# Patient Record
Sex: FEMALE | Race: WHITE | NOT HISPANIC OR LATINO | Employment: FULL TIME
[De-identification: names, ages, dates, MRNs, and addresses within clinical notes are randomized per-mention and may not be internally consistent; named-entity substitution may affect disease eponyms.]

---

## 2017-06-17 ENCOUNTER — HEALTH MAINTENANCE LETTER (OUTPATIENT)
Age: 47
End: 2017-06-17

## 2018-05-15 NOTE — TELEPHONE ENCOUNTER
FUTURE VISIT INFORMATION      FUTURE VISIT INFORMATION:    Date: 5/18/18    Time: 0840    Location: ORTHO  REFERRAL INFORMATION:    Referring provider:  N/A    Referring providers clinic:  N/A    Reason for visit/diagnosis  L FOOT BUNION       RECORDS STATUS      PATIENT TO  IMAGES AND RECORDS PER APPOINTMENT NOTES

## 2018-05-18 ENCOUNTER — PRE VISIT (OUTPATIENT)
Dept: ORTHOPEDICS | Facility: CLINIC | Age: 48
End: 2018-05-18

## 2018-05-18 ENCOUNTER — OFFICE VISIT (OUTPATIENT)
Dept: ORTHOPEDICS | Facility: CLINIC | Age: 48
End: 2018-05-18
Payer: COMMERCIAL

## 2018-05-18 VITALS — WEIGHT: 198.2 LBS | HEIGHT: 64 IN | BODY MASS INDEX: 33.84 KG/M2

## 2018-05-18 DIAGNOSIS — M20.12 HALLUX VALGUS OF LEFT FOOT: Primary | ICD-10-CM

## 2018-05-18 RX ORDER — FLUVOXAMINE MALEATE 50 MG
50 TABLET ORAL 2 TIMES DAILY
COMMUNITY
End: 2019-01-24

## 2018-05-18 ASSESSMENT — ENCOUNTER SYMPTOMS
WEIGHT GAIN: 0
HALLUCINATIONS: 0
NIGHT SWEATS: 0
ALTERED TEMPERATURE REGULATION: 0
CHILLS: 0
WEIGHT LOSS: 0
INCREASED ENERGY: 0
POLYPHAGIA: 0
FATIGUE: 1
FEVER: 0
DECREASED APPETITE: 0
POLYDIPSIA: 0

## 2018-05-18 NOTE — NURSING NOTE
"Chief Complaint   Patient presents with     Foot Problems     patient is here for second opinion on painful left great toe bunion. Patient was previously seen at [facility].        Pain Assessment  Patient Currently in Pain: Yes  0-10 Pain Scale: 3  Primary Pain Location: Foot (great toe)  Pain Orientation: Left  Pain Descriptors: Aching, Sharp  Alleviating Factors: Rest  Aggravating Factors: Walking               Ht 1.626 m (5' 4\")  Wt 89.9 kg (198 lb 3.2 oz)  BMI 34.02 kg/m2    No Known Allergies    Current Outpatient Prescriptions   Medication Sig Dispense Refill     albuterol (2.5 MG/3ML) 0.083% nebulizer solution Take 1 vial (2.5 mg) by nebulization every 6 hours as needed for shortness of breath / dyspnea or wheezing 30 vial 0     albuterol (PROAIR HFA, PROVENTIL HFA, VENTOLIN HFA) 108 (90 BASE) MCG/ACT inhaler Inhale 2 puffs into the lungs every 4 hours as needed for shortness of breath / dyspnea or wheezing 1 Inhaler 6     CALCIUM 600 + D 600-200 MG-UNIT OR TABS one daily       chlorzoxazone (PARAFON FORTE) 500 MG tablet TAKE 1/2-1&1/2 TABLETS BY MOUTH 3-4 TIMES DAILY AS NEEDED TO RELAX MUSCLES  Needs follow up for refills. Needs to establish care 56 tablet 0     Cholecalciferol (VITAMIN D) 2000 UNITS tablet Take 2,000 Units by mouth daily. 100 tablet 3     fluvoxaMINE (LUVOX) 50 MG tablet Take 50 mg by mouth 2 times daily       LANsoprazole (PREVACID) 30 MG capsule Take 1 capsule (30 mg) by mouth daily 90 capsule 0     lidocaine (LIDODERM) 5 % patch Place 1 patch onto the skin every 24 hours. Remove after 12 hours. 30 patch 0     naproxen (NAPROSYN) 500 MG tablet Take 1 tablet (500 mg) by mouth 2 times daily as needed for moderate pain 30 tablet 1     TYLENOL EXTRA STRENGTH 500 MG OR TABS 1 TABLET EVERY 4 HOURS AS NEEDED       BUDESONIDE, INHALATION, 90 MCG/ACT AEPB Inhale 2 puffs into the lungs 2 times daily (Patient not taking: Reported on 5/18/2018) 3 each prn     buPROPion (WELLBUTRIN XL) " 150 MG 24 hr tablet Take 1 tablet (150 mg) by mouth every morning 30 tablet 0     citalopram (CELEXA) 40 MG tablet Take 1 tablet (40 mg) by mouth every morning (Patient not taking: Reported on 5/18/2018) 90 tablet 1     HYDROcodone-acetaminophen (NORCO) 5-325 MG per tablet Take 1 tablet by mouth every 6 hours as needed for moderate to severe pain (Patient not taking: Reported on 5/18/2018) 42 tablet 0     multivitamin, therapeutic with minerals (MULTI-VITAMIN) TABS Take 1 tablet by mouth daily         Fani Sanford  5/18/2018

## 2018-05-18 NOTE — LETTER
5/18/2018       RE: Manju Berg  [address] [facility] [address]     Dear Colleague,    Thank you for referring your patient, Manju Berg, to the [facility] at [facility]. Please see a copy of my visit note below.    Date of Service: 5/18/2018    Chief Complaint:   Chief Complaint   Patient presents with     Foot Problems     patient is here for second opinion on painful left great toe bunion. Patient was previously seen at [facility].       HPI: Manju is a 47 year old female who presents today for further evaluation of painful left bunion. Patient had bilateral bunionectomies in 2000. Since then, the left bunion has slowly returned and it is now painful almost daily. It is affecting her daily life, as she is only able to find one pair of shoes that is even somewhat comfortable. Walking is otherwise painful. She has also started to get blisters on the dorsal left second digit because it is riding up higher now and rubbing against top of her shoes. She has tried padding, different footwear, steroid injections, pain medications and just recently, custom orthotics. She saw a podiatrist about 2 months ago and is here for a second opinion to see what her treatment options are. She is open to corrective surgery. She brought recent x-rays with her today.    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/2011,Proximal left tibia     Dyspepsia and other specified disorders of function of stomach     No Comments Provided     Headache     No Comments Provided     Herpes simplex     No Comments Provided     Lateral epicondylitis of elbow     No Comments Provided     Other depressive disorder     No Comments Provided     Other unspecified back disorder     No  "Comments Provided     Pain in joint, upper arm     No Comments Provided       PSxH:   Past Surgical History:   Procedure Laterality Date     BUNIONECTOMY      No Comments Provided     OTHER SURGICAL HISTORY      65656.0,[place] EXCIS BARTHOLIN GLAND/CYST     OTHER SURGICAL HISTORY      3/16/12,112784,OTHER,Left,lower leg     RELEASE CARPAL TUNNEL      No Comments Provided     Allergies: Review of patient's allergies indicates no known allergies.    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     FH:   Family History   Problem Relation Age of Onset     Hypertension Father      Hypertension     Hyperlipidemia Father      Hyperlipidemia     Other - See Comments Mother      Psychiatric illness     Family History Negative Sister      Good Health     Family History Negative Son      Good Health     Colon Cancer Father      Cancer-colon     CANCER Father      Cancer,bladder   Objective:  Ht 5' 4\"  Wt 198 lb 3.2 oz  BMI 34.02 kg/m2    PT and DP pulses are 2/4 bilaterally. CRT is 3 seconds. Positive pedal hair.   Gross sensation is intact bilaterally.   Equinus is mild bilaterally. No pain with active or passive ROM of the ankle, MTJ, 1st ray, or halluces bilaterally,. Hypermobility of the left 1st ray in all directions. HAV deformity noted to the left foot with some irritation erythema noted to the medial aspect of the joint. Pain noted with 1st MTPJ active and passive ROM.   Nails normal bilaterally. No open lesions " are noted.     No x-rays indicated during today's visit  Outside films from [place] [facility] were reviewed today, independent visualization of images was performed, and results were discussed with the patient  Impression: Noted to have medial prominence excision with some tibial sesamoid peaking.     Assessment: Left bunion causing pain  Hammertoe left second digit  Hx of BL bunionectomies 2000    Plan:  - Pt seen and evaluated. Diagnosis and treatment options discussed. X-rays reviewed.  - She does have hypermobility of the first ray and a previous bunionectomy on the left.  I have referred her to see Dr. Esqueda for possible Lapidus or other procedure consultation.   - Dispensed Budin splint for L second digit  - Continue with custom orthotics and comfortable footwear in the meantime.   - Refer to Dr. Esqueda for discussion of surgical options     Again, thank you for allowing me to participate in the care of your patient.      Sincerely,    Rajan Millan DPM

## 2018-05-18 NOTE — MR AVS SNAPSHOT
After Visit Summary   5/18/2018    Manju Berg    MRN: [id number]           Patient Information     Date Of Birth          1970        Visit Information        Provider Department      5/18/2018 8:40 AM Rajan Millan DPM [facility]        Today's Diagnoses     Hallux valgus of left foot    -  1       Follow-ups after your visit        Additional Services     ORTHOPEDICS ADULT REFERRAL       Your provider has referred you to: [facility]: Orthopaedic Clinic [facility] [phone]   [email or web address]      Dr. Esqueda - consult for possible Lapidus    Please be aware that coverage of these services is subject to the terms and limitations of your health insurance plan.  Call member services at your health plan with any benefit or coverage questions.      Please bring the following to your appointment:    >>   Any x-rays, CTs or MRIs which have been performed.  Contact the facility where they were done to arrange for  prior to your scheduled appointment.    >>   List of current medications   >>   This referral request   >>   Any documents/labs given to you for this referral                  Your next 10 appointments already scheduled     Jul 10, 2018  2:50 PM CDT   (Arrive by 2:35 PM)   NEW FOOT/ANKLE with Sudeep Esqueda MD   [facility] ([facility])    [address] [place] [id number] [phone]              Who to contact     Please call your clinic at [phone] to:    Ask questions about your health    Make or cancel appointments    Discuss your medicines    Learn about your test results    Speak to your doctor            Additional Information About Your Visit        MyChart Information     [employer] gives you secure access to your electronic health record. If you see a primary care provider, you can also send messages to your care team and make  "appointments. If you have questions, please call your primary care clinic.  If you do not have a primary care provider, please call [phone] and they will assist you.      [employer] is an electronic gateway that provides easy, online access to your medical records. With [employer], you can request a clinic appointment, read your test results, renew a prescription or communicate with your care team.     To access your existing account, please contact your [facility] or call [phone] for assistance.        Care EveryWhere ID     This is your Care EveryWhere ID. This could be used by other organizations to access your [facility] medical records  [id number]        Your Vitals Were     Height BMI (Body Mass Index)                1.626 m (5' 4\") 34.02 kg/m2           Blood Pressure from Last 3 Encounters:   07/19/15 120/83   02/04/15 108/60   01/09/15 120/72    Weight from Last 3 Encounters:   05/18/18 89.9 kg (198 lb 3.2 oz)   07/19/15 82.5 kg (181 lb 12.8 oz)   02/04/15 83.5 kg (184 lb)              We Performed the Following     ORTHOPEDICS ADULT REFERRAL        Primary Care Provider Office Phone # Fax #    Pierce Aguilar [phone]       [address] [place] [address] [place] [address]        Equal Access to Services     MATT LIND AH: Hadii aad ku hadasho Soomaali, waaxda luqadaha, qaybta kaalmada adegurjityada, mignon miller. So [facility] [phone].    ATENCIÓN: Si habla español, tiene a bassett disposición servicios gratuitos de asistencia lingüística. Llame al [phone].    We comply with applicable federal civil rights laws and [place] laws. We do not discriminate on the basis of race, color, national origin, age, disability, sex, sexual orientation, or gender identity.            Thank you!     Thank you for choosing [facility]  for your care. Our goal is always to provide you with excellent care. " Hearing back from our patients is one way we can continue to improve our services. Please take a few minutes to complete the written survey that you may receive in the mail after your visit with us. Thank you!             Your Updated Medication List - Protect others around you: Learn how to safely use, store and throw away your medicines at www.disposemymeds.org.          This list is accurate as of 5/18/18 10:31 AM.  Always use your most recent med list.                   Brand Name Dispense Instructions for use Diagnosis    * albuterol 108 (90 Base) MCG/ACT Inhaler    PROAIR HFA/PROVENTIL HFA/VENTOLIN HFA    1 Inhaler    Inhale 2 puffs into the lungs every 4 hours as needed for shortness of breath / dyspnea or wheezing    Mild intermittent asthma, with acute exacerbation       * albuterol (2.5 MG/3ML) 0.083% neb solution     30 vial    Take 1 vial (2.5 mg) by nebulization every 6 hours as needed for shortness of breath / dyspnea or wheezing    Cough       BUDESONIDE (INHALATION) 90 MCG/ACT Aepb     3 each    Inhale 2 puffs into the lungs 2 times daily    Mild intermittent asthma       buPROPion 150 MG 24 hr tablet    WELLBUTRIN XL    30 tablet    Take 1 tablet (150 mg) by mouth every morning    Major depressive disorder, single episode, unspecified       CALCIUM 600 + D 600-200 MG-UNIT Tabs      one daily        chlorzoxazone 500 MG tablet    PARAFON FORTE    56 tablet    TAKE 1/2-1&1/2 TABLETS BY MOUTH 3-4 TIMES DAILY AS NEEDED TO RELAX MUSCLES Needs follow up for refills. Needs to establish care    Cervicalgia       citalopram 40 MG tablet    celeXA    90 tablet    Take 1 tablet (40 mg) by mouth every morning    Major depressive disorder, single episode, unspecified       fluvoxaMINE 50 MG tablet    LUVOX     Take 50 mg by mouth 2 times daily        HYDROcodone-acetaminophen 5-325 MG per tablet    NORCO    42 tablet    Take 1 tablet by mouth every 6 hours as needed for moderate to severe pain    Cervicalgia        LANsoprazole 30 MG CR capsule    PREVACID    90 capsule    Take 1 capsule (30 mg) by mouth daily    Dyspepsia and other specified disorders of function of stomach       lidocaine 5 % Patch    LIDODERM    30 patch    Place 1 patch onto the skin every 24 hours. Remove after 12 hours.    Lumbago       Multi-vitamin Tabs tablet      Take 1 tablet by mouth daily        naproxen 500 MG tablet    NAPROSYN    30 tablet    Take 1 tablet (500 mg) by mouth 2 times daily as needed for moderate pain    Hand joint pain, right       TYLENOL 500 MG tablet   Generic drug:  acetaminophen      1 TABLET EVERY 4 HOURS AS NEEDED        vitamin D 2000 units tablet     100 tablet    Take 2,000 Units by mouth daily.    Unspecified vitamin D deficiency       * Notice:  This list has 2 medication(s) that are the same as other medications prescribed for you. Read the directions carefully, and ask your doctor or other care provider to review them with you.

## 2018-05-18 NOTE — PROGRESS NOTES
Date of Service: 5/18/2018    Chief Complaint:   Chief Complaint   Patient presents with     Foot Problems     patient is here for second opinion on painful left great toe bunion. Patient was previously seen at [facility].       HPI: Manju is a 47 year old female who presents today for further evaluation of painful left bunion. Patient had bilateral bunionectomies in 2000. Since then, the left bunion has slowly returned and it is now painful almost daily. It is affecting her daily life, as she is only able to find one pair of shoes that is even somewhat comfortable. Walking is otherwise painful. She has also started to get blisters on the dorsal left second digit because it is riding up higher now and rubbing against top of her shoes. She has tried padding, different footwear, steroid injections, pain medications and just recently, custom orthotics. She saw a podiatrist about 2 months ago and is here for a second opinion to see what her treatment options are. She is open to corrective surgery. She brought recent x-rays with her today.    Review of Systems: Answers for HPI/ROS submitted by the patient on 5/18/2018   General Symptoms: Yes  Skin Symptoms: No  HENT Symptoms: No  EYE SYMPTOMS: No  HEART SYMPTOMS: No  LUNG SYMPTOMS: No  INTESTINAL SYMPTOMS: No  URINARY SYMPTOMS: No  GYNECOLOGIC SYMPTOMS: No  BREAST SYMPTOMS: No  SKELETAL SYMPTOMS: No  BLOOD SYMPTOMS: No  NERVOUS SYSTEM SYMPTOMS: No  MENTAL HEALTH SYMPTOMS: No  Fever: No  Loss of appetite: No  Weight loss: No  Weight gain: No  Fatigue: Yes  Night sweats: No  Chills: No  Increased stress: Yes  Excessive hunger: No  Excessive thirst: No  Feeling hot or cold when others believe the temperature is normal: No  Loss of height: No  Post-operative complications: No  Surgical site pain: No  Hallucinations: No  Change in or Loss of Energy: No  Hyperactivity: No  Confusion: No      PMH:   Past Medical History:   Diagnosis Date     Anxiety state     No  "Comments Provided     Asthma     No Comments Provided     Asthma     No Comments Provided     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     5/27/2011,Proximal left tibia     Dyspepsia and other specified disorders of function of stomach     No Comments Provided     Headache     No Comments Provided     Herpes simplex     No Comments Provided     Lateral epicondylitis of elbow     No Comments Provided     Other depressive disorder     No Comments Provided     Other unspecified back disorder     No Comments Provided     Pain in joint, upper arm     No Comments Provided       PSxH:   Past Surgical History:   Procedure Laterality Date     BUNIONECTOMY      No Comments Provided     OTHER SURGICAL HISTORY      18389.0,[place] EXCIS BARTHOLIN GLAND/CYST     OTHER SURGICAL HISTORY      3/16/12,795210,OTHER,Left,lower leg     RELEASE CARPAL TUNNEL      No Comments Provided       Allergies: Review of patient's allergies indicates no known allergies.    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       FH:   Family History   Problem Relation Age of Onset     Hypertension Father      Hypertension     Hyperlipidemia Father      Hyperlipidemia     Other - See Comments Mother      Psychiatric illness     Family History Negative Sister      Good Health     Family History Negative Son      Good Health     Colon Cancer Father      Cancer-colon     CANCER Father      Cancer,bladder       Objective:  Ht 5' 4\"  Wt 198 lb 3.2 oz "  BMI 34.02 kg/m2    PT and DP pulses are 2/4 bilaterally. CRT is 3 seconds. Positive pedal hair.   Gross sensation is intact bilaterally.   Equinus is mild bilaterally. No pain with active or passive ROM of the ankle, MTJ, 1st ray, or halluces bilaterally,. Hypermobility of the left 1st ray in all directions. HAV deformity noted to the left foot with some irritation erythema noted to the medial aspect of the joint. Pain noted with 1st MTPJ active and passive ROM.   Nails normal bilaterally. No open lesions are noted.     No x-rays indicated during today's visit  Outside films from [facility] were reviewed today, independent visualization of images was performed, and results were discussed with the patient  Impression: Noted to have medial prominence excision with some tibial sesamoid peaking.       Assessment: Left bunion causing pain  Hammertoe left second digit  Hx of BL bunionectomies 2000    Plan:  - Pt seen and evaluated. Diagnosis and treatment options discussed. X-rays reviewed.  - She does have hypermobility of the first ray and a previous bunionectomy on the left.  I have referred her to see Dr. Esqueda for possible Lapidus or other procedure consultation.   - Dispensed Budin splint for L second digit  - Continue with custom orthotics and comfortable footwear in the meantime.   - Refer to Dr. Esqueda for discussion of surgical options

## 2018-06-28 NOTE — TELEPHONE ENCOUNTER
FUTURE VISIT INFORMATION      FUTURE VISIT INFORMATION:    Date: 7/10/18    Time: 1450    Location: ORTHO  REFERRAL INFORMATION:    Referring provider:  DR HEREDIA    Referring providers clinic:  ORTHO/PODIATRY    Reason for visit/diagnosis  HALLUX VALGUS      RECORDS STATUS      RECORDS IN CHART FROM DR GLOVER APPOINTMENT

## 2018-07-02 DIAGNOSIS — M20.12 HALLUX VALGUS, ACQUIRED, LEFT: Primary | ICD-10-CM

## 2018-07-10 ENCOUNTER — RADIANT APPOINTMENT (OUTPATIENT)
Dept: GENERAL RADIOLOGY | Facility: CLINIC | Age: 48
End: 2018-07-10
Attending: ORTHOPAEDIC SURGERY
Payer: COMMERCIAL

## 2018-07-10 ENCOUNTER — OFFICE VISIT (OUTPATIENT)
Dept: ORTHOPEDICS | Facility: CLINIC | Age: 48
End: 2018-07-10
Payer: COMMERCIAL

## 2018-07-10 ENCOUNTER — PRE VISIT (OUTPATIENT)
Dept: ORTHOPEDICS | Facility: CLINIC | Age: 48
End: 2018-07-10

## 2018-07-10 VITALS — BODY MASS INDEX: 32.44 KG/M2 | WEIGHT: 190 LBS | HEIGHT: 64 IN

## 2018-07-10 DIAGNOSIS — M20.12 HALLUX VALGUS, ACQUIRED, LEFT: ICD-10-CM

## 2018-07-10 DIAGNOSIS — M25.572 PAIN IN JOINT, ANKLE AND FOOT, LEFT: Primary | ICD-10-CM

## 2018-07-10 ASSESSMENT — ENCOUNTER SYMPTOMS
SKIN CHANGES: 0
POOR WOUND HEALING: 1
NAIL CHANGES: 0

## 2018-07-10 NOTE — NURSING NOTE
Reason For Visit:   Chief Complaint   Patient presents with     Musculoskeletal Problem     Left foot bunion           Pain Assessment  Patient Currently in Pain: No

## 2018-07-10 NOTE — LETTER
7/10/2018       RE: Manju Berg  [address] [facility] [address]     Dear Colleague,    Thank you for referring your patient, Manju Berg, [facility]. Please see a copy of my visit note below.    Chief complaint left foot bunion deformity    Mrs. Jalloh is a 48-year-old female who presents today for evaluation of her left foot.  Patient reports to have had a bunion corrective surgery in the past and in spite of her best efforts she reports to to have now a recurrence of the deformity.  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  Reports otherwise to be a very active person who enjoys walking for recreational purposes.    Patient has tried some shoe modifications but he still presents with pain and discomfort.  More recently she had that went a hike with her family where she developed a fair amount of ulceration across the dorsal aspect of the second toe which is felt to be secondary to the great toe pushing the second toe towards the shoe.    Past medical history significant for anxiety and depression among others.    Patient has no known drug allergies.    Current medications were reviewed on today's visit.    On today's exam she presents with full range of motion of the ankle and hind foot joints. Presents with a hypermobile first ray as well. There is a correctable deformity of the bunion. There are also signs of skin pressure along the dorsomedial aspect of the 1st MTPJ. Remaining of the foot exam is unremarkable.    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     Assesement: Left bunion deformity.    Plan, it was discussed with the patient the different treatment options. Those included, observation vs. a Lapidus  procedure vs. a 1st MTPJ fusion. Pros and cons of each option were discussed with the patient.     After a lengthy discussion patient opted to proceed with a 1st MTPJ fusion of the left foot. Complications form that intervention include but not limited to, pain, stiffness, nonunion, nerve damage.    Patient would like to do some thinking and she will contact us when she chooses to proceed with a 1st MTPJ fusion of the left foot.    CT: 20 mins, TT: 30 mins.    Again, thank you for allowing me to participate in the care of your patient.      Sincerely,    Sudeep Esqueda MD

## 2018-07-10 NOTE — NURSING NOTE
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 No       Patient demonstrates understanding of the following:  Reason for the appointment, diagnosis and treatment plan: Yes  Knowledge of proper use of medications and conditions for which they are ordered (with special attention to potential side effects or drug interactions): Yes  Which situations necessitate calling provider and whom to contact: Yes       Teaching Concerns Addressed:   Comments: negative significant medical history      Proper use and care of  (medical equip, care aids, etc.): Yes  Nutritional needs and diet plan: Yes  Pain management techniques: Yes  Wound Care: Yes  How and/when to access community resources: NA     Instructional Materials Used/Given: pre-op packet, antiseptic soap, TRIA packet, post-operative foot and ankle surgery instructions      Time spent with patient: 15 minutes.

## 2018-07-10 NOTE — PROGRESS NOTES
Chief complaint left foot bunion deformity    Mrs. Jalloh is a 48-year-old female who presents today for evaluation of her left foot.  Patient reports to have had a bunion corrective surgery in the past and in spite of her best efforts she reports to to have now a recurrence of the deformity.  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  Reports otherwise to be a very active person who enjoys walking for recreational purposes.    Patient has tried some shoe modifications but he still presents with pain and discomfort.  More recently she had that went a hike with her family where she developed a fair amount of ulceration across the dorsal aspect of the second toe which is felt to be secondary to the great toe pushing the second toe towards the shoe.    Past medical history significant for anxiety and depression among others.    Patient has no known drug allergies.    Current medications were reviewed on today's visit.    On today's exam she presents with full range of motion of the ankle and hind foot joints. Presents with a hypermobile first ray as well. There is a correctable deformity of the bunion. There are also signs of skin pressure along the dorsomedial aspect of the 1st MTPJ. Remaining of the foot exam is unremarkable.    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     Assesement: Left bunion deformity.    Plan, it was discussed with the patient the different treatment options. Those included, observation vs. a Lapidus procedure vs. a 1st MTPJ fusion. Pros and cons of each option were discussed with the patient.     After a lengthy discussion patient opted to proceed with a 1st MTPJ fusion of the left foot. Complications form that intervention include but not limited to, pain, stiffness, nonunion, nerve  damage.    Patient would like to do some thinking and she will contact us when she chooses to proceed with a 1st MTPJ fusion of the left foot.    CT: 20 mins, TT: 30 mins.

## 2018-07-10 NOTE — MR AVS SNAPSHOT
"              After Visit Summary   7/10/2018    Manju Berg    MRN: [id number]           Patient Information     Date Of Birth          1970        Visit Information        Provider Department      7/10/2018 2:50 PM [facility]        Today's Diagnoses     Pain in joint, ankle and foot, left    -  1       Follow-ups after your visit        Who to contact     Please call your clinic at [phone] to:    Ask questions about your health    Make or cancel appointments    Discuss your medicines    Learn about your test results    Speak to your doctor            Additional Information About Your Visit        MyChart Information     [employer] gives you secure access to your electronic health record. If you see a primary care provider, you can also send messages to your care team and make appointments. If you have questions, please call your primary care clinic.  If you do not have a primary care provider, please call [phone] and they will assist you.      [employer] is an electronic gateway that provides easy, online access to your medical records. With [employer], you can request a clinic appointment, read your test results, renew a prescription or communicate with your care team.     To access your existing account, please contact your [facility] or call [phone] for assistance.        Care EveryWhere ID     This is your Care EveryWhere ID. This could be used by other organizations to access your [place] medical records  [id number]        Your Vitals Were     Height BMI (Body Mass Index)                1.626 m (5' 4\") 32.61 kg/m2           Blood Pressure from Last 3 Encounters:   07/19/15 120/83   02/04/15 108/60   01/09/15 120/72    Weight from Last 3 Encounters:   07/10/18 86.2 kg (190 lb)   05/18/18 89.9 kg (198 lb 3.2 oz)   07/19/15 82.5 kg (181 lb 12.8 oz)              Today, you had the following     No orders found for " display         Today's Medication Changes          These changes are accurate as of 7/10/18  5:52 PM.  If you have any questions, ask your nurse or doctor.               Stop taking these medicines if you haven't already. Please contact your care team if you have questions.     HYDROcodone-acetaminophen 5-325 MG per tablet   Commonly known as:  NORCO   Stopped by:  Sudeep Esqueda MD                    Primary Care Provider Office Phone # Fax #    Pierce Aguilar [phone]       [address] [place] [address] [place] [address]        Equal Access to Services     [facility]: Hadii aad ku hadasho Soomaali, waaxda luqadaha, qaybta kaalmada adeegyada, mignon zamudio . So [facility] [phone].    ATENCIÓN: Si habla español, tiene a bassett disposición servicios gratuitos de asistencia lingüística. [facility] [phone].    We comply with applicable federal civil rights laws and [place] laws. We do not discriminate on the basis of race, color, national origin, age, disability, sex, sexual orientation, or gender identity.            Thank you!     Thank you for choosing [facility]  for your care. Our goal is always to provide you with excellent care. Hearing back from our patients is one way we can continue to improve our services. Please take a few minutes to complete the written survey that you may receive in the mail after your visit with us. Thank you!             Your Updated Medication List - Protect others around you: Learn how to safely use, store and throw away your medicines at www.disposemymeds.org.          This list is accurate as of 7/10/18  5:52 PM.  Always use your most recent med list.                   Brand Name Dispense Instructions for use Diagnosis    * albuterol 108 (90 Base) MCG/ACT Inhaler    PROAIR HFA/PROVENTIL HFA/VENTOLIN HFA    1 Inhaler    Inhale 2 puffs into the lungs every 4 hours as needed for shortness of  breath / dyspnea or wheezing    Mild intermittent asthma, with acute exacerbation       * albuterol (2.5 MG/3ML) 0.083% neb solution     30 vial    Take 1 vial (2.5 mg) by nebulization every 6 hours as needed for shortness of breath / dyspnea or wheezing    Cough       BUDESONIDE (INHALATION) 90 MCG/ACT Aepb     3 each    Inhale 2 puffs into the lungs 2 times daily    Mild intermittent asthma       buPROPion 150 MG 24 hr tablet    WELLBUTRIN XL    30 tablet    Take 1 tablet (150 mg) by mouth every morning    Major depressive disorder, single episode, unspecified       CALCIUM 600 + D 600-200 MG-UNIT Tabs      one daily        chlorzoxazone 500 MG tablet    PARAFON FORTE    56 tablet    TAKE 1/2-1&1/2 TABLETS BY MOUTH 3-4 TIMES DAILY AS NEEDED TO RELAX MUSCLES Needs follow up for refills. Needs to establish care    Cervicalgia       citalopram 40 MG tablet    celeXA    90 tablet    Take 1 tablet (40 mg) by mouth every morning    Major depressive disorder, single episode, unspecified       fluvoxaMINE 50 MG tablet    LUVOX     Take 50 mg by mouth 2 times daily        LANsoprazole 30 MG CR capsule    PREVACID    90 capsule    Take 1 capsule (30 mg) by mouth daily    Dyspepsia and other specified disorders of function of stomach       lidocaine 5 % Patch    LIDODERM    30 patch    Place 1 patch onto the skin every 24 hours. Remove after 12 hours.    Lumbago       Multi-vitamin Tabs tablet      Take 1 tablet by mouth daily        naproxen 500 MG tablet    NAPROSYN    30 tablet    Take 1 tablet (500 mg) by mouth 2 times daily as needed for moderate pain    Hand joint pain, right       TYLENOL 500 MG tablet   Generic drug:  acetaminophen      1 TABLET EVERY 4 HOURS AS NEEDED        vitamin D 2000 units tablet     100 tablet    Take 2,000 Units by mouth daily.    Unspecified vitamin D deficiency       * Notice:  This list has 2 medication(s) that are the same as other medications prescribed for you. Read the directions  carefully, and ask your doctor or other care provider to review them with you.

## 2018-07-13 ENCOUNTER — TELEPHONE (OUTPATIENT)
Dept: ORTHOPEDICS | Facility: CLINIC | Age: 48
End: 2018-07-13

## 2018-07-13 NOTE — TELEPHONE ENCOUNTER
Phoned patient to confirm surgery with Dr. Esqueda for 8/31/18 at [facility]. Patient was told she will receive a call from [facility] the week of her surgery to confirm her arrival time. Patient was also provided dates and times of her post ops and was reminded to get her pre-op H&P within 30 days of surgery. Patient agreed.

## 2018-07-30 ENCOUNTER — TELEPHONE (OUTPATIENT)
Dept: ORTHOPEDICS | Facility: CLINIC | Age: 48
End: 2018-07-30

## 2018-07-30 NOTE — TELEPHONE ENCOUNTER
Phoned patient to let her know that her surgery with Dr. Esqueda scheduled for 8/31/18 at [facility] will need to be rescheduled due to a change in his schedule. I left her my direct number to call back as soon as she is able. [phone].

## 2018-07-30 NOTE — TELEPHONE ENCOUNTER
Received call back from patient to reschedule her surgery with Dr. Esqueda from 8/31/18 to 8/30/18 at [facility]. Patient was told she will receive a call from [facility] the week of surgery to confirm her arrival time. Patient agreed.

## 2018-08-09 ENCOUNTER — TELEPHONE (OUTPATIENT)
Dept: ORTHOPEDICS | Facility: CLINIC | Age: 48
End: 2018-08-09

## 2018-08-09 DIAGNOSIS — Z09 SURGICAL FOLLOW-UP CARE: Primary | ICD-10-CM

## 2018-08-09 NOTE — TELEPHONE ENCOUNTER
[facility] Call Center    Phone Message    May a detailed message be left on voicemail: yes    Reason for Call: Other: Pt is requesting knee walker for post surgery     Action Taken: Message routed to:  [facility] Orthopedics

## 2018-08-13 NOTE — TELEPHONE ENCOUNTER
Order placed for roll-a-bout knee walker as requested and mailed to patients home. Patient has been notified.

## 2018-08-21 ENCOUNTER — DOCUMENTATION ONLY (OUTPATIENT)
Dept: ORTHOPEDICS | Facility: CLINIC | Age: 48
End: 2018-08-21

## 2018-08-24 ENCOUNTER — TELEPHONE (OUTPATIENT)
Dept: ORTHOPEDICS | Facility: CLINIC | Age: 48
End: 2018-08-24

## 2018-08-24 NOTE — TELEPHONE ENCOUNTER
[facility] Call Center    Phone Message    May a detailed message be left on voicemail: yes    Reason for Call: Other: Ferny from [employer] calling. Has some questions regarding paperwork that was faxed to them. Please call them ASAP.     Action Taken: Message routed to:  [facility] Orthopedics

## 2018-08-24 NOTE — TELEPHONE ENCOUNTER
Call returned to Ferny from [facility], additional information provided as requested such as who her referring provider was and how long she has had this condition.

## 2018-08-30 ENCOUNTER — TRANSFERRED RECORDS (OUTPATIENT)
Dept: HEALTH INFORMATION MANAGEMENT | Facility: CLINIC | Age: 48
End: 2018-08-30

## 2018-09-13 ENCOUNTER — OFFICE VISIT (OUTPATIENT)
Dept: ORTHOPEDICS | Facility: CLINIC | Age: 48
End: 2018-09-13
Payer: COMMERCIAL

## 2018-09-13 DIAGNOSIS — Z09 SURGICAL FOLLOW-UP CARE: Primary | ICD-10-CM

## 2018-09-13 NOTE — MR AVS SNAPSHOT
After Visit Summary   9/13/2018    Manju Berg    MRN: [id number]           Patient Information     Date Of Birth          1970        Visit Information        Provider Department      9/13/2018 1:00 PM Nurse, Johan FERNANDEZ [facility]        Today's Diagnoses     Surgical follow-up care    -  1       Follow-ups after your visit        Your next 10 appointments already scheduled     Oct 16, 2018  8:00 AM CDT   (Arrive by 7:45 AM)   RETURN FOOT/ANKLE with Sudeep Esqueda MD   [facility] ([facility])    [address] [place] [id number] [phone]              Who to contact     Please call your clinic at [phone] to:    Ask questions about your health    Make or cancel appointments    Discuss your medicines    Learn about your test results    Speak to your doctor            Additional Information About Your Visit        MyChart Information     [employer] gives you secure access to your electronic health record. If you see a primary care provider, you can also send messages to your care team and make appointments. If you have questions, please call your primary care clinic.  If you do not have a primary care provider, please call [phone] and they will assist you.      [facility] is an electronic gateway that provides easy, online access to your medical records. With [facility], you can request a clinic appointment, read your test results, renew a prescription or communicate with your care team.     To access your existing account, please contact your [facility] or call [phone] for assistance.        Care EveryWhere ID     This is your Care EveryWhere ID. This could be used by other organizations to access your [place] medical records  [id number]         Blood Pressure from Last 3 Encounters:   07/19/15 120/83   02/04/15 108/60   01/09/15 120/72    Weight from Last 3  Encounters:   07/10/18 86.2 kg (190 lb)   05/18/18 89.9 kg (198 lb 3.2 oz)   07/19/15 82.5 kg (181 lb 12.8 oz)              Today, you had the following     No orders found for display       Primary Care Provider Office Phone # Fax #    Pierce Aguilar [phone]       [address] [place] [address] [place] [address]        Equal Access to Services     [facility] : Hadii aad ku hadasho Soomaali, waaxda luqadaha, qaybta kaalmada adeegyada, waxay idiin hayaan adeeg kharash la'lynette ah. So [facility] [phone].    ATENCIÓN: Si habla español, tiene a bassett disposición servicios gratuitos de asistencia lingüística. [facility] [phone].    We comply with applicable federal civil rights laws and [place] laws. We do not discriminate on the basis of race, color, national origin, age, disability, sex, sexual orientation, or gender identity.            Thank you!     Thank you for choosing [facility]  for your care. Our goal is always to provide you with excellent care. Hearing back from our patients is one way we can continue to improve our services. Please take a few minutes to complete the written survey that you may receive in the mail after your visit with us. Thank you!             Your Updated Medication List - Protect others around you: Learn how to safely use, store and throw away your medicines at www.disposemymeds.org.          This list is accurate as of 9/13/18  1:39 PM.  Always use your most recent med list.                   Brand Name Dispense Instructions for use Diagnosis    * albuterol 108 (90 Base) MCG/ACT inhaler    PROAIR HFA/PROVENTIL HFA/VENTOLIN HFA    1 Inhaler    Inhale 2 puffs into the lungs every 4 hours as needed for shortness of breath / dyspnea or wheezing    Mild intermittent asthma, with acute exacerbation       * albuterol (2.5 MG/3ML) 0.083% neb solution     30 vial    Take 1 vial (2.5 mg) by nebulization every 6 hours as needed for shortness  of breath / dyspnea or wheezing    Cough       BUDESONIDE (INHALATION) 90 MCG/ACT Aepb     3 each    Inhale 2 puffs into the lungs 2 times daily    Mild intermittent asthma       buPROPion 150 MG 24 hr tablet    WELLBUTRIN XL    30 tablet    Take 1 tablet (150 mg) by mouth every morning    Major depressive disorder, single episode, unspecified       CALCIUM 600 + D 600-200 MG-UNIT Tabs      one daily        chlorzoxazone 500 MG tablet    PARAFON FORTE    56 tablet    TAKE 1/2-1&1/2 TABLETS BY MOUTH 3-4 TIMES DAILY AS NEEDED TO RELAX MUSCLES Needs follow up for refills. Needs to establish care    Cervicalgia       citalopram 40 MG tablet    celeXA    90 tablet    Take 1 tablet (40 mg) by mouth every morning    Major depressive disorder, single episode, unspecified       fluvoxaMINE 50 MG tablet    LUVOX     Take 50 mg by mouth 2 times daily        LANsoprazole 30 MG CR capsule    PREVACID    90 capsule    Take 1 capsule (30 mg) by mouth daily    Dyspepsia and other specified disorders of function of stomach       lidocaine 5 % Patch    LIDODERM    30 patch    Place 1 patch onto the skin every 24 hours. Remove after 12 hours.    Lumbago       Multi-vitamin Tabs tablet      Take 1 tablet by mouth daily        naproxen 500 MG tablet    NAPROSYN    30 tablet    Take 1 tablet (500 mg) by mouth 2 times daily as needed for moderate pain    Hand joint pain, right       order for DME     1 Units    Roll-A-Bout Walker. Patient can use for 4 months    Surgical follow-up care       TYLENOL 500 MG tablet   Generic drug:  acetaminophen      1 TABLET EVERY 4 HOURS AS NEEDED        vitamin D 2000 units tablet     100 tablet    Take 2,000 Units by mouth daily.    Unspecified vitamin D deficiency       * Notice:  This list has 2 medication(s) that are the same as other medications prescribed for you. Read the directions carefully, and ask your doctor or other care provider to review them with you.

## 2018-09-13 NOTE — PROGRESS NOTES
Reason for visit:    Manju Berg came in to the clinic for a two week post op check.    Her surgery was done 8/30/18 by Dr Esqueda.  She had Left 1st metatarsophalangeal joint arthrodesis     Assessment:    Manju came into the clinic in her post op cam boot WBAT    The Surgical wounds were exposed and found to be well-healed and without evidence of infection; so the sutures were removed.    She is taking Tylenol for pain, and states that overall she is feeling good with minimal pain.     Plan:     She was placed in steri-strips and an ace wrap.  She was told to remain WBAT     She has an appointment to see Dr. Esqueda at that time Dr. Esqueda will determine further restrictions.    She has our phone number and will call with questions or problems.

## 2018-09-15 ENCOUNTER — HOSPITAL ENCOUNTER (EMERGENCY)
Facility: CLINIC | Age: 48
Discharge: HOME | End: 2018-09-15
Attending: EMERGENCY MEDICINE | Admitting: EMERGENCY MEDICINE
Payer: COMMERCIAL

## 2018-09-15 ENCOUNTER — APPOINTMENT (OUTPATIENT)
Dept: ULTRASOUND IMAGING | Facility: CLINIC | Age: 48
End: 2018-09-15
Attending: EMERGENCY MEDICINE
Payer: COMMERCIAL

## 2018-09-15 ENCOUNTER — APPOINTMENT (OUTPATIENT)
Dept: GENERAL RADIOLOGY | Facility: CLINIC | Age: 48
End: 2018-09-15
Attending: EMERGENCY MEDICINE
Payer: COMMERCIAL

## 2018-09-15 VITALS
OXYGEN SATURATION: 98 % | WEIGHT: 185 LBS | BODY MASS INDEX: 31.76 KG/M2 | RESPIRATION RATE: 14 BRPM | HEART RATE: 69 BPM | DIASTOLIC BLOOD PRESSURE: 73 MMHG | SYSTOLIC BLOOD PRESSURE: 111 MMHG | TEMPERATURE: 97.7 F

## 2018-09-15 DIAGNOSIS — G89.18 POSTOPERATIVE PAIN: ICD-10-CM

## 2018-09-15 LAB
ANION GAP SERPL CALCULATED.3IONS-SCNC: 2 MMOL/L (ref 3–14)
BASOPHILS # BLD AUTO: 0 10E9/L (ref 0–0.2)
BASOPHILS NFR BLD AUTO: 0.5 %
BUN SERPL-MCNC: 13 MG/DL (ref 7–30)
CALCIUM SERPL-MCNC: 8.7 MG/DL (ref 8.5–10.1)
CHLORIDE SERPL-SCNC: 104 MMOL/L (ref 94–109)
CO2 SERPL-SCNC: 32 MMOL/L (ref 20–32)
CREAT SERPL-MCNC: 0.8 MG/DL (ref 0.52–1.04)
CRP SERPL-MCNC: <2.9 MG/L (ref 0–8)
DIFFERENTIAL METHOD BLD: NORMAL
EOSINOPHIL # BLD AUTO: 0.3 10E9/L (ref 0–0.7)
EOSINOPHIL NFR BLD AUTO: 5.7 %
ERYTHROCYTE [DISTWIDTH] IN BLOOD BY AUTOMATED COUNT: 11.8 % (ref 10–15)
ERYTHROCYTE [SEDIMENTATION RATE] IN BLOOD BY WESTERGREN METHOD: 5 MM/H (ref 0–20)
GFR SERPL CREATININE-BSD FRML MDRD: 77 ML/MIN/1.7M2
GLUCOSE SERPL-MCNC: 76 MG/DL (ref 70–99)
HCT VFR BLD AUTO: 42 % (ref 35–47)
HGB BLD-MCNC: 14.3 G/DL (ref 11.7–15.7)
IMM GRANULOCYTES # BLD: 0 10E9/L (ref 0–0.4)
IMM GRANULOCYTES NFR BLD: 0.2 %
LYMPHOCYTES # BLD AUTO: 2.4 10E9/L (ref 0.8–5.3)
LYMPHOCYTES NFR BLD AUTO: 43.4 %
MCH RBC QN AUTO: 28.9 PG (ref 26.5–33)
MCHC RBC AUTO-ENTMCNC: 34 G/DL (ref 31.5–36.5)
MCV RBC AUTO: 85 FL (ref 78–100)
MONOCYTES # BLD AUTO: 0.3 10E9/L (ref 0–1.3)
MONOCYTES NFR BLD AUTO: 5 %
NEUTROPHILS # BLD AUTO: 2.5 10E9/L (ref 1.6–8.3)
NEUTROPHILS NFR BLD AUTO: 45.2 %
NRBC # BLD AUTO: 0 10*3/UL
NRBC BLD AUTO-RTO: 0 /100
PLATELET # BLD AUTO: 226 10E9/L (ref 150–450)
POTASSIUM SERPL-SCNC: 3.8 MMOL/L (ref 3.4–5.3)
RBC # BLD AUTO: 4.95 10E12/L (ref 3.8–5.2)
SODIUM SERPL-SCNC: 138 MMOL/L (ref 133–144)
WBC # BLD AUTO: 5.6 10E9/L (ref 4–11)

## 2018-09-15 PROCEDURE — 86140 C-REACTIVE PROTEIN: CPT | Performed by: EMERGENCY MEDICINE

## 2018-09-15 PROCEDURE — 85652 RBC SED RATE AUTOMATED: CPT | Performed by: EMERGENCY MEDICINE

## 2018-09-15 PROCEDURE — 80048 BASIC METABOLIC PNL TOTAL CA: CPT | Performed by: EMERGENCY MEDICINE

## 2018-09-15 PROCEDURE — 73630 X-RAY EXAM OF FOOT: CPT | Mod: LT

## 2018-09-15 PROCEDURE — 99285 EMERGENCY DEPT VISIT HI MDM: CPT | Mod: 25

## 2018-09-15 PROCEDURE — 85025 COMPLETE CBC W/AUTO DIFF WBC: CPT | Performed by: EMERGENCY MEDICINE

## 2018-09-15 PROCEDURE — 93971 EXTREMITY STUDY: CPT | Mod: LT

## 2018-09-15 RX ORDER — GABAPENTIN 300 MG/1
CAPSULE ORAL
Qty: 70 CAPSULE | Refills: 0 | Status: SHIPPED | OUTPATIENT
Start: 2018-09-15 | End: 2019-01-22

## 2018-09-15 RX ORDER — OXYCODONE HYDROCHLORIDE 5 MG/1
5 TABLET ORAL EVERY 6 HOURS PRN
Qty: 12 TABLET | Refills: 0 | Status: SHIPPED | OUTPATIENT
Start: 2018-09-15 | End: 2019-01-22

## 2018-09-15 ASSESSMENT — ENCOUNTER SYMPTOMS
SHORTNESS OF BREATH: 0
WOUND: 0
ABDOMINAL PAIN: 0
FEVER: 0
ARTHRALGIAS: 1
VOMITING: 0
NAUSEA: 0
NUMBNESS: 1
CHILLS: 0

## 2018-09-15 NOTE — ED AVS SNAPSHOT
Emergency Department    [address] [facility] [address]    Phone:  [phone]    Fax:  [phone]                                       Manju Berg   MRN: [id number]    Department:   Emergency Department   Date of Visit:  9/15/2018           After Visit Summary Signature Page     I have received my discharge instructions, and my questions have been answered. I have discussed any challenges I see with this plan with the nurse or doctor.    ..........................................................................................................................................  Patient/Patient Representative Signature      ..........................................................................................................................................  Patient Representative Print Name and Relationship to Patient    ..................................................               ................................................  Date                                   Time    ..........................................................................................................................................  Reviewed by Signature/Title    ...................................................              ..............................................  Date                                               Time          22EPIC Rev 08/18

## 2018-09-15 NOTE — ED PROVIDER NOTES
History     Chief Complaint:  Foot Pain    HPI   Manju Berg is a 48 year old female s/p left first metatarsophalangeal joint fusion on 8/30/18 with Dr. Dheeraj BERRY who presents with left foot pain. The patient reports she was doing well following the surgery and had her sutures removed on 9/13/18. However, overnight on 9/14/18, the patient notes she began experiencing sudden onset of a burning and tingling sensation starting on the top of her left foot, radiating into her big toe. Throughout the day yesterday, she states she rested and kept the boot and ace wrap off, which seemed to help it feel better. Last night throughout the night, the patient states the pain and tingling became unbearable again, and she was unable to get comfortable even with pain medications, so she came to the ED for further evaluation. On presentation to the ED, the patient continues to endorse the tingling and pounding sensation in her left foot and big toe. The patient denies any redness, drainage, fevers, increased swelling to the foot, chest pain, shortness of breath, nausea, vomiting, night sweats, recent falls or trauma to the foot, or other acute symptoms. Of note, the patient has not spoken with her orthopedic surgeon since the pain started.    Allergies:  No known drug allergies    Medications:    Albuterol neb solution  Albuterol inhaler  Budesonide inhaler  Bupropion  Chlorzoxazone  Celexa  Fluvoxamine  Prevacid  Tylenol  Naproxen    Past Medical History:    Anxiety  Asthma  Benign neoplasm of bone/articular cartilage  Headaches  Depression  Chronic constipation    Past Surgical History:    Bunionectomy  Bartholin cyst removal  Release carpal tunnel  Orthopedic foot surgery, left    Family History:    Hypertension  Hyperlipidemia  Psychiatric illness  Colon cancer  Bladder cancer    Social History:  Smoking status: No  Alcohol use: Yes, socially  PCP: Pierce Aguilar  Marital Status:  [2]     Review of  Systems   Constitutional: Negative for chills and fever.   Respiratory: Negative for shortness of breath.    Cardiovascular: Negative for chest pain and leg swelling.   Gastrointestinal: Negative for abdominal pain, nausea and vomiting.   Musculoskeletal: Positive for arthralgias.   Skin: Negative for wound.   Neurological: Positive for numbness.   All other systems reviewed and are negative.    Physical Exam   Patient Vitals for the past 24 hrs:   BP Temp Temp src Pulse Resp SpO2 Weight   09/15/18 0932 (!) 128/92 97.7  F (36.5  C) Oral 82 14 95 % 83.9 kg (185 lb)     Physical Exam  Constitutional: Well developed, nontox appearance  Head: Atraumatic.   Mouth/Throat: Oropharynx is clear and moist.   Neck:  no stridor  Eyes: no scleral icterus  Cardiovascular: RRR, 2+ bilat radial pulses. Left lower extremity: Brisk cap refill.  Pulmonary/Chest: nml resp effort  Ext: Warm, well perfused, no edema. Left lower extremity warm and well-perfused, no edema, resolving ecchymosis to dorsal aspect of left foot. Incision CDI.    Neurological: A&O, symmetric facies, moves ext x4. Subjectively decreased sensation to left great toe.  Skin: Skin is warm and dry.   Psychiatric: Behavior is normal. Thought content normal.   Nursing note and vitals reviewed.    Emergency Department Course   Imaging:  Radiographic findings were communicated with the patient who voiced understanding of the findings.    US Lower Extremity Venous Duplex Left:  No evidence of deep venous thrombosis.  As read by Radiology.    Foot XR, G/E 3 views, left:  Two surgical screws are noted across the left first MTP  joint. No evidence for acute fracture or dislocation in the left foot.  As read by Radiology.    Laboratory:  CBC: WNL (WBC 5.6, HGB 1.3, )  BMP: Anion GAP 2 (L), o/w WNL (Creatinine 0.80)  CRP inflammation: <2.9  ESR: 5    Emergency Department Course:  Past medical records, nursing notes, and vitals reviewed.  0939: I performed an exam of the  patient and obtained history, as documented above.  IV inserted and blood drawn.  The patient was sent for a left lower extremity venous ultrasound and left foot x-ray while in the emergency department, findings above.    1120: I spoke to Dr. Joseph on-call for orthopedic surgery through [facility], where the patient had her surgery.    1153: I rechecked the patient. Explained findings to the patient.    I rechecked the patient. Findings and plan explained to the patient. Patient discharged home with instructions regarding supportive care, medications, and reasons to return. The importance of close follow-up was reviewed.     Impression & Plan    Medical Decision Making:  Manju Berg is a 48 year old female who presents to the ED for evaluation of post-operative left foot pain. Differential diagnosis for this patient includes, but is not limited to fracture, DVT, post op infection, neuropathy, and others. The patient was sent for duplex ultrasound of the left leg, which did not demonstrate any evidence of DVT. She was also sent for a left foot x-ray, which does not show evidence for fracture or other acute etiology of her newly onset pain. This is likely post-operative pain vs assoc nerve pain. Doubt post op infection, fracture.  She declined pain medications here. The patient should follow-up with her orthopedic surgeon Dr. Esqueda at least via telephone by Monday. In the meantime, she can take Tylenol and Ibuprofen for pain management. I have also provided her scripts for Gabapentin and Oxycodone she can use as needed until she follows up. Recommendations given regarding follow up with primary care doctor and return to the emergency department as needed for new or worsening symptoms. The patient was understanding and agreeable to plan. Patient discharged in stable condition.     Diagnosis:    ICD-10-CM   1. Postoperative pain G89.18     Disposition: Discharged to home    Discharge Medications:  New Prescriptions     GABAPENTIN (NEURONTIN) 300 MG CAPSULE    Take 300 mg daily for one week, then 300 mg twice daily for one week, then 300 mg TID.    OXYCODONE IR (ROXICODONE) 5 MG TABLET    Take 1 tablet (5 mg) by mouth every 6 hours as needed for pain     Edith Lara  9/15/2018    EMERGENCY DEPARTMENT    I, Edith Lara, maria eugenia serving as a scribe at 9:39 AM on 9/15/2018 to document services personally performed by Carmelo Garcia MD based on my observations and the provider's statements to me.      Carmelo Garcia MD  09/15/18 2001

## 2018-09-15 NOTE — DISCHARGE INSTRUCTIONS
Take the below medications as prescribed.    New Prescriptions    GABAPENTIN (NEURONTIN) 300 MG CAPSULE    Take 300 mg daily for one week, then 300 mg twice daily for one week, then 300 mg TID.    OXYCODONE IR (ROXICODONE) 5 MG TABLET    Take 1 tablet (5 mg) by mouth every 6 hours as needed for pain     1. -Take acetaminophen 500 to 1000 mg by mouth every 4 to 6 hours as needed for pain or fever.  Do not take more than 4000 mg in 24 hours.  Do not take within 6 hours of another acetaminophen containing medication such as norco (vicodin) or percocet.  - Take ibuprofen 600 to 800 mg by mouth every 6 to 8 hours as needed for pain or fever  2. Use ice as needed for swelling.  3. Elevate extremity to help with swelling.  4. Follow-up with Dr. Esqueda by phone on Monday.  5. You may return to the ED as needed for new or worsening symptoms such as fever greater than 100.4 F, reinjury, severe and uncontrollable pain, focal weakness, severe numbness and tingling, any other concerning symptoms.

## 2018-09-15 NOTE — ED AVS SNAPSHOT
Emergency Department    [address] [facility] [address]    Phone:  [phone]    Fax:  [phone]                                       Manju Berg   MRN: [id number]    Department:   Emergency Department   Date of Visit:  9/15/2018           Patient Information     Date Of Birth          1970        Your diagnoses for this visit were:     Postoperative pain        You were seen by Carmelo Garcia MD.      Follow-up Information     Follow up with  Emergency Department.    Specialty:  EMERGENCY MEDICINE    Why:  As needed    Contact information:    [address] [facility] [address] [phone]        Follow up with Sudeep Esqueda MD In 2 days.    Specialty:  Orthopedics    Contact information:    [facility]  [address] [place] [address] [phone]          Discharge Instructions       Take the below medications as prescribed.    New Prescriptions    GABAPENTIN (NEURONTIN) 300 MG CAPSULE    Take 300 mg daily for one week, then 300 mg twice daily for one week, then 300 mg TID.    OXYCODONE IR (ROXICODONE) 5 MG TABLET    Take 1 tablet (5 mg) by mouth every 6 hours as needed for pain     1. -Take acetaminophen 500 to 1000 mg by mouth every 4 to 6 hours as needed for pain or fever.  Do not take more than 4000 mg in 24 hours.  Do not take within 6 hours of another acetaminophen containing medication such as norco (vicodin) or percocet.  - Take ibuprofen 600 to 800 mg by mouth every 6 to 8 hours as needed for pain or fever  2. Use ice as needed for swelling.  3. Elevate extremity to help with swelling.  4. Follow-up with Dr. Esqueda by phone on Monday.  5. You may return to the ED as needed for new or worsening symptoms such as fever greater than 100.4 F, reinjury, severe and uncontrollable pain, focal weakness, severe numbness and tingling, any other concerning symptoms.      Your next 10 appointments already scheduled     Oct 16,  2018  8:00 AM CDT   (Arrive by 7:45 AM)   RETURN FOOT/ANKLE with Sudeep Esqueda MD   [facility] ([facility])    [address]  [address] [facility] [address] [phone]              24 Hour Appointment Hotline       To make an appointment at any [facility], call [phone] ([phone]). If you don't have a family doctor or clinic, we will help you find one. [facility] are conveniently located to serve the needs of you and your family.             Review of your medicines      START taking        Dose / Directions Last dose taken    gabapentin 300 MG capsule   Commonly known as:  NEURONTIN   Quantity:  70 capsule        Take 300 mg daily for one week, then 300 mg twice daily for one week, then 300 mg TID.   Refills:  0        oxyCODONE IR 5 MG tablet   Commonly known as:  ROXICODONE   Dose:  5 mg   Quantity:  12 tablet        Take 1 tablet (5 mg) by mouth every 6 hours as needed for pain   Refills:  0          Our records show that you are taking the medicines listed below. If these are incorrect, please call your family doctor or clinic.        Dose / Directions Last dose taken    * albuterol 108 (90 Base) MCG/ACT inhaler   Commonly known as:  PROAIR HFA/PROVENTIL HFA/VENTOLIN HFA   Dose:  2 puff   Quantity:  1 Inhaler        Inhale 2 puffs into the lungs every 4 hours as needed for shortness of breath / dyspnea or wheezing   Refills:  6        * albuterol (2.5 MG/3ML) 0.083% neb solution   Dose:  1 vial   Quantity:  30 vial        Take 1 vial (2.5 mg) by nebulization every 6 hours as needed for shortness of breath / dyspnea or wheezing   Refills:  0        BUDESONIDE (INHALATION) 90 MCG/ACT Aepb   Dose:  2 puff   Quantity:  3 each        Inhale 2 puffs into the lungs 2 times daily   Refills:  prn        buPROPion 150 MG 24 hr tablet   Commonly known as:  WELLBUTRIN XL   Dose:  150 mg   Quantity:  30 tablet        Take 1 tablet (150  mg) by mouth every morning   Refills:  0        CALCIUM 600 + D 600-200 MG-UNIT Tabs        one daily   Refills:  0        chlorzoxazone 500 MG tablet   Commonly known as:  PARAFON FORTE   Quantity:  56 tablet        TAKE 1/2-1&1/2 TABLETS BY MOUTH 3-4 TIMES DAILY AS NEEDED TO RELAX MUSCLES Needs follow up for refills. Needs to establish care   Refills:  0        citalopram 40 MG tablet   Commonly known as:  celeXA   Dose:  40 mg   Quantity:  90 tablet        Take 1 tablet (40 mg) by mouth every morning   Refills:  1        fluvoxaMINE 50 MG tablet   Commonly known as:  LUVOX   Dose:  50 mg        Take 50 mg by mouth 2 times daily   Refills:  0        LANsoprazole 30 MG CR capsule   Commonly known as:  PREVACID   Dose:  30 mg   Quantity:  90 capsule        Take 1 capsule (30 mg) by mouth daily   Refills:  0        lidocaine 5 % Patch   Commonly known as:  LIDODERM   Dose:  1 patch   Quantity:  30 patch        Place 1 patch onto the skin every 24 hours. Remove after 12 hours.   Refills:  0        Multi-vitamin Tabs tablet   Dose:  1 tablet        Take 1 tablet by mouth daily   Refills:  0        naproxen 500 MG tablet   Commonly known as:  NAPROSYN   Dose:  500 mg   Quantity:  30 tablet        Take 1 tablet (500 mg) by mouth 2 times daily as needed for moderate pain   Refills:  1        order for DME   Quantity:  1 Units        Roll-A-Bout Walker. Patient can use for 4 months   Refills:  0        TYLENOL 500 MG tablet   Generic drug:  acetaminophen        1 TABLET EVERY 4 HOURS AS NEEDED   Refills:  0        vitamin D 2000 units tablet   Dose:  2000 Units   Quantity:  100 tablet        Take 2,000 Units by mouth daily.   Refills:  3        * Notice:  This list has 2 medication(s) that are the same as other medications prescribed for you. Read the directions carefully, and ask your doctor or other care provider to review them with you.            Information about OPIOIDS     PRESCRIPTION OPIOIDS: WHAT YOU NEED TO KNOW    We gave you an opioid (narcotic) pain medicine. It is important to manage your pain, but opioids are not always the best choice. You should first try all the other options your care team gave you. Take this medicine for as short a time (and as few doses) as possible.    Some activities can increase your pain, such as bandage changes or therapy sessions. It may help to take your pain medicine 30 to 60 minutes before these activities. Reduce your stress by getting enough sleep, working on hobbies you enjoy and practicing relaxation or meditation. Talk to your care team about ways to manage your pain beyond prescription opioids.    These medicines have risks:    DO NOT drive when on new or higher doses of pain medicine. These medicines can affect your alertness and reaction times, and you could be arrested for driving under the influence (DUI). If you need to use opioids long-term, talk to your care team about driving.    DO NOT operate heavy machinery    DO NOT do any other dangerous activities while taking these medicines.    DO NOT drink any alcohol while taking these medicines.     If the opioid prescribed includes acetaminophen, DO NOT take with any other medicines that contain acetaminophen. Read all labels carefully. Look for the word  acetaminophen  or  Tylenol.  Ask your pharmacist if you have questions or are unsure.    You can get addicted to pain medicines, especially if you have a history of addiction (chemical, alcohol or substance dependence). Talk to your care team about ways to reduce this risk.    All opioids tend to cause constipation. Drink plenty of water and eat foods that have a lot of fiber, such as fruits, vegetables, prune juice, apple juice and high-fiber cereal. Take a laxative (Miralax, milk of magnesia, Colace, Senna) if you don t move your bowels at least every other day. Other side effects include upset stomach, sleepiness, dizziness, throwing up, tolerance (needing more of the  medicine to have the same effect), physical dependence and slowed breathing.    Store your pills in a secure place, locked if possible. We will not replace any lost or stolen medicine. If you don t finish your medicine, please throw away (dispose) as directed by your pharmacist. The [place] [employer]ution [employer] has more information about safe disposal: https://www.pca.[facility].[email or web address]/living-green/managing-unwanted-medications        Prescriptions were sent or printed at these locations (2 Prescriptions)                   Other Prescriptions                Printed at Department/Unit printer (2 of 2)         gabapentin (NEURONTIN) 300 MG capsule               oxyCODONE IR (ROXICODONE) 5 MG tablet                Procedures and tests performed during your visit     Basic metabolic panel    CBC with platelets differential    CRP inflammation    Erythrocyte sedimentation rate auto    Foot XR, G/E 3 views, left    US Lower Extremity Venous Duplex Left      Orders Needing Specimen Collection     None      Pending Results     No orders found from 9/13/2018 to 9/16/2018.            Pending Culture Results     No orders found from 9/13/2018 to 9/16/2018.            Pending Results Instructions     If you had any lab results that were not finalized at the time of your Discharge, you can call the ED Lab Result RN at [phone]. You will be contacted by this team for any positive Lab results or changes in treatment. The nurses are available 7 days a week from 10A to 6:30P.  You can leave a message 24 hours per day and they will return your call.        Test Results From Your Hospital Stay        9/15/2018 10:15 AM      Component Results     Component Value Ref Range & Units Status    WBC 5.6 4.0 - 11.0 10e9/L Final    RBC Count 4.95 3.8 - 5.2 10e12/L Final    Hemoglobin 14.3 11.7 - 15.7 g/dL Final    Hematocrit 42.0 35.0 - 47.0 % Final    MCV 85 78 - 100 fl Final    MCH 28.9 26.5 - 33.0 pg Final    MCHC 34.0 31.5 - 36.5 g/dL  Final    RDW 11.8 10.0 - 15.0 % Final    Platelet Count 226 150 - 450 10e9/L Final    Diff Method Automated Method  Final    % Neutrophils 45.2 % Final    % Lymphocytes 43.4 % Final    % Monocytes 5.0 % Final    % Eosinophils 5.7 % Final    % Basophils 0.5 % Final    % Immature Granulocytes 0.2 % Final    Nucleated RBCs 0 0 /100 Final    Absolute Neutrophil 2.5 1.6 - 8.3 10e9/L Final    Absolute Lymphocytes 2.4 0.8 - 5.3 10e9/L Final    Absolute Monocytes 0.3 0.0 - 1.3 10e9/L Final    Absolute Eosinophils 0.3 0.0 - 0.7 10e9/L Final    Absolute Basophils 0.0 0.0 - 0.2 10e9/L Final    Abs Immature Granulocytes 0.0 0 - 0.4 10e9/L Final    Absolute Nucleated RBC 0.0  Final         9/15/2018 10:32 AM      Component Results     Component Value Ref Range & Units Status    Sodium 138 133 - 144 mmol/L Final    Potassium 3.8 3.4 - 5.3 mmol/L Final    Chloride 104 94 - 109 mmol/L Final    Carbon Dioxide 32 20 - 32 mmol/L Final    Anion Gap 2 (L) 3 - 14 mmol/L Final    Glucose 76 70 - 99 mg/dL Final    Urea Nitrogen 13 7 - 30 mg/dL Final    Creatinine 0.80 0.52 - 1.04 mg/dL Final    GFR Estimate 77 >60 mL/min/1.7m2 Final    Non  GFR Calc    GFR Estimate If Black >90 >60 mL/min/1.7m2 Final    African American GFR Calc    Calcium 8.7 8.5 - 10.1 mg/dL Final         9/15/2018 10:32 AM      Component Results     Component Value Ref Range & Units Status    CRP Inflammation <2.9 0.0 - 8.0 mg/L Final         9/15/2018 10:37 AM      Component Results     Component Value Ref Range & Units Status    Sed Rate 5 0 - 20 mm/h Final         9/15/2018 11:32 AM      Narrative     LEFT FOOT THREE VIEWS  9/15/2018 10:15 AM     HISTORY: Left foot pain. History of first MTP fusion.    COMPARISON: None.        Impression     IMPRESSION: Two surgical screws are noted across the left first MTP  joint. No evidence for acute fracture or dislocation in the left foot.    OTIS CABRERA MD         9/15/2018 10:49 AM      Narrative      VENOUS ULTRASOUND LEFT LEG  9/15/2018 10:07 AM     HISTORY: New pain and throbbing to foot status post foot surgery.    COMPARISON: None.    FINDINGS:  Examination of the deep veins with graded compression and  color flow Doppler with spectral wave form analysis shows no evidence  of thrombus in the common femoral vein, femoral vein, popliteal vein  or calf veins.  There is no venous insufficiency.        Impression     IMPRESSION: No evidence of deep venous thrombosis.    KENNEDY JEAN MD                Clinical Quality Measure: Blood Pressure Screening     Your blood pressure was checked while you were in the emergency department today. The last reading we obtained was  BP: (!) 128/92 . Please read the guidelines below about what these numbers mean and what you should do about them.  If your systolic blood pressure (the top number) is less than 120 and your diastolic blood pressure (the bottom number) is less than 80, then your blood pressure is normal. There is nothing more that you need to do about it.  If your systolic blood pressure (the top number) is 120-139 or your diastolic blood pressure (the bottom number) is 80-89, your blood pressure may be higher than it should be. You should have your blood pressure rechecked within a year by a primary care provider.  If your systolic blood pressure (the top number) is 140 or greater or your diastolic blood pressure (the bottom number) is 90 or greater, you may have high blood pressure. High blood pressure is treatable, but if left untreated over time it can put you at risk for heart attack, stroke, or kidney failure. You should have your blood pressure rechecked by a primary care provider within the next 4 weeks.  If your provider in the emergency department today gave you specific instructions to follow-up with your doctor or provider even sooner than that, you should follow that instruction and not wait for up to 4 weeks for your follow-up visit.        Thank you  for choosing [facility]       Thank you for choosing [facility] for your care. Our goal is always to provide you with excellent care. Hearing back from our patients is one way we can continue to improve our services. Please take a few minutes to complete the written survey that you may receive in the mail after you visit with us. Thank you!        [employer] Information     [employer] gives you secure access to your electronic health record. If you see a primary care provider, you can also send messages to your care team and make appointments. If you have questions, please call your primary care clinic.  If you do not have a primary care provider, please call [phone] and they will assist you.        Care EveryWhere ID     This is your Care EveryWhere ID. This could be used by other organizations to access your [facility] medical records  [id number]        Equal Access to Services     MATT LIND : Fredy Hughes, tra cardoso, mignon gonzalez. So [facility] [phone].    ATENCIÓN: Si habla español, tiene a bassett disposición servicios gratuitos de asistencia lingüística. Llame al [phone].    We comply with applicable federal civil rights laws and [place] laws. We do not discriminate on the basis of race, color, national origin, age, disability, sex, sexual orientation, or gender identity.            After Visit Summary       This is your record. Keep this with you and show to your community pharmacist(s) and doctor(s) at your next visit.

## 2018-09-19 ENCOUNTER — TELEPHONE (OUTPATIENT)
Dept: ORTHOPEDICS | Facility: CLINIC | Age: 48
End: 2018-09-19

## 2018-09-19 NOTE — TELEPHONE ENCOUNTER
Message left to call the clinic concerning change in  current medication dose versus changing medication.  Dr Esqueda is concerned 100 mg capsules of Gabapentin may not control pain.  An alternative would be Lyrica 75 mg BID.

## 2018-09-19 NOTE — TELEPHONE ENCOUNTER
Patient states she adjusted the time she takes Gabapentin and drowsiness is not a problem now.  She will continue with this dosage and schedule.

## 2018-10-15 DIAGNOSIS — M79.672 LEFT FOOT PAIN: Primary | ICD-10-CM

## 2018-10-16 ENCOUNTER — RADIANT APPOINTMENT (OUTPATIENT)
Dept: GENERAL RADIOLOGY | Facility: CLINIC | Age: 48
End: 2018-10-16
Attending: ORTHOPAEDIC SURGERY
Payer: COMMERCIAL

## 2018-10-16 ENCOUNTER — OFFICE VISIT (OUTPATIENT)
Dept: ORTHOPEDICS | Facility: CLINIC | Age: 48
End: 2018-10-16
Payer: COMMERCIAL

## 2018-10-16 VITALS — WEIGHT: 188.7 LBS | HEIGHT: 64 IN | BODY MASS INDEX: 32.21 KG/M2

## 2018-10-16 DIAGNOSIS — M79.672 LEFT FOOT PAIN: ICD-10-CM

## 2018-10-16 DIAGNOSIS — M25.572 PAIN IN JOINT, ANKLE AND FOOT, LEFT: Primary | ICD-10-CM

## 2018-10-16 NOTE — LETTER
10/16/2018       RE: Manju Berg  [address] [facility] [address]     Dear Colleague,    Thank you for referring your patient, Manju Berg, to the [facility]. Please see a copy of my visit note below.    CHIEF COMPLAINT:  Status post left first MTP joint arthrodesis performed on 08/31/2018.      HISTORY OF PRESENT ILLNESS:  Ms. Berg presents today for further followup.  Reports to be compliant.  Denies to have any problems or complaints.      PHYSICAL EXAMINATION:  On today's visit, she presents with an excellent alignment of the left first MTP joint.  Presents with no gross motion across the joint and there is no pain when being stressed.  Surgical incision is well healed.  CMS is intact.      RADIOGRAPHIC EVALUATION:  Three views of the left foot were obtained today which were significant for showing what seems to be a partial to full consolidation across the arthrodesis site.  Hardware is intact and in place.  Alignment is excellent.      ASSESSMENT:  Status post left first MTP joint arthrodesis.      PLAN:  I discussed with the patient that she is making excellent progress.  We can be a little more liberal with the use of the CAM Walker and she can proceed with the use of a Dansko type of shoe as long as it is for short distances.  The patient was also cleared to sleep without the boot and to walk long distances but still probably with the CAM Walker.      All questions were answered.  The patient was pleased with the discussion.  The patient will follow up in a month from today, and at that time, 3 views of the left foot will be obtained.  Based on those findings, further recommendations will be given to the patient.       Sudeep Esqueda MD

## 2018-10-16 NOTE — NURSING NOTE
"Reason For Visit:   Chief Complaint   Patient presents with     Surgical Followup     6 week follow up left 1st MTPJ fusion DOS: 8/31/18       Ht 1.613 m (5' 3.5\")  Wt 85.6 kg (188 lb 11.2 oz)  BMI 32.9 kg/m2    Pain Assessment  Patient Currently in Pain: Yes  0-10 Pain Scale: 2  Primary Pain Location: Foot  Pain Orientation: Left  Pain Descriptors: Aching, Sore  Alleviating Factors: NSAIDS  Aggravating Factors: Walking    Siddhartha Bernabe ATC  "

## 2018-10-16 NOTE — MR AVS SNAPSHOT
"              After Visit Summary   10/16/2018    Manju Berg    MRN: [id number]           Patient Information     Date Of Birth          1970        Visit Information        Provider Department      10/16/2018 8:00 AM Sudeep Esqueda MD [facility]        Today's Diagnoses     Pain in joint, ankle and foot, left    -  1       Follow-ups after your visit        Your next 10 appointments already scheduled     Nov 20, 2018  1:40 PM CST   (Arrive by 1:25 PM)   RETURN FOOT/ANKLE with Sudeep Esqueda MD   [facility] ([facility])    [address] [place] [id number] [phone]              Who to contact     Please call your clinic at [phone] to:    Ask questions about your health    Make or cancel appointments    Discuss your medicines    Learn about your test results    Speak to your doctor            Additional Information About Your Visit        MyChart Information     [employer] gives you secure access to your electronic health record. If you see a primary care provider, you can also send messages to your care team and make appointments. If you have questions, please call your primary care clinic.  If you do not have a primary care provider, please call [phone] and they will assist you.      [employer] is an electronic gateway that provides easy, online access to your medical records. With [employer], you can request a clinic appointment, read your test results, renew a prescription or communicate with your care team.     To access your existing account, please contact your [facility] or call [phone] for assistance.        Care EveryWhere ID     This is your Care EveryWhere ID. This could be used by other organizations to access your [place] medical records  [id number]        Your Vitals Were     Height BMI (Body Mass Index)                1.613 m (5' 3.5\") 32.9 kg/m2   "         Blood Pressure from Last 3 Encounters:   09/15/18 111/73   07/19/15 120/83   02/04/15 108/60    Weight from Last 3 Encounters:   10/16/18 85.6 kg (188 lb 11.2 oz)   09/15/18 83.9 kg (185 lb)   07/10/18 86.2 kg (190 lb)              Today, you had the following     No orders found for display       Primary Care Provider Office Phone # Fax #    Pierce Aguilar [phone]       [address]   [facility] [address]        Equal Access to Services     [facility]: Hadii aad ku hadasho Soomaali, waaxda luqadaha, qaybta kaalmada adeegyada, mignon bartlett hayaan neyda zamudio . So [facility] [phone].    ATENCIÓN: Si habla español, tiene a bassett disposición servicios gratuitos de asistencia lingüística. [facility] [phone].    We comply with applicable federal civil rights laws and [place] laws. We do not discriminate on the basis of race, color, national origin, age, disability, sex, sexual orientation, or gender identity.            Thank you!     Thank you for choosing [facility]  for your care. Our goal is always to provide you with excellent care. Hearing back from our patients is one way we can continue to improve our services. Please take a few minutes to complete the written survey that you may receive in the mail after your visit with us. Thank you!             Your Updated Medication List - Protect others around you: Learn how to safely use, store and throw away your medicines at www.disposemymeds.org.          This list is accurate as of 10/16/18 11:59 PM.  Always use your most recent med list.                   Brand Name Dispense Instructions for use Diagnosis    * albuterol 108 (90 Base) MCG/ACT inhaler    PROAIR HFA/PROVENTIL HFA/VENTOLIN HFA    1 Inhaler    Inhale 2 puffs into the lungs every 4 hours as needed for shortness of breath / dyspnea or wheezing    Mild intermittent asthma, with acute exacerbation       * albuterol (2.5  MG/3ML) 0.083% neb solution     30 vial    Take 1 vial (2.5 mg) by nebulization every 6 hours as needed for shortness of breath / dyspnea or wheezing    Cough       BUDESONIDE (INHALATION) 90 MCG/ACT Aepb     3 each    Inhale 2 puffs into the lungs 2 times daily    Mild intermittent asthma       buPROPion 150 MG 24 hr tablet    WELLBUTRIN XL    30 tablet    Take 1 tablet (150 mg) by mouth every morning    Major depressive disorder, single episode, unspecified       CALCIUM 600 + D 600-200 MG-UNIT Tabs      one daily        chlorzoxazone 500 MG tablet    PARAFON FORTE    56 tablet    TAKE 1/2-1&1/2 TABLETS BY MOUTH 3-4 TIMES DAILY AS NEEDED TO RELAX MUSCLES Needs follow up for refills. Needs to establish care    Cervicalgia       citalopram 40 MG tablet    celeXA    90 tablet    Take 1 tablet (40 mg) by mouth every morning    Major depressive disorder, single episode, unspecified       fluvoxaMINE 50 MG tablet    LUVOX     Take 50 mg by mouth 2 times daily        gabapentin 300 MG capsule    NEURONTIN    70 capsule    Take 300 mg daily for one week, then 300 mg twice daily for one week, then 300 mg TID.        LANsoprazole 30 MG CR capsule    PREVACID    90 capsule    Take 1 capsule (30 mg) by mouth daily    Dyspepsia and other specified disorders of function of stomach       lidocaine 5 % Patch    LIDODERM    30 patch    Place 1 patch onto the skin every 24 hours. Remove after 12 hours.    Lumbago       Multi-vitamin Tabs tablet      Take 1 tablet by mouth daily        naproxen 500 MG tablet    NAPROSYN    30 tablet    Take 1 tablet (500 mg) by mouth 2 times daily as needed for moderate pain    Hand joint pain, right       order for DME     1 Units    Roll-A-Bout Walker. Patient can use for 4 months    Surgical follow-up care       oxyCODONE IR 5 MG tablet    ROXICODONE    12 tablet    Take 1 tablet (5 mg) by mouth every 6 hours as needed for pain        TYLENOL 500 MG tablet   Generic drug:  acetaminophen      1  TABLET EVERY 4 HOURS AS NEEDED        vitamin D 2000 units tablet     100 tablet    Take 2,000 Units by mouth daily.    Unspecified vitamin D deficiency       * Notice:  This list has 2 medication(s) that are the same as other medications prescribed for you. Read the directions carefully, and ask your doctor or other care provider to review them with you.

## 2018-10-16 NOTE — PROGRESS NOTES
CHIEF COMPLAINT:  Status post left first MTP joint arthrodesis performed on 08/31/2018.      HISTORY OF PRESENT ILLNESS:  Ms. Berg presents today for further followup.  Reports to be compliant.  Denies to have any problems or complaints.      PHYSICAL EXAMINATION:  On today's visit, she presents with an excellent alignment of the left first MTP joint.  Presents with no gross motion across the joint and there is no pain when being stressed.  Surgical incision is well healed.  CMS is intact.      RADIOGRAPHIC EVALUATION:  Three views of the left foot were obtained today which were significant for showing what seems to be a partial to full consolidation across the arthrodesis site.  Hardware is intact and in place.  Alignment is excellent.      ASSESSMENT:  Status post left first MTP joint arthrodesis.      PLAN:  I discussed with the patient that she is making excellent progress.  We can be a little more liberal with the use of the CAM Walker and she can proceed with the use of a Dansko type of shoe as long as it is for short distances.  The patient was also cleared to sleep without the boot and to walk long distances but still probably with the CAM Walker.      All questions were answered.  The patient was pleased with the discussion.  The patient will follow up in a month from today, and at that time, 3 views of the left foot will be obtained.  Based on those findings, further recommendations will be given to the patient.

## 2018-11-19 DIAGNOSIS — M79.672 LEFT FOOT PAIN: Primary | ICD-10-CM

## 2018-11-20 ENCOUNTER — OFFICE VISIT (OUTPATIENT)
Dept: ORTHOPEDICS | Facility: CLINIC | Age: 48
End: 2018-11-20
Payer: COMMERCIAL

## 2018-11-20 ENCOUNTER — RADIANT APPOINTMENT (OUTPATIENT)
Dept: GENERAL RADIOLOGY | Facility: CLINIC | Age: 48
End: 2018-11-20
Attending: ORTHOPAEDIC SURGERY
Payer: COMMERCIAL

## 2018-11-20 VITALS — WEIGHT: 188 LBS | BODY MASS INDEX: 32.1 KG/M2 | HEIGHT: 64 IN

## 2018-11-20 DIAGNOSIS — M25.572 PAIN IN JOINT, ANKLE AND FOOT, LEFT: Primary | ICD-10-CM

## 2018-11-20 DIAGNOSIS — M79.672 LEFT FOOT PAIN: ICD-10-CM

## 2018-11-20 NOTE — LETTER
11/20/2018       RE: Manju Berg  [address] [facility] [address]     Dear Colleague,    Thank you for referring your patient, Manju Berg, to the [facility] at [facility]. Please see a copy of my visit note below.    CHIEF COMPLAINT:  Status post left first MTP joint arthrodesis performed on 08/31/2018.      HISTORY OF PRESENT ILLNESS:  Mrs. Berg presents today for further followup.  Reports to be doing well.  Reports to be compliant.      PHYSICAL EXAMINATION:  On today's visit, she presents with no gross motion across the first MTP joint.  Presents with excellent alignment.  CMS intact.  There is a well-healed surgical incision.      RADIOGRAPHIC EVALUATION:  Three views of the left foot were obtained today which were significant for showing full consolidation of the arthrodesis site.  Hardware is intact and in place.  Alignment is excellent.      ASSESSMENT:  Status post left first MTP joint arthrodesis.      PLAN:  I discussed with the patient, she can have activity as tolerated.  A prescription for physical therapy and gait training was given to the patient to be utilized at her own discretion.      The patient will follow up on a p.r.n. basis or in 3 months from now if she is not happy with the function of her left foot.      All questions were answered.         Again, thank you for allowing me to participate in the care of your patient.      Sincerely,    Sudeep Esqueda MD

## 2018-11-20 NOTE — PROGRESS NOTES
CHIEF COMPLAINT:  Status post left first MTP joint arthrodesis performed on 08/31/2018.      HISTORY OF PRESENT ILLNESS:  Mrs. Berg presents today for further followup.  Reports to be doing well.  Reports to be compliant.      PHYSICAL EXAMINATION:  On today's visit, she presents with no gross motion across the first MTP joint.  Presents with excellent alignment.  CMS intact.  There is a well-healed surgical incision.      RADIOGRAPHIC EVALUATION:  Three views of the left foot were obtained today which were significant for showing full consolidation of the arthrodesis site.  Hardware is intact and in place.  Alignment is excellent.      ASSESSMENT:  Status post left first MTP joint arthrodesis.      PLAN:  I discussed with the patient, she can have activity as tolerated.  A prescription for physical therapy and gait training was given to the patient to be utilized at her own discretion.      The patient will follow up on a p.r.n. basis or in 3 months from now if she is not happy with the function of her left foot.      All questions were answered.

## 2018-11-20 NOTE — MR AVS SNAPSHOT
"              After Visit Summary   11/20/2018    Manju Berg    MRN: [id number]           Patient Information     Date Of Birth          1970        Visit Information        Provider Department      11/20/2018 1:40 PM Sudeep Esqueda MD [facility]        Today's Diagnoses     Pain in joint, ankle and foot, left    -  1       Follow-ups after your visit        Additional Services     PHYSICAL THERAPY REFERRAL (External-Prints)       Physical Therapy Referral                  Who to contact     Please call your clinic at [phone] to:    Ask questions about your health    Make or cancel appointments    Discuss your medicines    Learn about your test results    Speak to your doctor            Additional Information About Your Visit        MyChart Information     [employer] gives you secure access to your electronic health record. If you see a primary care provider, you can also send messages to your care team and make appointments. If you have questions, please call your primary care clinic.  If you do not have a primary care provider, please call [phone] and they will assist you.      [employer] is an electronic gateway that provides easy, online access to your medical records. With [employer], you can request a clinic appointment, read your test results, renew a prescription or communicate with your care team.     To access your existing account, please contact your [facility] or call [phone] for assistance.        Care EveryWhere ID     This is your Care EveryWhere ID. This could be used by other organizations to access your [place] medical records  [id number]        Your Vitals Were     Height BMI (Body Mass Index)                1.613 m (5' 3.5\") 32.78 kg/m2           Blood Pressure from Last 3 Encounters:   09/15/18 111/73   07/19/15 120/83   02/04/15 108/60    Weight from Last 3 Encounters:   11/20/18 85.3 kg (188 lb)   10/16/18 85.6 kg " (188 lb 11.2 oz)   09/15/18 83.9 kg (185 lb)               Primary Care Provider Office Phone # Fax #    Pierce Aguilar [phone]       [address] [place] [address]        Equal Access to Services     [facility] : Hadii aad ku hadasho Soomaali, waaxda luqadaha, qaybta kaalmada adeegyada, waxay idiin hayaan adeeg jf lamorenitan ah. So [facility] [phone].    ATENCIÓN: Si habla español, tiene a bassett disposición servicios gratuitos de asistencia lingüística. [facility] [phone].    We comply with applicable federal civil rights laws and [place] laws. We do not discriminate on the basis of race, color, national origin, age, disability, sex, sexual orientation, or gender identity.            Thank you!     Thank you for choosing [facility]  for your care. Our goal is always to provide you with excellent care. Hearing back from our patients is one way we can continue to improve our services. Please take a few minutes to complete the written survey that you may receive in the mail after your visit with us. Thank you!             Your Updated Medication List - Protect others around you: Learn how to safely use, store and throw away your medicines at www.disposemymeds.org.          This list is accurate as of 11/20/18 11:59 PM.  Always use your most recent med list.                   Brand Name Dispense Instructions for use Diagnosis    * albuterol 108 (90 Base) MCG/ACT inhaler    PROAIR HFA/PROVENTIL HFA/VENTOLIN HFA    1 Inhaler    Inhale 2 puffs into the lungs every 4 hours as needed for shortness of breath / dyspnea or wheezing    Mild intermittent asthma, with acute exacerbation       * albuterol (2.5 MG/3ML) 0.083% neb solution    PROVENTIL    30 vial    Take 1 vial (2.5 mg) by nebulization every 6 hours as needed for shortness of breath / dyspnea or wheezing    Cough       budesonide 90 MCG/ACT inhaler    PULMICORT FLEXHALER    3 each    Inhale  2 puffs into the lungs 2 times daily    Mild intermittent asthma       buPROPion 150 MG 24 hr tablet    WELLBUTRIN XL    30 tablet    Take 1 tablet (150 mg) by mouth every morning    Major depressive disorder, single episode, unspecified       CALCIUM 600 + D 600-200 MG-UNIT Tabs      one daily        chlorzoxazone 500 MG tablet    PARAFON FORTE    56 tablet    TAKE 1/2-1&1/2 TABLETS BY MOUTH 3-4 TIMES DAILY AS NEEDED TO RELAX MUSCLES Needs follow up for refills. Needs to establish care    Cervicalgia       citalopram 40 MG tablet    celeXA    90 tablet    Take 1 tablet (40 mg) by mouth every morning    Major depressive disorder, single episode, unspecified       fluvoxaMINE 50 MG tablet    LUVOX     Take 50 mg by mouth 2 times daily        gabapentin 300 MG capsule    NEURONTIN    70 capsule    Take 300 mg daily for one week, then 300 mg twice daily for one week, then 300 mg TID.        LANsoprazole 30 MG DR capsule    PREVACID    90 capsule    Take 1 capsule (30 mg) by mouth daily    Dyspepsia and other specified disorders of function of stomach       lidocaine 5 % Patch    LIDODERM    30 patch    Place 1 patch onto the skin every 24 hours. Remove after 12 hours.    Lumbago       Multi-vitamin tablet      Take 1 tablet by mouth daily        naproxen 500 MG tablet    NAPROSYN    30 tablet    Take 1 tablet (500 mg) by mouth 2 times daily as needed for moderate pain    Hand joint pain, right       order for DME     1 Units    Roll-A-Bout Walker. Patient can use for 4 months    Surgical follow-up care       oxyCODONE 5 MG tablet    ROXICODONE    12 tablet    Take 1 tablet (5 mg) by mouth every 6 hours as needed for pain        TYLENOL 500 MG tablet   Generic drug:  acetaminophen      1 TABLET EVERY 4 HOURS AS NEEDED        vitamin D3 2000 units tablet    CHOLECALCIFEROL    100 tablet    Take 2,000 Units by mouth daily.    Unspecified vitamin D deficiency       * Notice:  This list has 2 medication(s) that are the same  as other medications prescribed for you. Read the directions carefully, and ask your doctor or other care provider to review them with you.

## 2018-11-20 NOTE — NURSING NOTE
"Reason For Visit:   Chief Complaint   Patient presents with     Surgical Followup     12 week POP left 1st MTPJ fusion DOS: 8/31/18       Ht 1.613 m (5' 3.5\")  Wt 85.3 kg (188 lb)  BMI 32.78 kg/m2    Pain Assessment  Patient Currently in Pain: Denies (soreness)    Siddhartha Bernabe ATC  "

## 2018-12-10 ENCOUNTER — TELEPHONE (OUTPATIENT)
Dept: ORTHOPEDICS | Facility: CLINIC | Age: 48
End: 2018-12-10

## 2018-12-10 DIAGNOSIS — Z53.9 ERRONEOUS ENCOUNTER--DISREGARD: Primary | ICD-10-CM

## 2018-12-10 DIAGNOSIS — S92.919A TOE FRACTURE: Primary | ICD-10-CM

## 2018-12-10 NOTE — TELEPHONE ENCOUNTER
Scheduled apt with  on 12/12/18 at 1:45pm and had a script sent to [facility] in [place] for knee scooter.

## 2018-12-10 NOTE — TELEPHONE ENCOUNTER
Appt made for fracture.  She is also asking for knee scooter prior to her appointment. RN will order for her

## 2018-12-11 NOTE — TELEPHONE ENCOUNTER
FUTURE VISIT INFORMATION      FUTURE VISIT INFORMATION:    Date: 12/12/18    Time: 1345    Location: ORTHO  REFERRAL INFORMATION:    Referring provider:  N/A    Referring providers clinic:  N/A    Reason for visit/diagnosis  R FOOT 5TH TOE FX     RECORDS REQUESTED FROM:       Clinic name Comments Records Status Imaging Status                                         RECORDS RECEIVED FROM: [facility]   DATE RECEIVED: 12/11/18   NOTES STATUS DETAILS   OFFICE NOTE from referring provider N/A    OFFICE NOTE from other specialist N/A    DISCHARGE SUMMARY from hospital N/A    DISCHARGE REPORT from the ER Care Everywhere 12/7/18   OPERATIVE REPORT N/A    MEDICATION LIST Care Everywhere    IMPLANT RECORD/STICKER N/A    LABS     CBC/DIFF N/A    CULTURES N/A    INJECTIONS DONE IN RADIOLOGY N/A    MRI N/A    CT SCAN N/A    XRAYS (IMAGES & REPORTS) Received 12/7/18   TUMOR     PATHOLOGY  Slides & report N/A

## 2018-12-12 ENCOUNTER — PRE VISIT (OUTPATIENT)
Dept: ORTHOPEDICS | Facility: CLINIC | Age: 48
End: 2018-12-12

## 2018-12-12 ENCOUNTER — ANCILLARY PROCEDURE (OUTPATIENT)
Dept: GENERAL RADIOLOGY | Facility: CLINIC | Age: 48
End: 2018-12-12
Payer: COMMERCIAL

## 2018-12-12 ENCOUNTER — OFFICE VISIT (OUTPATIENT)
Dept: ORTHOPEDICS | Facility: CLINIC | Age: 48
End: 2018-12-12
Payer: COMMERCIAL

## 2018-12-12 DIAGNOSIS — S92.351A DISPLACED FRACTURE OF FIFTH METATARSAL BONE, RIGHT FOOT, INITIAL ENCOUNTER FOR CLOSED FRACTURE: Primary | ICD-10-CM

## 2018-12-12 DIAGNOSIS — M25.511 ACUTE PAIN OF RIGHT SHOULDER: ICD-10-CM

## 2018-12-12 NOTE — PROGRESS NOTES
Referring provider: Sudeep Esqueda MD    CC: R foot pain    HPI: The patient is a 48-year-old female presented for evaluation of right foot pain after losing her footing on a step on December 7.  She had immediate pain.  She presented to the emergency department where she was diagnosed with a right fifth metatarsal fracture and placed into a CAM boot.  She has been weightbearing as tolerated in this since the time of injury.  She denies other areas of pain, head trauma, numbness or tingling, motor dysfunction or weakness distally.  She reports that her pain has improved since the date of injury.  She has been taking ibuprofen and Tylenol predominantly for pain control.    The patient also notes right shoulder pain after a fall yesterday when she was trying to get inside the home.  She points the anterior aspect of her shoulder.  It is aggravated by forward flexion.    Past medical history:  1.  Depression  2.  Asthma  3.  Anxiety    Past surgical history:  1.  Carpal tunnel release  2.  Bilateral bunionectomy  3.  Cyst excision    Family history: Patient denies family history problems of bleeding, clotting or anesthesia.    Social history: The patient lives in [place].  She is a nurse care coordinator.  She denies smoking.    Review of systems: Intake form is reviewed and the answers included at the end of this dictation.    Physical Exam:   General: No acute distress, pleasant, cooperative with exam.  HEENT: Normocephalic atraumatic.  Cardiac: Extremities warm well perfused.  Respiratory: Nonlabored breathing on room air.  Neuro: Moves all extremities spontaneously.   Psych: Appropriate affect.  Skin: No rashes or wounds noted on exposed skin.  MSK: Focused examination of the right lower extremity reveals DP and PT pulses 2+.  Sensation intact light touch transverse peroneal, deep peroneal, saphenous, sural and tibial nerve just patients.  Patient fires EHL and FHL.  She is able to wiggle her lesser toes.  She  has resolving ecchymosis and swelling consistent with her known injury.  She is nontender palpation of the other bony prominences of the foot avoiding the fifth metatarsal.  She has full ankle range of motion without pain.  Admission of her right upper extremity reveals tenderness to palpation of the anterior shoulder but no significant soft tissue swelling or effusion.  There is no significant deformity.    Imaging:    An x-ray of the right foot obtained on December 7, 2018 is reviewed and reveals evidence of an oblique fifth metatarsal fracture.  There is no significant displacement or angulation.    An x-ray of the right shoulder was obtained today revealing no acute bony abnormality.    Assessment and plan: The patient is a 48-year-old female presenting for evaluation of a right fifth metatarsal fracture sustained on December 7, 2018.  We reviewed the imaging with the patient in clinic today.  We will pursue nonoperative management.  She can continue with the CAM boot and weightbearing as tolerated.  We will have her follow-up in 1 month with repeat x-rays.  In regards to her right shoulder, we reviewed the imaging with her today.  If she is continuing to have pain, then we will refer her to our nonoperative clinic for further evaluation.  All questions were answered.    The patient was seen and discussed with Dr. Collazo was in agreement the above assessment plan.    Blanca Vasquez MD  Orthopedic Surgery Resident, PGY-4

## 2018-12-12 NOTE — LETTER
12/12/2018       RE: Manju Berg  [address] [facility] [address]     Dear Colleague,    Thank you for referring your patient, Manju Berg, to the [facility]. Please see a copy of my visit note below.    I was present with the resident during the history and exam.  I discussed the case with the resident and agree with the findings as documented in the assessment and plan.    Referring provider: Sudeep Esqueda MD    CC: R foot pain    HPI: The patient is a 48-year-old female presented for evaluation of right foot pain after losing her footing on a step on December 7.  She had immediate pain.  She presented to the emergency department where she was diagnosed with a right fifth metatarsal fracture and placed into a CAM boot.  She has been weightbearing as tolerated in this since the time of injury.  She denies other areas of pain, head trauma, numbness or tingling, motor dysfunction or weakness distally.  She reports that her pain has improved since the date of injury.  She has been taking ibuprofen and Tylenol predominantly for pain control.    The patient also notes right shoulder pain after a fall yesterday when she was trying to get inside the home.  She points the anterior aspect of her shoulder.  It is aggravated by forward flexion.    Past medical history:  1.  Depression  2.  Asthma  3.  Anxiety    Past surgical history:  1.  Carpal tunnel release  2.  Bilateral bunionectomy  3.  Cyst excision    Family history: Patient denies family history problems of bleeding, clotting or anesthesia.    Social history: The patient lives in [place].  She is a nurse care coordinator.  She denies smoking.    Review of systems: Intake form is reviewed and the answers included at the end of this dictation.    Physical Exam:   General: No acute distress, pleasant, cooperative with exam.  HEENT: Normocephalic atraumatic.  Cardiac:  Extremities warm well perfused.  Respiratory: Nonlabored breathing on room air.  Neuro: Moves all extremities spontaneously.   Psych: Appropriate affect.  Skin: No rashes or wounds noted on exposed skin.  MSK: Focused examination of the right lower extremity reveals DP and PT pulses 2+.  Sensation intact light touch transverse peroneal, deep peroneal, saphenous, sural and tibial nerve just patients.  Patient fires EHL and FHL.  She is able to wiggle her lesser toes.  She has resolving ecchymosis and swelling consistent with her known injury.  She is nontender palpation of the other bony prominences of the foot avoiding the fifth metatarsal.  She has full ankle range of motion without pain.  Admission of her right upper extremity reveals tenderness to palpation of the anterior shoulder but no significant soft tissue swelling or effusion.  There is no significant deformity.    Imaging:    An x-ray of the right foot obtained on December 7, 2018 is reviewed and reveals evidence of an oblique fifth metatarsal fracture.  There is no significant displacement or angulation.    An x-ray of the right shoulder was obtained today revealing no acute bony abnormality.    Assessment and plan: The patient is a 48-year-old female presenting for evaluation of a right fifth metatarsal fracture sustained on December 7, 2018.  We reviewed the imaging with the patient in clinic today.  We will pursue nonoperative management.  She can continue with the CAM boot and weightbearing as tolerated.  We will have her follow-up in 1 month with repeat x-rays.  In regards to her right shoulder, we reviewed the imaging with her today.  If she is continuing to have pain, then we will refer her to our nonoperative clinic for further evaluation.  All questions were answered.    The patient was seen and discussed with Dr. Collazo was in agreement the above assessment plan.    Blanca Vasquez MD  Orthopedic Surgery Resident, PGY-4        Again, thank you  for allowing me to participate in the care of your patient.      Sincerely,    Ramesh Collazo MD

## 2018-12-17 ENCOUNTER — OFFICE VISIT (OUTPATIENT)
Dept: ORTHOPEDICS | Facility: CLINIC | Age: 48
End: 2018-12-17
Payer: COMMERCIAL

## 2018-12-17 ENCOUNTER — ANCILLARY PROCEDURE (OUTPATIENT)
Dept: GENERAL RADIOLOGY | Facility: CLINIC | Age: 48
End: 2018-12-17
Attending: FAMILY MEDICINE
Payer: COMMERCIAL

## 2018-12-17 VITALS — RESPIRATION RATE: 16 BRPM | HEIGHT: 64 IN | BODY MASS INDEX: 32.1 KG/M2 | WEIGHT: 188 LBS

## 2018-12-17 DIAGNOSIS — S92.354D CLOSED NONDISPLACED FRACTURE OF FIFTH METATARSAL BONE OF RIGHT FOOT WITH ROUTINE HEALING, SUBSEQUENT ENCOUNTER: Primary | ICD-10-CM

## 2018-12-17 ASSESSMENT — MIFFLIN-ST. JEOR: SCORE: 1469.82

## 2018-12-17 NOTE — LETTER
12/17/2018       RE: Manju Berg  [address] [facility] [address]     Dear Colleague,    Thank you for referring your patient, Manju Berg, to the [facility] at [facility]. Please see a copy of my visit note below.          SPORTS & ORTHOPEDIC WALK-IN VISIT 12/17/2018    Primary Care Physician: Dr. Aguilar    Lost footing, went to ED. Xrays show fifth metacarpal fracture. Seen by Zay 12/12/18 and decided on non-op treatment. Friday was walking around house with boot on, had a sudden sharp pain like she broke it again. Pain went away but has gotten progressively worse again. Believes swelling got worse as well.     Reason for visit:     What part of your body is injured / painful?  right foot    What caused the injury /pain? Fall    How long ago did your injury occur or pain begin? several weeks ago    What are your most bothersome symptoms? Pain and Swelling    How would you characterize your symptom?  sharp    What makes your symptoms better? Other: boot     What makes your symptoms worse? Standing and Walking    Have you been previously seen for this problem? Yes, ED, Zay    Medical History:    Any recent changes to your medical history? No    Any new medication prescribed since last visit? No    Have you had surgery on this body part before? 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/2011,Proximal left tibia     Dyspepsia and other specified disorders of function of stomach     No Comments Provided     Headache     No Comments Provided     Herpes simplex     No Comments Provided     Lateral epicondylitis of elbow     No Comments Provided     Other  depressive disorder     No Comments Provided     Other unspecified back disorder     No Comments Provided     Pain in joint, upper arm     No Comments Provided       Active problem list:  Patient Active Problem List   Diagnosis     Herpes simplex virus (HSV) infection     Dyspepsia and other specified disorders of function of stomach     Encounter for other general counseling or advice on contraception     Other unspecified back disorder     Mild intermittent asthma     Major depressive disorder, single episode     Family history of colon cancer     CARDIOVASCULAR SCREENING; LDL GOAL LESS THAN 160     Lateral epicondylitis     Pain in joint, upper arm     sclerosing leiomyoma, L gastrocnemius, s/p excision     Chronic constipation     Vitamin D deficiency     Anxiety state       FH:  Family History   Problem Relation Age of Onset     Hypertension Father         Hypertension     Hyperlipidemia Father         Hyperlipidemia     Colon Cancer Father         Cancer-colon     Cancer Father         Cancer,bladder     Other - See Comments Mother         Psychiatric illness     Family History Negative Sister         Good Health     Family History Negative Son         Good Health       SH:  Social History     Socioeconomic History     Marital status:      Spouse name: Not on file     Number of children: Not on file     Years of education: Not on file     Highest education level: Not on file   Social Needs     Financial resource strain: Not on file     Food insecurity - worry: Not on file     Food insecurity - inability: Not on file     Transportation needs - medical: Not on file     Transportation needs - non-medical: Not on file   Occupational History     Occupation: RN     Employer: OTHER   Tobacco Use     Smoking status: Never Smoker     Smokeless tobacco: Never Used   Substance and Sexual Activity     Alcohol use: Yes     Comment: Alcoholic Drinks/day: Drinks socially only     Drug use: Unknown     Types: Other      Comment: Drug use: No     Sexual activity: Not on file   Other Topics Concern     Not on file   Social History Narrative    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     MEDS:  See EMR, reviewed  ALL:  See EMR, reviewed    Objective: She has residual swelling that is present from her initial injury involving the lateral side of the forefoot.  No bruising.  There is no swelling at the ankle.  She is nontender at the medial malleolus or lateral malleolus.  She is nontender at the navicular or the area of Lisfranc.  She has tenderness over the fifth metatarsal distally and also along the fourth metatarsal.  Normal range of motion of the great toe.  Skin is intact.  Sensation is normal.  Appropriate in conversation and affect.    Repeat AP and lateral of the foot is unchanged from her initial fracture findings.  She has a distal fifth metatarsal fracture that does not show displacement on the lateral view and seems unchanged from her initial x-ray on the AP view.    Assessment right fifth metatarsal fracture plan she has an upcoming visit with her orthopedist planned for later this month.  She has a Cam walker boot that she was told to assist with weightbearing.  She also has a scooter if weightbearing is too uncomfortable.  She will continue with ice and Tylenol.  She had no further questions.    This note was created with the use of Dragon software and unintentional spelling or errors may have occurred.    Again, thank you for allowing me to participate in the care of your patient.      Sincerely,    Moe Bell MD

## 2018-12-17 NOTE — PROGRESS NOTES
SPORTS & ORTHOPEDIC WALK-IN VISIT 12/17/2018    Primary Care Physician: Dr. Aguilar    Lost footing, went to ED. Xrays show fifth metacarpal fracture. Seen by Zay 12/12/18 and decided on non-op treatment. Friday was walking around house with boot on, had a sudden sharp pain like she broke it again. Pain went away but has gotten progressively worse again. Believes swelling got worse as well.     Reason for visit:     What part of your body is injured / painful?  right foot    What caused the injury /pain? Fall    How long ago did your injury occur or pain begin? several weeks ago    What are your most bothersome symptoms? Pain and Swelling    How would you characterize your symptom?  sharp    What makes your symptoms better? Other: boot     What makes your symptoms worse? Standing and Walking    Have you been previously seen for this problem? Yes, ED, Zay    Medical History:    Any recent changes to your medical history? No    Any new medication prescribed since last visit? No    Have you had surgery on this body part before? No    Social History:    Occupation: [employer]    Handedness: Right    Exercise:     Review of Systems:    Do you have fever, chills, weight loss? No    Do you have any vision problems? No    Do you have any chest pain or edema? No    Do you have any shortness of breath or wheezing?  No    Do you have stomach problems? No    Do you have any numbness or focal weakness? No    Do you have diabetes? No    Do you have problems with bleeding or clotting? No    Do you have an rashes or other skin lesions? 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/2011,Proximal left tibia     Dyspepsia and other specified disorders of function of stomach     No Comments Provided     Headache     No Comments Provided      Herpes simplex     No Comments Provided     Lateral epicondylitis of elbow     No Comments Provided     Other depressive disorder     No Comments Provided     Other unspecified back disorder     No Comments Provided     Pain in joint, upper arm     No Comments Provided       Active problem list:  Patient Active Problem List   Diagnosis     Herpes simplex virus (HSV) infection     Dyspepsia and other specified disorders of function of stomach     Encounter for other general counseling or advice on contraception     Other unspecified back disorder     Mild intermittent asthma     Major depressive disorder, single episode     Family history of colon cancer     CARDIOVASCULAR SCREENING; LDL GOAL LESS THAN 160     Lateral epicondylitis     Pain in joint, upper arm     sclerosing leiomyoma, L gastrocnemius, s/p excision     Chronic constipation     Vitamin D deficiency     Anxiety state       FH:  Family History   Problem Relation Age of Onset     Hypertension Father         Hypertension     Hyperlipidemia Father         Hyperlipidemia     Colon Cancer Father         Cancer-colon     Cancer Father         Cancer,bladder     Other - See Comments Mother         Psychiatric illness     Family History Negative Sister         Good Health     Family History Negative Son         Good Health       SH:  Social History     Socioeconomic History     Marital status:      Spouse name: Not on file     Number of children: Not on file     Years of education: Not on file     Highest education level: Not on file   Social Needs     Financial resource strain: Not on file     Food insecurity - worry: Not on file     Food insecurity - inability: Not on file     Transportation needs - medical: Not on file     Transportation needs - non-medical: Not on file   Occupational History     Occupation: RN     Employer: OTHER   Tobacco Use     Smoking status: Never Smoker     Smokeless tobacco: Never Used   Substance and Sexual Activity      Alcohol use: Yes     Comment: Alcoholic Drinks/day: Drinks socially only     Drug use: Unknown     Types: Other     Comment: Drug use: No     Sexual activity: Not on file   Other Topics Concern     Not on file   Social History Narrative    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       MEDS:  See EMR, reviewed  ALL:  See EMR, reviewed    REVIEW OF SYSTEMS:  CONSTITUTIONAL:NEGATIVE for fever, chills, change in weight  INTEGUMENTARY/SKIN: NEGATIVE for worrisome rashes, moles or lesions  EYES: NEGATIVE for vision changes or irritation  ENT/MOUTH: NEGATIVE for ear, mouth and throat problems  RESP:NEGATIVE for significant cough or SOB  BREAST: NEGATIVE for masses, tenderness or discharge  CV: NEGATIVE for chest pain, palpitations or peripheral edema  GI: NEGATIVE for nausea, abdominal pain, heartburn, or change in bowel habits  :NEGATIVE for frequency, dysuria, or hematuria  :NEGATIVE for frequency, dysuria, or hematuria  NEURO: NEGATIVE for weakness, dizziness or paresthesias  ENDOCRINE: NEGATIVE for temperature intolerance, skin/hair changes  HEME/ALLERGY/IMMUNE: NEGATIVE for bleeding problems  PSYCHIATRIC: NEGATIVE for changes in mood or affect    Objective: She has residual swelling that is present from her initial injury involving the lateral side of the forefoot.  No bruising.  There is no swelling at the ankle.  She is nontender at the medial malleolus or lateral malleolus.  She is nontender at the navicular or the area of Lisfranc.  She has tenderness over the fifth metatarsal distally and also along the fourth metatarsal.  Normal range of motion of the great toe.  Skin is intact.  Sensation is normal.  Appropriate in conversation and affect.    Repeat AP and lateral of the foot is unchanged from her initial fracture findings.  She has a distal fifth metatarsal fracture that does not show displacement on the lateral view and seems unchanged from her initial x-ray on the AP view.    Assessment right fifth  metatarsal fracture plan she has an upcoming visit with her orthopedist planned for later this month.  She has a Cam walker boot that she was told to assist with weightbearing.  She also has a scooter if weightbearing is too uncomfortable.  She will continue with ice and Tylenol.  She had no further questions.        This note was created with the use of Dragon software and unintentional spelling or errors may have occurred.

## 2019-01-07 NOTE — TELEPHONE ENCOUNTER
Patient called me back. No external records available - this is her first consultation for tremors.

## 2019-01-07 NOTE — TELEPHONE ENCOUNTER
FUTURE VISIT INFORMATION      FUTURE VISIT INFORMATION:    Date: 1/24/19    Time: 11.10a    Location: [facility]  REFERRAL INFORMATION:    Referring provider:  Dr Pierce Aguilar     Referring providers clinic:  [facility]    Reason for visit/diagnosis  Tremors    RECORDS REQUESTED FROM:       Clinic name Comments Records Status Imaging Status   [facility] Dr. Aguilar [facility]                                      1/7/19: External referral from Dr. Aguilar at [facility]. Could not find any records for tremors through [facility]. Left patient a voice mail to call me back.

## 2019-01-14 DIAGNOSIS — M79.671 RIGHT FOOT PAIN: Primary | ICD-10-CM

## 2019-01-14 NOTE — TELEPHONE ENCOUNTER
RECORDS RECEIVED FROM: internal   DATE RECEIVED: 1/14/19   NOTES STATUS DETAILS   OFFICE NOTE from referring provider Internal 12/17   OFFICE NOTE from other specialist Internal    DISCHARGE SUMMARY from hospital N/A    DISCHARGE REPORT from the ER N/A    OPERATIVE REPORT N/A    MEDICATION LIST Internal    IMPLANT RECORD/STICKER N/A    LABS     CBC/DIFF N/A    CULTURES N/A    INJECTIONS DONE IN RADIOLOGY N/A    MRI N/A    CT SCAN N/A    XRAYS (IMAGES & REPORTS) Internal 12/17   TUMOR     PATHOLOGY  Slides & report N/A

## 2019-01-15 ENCOUNTER — PRE VISIT (OUTPATIENT)
Dept: ORTHOPEDICS | Facility: CLINIC | Age: 49
End: 2019-01-15

## 2019-01-17 ENCOUNTER — ANCILLARY PROCEDURE (OUTPATIENT)
Dept: GENERAL RADIOLOGY | Facility: CLINIC | Age: 49
End: 2019-01-17
Payer: COMMERCIAL

## 2019-01-17 ENCOUNTER — OFFICE VISIT (OUTPATIENT)
Dept: ORTHOPEDICS | Facility: CLINIC | Age: 49
End: 2019-01-17
Payer: COMMERCIAL

## 2019-01-17 DIAGNOSIS — M79.671 RIGHT FOOT PAIN: ICD-10-CM

## 2019-01-17 DIAGNOSIS — S92.351D CLOSED DISPLACED FRACTURE OF FIFTH METATARSAL BONE OF RIGHT FOOT WITH ROUTINE HEALING, SUBSEQUENT ENCOUNTER: Primary | ICD-10-CM

## 2019-01-17 NOTE — PROGRESS NOTES
January 17, 2019: Manju Berg is a 48 year old female who is seen in f/u up for right 5th metacarpal fracture that occurred on 12/7/18. She reports that her pain has been improving.  She has been wearing the CAM boot.        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/2011,Proximal left tibia     Dyspepsia and other specified disorders of function of stomach     No Comments Provided     Headache     No Comments Provided     Herpes simplex     No Comments Provided     Lateral epicondylitis of elbow     No Comments Provided     Other depressive disorder     No Comments Provided     Other unspecified back disorder     No Comments Provided     Pain in joint, upper arm     No Comments Provided       Active problem list:  Patient Active Problem List   Diagnosis     Herpes simplex virus (HSV) infection     Dyspepsia and other specified disorders of function of stomach     Encounter for other general counseling or advice on contraception     Other unspecified back disorder     Mild intermittent asthma     Major depressive disorder, single episode     Family history of colon cancer     CARDIOVASCULAR SCREENING; LDL GOAL LESS THAN 160     Lateral epicondylitis     Pain in joint, upper arm     sclerosing leiomyoma, L gastrocnemius, s/p excision     Chronic constipation     Vitamin D deficiency     Anxiety state       FH:  Family History   Problem Relation Age of Onset     Hypertension Father         Hypertension     Hyperlipidemia Father         Hyperlipidemia     Colon Cancer Father         Cancer-colon     Cancer Father         Cancer,bladder     Other - See Comments Mother         Psychiatric illness     Family History Negative Sister         Good Health     Family History Negative Son         Good Health       SH:  Social History      Socioeconomic History     Marital status:      Spouse name: Not on file     Number of children: Not on file     Years of education: Not on file     Highest education level: Not on file   Social Needs     Financial resource strain: Not on file     Food insecurity - worry: Not on file     Food insecurity - inability: Not on file     Transportation needs - medical: Not on file     Transportation needs - non-medical: Not on file   Occupational History     Occupation: RN     Employer: OTHER   Tobacco Use     Smoking status: Never Smoker     Smokeless tobacco: Never Used   Substance and Sexual Activity     Alcohol use: Yes     Comment: Alcoholic Drinks/day: Drinks socially only     Drug use: Unknown     Types: Other     Comment: Drug use: No     Sexual activity: Not on file   Other Topics Concern     Not on file   Social History Narrative    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       MEDS:  See EMR, reviewed  ALL:  See EMR, reviewed    REVIEW OF SYSTEMS:  CONSTITUTIONAL:NEGATIVE for fever, chills, change in weight  INTEGUMENTARY/SKIN: NEGATIVE for worrisome rashes, moles or lesions  EYES: NEGATIVE for vision changes or irritation  ENT/MOUTH: NEGATIVE for ear, mouth and throat problems  RESP:NEGATIVE for significant cough or SOB  BREAST: NEGATIVE for masses, tenderness or discharge  CV: NEGATIVE for chest pain, palpitations or peripheral edema  GI: NEGATIVE for nausea, abdominal pain, heartburn, or change in bowel habits  :NEGATIVE for frequency, dysuria, or hematuria  :NEGATIVE for frequency, dysuria, or hematuria  NEURO: NEGATIVE for weakness, dizziness or paresthesias  ENDOCRINE: NEGATIVE for temperature intolerance, skin/hair changes  HEME/ALLERGY/IMMUNE: NEGATIVE for bleeding problems  PSYCHIATRIC: NEGATIVE for changes in mood or affect    Objective: She has only minimal tenderness today over the distal fifth metatarsal on the right which is an improvement.  The overlying skin is intact.  She  has some tenderness along the shaft of the fourth metatarsal but no findings on x-ray of abnormalities of the fourth metatarsal.  Sensation is intact in the distal digit.  Capillary refill is normal.  The overlying skin is intact.  Appropriate in conversation and affect.    Repeat x-ray today compared to 4 weeks ago shows increasing displacement distally of the comminuted fracture with increasing displacement on the lateral view as well.  There appears to be some signs of early bone formation about the more proximal aspect of the fracture.    Assessment: Right-sided fifth metatarsal fracture distally with comminution and displacement.    Plan: Went over x-rays together and compared them to x-rays from 4 weeks ago.  I think clinically she is showing signs of improvement but I am concerned about the displacement that has been present.  I suspect her problem is still nonoperative.  She saw Dr. Collazo for this fracture initially 1 month ago and nonoperative care was encouraged at that time.  I would like her to see him in consultation review the x-rays and make sure that we can continue with nonoperative care.  He has an appointment available within the next week.  Her current Cam walker boot has worn out and the Velcro no longer keeps the boot in place.  She would like replacement boot which is provided.      This note was created with the use of Dragon software and unintentional spelling or errors may have occurred.

## 2019-01-17 NOTE — LETTER
1/17/2019      RE: Manju Berg  [address] [facility] [id number]       January 17, 2019: Manju Berg is a 48 year old female who is seen in f/u up for right 5th metacarpal fracture that occurred on 12/7/18. She reports that her pain has been improving.  She has been wearing the CAM boot.        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/2011,Proximal left tibia     Dyspepsia and other specified disorders of function of stomach     No Comments Provided     Headache     No Comments Provided     Herpes simplex     No Comments Provided     Lateral epicondylitis of elbow     No Comments Provided     Other depressive disorder     No Comments Provided     Other unspecified back disorder     No Comments Provided     Pain in joint, upper arm     No Comments Provided       Active problem list:  Patient Active Problem List   Diagnosis     Herpes simplex virus (HSV) infection     Dyspepsia and other specified disorders of function of stomach     Encounter for other general counseling or advice on contraception     Other unspecified back disorder     Mild intermittent asthma     Major depressive disorder, single episode     Family history of colon cancer     CARDIOVASCULAR SCREENING; LDL GOAL LESS THAN 160     Lateral epicondylitis     Pain in joint, upper arm     sclerosing leiomyoma, L gastrocnemius, s/p excision     Chronic constipation     Vitamin D deficiency     Anxiety state       FH:  Family History   Problem Relation Age of Onset     Hypertension Father         Hypertension     Hyperlipidemia Father         Hyperlipidemia     Colon Cancer Father         Cancer-colon     Cancer Father         Cancer,bladder     Other - See Comments Mother         Psychiatric illness     Family History Negative Sister         Good  Health     Family History Negative Son         Good Health       SH:  Social History     Socioeconomic History     Marital status:      Spouse name: Not on file     Number of children: Not on file     Years of education: Not on file     Highest education level: Not on file   Social Needs     Financial resource strain: Not on file     Food insecurity - worry: Not on file     Food insecurity - inability: Not on file     Transportation needs - medical: Not on file     Transportation needs - non-medical: Not on file   Occupational History     Occupation: RN     Employer: OTHER   Tobacco Use     Smoking status: Never Smoker     Smokeless tobacco: Never Used   Substance and Sexual Activity     Alcohol use: Yes     Comment: Alcoholic Drinks/day: Drinks socially only     Drug use: Unknown     Types: Other     Comment: Drug use: No     Sexual activity: Not on file   Other Topics Concern     Not on file   Social History Narrative    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       MEDS:  See EMR, reviewed  ALL:  See EMR, reviewed    REVIEW OF SYSTEMS:  CONSTITUTIONAL:NEGATIVE for fever, chills, change in weight  INTEGUMENTARY/SKIN: NEGATIVE for worrisome rashes, moles or lesions  EYES: NEGATIVE for vision changes or irritation  ENT/MOUTH: NEGATIVE for ear, mouth and throat problems  RESP:NEGATIVE for significant cough or SOB  BREAST: NEGATIVE for masses, tenderness or discharge  CV: NEGATIVE for chest pain, palpitations or peripheral edema  GI: NEGATIVE for nausea, abdominal pain, heartburn, or change in bowel habits  :NEGATIVE for frequency, dysuria, or hematuria  :NEGATIVE for frequency, dysuria, or hematuria  NEURO: NEGATIVE for weakness, dizziness or paresthesias  ENDOCRINE: NEGATIVE for temperature intolerance, skin/hair changes  HEME/ALLERGY/IMMUNE: NEGATIVE for bleeding problems  PSYCHIATRIC: NEGATIVE for changes in mood or affect    Objective: She has only minimal tenderness today over the distal fifth  metatarsal on the right which is an improvement.  The overlying skin is intact.  She has some tenderness along the shaft of the fourth metatarsal but no findings on x-ray of abnormalities of the fourth metatarsal.  Sensation is intact in the distal digit.  Capillary refill is normal.  The overlying skin is intact.  Appropriate in conversation and affect.    Repeat x-ray today compared to 4 weeks ago shows increasing displacement distally of the comminuted fracture with increasing displacement on the lateral view as well.  There appears to be some signs of early bone formation about the more proximal aspect of the fracture.    Assessment: Right-sided fifth metatarsal fracture distally with comminution and displacement.    Plan: Went over x-rays together and compared them to x-rays from 4 weeks ago.  I think clinically she is showing signs of improvement but I am concerned about the displacement that has been present.  I suspect her problem is still nonoperative.  She saw Dr. Collazo for this fracture initially 1 month ago and nonoperative care was encouraged at that time.  I would like her to see him in consultation review the x-rays and make sure that we can continue with nonoperative care.  He has an appointment available within the next week.  Her current Cam walker boot has worn out and the Velcro no longer keeps the boot in place.  She would like replacement boot which is provided.      This note was created with the use of Dragon software and unintentional spelling or errors may have occurred.      Moe Bell MD

## 2019-01-18 ENCOUNTER — TELEPHONE (OUTPATIENT)
Dept: ORTHOPEDICS | Facility: CLINIC | Age: 49
End: 2019-01-18

## 2019-01-18 NOTE — TELEPHONE ENCOUNTER
Called pt for appt time change due to provider schedule. Pt was offered a later time in the day which was accepted.    Cj MARTIN ATC

## 2019-01-22 DIAGNOSIS — M79.675 TOE PAIN, LEFT: Primary | ICD-10-CM

## 2019-01-22 PROBLEM — M67.472 DIGITAL MUCINOUS CYST OF TOE OF LEFT FOOT: Status: ACTIVE | Noted: 2018-08-03

## 2019-01-22 RX ORDER — GABAPENTIN 300 MG/1
300 CAPSULE ORAL 3 TIMES DAILY
Qty: 70 CAPSULE | Refills: 0 | COMMUNITY
Start: 2019-01-22 | End: 2019-01-24

## 2019-01-22 RX ORDER — FLUVOXAMINE MALEATE 100 MG
TABLET ORAL
Refills: 1 | COMMUNITY
Start: 2018-12-26 | End: 2022-02-10

## 2019-01-22 RX ORDER — BACLOFEN 10 MG/1
10-20 TABLET ORAL
COMMUNITY
Start: 2019-01-08 | End: 2019-01-24

## 2019-01-22 RX ORDER — ACETAMINOPHEN 500 MG
TABLET ORAL
COMMUNITY
Start: 2015-03-19 | End: 2019-01-24

## 2019-01-22 RX ORDER — HYDROXYZINE PAMOATE 25 MG/1
CAPSULE ORAL
Refills: 1 | COMMUNITY
Start: 2018-08-30 | End: 2019-01-24

## 2019-01-22 RX ORDER — TINIDAZOLE 500 MG/1
TABLET ORAL
Refills: 0 | COMMUNITY
Start: 2018-08-03 | End: 2019-01-24

## 2019-01-22 NOTE — PROGRESS NOTES
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   Take 1-2 Tablets by mouth every 4 hours as needed. 25 Tablet   0 08/30/2018   Active     Active Problems      Problem Noted Date   Asthma 06/16/2012     Encounters  - from Last 3 Months    Date Type Specialty Care Team Description   01/04/2019 kristen peterson         Surgical History      Surgery Date Laterality Comments   ORTHOPEDIC SURGERY           Medical History      Medical History Date Comments   Gastroesophageal reflux disease       Depression ([facility])       Asthma ([facility])         Family History      Medical History Relation Name Comments   Hypertension Birth Father         Relation Name Status Comments   Birth Father             Answers for HPI/ROS submitted by the patient on 1/24/2019   General Symptoms: No  Skin Symptoms: No  HENT Symptoms: No  EYE SYMPTOMS: No  HEART SYMPTOMS: No  LUNG SYMPTOMS: No  INTESTINAL SYMPTOMS: No  URINARY SYMPTOMS: No  GYNECOLOGIC SYMPTOMS: No  BREAST SYMPTOMS: No  SKELETAL SYMPTOMS: Yes  BLOOD SYMPTOMS: No  NERVOUS SYSTEM SYMPTOMS: Yes  MENTAL HEALTH SYMPTOMS: No  Back  pain: No  Muscle aches: Yes  Neck pain: No  Swollen joints: No  Joint pain: No  Bone pain: Yes  Muscle cramps: No  Muscle weakness: No  Joint stiffness: No  Bone fracture: Yes  Trouble with coordination: No  Dizziness or trouble with balance: Yes  Fainting or black-out spells: No  Memory loss: No  Headache: Yes  Seizures: No  Speech problems: No  Tingling: No  Tremor: Yes  Weakness: No  Difficulty walking: No  Paralysis: No  Numbness: No

## 2019-01-23 ENCOUNTER — OFFICE VISIT (OUTPATIENT)
Dept: ORTHOPEDICS | Facility: CLINIC | Age: 49
End: 2019-01-23
Payer: COMMERCIAL

## 2019-01-23 VITALS — WEIGHT: 188 LBS | BODY MASS INDEX: 32.1 KG/M2 | HEIGHT: 64 IN

## 2019-01-23 DIAGNOSIS — S92.351D DISPLACED FRACTURE OF FIFTH METATARSAL BONE, RIGHT FOOT, SUBSEQUENT ENCOUNTER FOR FRACTURE WITH ROUTINE HEALING: Primary | ICD-10-CM

## 2019-01-23 ASSESSMENT — MIFFLIN-ST. JEOR: SCORE: 1469.82

## 2019-01-23 NOTE — NURSING NOTE
"Reason For Visit:   Chief Complaint   Patient presents with     Right Foot - RECHECK     DOI 12/10/18       Ht 1.613 m (5' 3.5\")   Wt 85.3 kg (188 lb)   BMI 32.78 kg/m      Pain Assessment  Patient Currently in Pain: Yes  0-10 Pain Scale: 3  Primary Pain Location: Foot  Pain Descriptors: Burning  Aggravating Factors: Walking    Carlos Dickinson ATC  "

## 2019-01-23 NOTE — PROGRESS NOTES
This 48-year-old woman is more than 6 weeks out from fracture of the distal portion of the right fifth metatarsal.  She still has anterolateral pain on the foot which radiates proximally.  She has been wearing a cam boot and has pain while wearing it at times.  She is recovering from surgery on her left foot as well.  I reviewed her patient survey information in the EMR.    Examination she is alert oriented has normal mood and 3.  She is mildly tender over the anterolateral aspect of her foot right where the fracture site is.    X-rays show that the fracture has displaced some but also there is healing bone on the medial side but a large gap centrally and laterally.    We discussed treatment of this.  I expect that there will be more bone that will form here and this may even fill in.  This can take several months in some cases.  Surgery could also be done but this would involve re-breaking the healed portions and surgery does not have a established track record in relieving pain and accelerating return to function in this part of this bone.  Given the large gap however it something that we will consider if she fails to form any more new bone but has persistent pain in this area.  Certainly if she has increasing pain she will return to see us immediately.  I have answered all of her questions.

## 2019-01-23 NOTE — LETTER
1/23/2019       RE: Manju Berg  [address] [facility] [address]     Dear Colleague,    Thank you for referring your patient, Manju Berg, to [facility]. Please see a copy of my visit note below.    This 48-year-old woman is more than 6 weeks out from fracture of the distal portion of the right fifth metatarsal.  She still has anterolateral pain on the foot which radiates proximally.  She has been wearing a cam boot and has pain while wearing it at times.  She is recovering from surgery on her left foot as well.  I reviewed her patient survey information in the EMR.    Examination she is alert oriented has normal mood and 3.  She is mildly tender over the anterolateral aspect of her foot right where the fracture site is.    X-rays show that the fracture has displaced some but also there is healing bone on the medial side but a large gap centrally and laterally.    We discussed treatment of this.  I expect that there will be more bone that will form here and this may even fill in.  This can take several months in some cases.  Surgery could also be done but this would involve re-breaking the healed portions and surgery does not have a established track record in relieving pain and accelerating return to function in this part of this bone.  Given the large gap however it something that we will consider if she fails to form any more new bone but has persistent pain in this area.  Certainly if she has increasing pain she will return to see us immediately.  I have answered all of her questions.    Again, thank you for allowing me to participate in the care of your patient.      Sincerely,    Ramesh Collazo MD

## 2019-01-24 ENCOUNTER — PRE VISIT (OUTPATIENT)
Dept: NEUROLOGY | Facility: CLINIC | Age: 49
End: 2019-01-24

## 2019-01-24 ENCOUNTER — OFFICE VISIT (OUTPATIENT)
Dept: NEUROLOGY | Facility: CLINIC | Age: 49
End: 2019-01-24
Payer: COMMERCIAL

## 2019-01-24 VITALS
DIASTOLIC BLOOD PRESSURE: 75 MMHG | OXYGEN SATURATION: 96 % | SYSTOLIC BLOOD PRESSURE: 128 MMHG | WEIGHT: 197 LBS | BODY MASS INDEX: 34.35 KG/M2 | HEART RATE: 91 BPM

## 2019-01-24 DIAGNOSIS — M54.41 CHRONIC BILATERAL LOW BACK PAIN WITH RIGHT-SIDED SCIATICA: ICD-10-CM

## 2019-01-24 DIAGNOSIS — M54.2 CERVICALGIA: ICD-10-CM

## 2019-01-24 DIAGNOSIS — G25.3 MYOCLONUS: ICD-10-CM

## 2019-01-24 DIAGNOSIS — G89.29 CHRONIC BILATERAL LOW BACK PAIN WITH RIGHT-SIDED SCIATICA: ICD-10-CM

## 2019-01-24 DIAGNOSIS — R25.1 TREMOR: Primary | ICD-10-CM

## 2019-01-24 DIAGNOSIS — F32.5 MAJOR DEPRESSION IN COMPLETE REMISSION (H): ICD-10-CM

## 2019-01-24 DIAGNOSIS — F41.9 ANXIETY: ICD-10-CM

## 2019-01-24 RX ORDER — BUPROPION HYDROCHLORIDE 300 MG/1
300 TABLET ORAL EVERY MORNING
COMMUNITY
Start: 2019-01-24 | End: 2022-06-30

## 2019-01-24 RX ORDER — PROPRANOLOL HYDROCHLORIDE 40 MG/1
40 TABLET ORAL PRN
Qty: 270 TABLET | Refills: 3 | COMMUNITY
Start: 2019-01-24 | End: 2022-09-25

## 2019-01-24 RX ORDER — ACETAMINOPHEN 500 MG
1000 TABLET ORAL EVERY 4 HOURS PRN
Status: ON HOLD | COMMUNITY
Start: 2019-01-24 | End: 2022-09-29

## 2019-01-24 RX ORDER — CHLORZOXAZONE 500 MG/1
TABLET ORAL
Qty: 56 TABLET | Refills: 0 | COMMUNITY
Start: 2019-01-24 | End: 2022-12-20

## 2019-01-24 RX ORDER — LIDOCAINE 50 MG/G
1 PATCH TOPICAL DAILY PRN
Qty: 30 PATCH | Refills: 0 | COMMUNITY
Start: 2019-01-24 | End: 2024-01-04

## 2019-01-24 RX ORDER — GABAPENTIN 300 MG/1
300 CAPSULE ORAL 2 TIMES DAILY
Qty: 70 CAPSULE | Refills: 0 | COMMUNITY
Start: 2019-01-24 | End: 2022-01-26

## 2019-01-24 RX ORDER — BACLOFEN 10 MG/1
TABLET ORAL
COMMUNITY
Start: 2019-01-24 | End: 2022-01-26

## 2019-01-24 RX ORDER — DOCUSATE SODIUM 100 MG/1
100 CAPSULE, LIQUID FILLED ORAL AT BEDTIME
Qty: 10 CAPSULE | Refills: 0 | COMMUNITY
Start: 2019-01-24 | End: 2022-09-25

## 2019-01-24 ASSESSMENT — ENCOUNTER SYMPTOMS
TREMORS: 1
LOSS OF CONSCIOUSNESS: 0
MUSCLE WEAKNESS: 0
ARTHRALGIAS: 0
SPEECH CHANGE: 0
NECK PAIN: 0
MEMORY LOSS: 0
BACK PAIN: 0
STIFFNESS: 0
DISTURBANCES IN COORDINATION: 0
JOINT SWELLING: 0
DIZZINESS: 1
MUSCLE CRAMPS: 0
TINGLING: 0
SEIZURES: 0
PARALYSIS: 0
HEADACHES: 1
NUMBNESS: 0
WEAKNESS: 0
MYALGIAS: 1

## 2019-01-24 NOTE — Clinical Note
2019       RE: Manju Berg  [address] [address]     Dear Colleague,    Thank you for referring your patient, Manju Berg, to the [facility]UR[facility] at [facility]. Please see a copy of my visit note below.    Summary and Recommendations:         Se Morales MD  _____________________________________________________________________  PATIENT: Manju Berg  48 year old female   : 1970  TAYLA: 2019    Consult requested by ***        Outside records reviewed and revealed ***      History obtained from patient      History of Present Illness  *** yo         Medications                                                                                                                                                                                                                  14 Review of systems  are negative except for   Patient Active Problem List   Diagnosis     Herpes     GERD (gastroesophageal reflux disease)     Other specified counseling     Other unspecified back disorder     Mild intermittent asthma     Depression, major, single episode     Family history of colon cancer     CARDIOVASCULAR SCREENING; LDL GOAL LESS THAN 160     Lateral epicondylitis     Pain in joint, upper arm     Benign neoplasm of soft tissues     Constipation     Vitamin D deficiency     Anxiety state     Asthma      delivery delivered     Digital mucinous cyst of toe of left foot     Edema     Leg pain, bilateral     Pain of finger of right hand     Prediabetes     Shoulder (girdle) dystocia during labor and deliver, delivered     Toe pain, left     Varicose veins of lower extremity     Backache      No Known Allergies  Past Surgical History:   Procedure Laterality Date     BUNIONECTOMY      No Comments Provided     OTHER SURGICAL HISTORY      82901.0,DE EXCIS BARTHOLIN GLAND/CYST     OTHER SURGICAL HISTORY       3/16/12,925877,OTHER,Left,lower leg     RELEASE CARPAL TUNNEL      No Comments Provided     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/2011,Proximal left tibia     Dyspepsia and other specified disorders of function of stomach     No Comments Provided     Headache     No Comments Provided     Herpes simplex     No Comments Provided     Lateral epicondylitis of elbow     No Comments Provided     Other depressive disorder     No Comments Provided     Other unspecified back disorder     No Comments Provided     Pain in joint, upper arm     No Comments Provided     Social History     Socioeconomic History     Marital status:      Spouse name: Not on file     Number of children: Not on file     Years of education: Not on file     Highest education level: Not on file   Social Needs     Financial resource strain: Not on file     Food insecurity - worry: Not on file     Food insecurity - inability: Not on file     Transportation needs - medical: Not on file     Transportation needs - non-medical: Not on file   Occupational History     Occupation: RN     Employer: OTHER   Tobacco Use     Smoking status: Never Smoker     Smokeless tobacco: Never Used   Substance and Sexual Activity     Alcohol use: Yes     Comment: Alcoholic Drinks/day: Drinks socially only     Drug use: Unknown     Types: Other     Comment: Drug use: No     Sexual activity: Not on file   Other Topics Concern     Not on file   Social History Narrative    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        . lives in [place]. [facility] spouse     Family History   Problem Relation Age of Onset     Hypertension Father         Hypertension     Hyperlipidemia Father         Hyperlipidemia     Colon Cancer Father         Cancer-colon     Cancer Father          Cancer,bladder     Other - See Comments Mother         Psychiatric illness     Family History Negative Sister         Good Health     Family History Negative Son         Good Health     Current Outpatient Medications   Medication Sig Dispense Refill     albuterol (2.5 MG/3ML) 0.083% nebulizer solution Take 1 vial (2.5 mg) by nebulization every 6 hours as needed for shortness of breath / dyspnea or wheezing 30 vial 0     albuterol (PROAIR HFA, PROVENTIL HFA, VENTOLIN HFA) 108 (90 BASE) MCG/ACT inhaler Inhale 2 puffs into the lungs every 4 hours as needed for shortness of breath / dyspnea or wheezing 1 Inhaler 6     BUDESONIDE, INHALATION, 90 MCG/ACT AEPB Inhale 2 puffs into the lungs 2 times daily (Patient not taking: Reported on 5/18/2018) 3 each prn     buPROPion (WELLBUTRIN XL) 150 MG 24 hr tablet Take 1 tablet (150 mg) by mouth every morning 30 tablet 0     CALCIUM 600 + D 600-200 MG-UNIT OR TABS one daily       chlorzoxazone (PARAFON FORTE) 500 MG tablet TAKE 1/2-1&1/2 TABLETS BY MOUTH 3-4 TIMES DAILY AS NEEDED TO RELAX MUSCLES  Needs follow up for refills. Needs to establish care 56 tablet 0     Cholecalciferol (VITAMIN D) 2000 UNITS tablet Take 2,000 Units by mouth daily. 100 tablet 3     citalopram (CELEXA) 40 MG tablet Take 1 tablet (40 mg) by mouth every morning (Patient not taking: Reported on 5/18/2018) 90 tablet 1     fluvoxaMINE (LUVOX) 50 MG tablet Take 50 mg by mouth 2 times daily       gabapentin (NEURONTIN) 300 MG capsule Take 300 mg daily for one week, then 300 mg twice daily for one week, then 300 mg TID. (Patient taking differently: 300 mg 2 times daily Take 300 mg daily for one week, then 300 mg twice daily for one week, then 300 mg TID.) 70 capsule 0     LANsoprazole (PREVACID) 30 MG capsule Take 1 capsule (30 mg) by mouth daily 90 capsule 0     lidocaine (LIDODERM) 5 % patch Place 1 patch onto the skin every 24 hours. Remove after 12 hours. 30 patch 0     multivitamin, therapeutic with  minerals (MULTI-VITAMIN) TABS Take 1 tablet by mouth daily       naproxen (NAPROSYN) 500 MG tablet Take 1 tablet (500 mg) by mouth 2 times daily as needed for moderate pain 30 tablet 1     order for DME [employer] [facility]. Patient can use for 3 months (Patient not taking: Reported on 1/17/2019) 1 Units 0     order for DME [employer] [facility]. Patient can use for 4 months (Patient not taking: Reported on 1/17/2019) 1 Units 0     oxyCODONE IR (ROXICODONE) 5 MG tablet Take 1 tablet (5 mg) by mouth every 6 hours as needed for pain (Patient not taking: Reported on 1/17/2019) 12 tablet 0     TYLENOL EXTRA STRENGTH 500 MG OR TABS 1 TABLET EVERY 4 HOURS AS NEEDED       Examination  B/P: Data Unavailable, T: Data Unavailable, P: Data Unavailable, R: Data Unavailable 0 lbs 0 oz  There were no vitals taken for this visit., There is no height or weight on file to calculate BMI.    Vitals signs were added and reviewed if not above. Please refer to the chart from this visit.    General examination: well developed, nourished and normal affect  Carotid: No bruits. Chest CTA, Heart regular without gallops or murmurs. Abdomen soft nontender, no masses, bowel sounds intact. Periphery: normal pulses without edema. No skin lesions. MENTAL STATUS:  Alert, oriented x3.  Speech fluent with normal naming, repetition, comprehension.  Good right-left orientation, Can remember ***/3 objects.   CRANIAL NERVES:  Disks flat. Pupils are equal, round, reactive to light.  Normal vascularity and fields. Extraocular movements full.  Facial sensation and movement normal.  Hearing intact. Palate moves symmetrically.  Tongue midline.  Sternocleidomastoid and trapezius strength intact.  Neck strength was normal.  NEUROLOGIC:  Tone: ***. Motor in upper and lower extremities. 5/5.  Reflexes 2/4.  Toe signs downgoing.  Good finger-nose-finger, fine finger movement, heel-shin maneuver, sensation to light touch, position sense and vibration and temperature was  normal. Gait normal except for ***. Romberg and postural stability *** Tremor ***            Again, thank you for allowing me to participate in the care of your patient.      Sincerely,    Se Morales MD

## 2019-01-24 NOTE — PATIENT INSTRUCTIONS
Tremor   She does not have hyperthyroidism based on her 9/2018 TSH that was normal.   This could be a combination of medication induced tremor and possibly essential tremor. (no family history of tremor)  There are no parkinsonian features.   She does not have clear dystonic features.     She has a history of intermittent jerks - presumably myoclonus  This could be related to sleep deprivation and/or medications    PLAN  Consider a reduction in one of her antidepressants, eg fluvoxamine reduce from 100 to 50mg twice daily or wellbutrin dose reduction to 225mg from 300mg.     Wait on sleep study to look for sleep apnea    Discussed sleeping issues -  has sleep apnea but not using cpap. She could have mild sleep apnea that could be aggravating her sleep deficit thereby predisposing to myoclonus - jerking.    Not keen to prescribe tremor medications due to a variety of reasons    1. Beta blockers may aggravate asthma  - she does have a 40mg dose that she uses as needed for anxiety. She could conceivably start with 10 or 20mg dose to lessen lung and heart issues.   2. Primidone - sedating  3. topiramate - may help with weight loss but has been associated with renal stones and memory changes    Return as needed.

## 2019-01-24 NOTE — LETTER
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 MG tablet    Indications: Situational Anxiety Take 40 mg by mouth. Indications: Anxiety Related to Current Life Problems   0     Active   HYDROcodone-acetaminophen (NORCO) 5-325 MG tablet   Take 1-2 Tablets by mouth every 4 hours as needed. 25 Tablet   0 08/30/2018   Active     Active Problems      Problem Noted Date   Asthma 06/16/2012     Encounters  - from Last 3 Months    Date Type Specialty Care Team Description   01/04/2019 raineuwdhruv peterson         Surgical History      Surgery Date Laterality Comments   ORTHOPEDIC SURGERY           Medical History      Medical History Date Comments   Gastroesophageal reflux disease       Depression ([facility])       Asthma ([facility])         Family History      Medical History Relation Name Comments   Hypertension Birth Father         Relation Name Status Comments   Birth Father           Sincerely,    Se Morales MD

## 2019-02-06 DIAGNOSIS — M79.671 RIGHT FOOT PAIN: Primary | ICD-10-CM

## 2019-02-12 ENCOUNTER — ANCILLARY PROCEDURE (OUTPATIENT)
Dept: GENERAL RADIOLOGY | Facility: CLINIC | Age: 49
End: 2019-02-12
Attending: ORTHOPAEDIC SURGERY
Payer: COMMERCIAL

## 2019-02-12 ENCOUNTER — OFFICE VISIT (OUTPATIENT)
Dept: ORTHOPEDICS | Facility: CLINIC | Age: 49
End: 2019-02-12
Payer: COMMERCIAL

## 2019-02-12 DIAGNOSIS — M25.572 PAIN IN JOINT, ANKLE AND FOOT, LEFT: Primary | ICD-10-CM

## 2019-02-12 DIAGNOSIS — M79.672 LEFT FOOT PAIN: ICD-10-CM

## 2019-02-12 DIAGNOSIS — M79.671 RIGHT FOOT PAIN: ICD-10-CM

## 2019-02-12 NOTE — LETTER
2/12/2019       RE: Manju Berg  [address] [facility] [address]     Dear Colleague,    Thank you for referring your patient, Manju Berg, to [facility]. Please see a copy of my visit note below.    CHIEF COMPLAINT:   1.  Status post left first MTP joint arthrodesis performed on 08/31/2018.   2.  Status post right fifth metatarsal fracture, currently under the care of Dr. Collazo.      HISTORY OF PRESENT ILLNESS:  Mrs. Berg presents today for evaluation of the left foot.  Reports to continue having problems and difficulties with regards to the first MTP joint.  The patient believes that she is having some interference with the hardware, and she has an interest in undergoing hardware removal.      PHYSICAL EXAMINATION:  On today's exam, she presents with excellent alignment of the left great toe.  Presents with no gross motion across the first MTP joint.  There is some swelling.  There are no signs of infection, inflammation.  Presents with a well-healed surgical incision.  CMS is grossly intact.      RADIOGRAPHIC EVALUATION:  Three views of the foot were obtained today which were significant for showing no obvious radiolucency across the first MTP joint as well as no failure of the hardware, either by loosening or fragmentation.      ASSESSMENT:  Status post left first MTP joint arthrodesis, possible painful retained hardware, possible pseudarthro sis.      PLAN:  Discussed with patient that at this point I would like to proceed with a CT scan of the left foot.  This will confirm if, in fact, she presents with a complete union or a pseudoarthrosis.  If she presents with a pseudoarthrosis, she will return to the clinic to discuss treatment options.  If she presents with a full union, we can tell her to proceed with hardware removal from the left foot at the best of her convenience.      I discussed with her the  most likely postoperative course and complications from undergoing such intervention.      All questions were answered.  The patient was pleased with the discussion.      TT 15 minutes, CT 10 minutes.     Again, thank you for allowing me to participate in the care of your patient.      Sincerely,    Sudeep Esqueda MD

## 2019-02-12 NOTE — NURSING NOTE
Reason For Visit:   Chief Complaint   Patient presents with     RECHECK     continued pain left foot 1st MTPJ fusion DOS 8/31/18        There were no vitals taken for this visit.    Pain Assessment  Patient Currently in Pain: Yes  0-10 Pain Scale: 2(with walking pain get to an 8/10)  Primary Pain Location: Foot  Pain Descriptors: Aching, Burning  Alleviating Factors: Rest  Aggravating Factors: Walking, Standing    Em Mckeon ATC

## 2019-02-12 NOTE — PROGRESS NOTES
CHIEF COMPLAINT:   1.  Status post left first MTP joint arthrodesis performed on 08/31/2018.   2.  Status post right fifth metatarsal fracture, currently under the care of Dr. Collazo.      HISTORY OF PRESENT ILLNESS:  Mrs. Berg presents today for evaluation of the left foot.  Reports to continue having problems and difficulties with regards to the first MTP joint.  The patient believes that she is having some interference with the hardware, and she has an interest in undergoing hardware removal.      PHYSICAL EXAMINATION:  On today's exam, she presents with excellent alignment of the left great toe.  Presents with no gross motion across the first MTP joint.  There is some swelling.  There are no signs of infection, inflammation.  Presents with a well-healed surgical incision.  CMS is grossly intact.      RADIOGRAPHIC EVALUATION:  Three views of the foot were obtained today which were significant for showing no obvious radiolucency across the first MTP joint as well as no failure of the hardware, either by loosening or fragmentation.      ASSESSMENT:  Status post left first MTP joint arthrodesis, possible painful retained hardware, possible pseudarthro sis.      PLAN:  Discussed with patient that at this point I would like to proceed with a CT scan of the left foot.  This will confirm if, in fact, she presents with a complete union or a pseudoarthrosis.  If she presents with a pseudoarthrosis, she will return to the clinic to discuss treatment options.  If she presents with a full union, we can tell her to proceed with hardware removal from the left foot at the best of her convenience.      I discussed with her the most likely postoperative course and complications from undergoing such intervention.      All questions were answered.  The patient was pleased with the discussion.      TT 15 minutes, CT 10 minutes.

## 2019-02-19 ENCOUNTER — ANCILLARY PROCEDURE (OUTPATIENT)
Dept: CT IMAGING | Facility: CLINIC | Age: 49
End: 2019-02-19
Attending: ORTHOPAEDIC SURGERY
Payer: COMMERCIAL

## 2019-02-19 DIAGNOSIS — M25.572 PAIN IN JOINT, ANKLE AND FOOT, LEFT: ICD-10-CM

## 2019-02-21 ENCOUNTER — TELEPHONE (OUTPATIENT)
Dept: ORTHOPEDICS | Facility: CLINIC | Age: 49
End: 2019-02-21

## 2019-02-21 NOTE — TELEPHONE ENCOUNTER
RN called and spoke with Manju.  Dr. Esqueda has reviewed your imaging of the CT Left foot.  You are healed and you may have the screws out, quick recovery of 10 days with a boot and done.  She will call us when she is ready to do this.          review CT left foot MRN [id number] H ZULY    Sudeep Esqueda [email or web address]  Tue 2/19, 5:28 PM  Syeda Lyons;Connie Garibay [email or web address]>  Syeda,    KENA is healed, she could have he screws removed and a quick recovery (10 days with a boot and done).    Thanks a lot!!    Sudeep Esqueda MD  [facility] Medical Director  Chief of Foot and Ankle Surgery Service  Department of Orthopaedic Surgery  [facility]  Ph [phone]

## 2019-10-01 ENCOUNTER — HEALTH MAINTENANCE LETTER (OUTPATIENT)
Age: 49
End: 2019-10-01

## 2020-02-04 DIAGNOSIS — R11.0 NAUSEA: Primary | ICD-10-CM

## 2020-02-04 RX ORDER — ONDANSETRON 4 MG/1
4 TABLET, FILM COATED ORAL EVERY 8 HOURS PRN
Qty: 20 TABLET | Refills: 0 | Status: SHIPPED | OUTPATIENT
Start: 2020-02-04 | End: 2022-01-26

## 2021-01-15 ENCOUNTER — HEALTH MAINTENANCE LETTER (OUTPATIENT)
Age: 51
End: 2021-01-15

## 2021-09-04 ENCOUNTER — HEALTH MAINTENANCE LETTER (OUTPATIENT)
Age: 51
End: 2021-09-04

## 2022-01-11 DIAGNOSIS — J45.909 ASTHMA: Primary | ICD-10-CM

## 2022-01-12 ENCOUNTER — ANCILLARY PROCEDURE (OUTPATIENT)
Dept: GENERAL RADIOLOGY | Facility: CLINIC | Age: 52
End: 2022-01-12
Attending: INTERNAL MEDICINE
Payer: COMMERCIAL

## 2022-01-12 DIAGNOSIS — J45.909 ASTHMA: ICD-10-CM

## 2022-01-12 PROCEDURE — 94726 PLETHYSMOGRAPHY LUNG VOLUMES: CPT | Performed by: INTERNAL MEDICINE

## 2022-01-12 PROCEDURE — 94375 RESPIRATORY FLOW VOLUME LOOP: CPT | Performed by: INTERNAL MEDICINE

## 2022-01-12 PROCEDURE — 94729 DIFFUSING CAPACITY: CPT | Performed by: INTERNAL MEDICINE

## 2022-01-12 PROCEDURE — 71046 X-RAY EXAM CHEST 2 VIEWS: CPT | Performed by: RADIOLOGY

## 2022-01-16 LAB
DLCOUNC-%PRED-PRE: 123 %
DLCOUNC-PRE: 25.76 ML/MIN/MMHG
DLCOUNC-PRED: 20.78 ML/MIN/MMHG
ERV-%PRED-PRE: 107 %
ERV-PRE: 0.59 L
ERV-PRED: 0.55 L
EXPTIME-PRE: 6.41 SEC
FEF2575-%PRED-PRE: 139 %
FEF2575-PRE: 3.82 L/SEC
FEF2575-PRED: 2.75 L/SEC
FEFMAX-%PRED-PRE: 149 %
FEFMAX-PRE: 10 L/SEC
FEFMAX-PRED: 6.69 L/SEC
FEV1-%PRED-PRE: 128 %
FEV1-PRE: 3.54 L
FEV1FEV6-PRE: 85 %
FEV1FEV6-PRED: 82 %
FEV1FVC-PRE: 85 %
FEV1FVC-PRED: 81 %
FEV1SVC-PRE: 84 %
FEV1SVC-PRED: 80 %
FIFMAX-PRE: 8.9 L/SEC
FRCPLETH-%PRED-PRE: 77 %
FRCPLETH-PRE: 2.05 L
FRCPLETH-PRED: 2.66 L
FVC-%PRED-PRE: 121 %
FVC-PRE: 4.16 L
FVC-PRED: 3.42 L
IC-%PRED-PRE: 124 %
IC-PRE: 3.6 L
IC-PRED: 2.89 L
RVPLETH-%PRED-PRE: 86 %
RVPLETH-PRE: 1.47 L
RVPLETH-PRED: 1.7 L
TLCPLETH-%PRED-PRE: 116 %
TLCPLETH-PRE: 5.66 L
TLCPLETH-PRED: 4.86 L
VA-%PRED-PRE: 105 %
VA-PRE: 5.05 L
VC-%PRED-PRE: 121 %
VC-PRE: 4.19 L
VC-PRED: 3.44 L

## 2022-01-26 ENCOUNTER — OFFICE VISIT (OUTPATIENT)
Dept: PULMONOLOGY | Facility: CLINIC | Age: 52
End: 2022-01-26
Payer: COMMERCIAL

## 2022-01-26 VITALS
TEMPERATURE: 98.8 F | DIASTOLIC BLOOD PRESSURE: 84 MMHG | RESPIRATION RATE: 16 BRPM | OXYGEN SATURATION: 98 % | SYSTOLIC BLOOD PRESSURE: 123 MMHG | HEART RATE: 98 BPM | WEIGHT: 203 LBS | BODY MASS INDEX: 35.4 KG/M2

## 2022-01-26 DIAGNOSIS — J45.40 MODERATE PERSISTENT ASTHMA, UNSPECIFIED WHETHER COMPLICATED: Primary | ICD-10-CM

## 2022-01-26 DIAGNOSIS — R05.9 COUGH: ICD-10-CM

## 2022-01-26 PROCEDURE — 99205 OFFICE O/P NEW HI 60 MIN: CPT | Performed by: INTERNAL MEDICINE

## 2022-01-26 RX ORDER — BUDESONIDE AND FORMOTEROL FUMARATE DIHYDRATE 160; 4.5 UG/1; UG/1
2 AEROSOL RESPIRATORY (INHALATION) 2 TIMES DAILY
Qty: 10.2 G | Refills: 11 | Status: SHIPPED | OUTPATIENT
Start: 2022-01-26 | End: 2022-03-23

## 2022-01-26 RX ORDER — CETIRIZINE HYDROCHLORIDE 10 MG/1
10 TABLET ORAL
COMMUNITY
Start: 2021-05-17 | End: 2022-07-25

## 2022-01-26 RX ORDER — VALACYCLOVIR HYDROCHLORIDE 1 G/1
TABLET, FILM COATED ORAL
COMMUNITY
Start: 2021-04-26 | End: 2022-07-01

## 2022-01-26 RX ORDER — MONTELUKAST SODIUM 10 MG/1
1 TABLET ORAL AT BEDTIME
COMMUNITY
Start: 2021-01-25 | End: 2022-03-23

## 2022-01-26 RX ORDER — ALBUTEROL SULFATE 0.83 MG/ML
2.5 SOLUTION RESPIRATORY (INHALATION) EVERY 6 HOURS PRN
Qty: 90 ML | Refills: 3 | Status: SHIPPED | OUTPATIENT
Start: 2022-01-26 | End: 2022-12-08

## 2022-01-26 RX ORDER — VITAMIN E 268 MG
CAPSULE ORAL
COMMUNITY
End: 2022-03-23

## 2022-01-26 RX ORDER — BUDESONIDE AND FORMOTEROL FUMARATE DIHYDRATE 160; 4.5 UG/1; UG/1
2 AEROSOL RESPIRATORY (INHALATION)
COMMUNITY
End: 2022-01-26

## 2022-01-26 RX ORDER — ALBUTEROL SULFATE 90 UG/1
2 AEROSOL, METERED RESPIRATORY (INHALATION) EVERY 4 HOURS PRN
Qty: 8.5 G | Refills: 11 | Status: SHIPPED | OUTPATIENT
Start: 2022-01-26 | End: 2022-12-08

## 2022-01-26 RX ORDER — SENNOSIDES 8.6 MG
TABLET ORAL
COMMUNITY
Start: 2021-02-23 | End: 2022-09-25

## 2022-01-26 ASSESSMENT — ASTHMA QUESTIONNAIRES
ACT_TOTALSCORE: 11
QUESTION_3 LAST FOUR WEEKS HOW OFTEN DID YOUR ASTHMA SYMPTOMS (WHEEZING, COUGHING, SHORTNESS OF BREATH, CHEST TIGHTNESS OR PAIN) WAKE YOU UP AT NIGHT OR EARLIER THAN USUAL IN THE MORNING: FOUR OR MORE NIGHTS A WEEK
QUESTION_5 LAST FOUR WEEKS HOW WOULD YOU RATE YOUR ASTHMA CONTROL: POORLY CONTROLLED
QUESTION_1 LAST FOUR WEEKS HOW MUCH OF THE TIME DID YOUR ASTHMA KEEP YOU FROM GETTING AS MUCH DONE AT WORK, SCHOOL OR AT HOME: SOME OF THE TIME
QUESTION_2 LAST FOUR WEEKS HOW OFTEN HAVE YOU HAD SHORTNESS OF BREATH: THREE TO SIX TIMES A WEEK
QUESTION_4 LAST FOUR WEEKS HOW OFTEN HAVE YOU USED YOUR RESCUE INHALER OR NEBULIZER MEDICATION (SUCH AS ALBUTEROL): ONE OR TWO TIMES PER DAY
ACT_TOTALSCORE: 11

## 2022-01-26 ASSESSMENT — PAIN SCALES - GENERAL: PAINLEVEL: NO PAIN (0)

## 2022-01-26 NOTE — PROGRESS NOTES
Pulmonary Clinic New Patient Consult  Reason for Consult: Asthma/Cough  History of Present Illness  I had the pleasure of seeing Manju Berg, who is a pleasant 51-year-old female who presents to clinic for evaluation of a chronic cough. She has been followed by Dr. Pierce Romo with [facility].  To briefly review, Manju was diagnosed with asthma as a teenager which was mostly associated with exercise (exercise-induced) and only requiring albuterol inhaler as needed.  More recently, her asthma has been poorly controlled with frequent asthma flares as well as requirements for antibiotics as well as steroids.  She is currently on a regimen of Symbicort 80 mcg 2 puffs twice daily, Singulair in addition to albuterol as needed.  She had a recent flare 3 to 4 weeks ago and is slowly recuperating.  During her flares, she required a daily use of her albuterol inhaler and she had a lot of nighttime symptoms.  Her symptoms include episodic shortness of breath associated with chest tightness as well as frequent wheezing.  She also has a cough that is usually mostly dry.  Her spirometric evaluation has historically been normal for the most part.  She does have a lot of seasonal allergies with associated allergic rhinitis and postnasal drip.  She uses Flonase sparingly usually mostly as needed.  She takes over-the-counter cetirizine daily.  Significant history of peptic ulcer disease and is on lansoprazole 30 mg, she denies any reflux symptoms currently.  Her weight has been somewhat stable, she denies any symptoms suggestive of sleep apnea her triggers includes chemicals, fumes, dust as well as pollens.   She denies any chest pain, no orthopnea, no PND, no leg swellings, no palpitations etc. she denies any dysphagia no dysphonia.   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 Never smoker, no family history of chronic lung disease or lung cancer.  She  lives in a house and her filters are changed very regularly.  She has hypoallergenic coverings on her mattresses and pillows.  There are no old carpets in the house.. And she tries to control the amount of dust.   Has pets including a cat and 2 dogs and she is not allergic to them      Review of Systems:  10 of 14 systems reviewed and are negative unless otherwise stated in HPI.    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/2011,Proximal left tibia     Dyspepsia and other specified disorders of function of stomach     No Comments Provided     Headache     No Comments Provided     Herpes simplex     No Comments Provided     Lateral epicondylitis of elbow     No Comments Provided     Other depressive disorder     No Comments Provided     Other unspecified back disorder     No Comments Provided     Pain in joint, upper arm     No Comments Provided       Past Surgical History:   Procedure Laterality Date     ------------OTHER------------- Right 12/07/2018    5th metatarsal fracture     BUNIONECTOMY Bilateral 1999 1999     FOOT SURGERY Left 30 august 2018    fusion of the large toe 30 august 2018     OTHER SURGICAL HISTORY      11534.0,DC EXCIS BARTHOLIN GLAND/CYST     OTHER SURGICAL HISTORY Left 03/16/2012     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     RELEASE CARPAL TUNNEL Bilateral     No Comments Provided       Family History   Problem Relation Age of Onset     Hypertension Father         Hypertension     Hyperlipidemia Father         Hyperlipidemia     Colon Cancer Father         Cancer-colon     Cancer Father         Cancer,bladder     Other - See Comments Mother         Psychiatric illness     Depression Mother      Osteoporosis Mother      Family History Negative Sister         Good Health     Family History Negative Son         Good Health        Social History     Socioeconomic History     Marital status:      Spouse name: Not on file     Number of children: Not on file     Years of education: Not on file     Highest education level: Not on file   Occupational History     Occupation: RN     Employer: OTHER   Tobacco Use     Smoking status: Never Smoker     Smokeless tobacco: Never Used   Substance and Sexual Activity     Alcohol use: Yes     Comment: Alcoholic Drinks/day: Drinks socially only     Drug use: Unknown     Types: Other     Comment: Drug use: No     Sexual activity: Not on file   Other Topics Concern     Not on file   Social History Narrative    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        . lives in [place]. [facility] [facility] spouse     Social Determinants of Health     Financial Resource Strain: Not on file   Food Insecurity: Not on file   Transportation Needs: Not on file   Physical Activity: Not on file   Stress: Not on file   Social Connections: Not on file   Intimate Partner Violence: Not on file   Housing Stability: Not on file         No Known Allergies      Current Outpatient Medications:      acetaminophen (TYLENOL) 500 MG tablet, Take 2 tablets (1,000 mg) by mouth every 4 hours as needed for mild pain, Disp: , Rfl:      albuterol (PROAIR HFA/PROVENTIL HFA/VENTOLIN HFA) 108 (90 Base) MCG/ACT inhaler, Inhale 2 puffs into the lungs every 4 hours as needed for shortness of breath / dyspnea or wheezing, Disp: 8.5 g, Rfl: 11     albuterol (PROVENTIL) (2.5 MG/3ML) 0.083% neb solution, Take 1 vial (2.5 mg) by nebulization every 6 hours as needed for shortness of breath / dyspnea or wheezing, Disp: 90 mL, Rfl: 3     budesonide-formoterol (SYMBICORT) 160-4.5 MCG/ACT Inhaler, Inhale 2 puffs into the lungs 2 times daily, Disp: 10.2 g, Rfl: 11     buPROPion (WELLBUTRIN XL) 300 MG 24 hr tablet, Take 1 tablet (300 mg) by mouth every morning, Disp: , Rfl:      CALCIUM 600 + D 600-200 MG-UNIT OR TABS, one daily, Disp: ,  Rfl:      cetirizine (ZYRTEC) 10 MG tablet, Take 10 mg by mouth, Disp: , Rfl:      chlorzoxazone (PARAFON FORTE) 500 MG tablet, TAKE 1/2-1&1/2 TABLETS BY MOUTH 3-4 TIMES DAILY AS NEEDED TO RELAX MUSCLES Needs follow up for refills. Needs to establish care, Disp: 56 tablet, Rfl: 0     Cholecalciferol (VITAMIN D) 2000 UNITS tablet, Take 2,000 Units by mouth daily., Disp: 100 tablet, Rfl: 3     docusate sodium (COLACE) 100 MG capsule, Take 1 capsule (100 mg) by mouth At Bedtime, Disp: 10 capsule, Rfl: 0     fluvoxaMINE (LUVOX) 100 MG tablet, TAKE 1 TABLET BY MOUTH TWICE A DAY, Disp: , Rfl: 1     LANsoprazole (PREVACID) 30 MG capsule, Take 1 capsule (30 mg) by mouth daily, Disp: 90 capsule, Rfl: 0     lidocaine (LIDODERM) 5 % patch, Place 1 patch onto the skin daily as needed for moderate pain Remove after 12 hours., Disp: 30 patch, Rfl: 0     montelukast (SINGULAIR) 10 MG tablet, Take 1 tablet by mouth At Bedtime, Disp: , Rfl:      naproxen (NAPROSYN) 500 MG tablet, Take 1 tablet (500 mg) by mouth 2 times daily as needed for moderate pain, Disp: 30 tablet, Rfl: 1     propranolol (INDERAL) 40 MG tablet, Take 1 tablet (40 mg) by mouth as needed, Disp: 270 tablet, Rfl: 3     sennosides (SENOKOT) 8.6 MG tablet, TAKE 1 TABLET BY MOUTH TWICE DAILY AS NEEDED, Disp: , Rfl:      valACYclovir (VALTREX) 1000 mg tablet, take 2 tablets twice x 1 day at first signs of outbreak., Disp: , Rfl:      vitamin E (TOCOPHEROL) 400 units (180 mg) capsule, , Disp: , Rfl:      baclofen (LIORESAL) 10 MG tablet, 1-2 X 10MG TABS BY MOUTH 2-3 TIMES PER DAY as needed, Disp: , Rfl:      gabapentin (NEURONTIN) 300 MG capsule, Take 1 capsule (300 mg) by mouth 2 times daily, Disp: 70 capsule, Rfl: 0     ondansetron (ZOFRAN) 4 MG tablet, Take 1 tablet (4 mg) by mouth every 8 hours as needed for nausea, Disp: 20 tablet, Rfl: 0      Physical Exam:  /84   Pulse 98   Temp 98.8  F (37.1  C)   Resp 16   Wt 92.1 kg (203 lb)   SpO2 98%   BMI 35.40  kg/m    GENERAL: Well developed, well nourished, alert, and in no apparent distress.  HEENT: Normocephalic, atraumatic. PERRL, EOMI. Oral mucosa is moist. No perioral cyanosis.  NECK: supple, no masses, no thyromegaly.  RESP:  Normal respiratory effort.  CTAB.  No rales, wheezes, rhonchi.  No cyanosis or clubbing.  CV: Normal S1, S2, regular rhythm, normal rate. No murmur.  No LE edema.   ABDOMEN:  Soft, non-tender, non-distended.   SKIN: warm and dry. No rash.  NEURO: AAOx3.  Normal gait.  Fluent speech.  PSYCH: mentation appears normal.   Results:  PFTs: Normal spirometric study, normal lung volumes, no evidence of airflow obstruction, normal flow volume loops as well as normal diffusion capacity  Most Recent [facility] Pulmonary Function Testing    FVC-Pred   Date Value Ref Range Status   01/12/2022 3.42 L      FVC-Pre   Date Value Ref Range Status   01/12/2022 4.16 L      FVC-%Pred-Pre   Date Value Ref Range Status   01/12/2022 121 %      FEV1-Pre   Date Value Ref Range Status   01/12/2022 3.54 L      FEV1-%Pred-Pre   Date Value Ref Range Status   01/12/2022 128 %      FEV1FVC-Pred   Date Value Ref Range Status   01/12/2022 81 %      FEV1FVC-Pre   Date Value Ref Range Status   01/12/2022 85 %      No results found for: 20029  FEFMax-Pred   Date Value Ref Range Status   01/12/2022 6.69 L/sec      FEFMax-Pre   Date Value Ref Range Status   01/12/2022 10.00 L/sec      FEFMax-%Pred-Pre   Date Value Ref Range Status   01/12/2022 149 %      ExpTime-Pre   Date Value Ref Range Status   01/12/2022 6.41 sec      FIFMax-Pre   Date Value Ref Range Status   01/12/2022 8.90 L/sec      FEV1FEV6-Pred   Date Value Ref Range Status   01/12/2022 82 %      FEV1FEV6-Pre   Date Value Ref Range Status   01/12/2022 85 %      No results found for: 20055  Imaging (personally reviewed in clinic today): CXR 01/12/2022  Minimal perihilar bronchial wall thickening.      Assessment and Plan:    Moderate persistent asthma   ACT score today is 11  indicative of poor control.  She is currently on a regimen of Symbicort 80 mcg 2 puffs twice daily in addition to Singulair and albuterol as needed.  She is just coming off an asthma flare and her control is slowly improving.  It is reasonable to escalate her therapy to a higher dose Symbicort and prescriptions for Symbicort 160 mcg 2 puffs twice daily were given today with refills.  She will continue with Singulair, I did caution her on the black box warning as it pertains to her history of depression and to watch closely for worsening mood changes.  She would also continue with every day over-the-counter sertraline.  Given her concomitant allergic rhinitis, I recommended that she uses her Flonase in a scheduled daily fashion instead of as needed to derive the full benefit and better control of allergic rhinitis and postnasal drip.  Long conversation about trigger avoidance and we formulated an asthma action plan in clinic today.  Brief discussion about biologics.  If her symptoms are not better controlled on maximal inhaler therapy such as the addition of a LAMA inhaler to her regimen, I will evaluate her for possible biologics by checking an IgE level as well as peripheral eosinophil levels.    We also discussed how GERD as well as obesity can complicate effective control of asthma.  Questions and concerns were answered to the patient's satisfaction.  she was provided with my contact information should new questions or concerns arise in the interim.  She should return to clinic in 6 months.  I spent a total of 60 minutes face to face with Manju Berg during today's office visit. Over 50% of this time was spent counseling the patient and/or coordinating care regarding their pulmonary disease.    Up to date on Pneumovax (2010)  Liane Perez MD  Pulmonary, Critical Care and Sleep Medicine  [facility]  Pager: [phone]        The above note was dictated using voice recognition software  and may include typographical errors. Please contact the author for any clarifications.

## 2022-01-26 NOTE — PROGRESS NOTES
Manju Berg's goals for this visit include: Consult  She requests these members of her care team be copied on today's visit information: PCP    PCP: Pierce Aguilar    Referring Provider:  No referring provider defined for this encounter.    /84   Pulse 98   Temp 98.8  F (37.1  C)   Resp 16   Wt 92.1 kg (203 lb)   SpO2 98%   BMI 35.40 kg/m      Do you need any medication refills at today's visit? JONATHAN Weinstein LPN  Pulmonary Medicine:  [facility]  Phone: [phone] Fax: [phone]

## 2022-02-10 DIAGNOSIS — F32.A DEPRESSION, UNSPECIFIED DEPRESSION TYPE: Primary | ICD-10-CM

## 2022-02-10 RX ORDER — FLUVOXAMINE MALEATE 50 MG
50 TABLET ORAL 2 TIMES DAILY
Qty: 60 TABLET | Refills: 0 | Status: SHIPPED | OUTPATIENT
Start: 2022-02-10 | End: 2022-03-23

## 2022-02-19 ENCOUNTER — HEALTH MAINTENANCE LETTER (OUTPATIENT)
Age: 52
End: 2022-02-19

## 2022-02-27 ENCOUNTER — MYC MEDICAL ADVICE (OUTPATIENT)
Dept: PULMONOLOGY | Facility: CLINIC | Age: 52
End: 2022-02-27
Payer: COMMERCIAL

## 2022-02-27 DIAGNOSIS — J18.9 COMMUNITY ACQUIRED PNEUMONIA: Primary | ICD-10-CM

## 2022-03-01 RX ORDER — AZITHROMYCIN 250 MG/1
TABLET, FILM COATED ORAL
Qty: 6 TABLET | Refills: 0 | Status: SHIPPED | OUTPATIENT
Start: 2022-03-01 | End: 2022-03-06

## 2022-03-01 RX ORDER — PREDNISONE 20 MG/1
40 TABLET ORAL DAILY
Qty: 10 TABLET | Refills: 0 | Status: SHIPPED | OUTPATIENT
Start: 2022-03-01 | End: 2022-03-06

## 2022-03-21 ASSESSMENT — ENCOUNTER SYMPTOMS
COUGH: 1
CHILLS: 0
PARESTHESIAS: 0
ABDOMINAL PAIN: 1
ARTHRALGIAS: 0
CONSTIPATION: 1
DYSURIA: 0
DIARRHEA: 0
JOINT SWELLING: 0
SORE THROAT: 0
FREQUENCY: 0
NAUSEA: 1
BREAST MASS: 0
DIZZINESS: 0
MYALGIAS: 0
HEARTBURN: 0
SHORTNESS OF BREATH: 0
HEADACHES: 1
HEMATURIA: 0
PALPITATIONS: 0
FEVER: 0
WEAKNESS: 0
EYE PAIN: 0
NERVOUS/ANXIOUS: 1
HEMATOCHEZIA: 0

## 2022-03-23 ENCOUNTER — OFFICE VISIT (OUTPATIENT)
Dept: FAMILY MEDICINE | Facility: CLINIC | Age: 52
End: 2022-03-23
Payer: COMMERCIAL

## 2022-03-23 VITALS
TEMPERATURE: 97.6 F | WEIGHT: 205.9 LBS | OXYGEN SATURATION: 98 % | DIASTOLIC BLOOD PRESSURE: 84 MMHG | RESPIRATION RATE: 20 BRPM | HEIGHT: 63 IN | BODY MASS INDEX: 36.48 KG/M2 | SYSTOLIC BLOOD PRESSURE: 121 MMHG | HEART RATE: 80 BPM

## 2022-03-23 DIAGNOSIS — G89.29 CHRONIC LOW BACK PAIN, UNSPECIFIED BACK PAIN LATERALITY, UNSPECIFIED WHETHER SCIATICA PRESENT: ICD-10-CM

## 2022-03-23 DIAGNOSIS — Z12.4 CERVICAL CANCER SCREENING: ICD-10-CM

## 2022-03-23 DIAGNOSIS — Z12.11 SCREEN FOR COLON CANCER: ICD-10-CM

## 2022-03-23 DIAGNOSIS — F32.A DEPRESSION, UNSPECIFIED DEPRESSION TYPE: ICD-10-CM

## 2022-03-23 DIAGNOSIS — Z13.220 SCREENING CHOLESTEROL LEVEL: ICD-10-CM

## 2022-03-23 DIAGNOSIS — E66.01 MORBID OBESITY (H): ICD-10-CM

## 2022-03-23 DIAGNOSIS — L40.9 PSORIASIS OF SCALP: ICD-10-CM

## 2022-03-23 DIAGNOSIS — M54.50 CHRONIC LOW BACK PAIN, UNSPECIFIED BACK PAIN LATERALITY, UNSPECIFIED WHETHER SCIATICA PRESENT: ICD-10-CM

## 2022-03-23 DIAGNOSIS — K21.9 GASTROESOPHAGEAL REFLUX DISEASE, UNSPECIFIED WHETHER ESOPHAGITIS PRESENT: ICD-10-CM

## 2022-03-23 DIAGNOSIS — Z11.4 SCREENING FOR HIV (HUMAN IMMUNODEFICIENCY VIRUS): ICD-10-CM

## 2022-03-23 DIAGNOSIS — Z12.31 VISIT FOR SCREENING MAMMOGRAM: ICD-10-CM

## 2022-03-23 DIAGNOSIS — R73.01 ELEVATED FASTING GLUCOSE: ICD-10-CM

## 2022-03-23 DIAGNOSIS — Z00.00 ROUTINE GENERAL MEDICAL EXAMINATION AT A HEALTH CARE FACILITY: ICD-10-CM

## 2022-03-23 DIAGNOSIS — Z13.1 SCREENING FOR DIABETES MELLITUS: ICD-10-CM

## 2022-03-23 DIAGNOSIS — R51.9 HEADACHE, CHRONIC DAILY: ICD-10-CM

## 2022-03-23 DIAGNOSIS — E55.9 VITAMIN D DEFICIENCY: ICD-10-CM

## 2022-03-23 DIAGNOSIS — Z11.59 NEED FOR HEPATITIS C SCREENING TEST: ICD-10-CM

## 2022-03-23 DIAGNOSIS — K59.00 CONSTIPATION, UNSPECIFIED CONSTIPATION TYPE: Primary | ICD-10-CM

## 2022-03-23 DIAGNOSIS — R10.84 ABDOMINAL PAIN, GENERALIZED: ICD-10-CM

## 2022-03-23 PROBLEM — Z97.5 IUD (INTRAUTERINE DEVICE) IN PLACE: Status: ACTIVE | Noted: 2019-07-24

## 2022-03-23 PROBLEM — M67.472 DIGITAL MUCINOUS CYST OF TOE OF LEFT FOOT: Status: RESOLVED | Noted: 2018-08-03 | Resolved: 2022-03-23

## 2022-03-23 PROCEDURE — 99213 OFFICE O/P EST LOW 20 MIN: CPT | Mod: 25 | Performed by: FAMILY MEDICINE

## 2022-03-23 PROCEDURE — G0145 SCR C/V CYTO,THINLAYER,RESCR: HCPCS | Performed by: FAMILY MEDICINE

## 2022-03-23 PROCEDURE — 87624 HPV HI-RISK TYP POOLED RSLT: CPT | Performed by: FAMILY MEDICINE

## 2022-03-23 PROCEDURE — 99386 PREV VISIT NEW AGE 40-64: CPT | Performed by: FAMILY MEDICINE

## 2022-03-23 RX ORDER — FLUVOXAMINE MALEATE 50 MG
50 TABLET ORAL 2 TIMES DAILY
Qty: 180 TABLET | Refills: 1 | Status: SHIPPED | OUTPATIENT
Start: 2022-03-23 | End: 2022-08-25

## 2022-03-23 RX ORDER — BUDESONIDE AND FORMOTEROL FUMARATE DIHYDRATE 80; 4.5 UG/1; UG/1
2 AEROSOL RESPIRATORY (INHALATION) 2 TIMES DAILY
COMMUNITY
End: 2022-07-25

## 2022-03-23 RX ORDER — BETAMETHASONE DIPROPIONATE 0.05 %
GEL (GRAM) TOPICAL DAILY
Qty: 50 G | Refills: 3 | Status: SHIPPED | OUTPATIENT
Start: 2022-03-23 | End: 2023-05-10

## 2022-03-23 RX ORDER — BETAMETHASONE DIPROPIONATE 0.05 %
GEL (GRAM) TOPICAL
COMMUNITY
Start: 2022-01-15 | End: 2022-03-23

## 2022-03-23 ASSESSMENT — ENCOUNTER SYMPTOMS
SHORTNESS OF BREATH: 0
PARESTHESIAS: 0
NAUSEA: 1
SORE THROAT: 0
COUGH: 1
CONSTIPATION: 1
PALPITATIONS: 0
ABDOMINAL PAIN: 1
JOINT SWELLING: 0
FEVER: 0
NERVOUS/ANXIOUS: 1
HEARTBURN: 0
DYSURIA: 0
EYE PAIN: 0
DIARRHEA: 0
ARTHRALGIAS: 0
HEADACHES: 1
DIZZINESS: 0
HEMATOCHEZIA: 0
FREQUENCY: 0
HEMATURIA: 0
BREAST MASS: 0
WEAKNESS: 0
CHILLS: 0
MYALGIAS: 0

## 2022-03-23 ASSESSMENT — PAIN SCALES - GENERAL: PAINLEVEL: MILD PAIN (2)

## 2022-03-23 NOTE — PATIENT INSTRUCTIONS
Please call  [facility] in [place] at [phone] to schedule mammogram.   Schedule with GI and dermatology            Preventive Health Recommendations  Female Ages 40 to 49    Yearly exam:     See your health care provider every year in order to  1. Review health changes.   2. Discuss preventive care.    3. Review your medicines if your doctor prescribed any.      Get a Pap test every three years (unless you have an abnormal result and your provider advises testing more often).      If you get Pap tests with HPV test, you only need to test every 5 years, unless you have an abnormal result. You do not need a Pap test if your uterus was removed (hysterectomy) and you have not had cancer.      You should be tested each year for STDs (sexually transmitted diseases), if you're at risk.     Ask your doctor if you should have a mammogram.      Have a colonoscopy (test for colon cancer) if someone in your family has had colon cancer or polyps before age 50.       Have a cholesterol test every 5 years.       Have a diabetes test (fasting glucose) after age 45. If you are at risk for diabetes, you should have this test every 3 years.    Shots: Get a flu shot each year. Get a tetanus shot every 10 years.     Nutrition:     Eat at least 5 servings of fruits and vegetables each day.    Eat whole-grain bread, whole-wheat pasta and brown rice instead of white grains and rice.    Get adequate Calcium and Vitamin D.      Lifestyle    Exercise at least 150 minutes a week (an average of 30 minutes a day, 5 days a week). This will help you control your weight and prevent disease.    Limit alcohol to one drink per day.    No smoking.     Wear sunscreen to prevent skin cancer.    See your dentist every six months for an exam and cleaning.
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
## 2022-03-25 LAB
BKR LAB AP GYN ADEQUACY: NORMAL
BKR LAB AP GYN INTERPRETATION: NORMAL
BKR LAB AP HPV REFLEX: NORMAL
BKR LAB AP LMP: NORMAL
BKR LAB AP PREVIOUS ABNORMAL: NORMAL
PATH REPORT.COMMENTS IMP SPEC: NORMAL
PATH REPORT.COMMENTS IMP SPEC: NORMAL
PATH REPORT.RELEVANT HX SPEC: NORMAL

## 2022-03-28 LAB
HUMAN PAPILLOMA VIRUS 16 DNA: NEGATIVE
HUMAN PAPILLOMA VIRUS 18 DNA: NEGATIVE
HUMAN PAPILLOMA VIRUS FINAL DIAGNOSIS: NORMAL
HUMAN PAPILLOMA VIRUS OTHER HR: NEGATIVE

## 2022-03-29 ENCOUNTER — ANCILLARY PROCEDURE (OUTPATIENT)
Dept: MAMMOGRAPHY | Facility: CLINIC | Age: 52
End: 2022-03-29
Attending: FAMILY MEDICINE
Payer: COMMERCIAL

## 2022-03-29 DIAGNOSIS — Z12.31 VISIT FOR SCREENING MAMMOGRAM: ICD-10-CM

## 2022-03-29 PROCEDURE — 77063 BREAST TOMOSYNTHESIS BI: CPT

## 2022-03-29 PROCEDURE — 77067 SCR MAMMO BI INCL CAD: CPT

## 2022-04-14 ENCOUNTER — LAB (OUTPATIENT)
Dept: LAB | Facility: CLINIC | Age: 52
End: 2022-04-14
Payer: COMMERCIAL

## 2022-04-14 DIAGNOSIS — Z11.59 NEED FOR HEPATITIS C SCREENING TEST: ICD-10-CM

## 2022-04-14 DIAGNOSIS — R73.01 ELEVATED FASTING GLUCOSE: ICD-10-CM

## 2022-04-14 DIAGNOSIS — Z13.1 SCREENING FOR DIABETES MELLITUS: ICD-10-CM

## 2022-04-14 DIAGNOSIS — Z13.220 SCREENING CHOLESTEROL LEVEL: ICD-10-CM

## 2022-04-14 DIAGNOSIS — Z11.4 SCREENING FOR HIV (HUMAN IMMUNODEFICIENCY VIRUS): ICD-10-CM

## 2022-04-14 LAB
CHOLEST SERPL-MCNC: 220 MG/DL
FASTING STATUS PATIENT QL REPORTED: ABNORMAL
FASTING STATUS PATIENT QL REPORTED: ABNORMAL
GLUCOSE BLD-MCNC: 107 MG/DL (ref 70–99)
HBA1C MFR BLD: 5.2 % (ref 0–5.6)
HCV AB SERPL QL IA: NONREACTIVE
HDLC SERPL-MCNC: 70 MG/DL
HIV 1+2 AB+HIV1 P24 AG SERPL QL IA: NONREACTIVE
LDLC SERPL CALC-MCNC: 129 MG/DL
NONHDLC SERPL-MCNC: 150 MG/DL
TRIGL SERPL-MCNC: 106 MG/DL

## 2022-04-14 PROCEDURE — 82947 ASSAY GLUCOSE BLOOD QUANT: CPT | Performed by: PATHOLOGY

## 2022-04-14 PROCEDURE — 36415 COLL VENOUS BLD VENIPUNCTURE: CPT | Performed by: PATHOLOGY

## 2022-04-14 PROCEDURE — 83036 HEMOGLOBIN GLYCOSYLATED A1C: CPT | Performed by: PATHOLOGY

## 2022-04-14 PROCEDURE — 99000 SPECIMEN HANDLING OFFICE-LAB: CPT | Performed by: PATHOLOGY

## 2022-04-14 PROCEDURE — 87389 HIV-1 AG W/HIV-1&-2 AB AG IA: CPT | Mod: 90 | Performed by: PATHOLOGY

## 2022-04-14 PROCEDURE — 80061 LIPID PANEL: CPT | Performed by: PATHOLOGY

## 2022-04-14 PROCEDURE — 86803 HEPATITIS C AB TEST: CPT | Mod: 90 | Performed by: PATHOLOGY

## 2022-04-14 NOTE — RESULT ENCOUNTER NOTE
Manju,  It was a pleasure to see you in the office recently.   - The HIV screening test was negative.   - The hepatitis C screening test was negative.   - The fasting blood glucose is elevated in the pre-diabetic range (100-125). This puts you at risk for developing diabetes in the future.  Thankfully, the A1c test which looks at your average glucose over the last 3 months was still in the normal range.  Lifestyle measures will help to lower your blood glucose levels and lower the risks of diabetes. These measures include regular exercise, weight loss/maintaining a healthy body weight, and moderating/decreasing carbohydrates (sugar, bread, pasta, rice, baked goods, potatoes, etc) in your diet. We will continue to monitor your blood glucose annually, but please be seen sooner  if you develop any new signs or symptoms of diabetes (increase thirst or urination, fatigue, blurry vision, unexplained weight loss).    - The cholesterol panel shows the LDL (bad cholesterol) was a little higher than ideal but not significantly elevated.  Your good cholesterol (HDL) and triglycerides were still normal.  I would encourage you to work on lifestyle measures to bring your cholesterol down and reduce cardiovascular risks. Plan to recheck your cholesterol yearly.     Recommendations to reduce cholesterol and cardiovascular risks:  Diet:  -Try to eat more vegetables, fruits, legumes, nuts/seeds, whole grains, and fish.  - try to eat less red meat, processed meats, processed foods, sweetened beverages.   - try to replace saturated fat in your diet with mono- and poly-unsaturated fats   Exercise:  Aim for regular exercise with a goal of 150 min of moderate to high intensity aerobic exercise per week      Please MyChart or call if you have any concerns or questions.   Sincerely,  Yarely Wise MD

## 2022-04-21 ENCOUNTER — TELEPHONE (OUTPATIENT)
Dept: FAMILY MEDICINE | Facility: CLINIC | Age: 52
End: 2022-04-21
Payer: COMMERCIAL

## 2022-04-26 ENCOUNTER — TRANSFERRED RECORDS (OUTPATIENT)
Dept: HEALTH INFORMATION MANAGEMENT | Facility: CLINIC | Age: 52
End: 2022-04-26
Payer: COMMERCIAL

## 2022-05-27 NOTE — PROGRESS NOTES
Assessment & Plan   Problem List Items Addressed This Visit    None     Visit Diagnoses     Trochanteric bursitis of left hip    -  Primary         Patient instructions are attached.   She can return at any time for a cortisone injection if the pain is severe or persistent.                    Return if symptoms worsen or fail to improve.    VALENCIA [facility] OPAL Nunes is a 52 year old who presents for the following health issues     History of Present Illness       Reason for visit:  Left hip pain  Symptom onset:  1-2 weeks ago  Symptoms include:  Throbbing pain, catching & weakness with stairs  Symptom intensity:  Moderate  Symptom progression:  Worsening  Had these symptoms before:  No  What makes it worse:  Laying on it  What makes it better:  Ice, tylenol, icee hot    She eats 2-3 servings of fruits and vegetables daily.She consumes 1 sweetened beverage(s) daily.She exercises with enough effort to increase her heart rate 9 or less minutes per day.  She exercises with enough effort to increase her heart rate 3 or less days per week. She is missing 1 dose(s) of medications per week.  She is not taking prescribed medications regularly due to other.    Today's PHQ-9         PHQ-9 Total Score: 9    PHQ-9 Q9 Thoughts of better off dead/self-harm past 2 weeks :   Not at all    How difficult have these problems made it for you to do your work, take care of things at home, or get along with other people: Very difficult     Tender to the touch  Left lateral hip  Started after sleeping on her left side  Got worse over 1-2 weeks, chiro adjustment helped  She did some stretches at home  New - never had this before  Clicking and feels like it could give out  Stairs aggravate it - feels weak on stairs  Able to get in and out of the car, put a sock on          Review of Systems         Objective    /78   Pulse 83   Temp 97  F (36.1  C) (Tympanic)    "Resp 20   Ht 1.607 m (5' 3.27\")   Wt 95.3 kg (210 lb)   SpO2 97%   BMI 36.89 kg/m    Body mass index is 36.89 kg/m .  Physical Exam  Constitutional:       General: She is not in acute distress.     Appearance: She is well-developed. She is not diaphoretic.   HENT:      Head: Normocephalic.      Right Ear: External ear normal.      Left Ear: External ear normal.      Nose: Nose normal.   Eyes:      Conjunctiva/sclera: Conjunctivae normal.   Pulmonary:      Effort: Pulmonary effort is normal.   Musculoskeletal:      Cervical back: Normal range of motion.      Right hip: No tenderness. Normal range of motion. Normal strength.      Left hip: Tenderness (greater trochanter) present. Normal range of motion. Normal strength.   Neurological:      Mental Status: She is alert and oriented to person, place, and time.   Psychiatric:         Judgment: Judgment normal.                        "

## 2022-05-30 ASSESSMENT — PATIENT HEALTH QUESTIONNAIRE - PHQ9
SUM OF ALL RESPONSES TO PHQ QUESTIONS 1-9: 9
SUM OF ALL RESPONSES TO PHQ QUESTIONS 1-9: 9
10. IF YOU CHECKED OFF ANY PROBLEMS, HOW DIFFICULT HAVE THESE PROBLEMS MADE IT FOR YOU TO DO YOUR WORK, TAKE CARE OF THINGS AT HOME, OR GET ALONG WITH OTHER PEOPLE: VERY DIFFICULT

## 2022-05-31 ENCOUNTER — OFFICE VISIT (OUTPATIENT)
Dept: FAMILY MEDICINE | Facility: CLINIC | Age: 52
End: 2022-05-31
Payer: COMMERCIAL

## 2022-05-31 VITALS
RESPIRATION RATE: 20 BRPM | WEIGHT: 210 LBS | DIASTOLIC BLOOD PRESSURE: 78 MMHG | SYSTOLIC BLOOD PRESSURE: 121 MMHG | HEART RATE: 83 BPM | OXYGEN SATURATION: 97 % | TEMPERATURE: 97 F | BODY MASS INDEX: 37.21 KG/M2 | HEIGHT: 63 IN

## 2022-05-31 DIAGNOSIS — M70.62 TROCHANTERIC BURSITIS OF LEFT HIP: Primary | ICD-10-CM

## 2022-05-31 PROCEDURE — 99213 OFFICE O/P EST LOW 20 MIN: CPT | Performed by: PHYSICIAN ASSISTANT

## 2022-05-31 ASSESSMENT — PAIN SCALES - GENERAL: PAINLEVEL: EXTREME PAIN (8)

## 2022-05-31 ASSESSMENT — PATIENT HEALTH QUESTIONNAIRE - PHQ9
SUM OF ALL RESPONSES TO PHQ QUESTIONS 1-9: 9
10. IF YOU CHECKED OFF ANY PROBLEMS, HOW DIFFICULT HAVE THESE PROBLEMS MADE IT FOR YOU TO DO YOUR WORK, TAKE CARE OF THINGS AT HOME, OR GET ALONG WITH OTHER PEOPLE: VERY DIFFICULT

## 2022-06-30 ENCOUNTER — MYC REFILL (OUTPATIENT)
Dept: FAMILY MEDICINE | Facility: CLINIC | Age: 52
End: 2022-06-30

## 2022-06-30 ENCOUNTER — MYC MEDICAL ADVICE (OUTPATIENT)
Dept: FAMILY MEDICINE | Facility: CLINIC | Age: 52
End: 2022-06-30

## 2022-06-30 DIAGNOSIS — Z86.19 H/O COLD SORES: Primary | ICD-10-CM

## 2022-06-30 DIAGNOSIS — F41.9 ANXIETY: ICD-10-CM

## 2022-06-30 DIAGNOSIS — F32.5 MAJOR DEPRESSION IN COMPLETE REMISSION (H): ICD-10-CM

## 2022-07-01 PROBLEM — Z86.19 H/O COLD SORES: Status: ACTIVE | Noted: 2022-07-01

## 2022-07-01 RX ORDER — VALACYCLOVIR HYDROCHLORIDE 1 G/1
TABLET, FILM COATED ORAL
Qty: 12 TABLET | Refills: 3 | Status: SHIPPED | OUTPATIENT
Start: 2022-07-01 | End: 2024-01-17

## 2022-07-01 RX ORDER — BUPROPION HYDROCHLORIDE 300 MG/1
300 TABLET ORAL EVERY MORNING
Qty: 90 TABLET | Refills: 1 | Status: SHIPPED | OUTPATIENT
Start: 2022-07-01 | End: 2022-12-20

## 2022-07-01 NOTE — TELEPHONE ENCOUNTER
Routing refill request to provider for review/approval because:  PHQ9 out of range.    Anahi Hillman RN  [facility]           patient

## 2022-07-25 ENCOUNTER — OFFICE VISIT (OUTPATIENT)
Dept: PULMONOLOGY | Facility: CLINIC | Age: 52
End: 2022-07-25
Payer: COMMERCIAL

## 2022-07-25 VITALS
BODY MASS INDEX: 36.67 KG/M2 | OXYGEN SATURATION: 96 % | HEART RATE: 89 BPM | WEIGHT: 208.8 LBS | SYSTOLIC BLOOD PRESSURE: 117 MMHG | DIASTOLIC BLOOD PRESSURE: 81 MMHG

## 2022-07-25 DIAGNOSIS — J45.40 MODERATE PERSISTENT ASTHMA WITHOUT COMPLICATION: Primary | ICD-10-CM

## 2022-07-25 PROCEDURE — 99215 OFFICE O/P EST HI 40 MIN: CPT | Performed by: INTERNAL MEDICINE

## 2022-07-25 RX ORDER — BUDESONIDE AND FORMOTEROL FUMARATE DIHYDRATE 80; 4.5 UG/1; UG/1
2 AEROSOL RESPIRATORY (INHALATION) 2 TIMES DAILY
Qty: 30.4 G | Refills: 3 | Status: SHIPPED | OUTPATIENT
Start: 2022-07-25 | End: 2022-07-25

## 2022-07-25 RX ORDER — BENZONATATE 100 MG/1
100 CAPSULE ORAL 3 TIMES DAILY PRN
Qty: 90 CAPSULE | Refills: 3 | Status: SHIPPED | OUTPATIENT
Start: 2022-07-25 | End: 2022-09-25

## 2022-07-25 RX ORDER — BUDESONIDE AND FORMOTEROL FUMARATE DIHYDRATE 80; 4.5 UG/1; UG/1
2 AEROSOL RESPIRATORY (INHALATION) 2 TIMES DAILY
Qty: 30.4 G | Refills: 3 | Status: SHIPPED | OUTPATIENT
Start: 2022-07-25 | End: 2023-05-10

## 2022-07-25 ASSESSMENT — PAIN SCALES - GENERAL: PAINLEVEL: MILD PAIN (2)

## 2022-07-25 NOTE — NURSING NOTE
Manju Berg's goals for this visit include:  NONE  She requests these members of her care team be copied on today's visit information: YES    PCP: Yarely Wise    Referring Provider:  No referring provider defined for this encounter.    There were no vitals taken for this visit.    Do you need any medication refills at today's visit?

## 2022-07-25 NOTE — PROGRESS NOTES
Pulmonary Clinic Return Patient Visit  Reason for Visit: Asthma/Cough  History of Present Illness  Manju Berg is a 52-year-old female who presents to clinic for follow up of a chronic cough. I last saw her in 1/2022.  To briefly review, Manju was diagnosed with asthma as a teenager which was mostly associated with exercise (exercise-induced) and only requiring albuterol inhaler as needed.  More recently, her asthma has been poorly controlled with frequent asthma flares as well as requirements for antibiotics as well as steroids.  She was started on a regimen of Symbicort 80 mcg 2 puffs twice daily and albuterol as needed.  When I saw her last in clinic, she was recuperating from her recent asthma flare which was likely triggered due to seasonal allergies with associated postnasal drip due to allergic rhinitis.  I continue her on a regimen of low-dose Symbicort, and started her on nasal corticosteroids-Flonase for better control for allergic rhinitis and seasonal allergies which are potential triggers.  I advised her to use once a day Claritin.  Today, she is doing a lot better.  She is less short of breath, cough is much improved and she has very minimal wheezing or chest tightness.  She has also noted significant control of her environmental triggers and associated allergic rhinitis with postnasal drip.  There has been no asthma flares since the last time since I saw her in clinic and her asthma control is better controlled overall.  She denies any chest pain, no orthopnea, no PND, no leg swellings, no palpitations etc. she denies any dysphagia no dysphonia.  Has pets including a cat and 2 dogs and she is not allergic to them      Review of Systems:  10 of 14 systems reviewed and are negative unless otherwise stated in HPI.    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     5/27/2011,Proximal left tibia     Dyspepsia and other specified disorders of function of stomach     No Comments Provided     Headache     No Comments Provided     Herpes simplex     No Comments Provided     Lateral epicondylitis of elbow     No Comments Provided     Other depressive disorder     No Comments Provided     Other unspecified back disorder     No Comments Provided     Pain in joint, upper arm     No Comments Provided       Past Surgical History:   Procedure Laterality Date     ------------OTHER------------- Right 12/07/2018    5th metatarsal fracture     BUNIONECTOMY Bilateral 1999 1999     C/SECTION, LOW TRANSVERSE  2013     FOOT SURGERY Left 30 august 2018    fusion of the large toe 30 august 2018     OTHER SURGICAL HISTORY      EXCIS BARTHOLIN GLAND/CYST     OTHER SURGICAL HISTORY Left 03/16/2012     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     RELEASE CARPAL TUNNEL Bilateral     No Comments Provided     STRIP VEIN  2014    vein removal for varicose veins       Family History   Problem Relation Age of Onset     Depression Mother      Osteoporosis Mother      Mental Illness Mother      Hypertension Father         Hypertension     Hyperlipidemia Father         Hyperlipidemia     Colon Cancer Father 56        Cancer-colon     Bladder Cancer Father 62        Cancer,bladder     Obesity Father      Family History Negative Sister         Good Health     Family History Negative Son      Mental Illness Maternal Aunt      Mental Illness Maternal Uncle        Social History     Socioeconomic History     Marital status:      Spouse name: Not on file     Number of children: Not on file     Years of education: Not on file     Highest education level: Not on file   Occupational History     Occupation: RN     Employer: OTHER   Tobacco Use     Smoking status: Never Smoker     Smokeless tobacco: Never Used   Substance and Sexual Activity     Alcohol use: Yes     Comment: Alcoholic  Drinks/day: Drinks socially only     Drug use: Unknown     Types: Other     Comment: Drug use: No     Sexual activity: Not on file   Other Topics Concern     Not on file   Social History Narrative    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        . lives in [place]. [facility] spouse     Social Determinants of Health     Financial Resource Strain: Not on file   Food Insecurity: Not on file   Transportation Needs: Not on file   Physical Activity: Not on file   Stress: Not on file   Social Connections: Not on file   Intimate Partner Violence: Not on file   Housing Stability: Not on file         No Known Allergies      Current Outpatient Medications:      acetaminophen (TYLENOL) 500 MG tablet, Take 2 tablets (1,000 mg) by mouth every 4 hours as needed for mild pain, Disp: , Rfl:      albuterol (PROAIR HFA/PROVENTIL HFA/VENTOLIN HFA) 108 (90 Base) MCG/ACT inhaler, Inhale 2 puffs into the lungs every 4 hours as needed for shortness of breath / dyspnea or wheezing, Disp: 8.5 g, Rfl: 11     albuterol (PROVENTIL) (2.5 MG/3ML) 0.083% neb solution, Take 1 vial (2.5 mg) by nebulization every 6 hours as needed for shortness of breath / dyspnea or wheezing, Disp: 90 mL, Rfl: 3     augmented betamethasone dipropionate (DIPROLENE) 0.05 % external gel, Apply topically daily, Disp: 50 g, Rfl: 3     budesonide-formoterol (SYMBICORT) 80-4.5 MCG/ACT Inhaler, Inhale 2 puffs into the lungs 2 times daily, Disp: , Rfl:      buPROPion (WELLBUTRIN XL) 300 MG 24 hr tablet, Take 1 tablet (300 mg) by mouth every morning, Disp: 90 tablet, Rfl: 1     CALCIUM 600 + D 600-200 MG-UNIT OR TABS, one daily, Disp: , Rfl:      chlorzoxazone (PARAFON FORTE) 500 MG tablet, TAKE 1/2-1&1/2 TABLETS BY MOUTH 3-4 TIMES DAILY AS NEEDED TO RELAX MUSCLES Needs follow up for refills. Needs to establish care, Disp: 56 tablet, Rfl: 0     Cholecalciferol (VITAMIN D) 2000 UNITS tablet, Take 2,000 Units by mouth daily., Disp: 100 tablet, Rfl: 3      docusate sodium (COLACE) 100 MG capsule, Take 1 capsule (100 mg) by mouth At Bedtime, Disp: 10 capsule, Rfl: 0     fluticasone (FLONASE) 50 MCG/ACT nasal spray, Spray 1 spray into both nostrils 2 times daily, Disp: , Rfl:      fluvoxaMINE (LUVOX) 50 MG tablet, Take 1 tablet (50 mg) by mouth 2 times daily, Disp: 180 tablet, Rfl: 1     LANsoprazole (PREVACID) 30 MG capsule, Take 1 capsule (30 mg) by mouth daily, Disp: 90 capsule, Rfl: 0     lidocaine (LIDODERM) 5 % patch, Place 1 patch onto the skin daily as needed for moderate pain Remove after 12 hours., Disp: 30 patch, Rfl: 0     naproxen (NAPROSYN) 500 MG tablet, Take 1 tablet (500 mg) by mouth 2 times daily as needed for moderate pain, Disp: 30 tablet, Rfl: 1     propranolol (INDERAL) 40 MG tablet, Take 1 tablet (40 mg) by mouth as needed, Disp: 270 tablet, Rfl: 3     sennosides (SENOKOT) 8.6 MG tablet, TAKE 1 TABLET BY MOUTH TWICE DAILY AS NEEDED, Disp: , Rfl:      valACYclovir (VALTREX) 1000 mg tablet, take 2 tablets PO Q 12 hours x 2 doses at first signs of outbreak., Disp: 12 tablet, Rfl: 3     cetirizine (ZYRTEC) 10 MG tablet, Take 10 mg by mouth (Patient not taking: Reported on 7/25/2022), Disp: , Rfl:       Physical Exam:  /81 (BP Location: Left arm, Patient Position: Sitting, Cuff Size: Adult Large)   Pulse 89   Wt 94.7 kg (208 lb 12.8 oz)   SpO2 96%   BMI 36.67 kg/m    GENERAL: Well developed, well nourished, alert, and in no apparent distress.  HEENT: Normocephalic, atraumatic. PERRL, EOMI. Oral mucosa is moist. No perioral cyanosis.  NECK: supple, no masses, no thyromegaly.  RESP:  Normal respiratory effort.  CTAB.  No rales, wheezes, rhonchi.  No cyanosis or clubbing.  CV: Normal S1, S2, regular rhythm, normal rate. No murmur.  No LE edema.   ABDOMEN:  Soft, non-tender, non-distended.   SKIN: warm and dry. No rash.  NEURO: AAOx3.  Normal gait.  Fluent speech.  PSYCH: mentation appears normal.   Results:  PFTs: Normal spirometric study,  normal lung volumes, no evidence of airflow obstruction, normal flow volume loops as well as normal diffusion capacity  Most Recent [facility] Pulmonary Function Testing    FVC-Pred   Date Value Ref Range Status   01/12/2022 3.42 L      FVC-Pre   Date Value Ref Range Status   01/12/2022 4.16 L      FVC-%Pred-Pre   Date Value Ref Range Status   01/12/2022 121 %      FEV1-Pre   Date Value Ref Range Status   01/12/2022 3.54 L      FEV1-%Pred-Pre   Date Value Ref Range Status   01/12/2022 128 %      FEV1FVC-Pred   Date Value Ref Range Status   01/12/2022 81 %      FEV1FVC-Pre   Date Value Ref Range Status   01/12/2022 85 %      No results found for: 20029  FEFMax-Pred   Date Value Ref Range Status   01/12/2022 6.69 L/sec      FEFMax-Pre   Date Value Ref Range Status   01/12/2022 10.00 L/sec      FEFMax-%Pred-Pre   Date Value Ref Range Status   01/12/2022 149 %      ExpTime-Pre   Date Value Ref Range Status   01/12/2022 6.41 sec      FIFMax-Pre   Date Value Ref Range Status   01/12/2022 8.90 L/sec      FEV1FEV6-Pred   Date Value Ref Range Status   01/12/2022 82 %      FEV1FEV6-Pre   Date Value Ref Range Status   01/12/2022 85 %      No results found for: 20055  Imaging (personally reviewed in clinic today): CXR 01/12/2022  Minimal perihilar bronchial wall thickening.      Assessment and Plan:    Moderate persistent asthma   ACT score today is 20 improved from 11.   She is currently on a regimen of Symbicort 80 mcg 2 puffs twice daily and albuterol as needed. I had started her on Flonase for seasonal allergies which have been very helpful.   Long conversation about trigger avoidance and we formulated an asthma action plan in clinic today.  Brief discussion about biologics today and her questions were answered.  Her asthma is well controlled and she does not need further evaluations for consideration of biologics.  Allergic rhinitis  Started on Flonase and has noticed improved symptoms  Questions and concerns were answered to  the patient's satisfaction.  she was provided with my contact information should new questions or concerns arise in the interim.  She should return to clinic in 6 months.  I spent a total of 40 minutes face to face with Manju Berg during today's office visit. Over 50% of this time was spent counseling the patient and/or coordinating care regarding their pulmonary disease.    Up to date on Pneumovax (2010)  Liane Perez MD  Pulmonary, Critical Care and Sleep Medicine  [facility]  Pager: [phone]        The above note was dictated using voice recognition software and may include typographical errors. Please contact the author for any clarifications.

## 2022-07-26 ASSESSMENT — ASTHMA QUESTIONNAIRES
QUESTION_2 LAST FOUR WEEKS HOW OFTEN HAVE YOU HAD SHORTNESS OF BREATH: NOT AT ALL
QUESTION_4 LAST FOUR WEEKS HOW OFTEN HAVE YOU USED YOUR RESCUE INHALER OR NEBULIZER MEDICATION (SUCH AS ALBUTEROL): NOT AT ALL
ACT_TOTALSCORE: 20
QUESTION_5 LAST FOUR WEEKS HOW WOULD YOU RATE YOUR ASTHMA CONTROL: WELL CONTROLLED
QUESTION_1 LAST FOUR WEEKS HOW MUCH OF THE TIME DID YOUR ASTHMA KEEP YOU FROM GETTING AS MUCH DONE AT WORK, SCHOOL OR AT HOME: NONE OF THE TIME
ACUTE_EXACERBATION_TODAY: NO
QUESTION_3 LAST FOUR WEEKS HOW OFTEN DID YOUR ASTHMA SYMPTOMS (WHEEZING, COUGHING, SHORTNESS OF BREATH, CHEST TIGHTNESS OR PAIN) WAKE YOU UP AT NIGHT OR EARLIER THAN USUAL IN THE MORNING: FOUR OR MORE NIGHTS A WEEK
ACT_TOTALSCORE: 20

## 2022-08-01 ENCOUNTER — TRANSFERRED RECORDS (OUTPATIENT)
Dept: HEALTH INFORMATION MANAGEMENT | Facility: CLINIC | Age: 52
End: 2022-08-01

## 2022-08-08 ENCOUNTER — TRANSFERRED RECORDS (OUTPATIENT)
Dept: HEALTH INFORMATION MANAGEMENT | Facility: CLINIC | Age: 52
End: 2022-08-08

## 2022-08-19 ENCOUNTER — HOSPITAL ENCOUNTER (OUTPATIENT)
Dept: NUCLEAR MEDICINE | Facility: CLINIC | Age: 52
Setting detail: NUCLEAR MEDICINE
Discharge: HOME | End: 2022-08-19
Attending: PHYSICIAN ASSISTANT | Admitting: PHYSICIAN ASSISTANT
Payer: COMMERCIAL

## 2022-08-19 DIAGNOSIS — R68.81 EARLY SATIETY: ICD-10-CM

## 2022-08-19 DIAGNOSIS — K59.09 CHRONIC CONSTIPATION: ICD-10-CM

## 2022-08-19 DIAGNOSIS — R11.0 NAUSEA: ICD-10-CM

## 2022-08-19 PROCEDURE — 343N000001 HC RX 343: Performed by: PHYSICIAN ASSISTANT

## 2022-08-19 PROCEDURE — A9541 TC99M SULFUR COLLOID: HCPCS | Performed by: PHYSICIAN ASSISTANT

## 2022-08-19 PROCEDURE — 78264 GASTRIC EMPTYING IMG STUDY: CPT

## 2022-08-19 PROCEDURE — 78264 GASTRIC EMPTYING IMG STUDY: CPT | Mod: 26 | Performed by: STUDENT IN AN ORGANIZED HEALTH CARE EDUCATION/TRAINING PROGRAM

## 2022-08-19 RX ADMIN — Medication 2 MCI.: at 08:13

## 2022-08-22 ENCOUNTER — VIRTUAL VISIT (OUTPATIENT)
Dept: DERMATOLOGY | Facility: CLINIC | Age: 52
End: 2022-08-22
Payer: COMMERCIAL

## 2022-08-22 ENCOUNTER — MYC MEDICAL ADVICE (OUTPATIENT)
Dept: CARE COORDINATION | Facility: CLINIC | Age: 52
End: 2022-08-22

## 2022-08-22 DIAGNOSIS — L40.0 PLAQUE PSORIASIS: Primary | ICD-10-CM

## 2022-08-22 PROCEDURE — 99203 OFFICE O/P NEW LOW 30 MIN: CPT | Mod: 95 | Performed by: DERMATOLOGY

## 2022-08-22 RX ORDER — BETAMETHASONE DIPROPIONATE 0.5 MG/ML
LOTION, AUGMENTED TOPICAL DAILY
Qty: 180 ML | Refills: 3 | Status: SHIPPED | OUTPATIENT
Start: 2022-08-22 | End: 2023-11-03

## 2022-08-22 NOTE — LETTER
8/22/2022       RE: Manju Berg  [address]  [place] [address]     Dear Colleague,    Thank you for referring your patient, Manju Berg, to the [facility] [place] at [facility]. Please see a copy of my visit note below.    [facility] Dermatology Note  Encounter Date: Aug 22, 2022  Store-and-Forward and Telephone ([phone]). Location of teledermatologist: [facility] [place].  Start time: 12:41. End time: 1:01.    Dermatology Problem List:  1. Scalp psoriasis: with some ear involvement  - Excimer, augmented betamethasone lotion  - in reserve: apremilast  - prior: augmented betamethasone gel, coal tar/salcylic acid shampoo, ketoconazole shampoo, zinc pyrithione shampoo, compounded betamethasone/calcipotriene solution    ____________________________________________    Assessment & Plan:     1. 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 Recalcitrant to topicals. We discussed risks/benefits of alternative topicals, Excimer laser, and systemic agents including biologics and apremilast. Will start with a few tweaks to the topical regimen and Excimer.  - Start Excimer three times per week  - switch to augmented betamethasone lotion daily    Procedures Performed:    None    Follow-up: 3 months    Staff:     Pierce Velasquez MD, FAAD   of Dermatology  Department of Dermatology  [facility] [employer]    ____________________________________________    CC: Consult (Consult for psoriasis)    HPI:  Ms. Manju Berg is a(n) 52 year old female who presents today as a new patient for scalp psoriasis    Scalp psoriasis - thick, crusted plaques - last few years, especially last 1.5 years  - started betamethasone gel from PCP - was initially working  - then referred for additional workup  - using augmented betamethasone gel  irregularly - uses at night  - started three shampoo regimen - T-sal, zinc pyrithione, ketoconazole; also had compounded solution (betamethasone/calcipotriene)    Patient is otherwise feeling well, without additional skin concerns.    Labs Reviewed:  N/A    Physical Exam:  Vitals: There were no vitals taken for this visit.  SKIN: Teledermatology photos were reviewed; image quality and interpretability: acceptable. Image date: 8/22/22.  - brightly erythematous hyperkeratotic plaque on the scalp  - scaling on the ears  - No other lesions of concern on areas examined.     Medications:  Current Outpatient Medications   Medication     acetaminophen (TYLENOL) 500 MG tablet     albuterol (PROAIR HFA/PROVENTIL HFA/VENTOLIN HFA) 108 (90 Base) MCG/ACT inhaler     albuterol (PROVENTIL) (2.5 MG/3ML) 0.083% neb solution     augmented betamethasone dipropionate (DIPROLENE) 0.05 % external gel     benzonatate (TESSALON) 100 MG capsule     budesonide-formoterol (SYMBICORT) 80-4.5 MCG/ACT Inhaler     buPROPion (WELLBUTRIN XL) 300 MG 24 hr tablet     CALCIUM 600 + D 600-200 MG-UNIT OR TABS     chlorzoxazone (PARAFON FORTE) 500 MG tablet     Cholecalciferol (VITAMIN D) 2000 UNITS tablet     fluticasone (FLONASE) 50 MCG/ACT nasal spray     fluvoxaMINE (LUVOX) 50 MG tablet     LANsoprazole (PREVACID) 30 MG capsule     lidocaine (LIDODERM) 5 % patch     naproxen (NAPROSYN) 500 MG tablet     propranolol (INDERAL) 40 MG tablet     sennosides (SENOKOT) 8.6 MG tablet     valACYclovir (VALTREX) 1000 mg tablet     docusate sodium (COLACE) 100 MG capsule     No current facility-administered medications for this visit.      Past Medical/Surgical History:   Patient Active Problem List   Diagnosis     Recurrent herpes labialis     GERD (gastroesophageal reflux disease)     Other specified counseling     Mild intermittent asthma     Depression, major, single episode     Family history of colon cancer     CARDIOVASCULAR SCREENING; LDL GOAL LESS THAN  160     Pain in joint, upper arm     Benign neoplasm of soft tissues     Constipation     Vitamin D deficiency     Anxiety state      delivery delivered     Leg pain, bilateral     Pain of finger of right hand     Prediabetes     Shoulder (girdle) dystocia during labor and deliver, delivered     Toe pain, left     Varicose veins of lower extremity     Backache     Headache, chronic daily     IUD (intrauterine device) in place     Psoriasis of scalp     Morbid obesity (H)     H/O cold sores     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     2011,Proximal left tibia     Dyspepsia and other specified disorders of function of stomach     No Comments Provided     Headache     No Comments Provided     Herpes simplex     No Comments Provided     Lateral epicondylitis of elbow     No Comments Provided     Other depressive disorder     No Comments Provided     Other unspecified back disorder     No Comments Provided     Pain in joint, upper arm     No Comments Provided       CC No referring provider defined for this encounter. on close of this encounter.

## 2022-08-22 NOTE — PROGRESS NOTES
[facility] Dermatology Note  Encounter Date: Aug 22, 2022  Store-and-Forward and Telephone ([phone]). Location of teledermatologist: [facility]MAT[facility] [place].  Start time: 12:41. End time: 1:01.    Dermatology Problem List:  1. Scalp psoriasis: with some ear involvement  - Excimer, augmented betamethasone lotion  - in reserve: apremilast  - prior: augmented betamethasone gel, coal tar/salcylic acid shampoo, ketoconazole shampoo, zinc pyrithione shampoo, compounded betamethasone/calcipotriene solution    ____________________________________________    Assessment & Plan:     1. 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 Recalcitrant to topicals. We discussed risks/benefits of alternative topicals, Excimer laser, and systemic agents including biologics and apremilast. Will start with a few tweaks to the topical regimen and Excimer.  - Start Excimer three times per week  - switch to augmented betamethasone lotion daily    Procedures Performed:    None    Follow-up: 3 months    Staff:     Pierce Velasquez MD, FAAD   of Dermatology  Department of Dermatology  [facility]    ____________________________________________    CC: Consult (Consult for psoriasis)    HPI:  Ms. Manju Berg is a(n) 52 year old female who presents today as a new patient for scalp psoriasis    Scalp psoriasis - thick, crusted plaques - last few years, especially last 1.5 years  - started betamethasone gel from PCP - was initially working  - then referred for additional workup  - using augmented betamethasone gel irregularly - uses at night  - started three shampoo regimen - T-sal, zinc pyrithione, ketoconazole; also had compounded solution (betamethasone/calcipotriene)    Patient is otherwise feeling well, without additional skin concerns.    Labs Reviewed:  N/A    Physical Exam:  Vitals: There were no vitals taken for this  visit.  SKIN: Teledermatology photos were reviewed; image quality and interpretability: acceptable. Image date: 22.  - brightly erythematous hyperkeratotic plaque on the scalp  - scaling on the ears  - No other lesions of concern on areas examined.     Medications:  Current Outpatient Medications   Medication     acetaminophen (TYLENOL) 500 MG tablet     albuterol (PROAIR HFA/PROVENTIL HFA/VENTOLIN HFA) 108 (90 Base) MCG/ACT inhaler     albuterol (PROVENTIL) (2.5 MG/3ML) 0.083% neb solution     augmented betamethasone dipropionate (DIPROLENE) 0.05 % external gel     benzonatate (TESSALON) 100 MG capsule     budesonide-formoterol (SYMBICORT) 80-4.5 MCG/ACT Inhaler     buPROPion (WELLBUTRIN XL) 300 MG 24 hr tablet     CALCIUM 600 + D 600-200 MG-UNIT OR TABS     chlorzoxazone (PARAFON FORTE) 500 MG tablet     Cholecalciferol (VITAMIN D) 2000 UNITS tablet     fluticasone (FLONASE) 50 MCG/ACT nasal spray     fluvoxaMINE (LUVOX) 50 MG tablet     LANsoprazole (PREVACID) 30 MG capsule     lidocaine (LIDODERM) 5 % patch     naproxen (NAPROSYN) 500 MG tablet     propranolol (INDERAL) 40 MG tablet     sennosides (SENOKOT) 8.6 MG tablet     valACYclovir (VALTREX) 1000 mg tablet     docusate sodium (COLACE) 100 MG capsule     No current facility-administered medications for this visit.      Past Medical/Surgical History:   Patient Active Problem List   Diagnosis     Recurrent herpes labialis     GERD (gastroesophageal reflux disease)     Other specified counseling     Mild intermittent asthma     Depression, major, single episode     Family history of colon cancer     CARDIOVASCULAR SCREENING; LDL GOAL LESS THAN 160     Pain in joint, upper arm     Benign neoplasm of soft tissues     Constipation     Vitamin D deficiency     Anxiety state      delivery delivered     Leg pain, bilateral     Pain of finger of right hand     Prediabetes     Shoulder (girdle) dystocia during labor and deliver, delivered     Toe pain,  left     Varicose veins of lower extremity     Backache     Headache, chronic daily     IUD (intrauterine device) in place     Psoriasis of scalp     Morbid obesity (H)     H/O cold sores     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/2011,Proximal left tibia     Dyspepsia and other specified disorders of function of stomach     No Comments Provided     Headache     No Comments Provided     Herpes simplex     No Comments Provided     Lateral epicondylitis of elbow     No Comments Provided     Other depressive disorder     No Comments Provided     Other unspecified back disorder     No Comments Provided     Pain in joint, upper arm     No Comments Provided       CC No referring provider defined for this encounter. on close of this encounter.

## 2022-08-23 ENCOUNTER — TRANSFERRED RECORDS (OUTPATIENT)
Dept: HEALTH INFORMATION MANAGEMENT | Facility: CLINIC | Age: 52
End: 2022-08-23

## 2022-08-24 ENCOUNTER — TELEPHONE (OUTPATIENT)
Dept: DERMATOLOGY | Facility: CLINIC | Age: 52
End: 2022-08-24

## 2022-08-24 NOTE — TELEPHONE ENCOUNTER
Informed the patient that we are waiting to hear back from her insurance    Ruma Hernandez, [facility]

## 2022-08-24 NOTE — TELEPHONE ENCOUNTER
Please check the coverage for the patient    Starting Excimer for recalcitrant scalp psoriasis    Ruma Hernandez, CMA

## 2022-08-24 NOTE — TELEPHONE ENCOUNTER
Called patient to schedule follow up with Dr. Velasquez. Follow up scheduled for 11/28. Patient had stated she was also going to be starting laser therapy but had not received further info. Please advise.    Thank you!  MEGAN Oliva

## 2022-08-25 DIAGNOSIS — F32.A DEPRESSION, UNSPECIFIED DEPRESSION TYPE: ICD-10-CM

## 2022-08-25 RX ORDER — FLUVOXAMINE MALEATE 50 MG
50 TABLET ORAL 2 TIMES DAILY
Qty: 180 TABLET | Refills: 0 | Status: SHIPPED | OUTPATIENT
Start: 2022-08-25 | End: 2022-10-10

## 2022-09-09 NOTE — TELEPHONE ENCOUNTER
"Dana Riley  You 2 days ago     DL    I checked her insurance and \"NO\" PA is required for the excimer treatments. Coverage based on medical necessity      "

## 2022-09-15 ENCOUNTER — ALLIED HEALTH/NURSE VISIT (OUTPATIENT)
Dept: DERMATOLOGY | Facility: CLINIC | Age: 52
End: 2022-09-15
Payer: COMMERCIAL

## 2022-09-15 DIAGNOSIS — L40.0 PLAQUE PSORIASIS: ICD-10-CM

## 2022-09-15 PROCEDURE — 96900 ACTINOTHERAPY UV LIGHT: CPT | Performed by: DERMATOLOGY

## 2022-09-15 NOTE — PROGRESS NOTES
[facility] Dermatology Phototherapy Record  1. Manju Berg is a 52 year old female is here today for phototherapy (Eximer) treatment for Plaque psoriasis [L40.0].      *This is initial treatment. No changes since office visit with Dr. Velasquez.      Changes or new medications since last treatment (If yes, notify MD):  N/A    New medical conditions (If yes, notify MD):  N/A    Any problems with last phototherapy treatment (If yes, notify MD)?  N/A    Patient denies any remaining skin redness since last treatment (If no, do not treat. Do not treat red skin):  N/A    2. The patient tolerated phototherapy without complication.    Patient will return per protocol for next Eximer treatment, per protocol.     Patient to see provider every 4-12 weeks for follow-up during treatment (if no, notify treating physician):  YES.     All questions and concerns discussed with patient in clinic today.    Manju Berg comes into clinic today at the request of Dr. Velasquez, Ordering Provider for Eximer laser treatment.    This service provided today was under the supervising provider of the day Dr. Jackson, who was available if needed.    Shanice Kerr RN

## 2022-09-20 ENCOUNTER — ALLIED HEALTH/NURSE VISIT (OUTPATIENT)
Dept: DERMATOLOGY | Facility: CLINIC | Age: 52
End: 2022-09-20
Payer: COMMERCIAL

## 2022-09-20 DIAGNOSIS — L40.0 PLAQUE PSORIASIS: ICD-10-CM

## 2022-09-20 PROCEDURE — 96900 ACTINOTHERAPY UV LIGHT: CPT | Performed by: DERMATOLOGY

## 2022-09-20 NOTE — PROGRESS NOTES
[facility] Dermatology Phototherapy Record  1. Manju Berg is a 52 year old female is here today for phototherapy (UVB) treatment for Psoriasis.        Changes or new medications since last treatment (If yes, notify MD):  NO    New medical conditions (If yes, notify MD):  NO    Any problems with last phototherapy treatment (If yes, notify MD)?  NO    Patient denies any remaining skin redness since last treatment (If no, do not treat. Do not treat red skin):  YES      2. The patient tolerated phototherapy without complication.    Patient will return per protocol for next UVB treatment, per protocol.     Patient to see provider every 4-12 weeks for follow-up during treatment (if no, notify treating physician):  YES.     All questions and concerns discussed with patient in clinic today.    Manju Berg comes into clinic today at the request of Dr Velasquez Ordering Provider for excimer laser.    This service provided today was under the supervising provider of the day Dr Jackson, who was available if needed.    Riri Diaz RN

## 2022-09-22 ENCOUNTER — MYC MEDICAL ADVICE (OUTPATIENT)
Dept: DERMATOLOGY | Facility: CLINIC | Age: 52
End: 2022-09-22

## 2022-09-22 ENCOUNTER — TELEPHONE (OUTPATIENT)
Dept: DERMATOLOGY | Facility: CLINIC | Age: 52
End: 2022-09-22

## 2022-09-22 DIAGNOSIS — L40.0 PLAQUE PSORIASIS: Primary | ICD-10-CM

## 2022-09-22 NOTE — TELEPHONE ENCOUNTER
Patient is in office for her excimer treatment and the machine is not operating. Is there an at home option?

## 2022-09-23 NOTE — TELEPHONE ENCOUNTER
No, there isn't home Excimer. We can perform home narrow band UVB but this is a little different. Thanks!

## 2022-09-25 ENCOUNTER — HOSPITAL ENCOUNTER (INPATIENT)
Facility: CLINIC | Age: 52
LOS: 4 days | Discharge: HOME | DRG: 418 | End: 2022-09-29
Attending: EMERGENCY MEDICINE | Admitting: STUDENT IN AN ORGANIZED HEALTH CARE EDUCATION/TRAINING PROGRAM
Payer: COMMERCIAL

## 2022-09-25 ENCOUNTER — APPOINTMENT (OUTPATIENT)
Dept: ULTRASOUND IMAGING | Facility: CLINIC | Age: 52
DRG: 418 | End: 2022-09-25
Attending: EMERGENCY MEDICINE
Payer: COMMERCIAL

## 2022-09-25 DIAGNOSIS — G89.18 POSTOPERATIVE PAIN: ICD-10-CM

## 2022-09-25 DIAGNOSIS — K81.0 ACUTE CHOLECYSTITIS: ICD-10-CM

## 2022-09-25 DIAGNOSIS — K85.90 ACUTE PANCREATITIS, UNSPECIFIED COMPLICATION STATUS, UNSPECIFIED PANCREATITIS TYPE: ICD-10-CM

## 2022-09-25 DIAGNOSIS — K80.50 CHOLEDOCHOLITHIASIS: Primary | ICD-10-CM

## 2022-09-25 LAB
ALBUMIN SERPL-MCNC: 4.4 G/DL (ref 3.4–5)
ALP SERPL-CCNC: 269 U/L (ref 40–150)
ALT SERPL W P-5'-P-CCNC: 515 U/L (ref 0–50)
ANION GAP SERPL CALCULATED.3IONS-SCNC: 7 MMOL/L (ref 3–14)
AST SERPL W P-5'-P-CCNC: 683 U/L (ref 0–45)
ATRIAL RATE - MUSE: 92 BPM
BASOPHILS # BLD AUTO: 0 10E3/UL (ref 0–0.2)
BASOPHILS NFR BLD AUTO: 1 %
BILIRUB SERPL-MCNC: 1.6 MG/DL (ref 0.2–1.3)
BUN SERPL-MCNC: 13 MG/DL (ref 7–30)
CALCIUM SERPL-MCNC: 10.2 MG/DL (ref 8.5–10.1)
CHLORIDE BLD-SCNC: 103 MMOL/L (ref 94–109)
CO2 SERPL-SCNC: 26 MMOL/L (ref 20–32)
CREAT SERPL-MCNC: 0.67 MG/DL (ref 0.52–1.04)
DIASTOLIC BLOOD PRESSURE - MUSE: NORMAL MMHG
EOSINOPHIL # BLD AUTO: 0.1 10E3/UL (ref 0–0.7)
EOSINOPHIL NFR BLD AUTO: 1 %
ERYTHROCYTE [DISTWIDTH] IN BLOOD BY AUTOMATED COUNT: 11.4 % (ref 10–15)
GFR SERPL CREATININE-BSD FRML MDRD: >90 ML/MIN/1.73M2
GLUCOSE BLD-MCNC: 116 MG/DL (ref 70–99)
HCG SERPL QL: NEGATIVE
HCT VFR BLD AUTO: 46.9 % (ref 35–47)
HGB BLD-MCNC: 15.8 G/DL (ref 11.7–15.7)
HOLD SPECIMEN: NORMAL
IMM GRANULOCYTES # BLD: 0 10E3/UL
IMM GRANULOCYTES NFR BLD: 0 %
INTERPRETATION ECG - MUSE: NORMAL
LIPASE SERPL-CCNC: ABNORMAL U/L (ref 73–393)
LYMPHOCYTES # BLD AUTO: 1.1 10E3/UL (ref 0.8–5.3)
LYMPHOCYTES NFR BLD AUTO: 14 %
MCH RBC QN AUTO: 28.8 PG (ref 26.5–33)
MCHC RBC AUTO-ENTMCNC: 33.7 G/DL (ref 31.5–36.5)
MCV RBC AUTO: 85 FL (ref 78–100)
MONOCYTES # BLD AUTO: 0.4 10E3/UL (ref 0–1.3)
MONOCYTES NFR BLD AUTO: 5 %
NEUTROPHILS # BLD AUTO: 6.4 10E3/UL (ref 1.6–8.3)
NEUTROPHILS NFR BLD AUTO: 79 %
NRBC # BLD AUTO: 0 10E3/UL
NRBC BLD AUTO-RTO: 0 /100
P AXIS - MUSE: 68 DEGREES
PLATELET # BLD AUTO: 285 10E3/UL (ref 150–450)
POTASSIUM BLD-SCNC: 3.7 MMOL/L (ref 3.4–5.3)
PR INTERVAL - MUSE: 140 MS
PROT SERPL-MCNC: 8.2 G/DL (ref 6.8–8.8)
QRS DURATION - MUSE: 70 MS
QT - MUSE: 344 MS
QTC - MUSE: 425 MS
R AXIS - MUSE: 36 DEGREES
RBC # BLD AUTO: 5.49 10E6/UL (ref 3.8–5.2)
SARS-COV-2 RNA RESP QL NAA+PROBE: NEGATIVE
SODIUM SERPL-SCNC: 136 MMOL/L (ref 133–144)
SYSTOLIC BLOOD PRESSURE - MUSE: NORMAL MMHG
T AXIS - MUSE: 57 DEGREES
TROPONIN I SERPL HS-MCNC: <3 NG/L
VENTRICULAR RATE- MUSE: 92 BPM
WBC # BLD AUTO: 8.1 10E3/UL (ref 4–11)

## 2022-09-25 PROCEDURE — 85025 COMPLETE CBC W/AUTO DIFF WBC: CPT | Performed by: EMERGENCY MEDICINE

## 2022-09-25 PROCEDURE — 80053 COMPREHEN METABOLIC PANEL: CPT | Performed by: EMERGENCY MEDICINE

## 2022-09-25 PROCEDURE — 96376 TX/PRO/DX INJ SAME DRUG ADON: CPT

## 2022-09-25 PROCEDURE — 99223 1ST HOSP IP/OBS HIGH 75: CPT | Mod: AI | Performed by: STUDENT IN AN ORGANIZED HEALTH CARE EDUCATION/TRAINING PROGRAM

## 2022-09-25 PROCEDURE — 96374 THER/PROPH/DIAG INJ IV PUSH: CPT

## 2022-09-25 PROCEDURE — 96375 TX/PRO/DX INJ NEW DRUG ADDON: CPT

## 2022-09-25 PROCEDURE — U0003 INFECTIOUS AGENT DETECTION BY NUCLEIC ACID (DNA OR RNA); SEVERE ACUTE RESPIRATORY SYNDROME CORONAVIRUS 2 (SARS-COV-2) (CORONAVIRUS DISEASE [COVID-19]), AMPLIFIED PROBE TECHNIQUE, MAKING USE OF HIGH THROUGHPUT TECHNOLOGIES AS DESCRIBED BY CMS-2020-01-R: HCPCS | Performed by: EMERGENCY MEDICINE

## 2022-09-25 PROCEDURE — 258N000003 HC RX IP 258 OP 636: Performed by: EMERGENCY MEDICINE

## 2022-09-25 PROCEDURE — 84484 ASSAY OF TROPONIN QUANT: CPT | Performed by: EMERGENCY MEDICINE

## 2022-09-25 PROCEDURE — 250N000011 HC RX IP 250 OP 636: Performed by: EMERGENCY MEDICINE

## 2022-09-25 PROCEDURE — 84703 CHORIONIC GONADOTROPIN ASSAY: CPT | Performed by: EMERGENCY MEDICINE

## 2022-09-25 PROCEDURE — 93005 ELECTROCARDIOGRAM TRACING: CPT

## 2022-09-25 PROCEDURE — 250N000013 HC RX MED GY IP 250 OP 250 PS 637: Performed by: EMERGENCY MEDICINE

## 2022-09-25 PROCEDURE — 99285 EMERGENCY DEPT VISIT HI MDM: CPT | Mod: 25

## 2022-09-25 PROCEDURE — 76705 ECHO EXAM OF ABDOMEN: CPT

## 2022-09-25 PROCEDURE — 36415 COLL VENOUS BLD VENIPUNCTURE: CPT | Performed by: EMERGENCY MEDICINE

## 2022-09-25 PROCEDURE — 83690 ASSAY OF LIPASE: CPT | Performed by: EMERGENCY MEDICINE

## 2022-09-25 PROCEDURE — 120N000001 HC R&B MED SURG/OB

## 2022-09-25 PROCEDURE — 250N000009 HC RX 250: Performed by: EMERGENCY MEDICINE

## 2022-09-25 PROCEDURE — 250N000011 HC RX IP 250 OP 636

## 2022-09-25 PROCEDURE — C9803 HOPD COVID-19 SPEC COLLECT: HCPCS

## 2022-09-25 PROCEDURE — 96361 HYDRATE IV INFUSION ADD-ON: CPT

## 2022-09-25 RX ORDER — ONDANSETRON 2 MG/ML
4 INJECTION INTRAMUSCULAR; INTRAVENOUS EVERY 30 MIN PRN
Status: DISCONTINUED | OUTPATIENT
Start: 2022-09-25 | End: 2022-09-26

## 2022-09-25 RX ORDER — KETOROLAC TROMETHAMINE 15 MG/ML
15 INJECTION, SOLUTION INTRAMUSCULAR; INTRAVENOUS ONCE
Status: COMPLETED | OUTPATIENT
Start: 2022-09-25 | End: 2022-09-25

## 2022-09-25 RX ORDER — ONDANSETRON 2 MG/ML
4 INJECTION INTRAMUSCULAR; INTRAVENOUS ONCE
Status: COMPLETED | OUTPATIENT
Start: 2022-09-25 | End: 2022-09-25

## 2022-09-25 RX ORDER — PIPERACILLIN SODIUM, TAZOBACTAM SODIUM 4; .5 G/20ML; G/20ML
4.5 INJECTION, POWDER, LYOPHILIZED, FOR SOLUTION INTRAVENOUS ONCE
Status: COMPLETED | OUTPATIENT
Start: 2022-09-25 | End: 2022-09-25

## 2022-09-25 RX ORDER — CETIRIZINE HYDROCHLORIDE 10 MG/1
10 TABLET ORAL DAILY
COMMUNITY

## 2022-09-25 RX ORDER — ONDANSETRON 2 MG/ML
INJECTION INTRAMUSCULAR; INTRAVENOUS
Status: COMPLETED
Start: 2022-09-25 | End: 2022-09-25

## 2022-09-25 RX ORDER — HYDROMORPHONE HYDROCHLORIDE 1 MG/ML
0.5 INJECTION, SOLUTION INTRAMUSCULAR; INTRAVENOUS; SUBCUTANEOUS
Status: COMPLETED | OUTPATIENT
Start: 2022-09-25 | End: 2022-09-25

## 2022-09-25 RX ADMIN — SODIUM CHLORIDE 1000 ML: 9 INJECTION, SOLUTION INTRAVENOUS at 20:56

## 2022-09-25 RX ADMIN — HYDROMORPHONE HYDROCHLORIDE 0.5 MG: 1 INJECTION, SOLUTION INTRAMUSCULAR; INTRAVENOUS; SUBCUTANEOUS at 21:10

## 2022-09-25 RX ADMIN — PIPERACILLIN SODIUM AND TAZOBACTAM SODIUM 4.5 G: 4; .5 INJECTION, POWDER, LYOPHILIZED, FOR SOLUTION INTRAVENOUS at 22:28

## 2022-09-25 RX ADMIN — SODIUM CHLORIDE 1000 ML: 9 INJECTION, SOLUTION INTRAVENOUS at 19:43

## 2022-09-25 RX ADMIN — LIDOCAINE HYDROCHLORIDE 30 ML: 20 SOLUTION ORAL; TOPICAL at 19:41

## 2022-09-25 RX ADMIN — KETOROLAC TROMETHAMINE 15 MG: 15 INJECTION, SOLUTION INTRAMUSCULAR; INTRAVENOUS at 23:39

## 2022-09-25 RX ADMIN — HYDROMORPHONE HYDROCHLORIDE 0.5 MG: 1 INJECTION, SOLUTION INTRAMUSCULAR; INTRAVENOUS; SUBCUTANEOUS at 21:57

## 2022-09-25 RX ADMIN — ONDANSETRON: 2 INJECTION INTRAMUSCULAR; INTRAVENOUS at 19:30

## 2022-09-25 RX ADMIN — HYDROMORPHONE HYDROCHLORIDE 0.5 MG: 1 INJECTION, SOLUTION INTRAMUSCULAR; INTRAVENOUS; SUBCUTANEOUS at 20:55

## 2022-09-25 RX ADMIN — HYDROMORPHONE HYDROCHLORIDE 1 MG: 1 INJECTION, SOLUTION INTRAMUSCULAR; INTRAVENOUS; SUBCUTANEOUS at 23:39

## 2022-09-25 RX ADMIN — ONDANSETRON 4 MG: 2 INJECTION INTRAMUSCULAR; INTRAVENOUS at 20:56

## 2022-09-25 ASSESSMENT — ENCOUNTER SYMPTOMS
SHORTNESS OF BREATH: 0
CONSTIPATION: 0
ABDOMINAL PAIN: 1
VOMITING: 1
FREQUENCY: 0
DIARRHEA: 0
COUGH: 0
FEVER: 0
DYSURIA: 0

## 2022-09-25 ASSESSMENT — ACTIVITIES OF DAILY LIVING (ADL)
ADLS_ACUITY_SCORE: 35
ADLS_ACUITY_SCORE: 35

## 2022-09-26 ENCOUNTER — APPOINTMENT (OUTPATIENT)
Dept: MRI IMAGING | Facility: CLINIC | Age: 52
DRG: 418 | End: 2022-09-26
Attending: PHYSICIAN ASSISTANT
Payer: COMMERCIAL

## 2022-09-26 ENCOUNTER — TELEPHONE (OUTPATIENT)
Dept: DERMATOLOGY | Facility: CLINIC | Age: 52
End: 2022-09-26

## 2022-09-26 LAB
ALBUMIN SERPL-MCNC: 3.2 G/DL (ref 3.4–5)
ALP SERPL-CCNC: 229 U/L (ref 40–150)
ALT SERPL W P-5'-P-CCNC: 738 U/L (ref 0–50)
ANION GAP SERPL CALCULATED.3IONS-SCNC: 4 MMOL/L (ref 3–14)
AST SERPL W P-5'-P-CCNC: 752 U/L (ref 0–45)
BILIRUB SERPL-MCNC: 2.1 MG/DL (ref 0.2–1.3)
BUN SERPL-MCNC: 12 MG/DL (ref 7–30)
CALCIUM SERPL-MCNC: 8.2 MG/DL (ref 8.5–10.1)
CHLORIDE BLD-SCNC: 110 MMOL/L (ref 94–109)
CO2 SERPL-SCNC: 27 MMOL/L (ref 20–32)
CREAT SERPL-MCNC: 0.74 MG/DL (ref 0.52–1.04)
ERYTHROCYTE [DISTWIDTH] IN BLOOD BY AUTOMATED COUNT: 11.8 % (ref 10–15)
GFR SERPL CREATININE-BSD FRML MDRD: >90 ML/MIN/1.73M2
GLUCOSE BLD-MCNC: 110 MG/DL (ref 70–99)
HCT VFR BLD AUTO: 35.5 % (ref 35–47)
HGB BLD-MCNC: 11.8 G/DL (ref 11.7–15.7)
LIPASE SERPL-CCNC: 8158 U/L (ref 73–393)
MCH RBC QN AUTO: 28.6 PG (ref 26.5–33)
MCHC RBC AUTO-ENTMCNC: 33.2 G/DL (ref 31.5–36.5)
MCV RBC AUTO: 86 FL (ref 78–100)
PLATELET # BLD AUTO: 199 10E3/UL (ref 150–450)
POTASSIUM BLD-SCNC: 4 MMOL/L (ref 3.4–5.3)
PROT SERPL-MCNC: 5.7 G/DL (ref 6.8–8.8)
RBC # BLD AUTO: 4.13 10E6/UL (ref 3.8–5.2)
SODIUM SERPL-SCNC: 141 MMOL/L (ref 133–144)
WBC # BLD AUTO: 4.8 10E3/UL (ref 4–11)

## 2022-09-26 PROCEDURE — 250N000013 HC RX MED GY IP 250 OP 250 PS 637: Performed by: STUDENT IN AN ORGANIZED HEALTH CARE EDUCATION/TRAINING PROGRAM

## 2022-09-26 PROCEDURE — 258N000003 HC RX IP 258 OP 636: Performed by: STUDENT IN AN ORGANIZED HEALTH CARE EDUCATION/TRAINING PROGRAM

## 2022-09-26 PROCEDURE — C9113 INJ PANTOPRAZOLE SODIUM, VIA: HCPCS | Performed by: INTERNAL MEDICINE

## 2022-09-26 PROCEDURE — 80053 COMPREHEN METABOLIC PANEL: CPT | Performed by: STUDENT IN AN ORGANIZED HEALTH CARE EDUCATION/TRAINING PROGRAM

## 2022-09-26 PROCEDURE — 258N000003 HC RX IP 258 OP 636: Performed by: INTERNAL MEDICINE

## 2022-09-26 PROCEDURE — 83690 ASSAY OF LIPASE: CPT | Performed by: STUDENT IN AN ORGANIZED HEALTH CARE EDUCATION/TRAINING PROGRAM

## 2022-09-26 PROCEDURE — 250N000013 HC RX MED GY IP 250 OP 250 PS 637: Performed by: INTERNAL MEDICINE

## 2022-09-26 PROCEDURE — 99222 1ST HOSP IP/OBS MODERATE 55: CPT | Performed by: SURGERY

## 2022-09-26 PROCEDURE — A9585 GADOBUTROL INJECTION: HCPCS | Performed by: STUDENT IN AN ORGANIZED HEALTH CARE EDUCATION/TRAINING PROGRAM

## 2022-09-26 PROCEDURE — 82040 ASSAY OF SERUM ALBUMIN: CPT | Performed by: STUDENT IN AN ORGANIZED HEALTH CARE EDUCATION/TRAINING PROGRAM

## 2022-09-26 PROCEDURE — 74183 MRI ABD W/O CNTR FLWD CNTR: CPT

## 2022-09-26 PROCEDURE — 36415 COLL VENOUS BLD VENIPUNCTURE: CPT | Performed by: STUDENT IN AN ORGANIZED HEALTH CARE EDUCATION/TRAINING PROGRAM

## 2022-09-26 PROCEDURE — 255N000002 HC RX 255 OP 636: Performed by: STUDENT IN AN ORGANIZED HEALTH CARE EDUCATION/TRAINING PROGRAM

## 2022-09-26 PROCEDURE — 250N000011 HC RX IP 250 OP 636: Performed by: INTERNAL MEDICINE

## 2022-09-26 PROCEDURE — 250N000011 HC RX IP 250 OP 636: Performed by: STUDENT IN AN ORGANIZED HEALTH CARE EDUCATION/TRAINING PROGRAM

## 2022-09-26 PROCEDURE — 120N000001 HC R&B MED SURG/OB

## 2022-09-26 PROCEDURE — 99233 SBSQ HOSP IP/OBS HIGH 50: CPT | Performed by: INTERNAL MEDICINE

## 2022-09-26 PROCEDURE — 85027 COMPLETE CBC AUTOMATED: CPT | Performed by: STUDENT IN AN ORGANIZED HEALTH CARE EDUCATION/TRAINING PROGRAM

## 2022-09-26 RX ORDER — AMOXICILLIN 250 MG
2 CAPSULE ORAL 2 TIMES DAILY PRN
Status: DISCONTINUED | OUTPATIENT
Start: 2022-09-26 | End: 2022-09-29 | Stop reason: HOSPADM

## 2022-09-26 RX ORDER — BUPROPION HYDROCHLORIDE 300 MG/1
300 TABLET ORAL EVERY MORNING
Status: DISCONTINUED | OUTPATIENT
Start: 2022-09-26 | End: 2022-09-29 | Stop reason: HOSPADM

## 2022-09-26 RX ORDER — ONDANSETRON 4 MG/1
4 TABLET, ORALLY DISINTEGRATING ORAL EVERY 6 HOURS PRN
Status: DISCONTINUED | OUTPATIENT
Start: 2022-09-26 | End: 2022-09-29 | Stop reason: HOSPADM

## 2022-09-26 RX ORDER — POLYETHYLENE GLYCOL 3350 17 G/17G
17 POWDER, FOR SOLUTION ORAL DAILY PRN
Status: DISCONTINUED | OUTPATIENT
Start: 2022-09-26 | End: 2022-09-29 | Stop reason: HOSPADM

## 2022-09-26 RX ORDER — NALOXONE HYDROCHLORIDE 0.4 MG/ML
0.4 INJECTION, SOLUTION INTRAMUSCULAR; INTRAVENOUS; SUBCUTANEOUS
Status: DISCONTINUED | OUTPATIENT
Start: 2022-09-26 | End: 2022-09-29 | Stop reason: HOSPADM

## 2022-09-26 RX ORDER — GADOBUTROL 604.72 MG/ML
9 INJECTION INTRAVENOUS ONCE
Status: COMPLETED | OUTPATIENT
Start: 2022-09-26 | End: 2022-09-26

## 2022-09-26 RX ORDER — AMOXICILLIN 250 MG
1 CAPSULE ORAL 2 TIMES DAILY PRN
Status: DISCONTINUED | OUTPATIENT
Start: 2022-09-26 | End: 2022-09-29 | Stop reason: HOSPADM

## 2022-09-26 RX ORDER — DIPHENHYDRAMINE HYDROCHLORIDE 50 MG/ML
25 INJECTION INTRAMUSCULAR; INTRAVENOUS EVERY 6 HOURS PRN
Status: DISCONTINUED | OUTPATIENT
Start: 2022-09-26 | End: 2022-09-29 | Stop reason: HOSPADM

## 2022-09-26 RX ORDER — OXYCODONE HYDROCHLORIDE 5 MG/1
5 TABLET ORAL EVERY 4 HOURS PRN
Status: DISCONTINUED | OUTPATIENT
Start: 2022-09-26 | End: 2022-09-28

## 2022-09-26 RX ORDER — FLUVOXAMINE MALEATE 50 MG
50 TABLET ORAL 2 TIMES DAILY
Status: DISCONTINUED | OUTPATIENT
Start: 2022-09-26 | End: 2022-09-29 | Stop reason: HOSPADM

## 2022-09-26 RX ORDER — ONDANSETRON 2 MG/ML
4 INJECTION INTRAMUSCULAR; INTRAVENOUS EVERY 6 HOURS PRN
Status: DISCONTINUED | OUTPATIENT
Start: 2022-09-26 | End: 2022-09-29 | Stop reason: HOSPADM

## 2022-09-26 RX ORDER — DIPHENHYDRAMINE HCL 25 MG
25 CAPSULE ORAL EVERY 6 HOURS PRN
Status: DISCONTINUED | OUTPATIENT
Start: 2022-09-26 | End: 2022-09-29 | Stop reason: HOSPADM

## 2022-09-26 RX ORDER — NALOXONE HYDROCHLORIDE 0.4 MG/ML
0.2 INJECTION, SOLUTION INTRAMUSCULAR; INTRAVENOUS; SUBCUTANEOUS
Status: DISCONTINUED | OUTPATIENT
Start: 2022-09-26 | End: 2022-09-29 | Stop reason: HOSPADM

## 2022-09-26 RX ORDER — PROCHLORPERAZINE 25 MG
25 SUPPOSITORY, RECTAL RECTAL EVERY 12 HOURS PRN
Status: DISCONTINUED | OUTPATIENT
Start: 2022-09-26 | End: 2022-09-29 | Stop reason: HOSPADM

## 2022-09-26 RX ORDER — LIDOCAINE 40 MG/G
CREAM TOPICAL
Status: DISCONTINUED | OUTPATIENT
Start: 2022-09-26 | End: 2022-09-29 | Stop reason: HOSPADM

## 2022-09-26 RX ORDER — PIPERACILLIN SODIUM, TAZOBACTAM SODIUM 3; .375 G/15ML; G/15ML
3.38 INJECTION, POWDER, LYOPHILIZED, FOR SOLUTION INTRAVENOUS EVERY 6 HOURS
Status: DISCONTINUED | OUTPATIENT
Start: 2022-09-26 | End: 2022-09-28

## 2022-09-26 RX ORDER — HYDROMORPHONE HYDROCHLORIDE 1 MG/ML
.3-.5 INJECTION, SOLUTION INTRAMUSCULAR; INTRAVENOUS; SUBCUTANEOUS
Status: DISCONTINUED | OUTPATIENT
Start: 2022-09-26 | End: 2022-09-29 | Stop reason: HOSPADM

## 2022-09-26 RX ORDER — PANTOPRAZOLE SODIUM 40 MG/1
40 TABLET, DELAYED RELEASE ORAL DAILY
Status: DISCONTINUED | OUTPATIENT
Start: 2022-09-26 | End: 2022-09-26

## 2022-09-26 RX ORDER — ACETAMINOPHEN 650 MG/1
650 SUPPOSITORY RECTAL EVERY 6 HOURS PRN
Status: DISCONTINUED | OUTPATIENT
Start: 2022-09-26 | End: 2022-09-29 | Stop reason: HOSPADM

## 2022-09-26 RX ORDER — SODIUM CHLORIDE 9 MG/ML
INJECTION, SOLUTION INTRAVENOUS CONTINUOUS
Status: DISCONTINUED | OUTPATIENT
Start: 2022-09-26 | End: 2022-09-29

## 2022-09-26 RX ORDER — PROCHLORPERAZINE MALEATE 10 MG
10 TABLET ORAL EVERY 6 HOURS PRN
Status: DISCONTINUED | OUTPATIENT
Start: 2022-09-26 | End: 2022-09-29 | Stop reason: HOSPADM

## 2022-09-26 RX ORDER — ACETAMINOPHEN 325 MG/1
650 TABLET ORAL EVERY 6 HOURS PRN
Status: DISCONTINUED | OUTPATIENT
Start: 2022-09-26 | End: 2022-09-29 | Stop reason: HOSPADM

## 2022-09-26 RX ADMIN — BUPROPION HYDROCHLORIDE 300 MG: 300 TABLET, EXTENDED RELEASE ORAL at 07:47

## 2022-09-26 RX ADMIN — HYDROMORPHONE HYDROCHLORIDE 0.5 MG: 1 INJECTION, SOLUTION INTRAMUSCULAR; INTRAVENOUS; SUBCUTANEOUS at 05:00

## 2022-09-26 RX ADMIN — PIPERACILLIN AND TAZOBACTAM 3.38 G: 3; .375 INJECTION, POWDER, FOR SOLUTION INTRAVENOUS at 16:16

## 2022-09-26 RX ADMIN — SODIUM CHLORIDE: 9 INJECTION, SOLUTION INTRAVENOUS at 15:05

## 2022-09-26 RX ADMIN — PANTOPRAZOLE SODIUM 40 MG: 40 INJECTION, POWDER, FOR SOLUTION INTRAVENOUS at 20:14

## 2022-09-26 RX ADMIN — FLUVOXAMINE MALEATE 50 MG: 50 TABLET ORAL at 07:46

## 2022-09-26 RX ADMIN — SODIUM CHLORIDE: 9 INJECTION, SOLUTION INTRAVENOUS at 00:31

## 2022-09-26 RX ADMIN — PANTOPRAZOLE SODIUM 40 MG: 40 TABLET, DELAYED RELEASE ORAL at 07:46

## 2022-09-26 RX ADMIN — HYDROMORPHONE HYDROCHLORIDE 0.5 MG: 1 INJECTION, SOLUTION INTRAMUSCULAR; INTRAVENOUS; SUBCUTANEOUS at 16:16

## 2022-09-26 RX ADMIN — SODIUM CHLORIDE: 9 INJECTION, SOLUTION INTRAVENOUS at 07:54

## 2022-09-26 RX ADMIN — HYDROMORPHONE HYDROCHLORIDE 0.5 MG: 1 INJECTION, SOLUTION INTRAMUSCULAR; INTRAVENOUS; SUBCUTANEOUS at 13:59

## 2022-09-26 RX ADMIN — PIPERACILLIN AND TAZOBACTAM 3.38 G: 3; .375 INJECTION, POWDER, FOR SOLUTION INTRAVENOUS at 04:41

## 2022-09-26 RX ADMIN — ONDANSETRON 4 MG: 2 INJECTION INTRAMUSCULAR; INTRAVENOUS at 09:45

## 2022-09-26 RX ADMIN — PIPERACILLIN AND TAZOBACTAM 3.38 G: 3; .375 INJECTION, POWDER, FOR SOLUTION INTRAVENOUS at 22:46

## 2022-09-26 RX ADMIN — DIPHENHYDRAMINE HYDROCHLORIDE 25 MG: 50 INJECTION, SOLUTION INTRAMUSCULAR; INTRAVENOUS at 00:45

## 2022-09-26 RX ADMIN — GADOBUTROL 9 ML: 604.72 INJECTION INTRAVENOUS at 14:51

## 2022-09-26 RX ADMIN — FLUTICASONE FUROATE AND VILANTEROL TRIFENATATE 1 PUFF: 100; 25 POWDER RESPIRATORY (INHALATION) at 20:15

## 2022-09-26 RX ADMIN — PIPERACILLIN AND TAZOBACTAM 3.38 G: 3; .375 INJECTION, POWDER, FOR SOLUTION INTRAVENOUS at 09:50

## 2022-09-26 RX ADMIN — HYDROMORPHONE HYDROCHLORIDE 0.5 MG: 1 INJECTION, SOLUTION INTRAMUSCULAR; INTRAVENOUS; SUBCUTANEOUS at 22:46

## 2022-09-26 RX ADMIN — LINACLOTIDE 72 MCG: 72 CAPSULE, GELATIN COATED ORAL at 08:06

## 2022-09-26 RX ADMIN — OXYCODONE HYDROCHLORIDE 5 MG: 5 TABLET ORAL at 07:46

## 2022-09-26 RX ADMIN — HYDROMORPHONE HYDROCHLORIDE 0.5 MG: 1 INJECTION, SOLUTION INTRAMUSCULAR; INTRAVENOUS; SUBCUTANEOUS at 20:22

## 2022-09-26 RX ADMIN — FLUVOXAMINE MALEATE 50 MG: 50 TABLET ORAL at 20:19

## 2022-09-26 ASSESSMENT — ACTIVITIES OF DAILY LIVING (ADL)
ADLS_ACUITY_SCORE: 18
ADLS_ACUITY_SCORE: 18
TOILETING_ISSUES: NO
ADLS_ACUITY_SCORE: 18
ADLS_ACUITY_SCORE: 18
WALKING_OR_CLIMBING_STAIRS_DIFFICULTY: NO
CONCENTRATING,_REMEMBERING_OR_MAKING_DECISIONS_DIFFICULTY: NO
ADLS_ACUITY_SCORE: 18
DRESSING/BATHING_DIFFICULTY: NO
ADLS_ACUITY_SCORE: 18
FALL_HISTORY_WITHIN_LAST_SIX_MONTHS: NO
ADLS_ACUITY_SCORE: 18
ADLS_ACUITY_SCORE: 18
DIFFICULTY_EATING/SWALLOWING: NO
WEAR_GLASSES_OR_BLIND: NO
ADLS_ACUITY_SCORE: 18
CHANGE_IN_FUNCTIONAL_STATUS_SINCE_ONSET_OF_CURRENT_ILLNESS/INJURY: NO
ADLS_ACUITY_SCORE: 18
DOING_ERRANDS_INDEPENDENTLY_DIFFICULTY: NO

## 2022-09-26 NOTE — PROGRESS NOTES
RECEIVING UNIT ED HANDOFF REVIEW    ED Nurse Handoff Report was reviewed by: Viry Hernandez RN on September 25, 2022 at 11:42 PM

## 2022-09-26 NOTE — TELEPHONE ENCOUNTER
PA Initiation    Medication: Otezla  Insurance Company: [employer] - Phone [phone] Fax [phone]  Pharmacy Filling the Rx: [facility] - [place], [place] - [facility] [address]  Filling Pharmacy Phone:    Filling Pharmacy Fax:    Start Date: 9/26/2022

## 2022-09-26 NOTE — H&P
"[facility]    History and Physical - Hospitalist Service       Date of Admission:  9/25/2022    Assessment & Plan      Manju Berg is a 52 year old female with past medical history significant for GERD, prediabetes and obesity admitted on 9/25/2022 with abdominal pain.     Gallstone pancreatitis, probable  Cholelithiasis   Possible Cholecystitis  The patient presents to the ED with epigastric and right upper quadrant pain that has been constant since after eating breakfast. She also reports a few episodes of emesis. No fevers or chills, diarrhea, or bloody stools. Laboratory evaluation in the ED was notable for elevated transaminases with alk phos of 269, ALT of 515, AST of 683 and T bili of 1.6. She also had a markedly elevated lipase to >30,000. WBC count was normal. RUQ US showed mildly distended gallbladder with cholelithiasis. There is mildly thickened wall without pericholecystic fluid and positive sonographic Terell's sign. There was not biliary dilatation.   Despite no biliary dilatation, clinical picture is concerning for probable gallstone pancreatitis with associated cholecystitis. Perhaps the stone has now passed.   - Admit to medicine  - GI consultation for possible MRCP/ERCP   - Surgery consultation   - Trend LFTs and lipase   - Continue IV fluid   - NPO   - Continue Zosyn   - Pain control as needed    GERD   - Continue PTA lansoprazole when verified     Constipation   - Continue PTA Linzess when verified   - PRN senna and miralax ordered as well     Hypercalcemia: Ca = 10.2 mg/dL (Ref range: 8.5 - 10.1 mg/dL) and/or iCa = N/A on admission, will monitor as appropriate         Anxiety/depression  - Continue PTA wellbutrin and fluvoxamine when verified         Obesity: Estimated body mass index is 34.67 kg/m  as calculated from the following:    Height as of this encounter: 1.626 m (5' 4\").    Weight as of this encounter: 91.6 kg (202 lb).       Diet: NPO for " Medical/Clinical Reasons Except for: No Exceptions    DVT Prophylaxis: Pneumatic Compression Devices  Etienne Catheter: Not present  Central Lines: None  Cardiac Monitoring: None  Code Status: Full Code     Disposition Plan   The patient's care was discussed with the Patient.    Jessica Portillo  Hospitalist Service  [facility]  Securely message with the Vocera Web Console (learn more here)  Text page via [employer] Paging/Directory         ______________________________________________________________________    Chief Complaint   Abdominal pain     History is obtained from the patient    History of Present Illness   Manju Berg is a 52 year old female who presents to the ED with severe abdominal pain since this morning. She has had a hx of intermittent abdominal. She has a hx of fairly significant GERD so tried taking some of her lansoprazole this morning to see if this would improve her pain but she had no relief. The pain is located mainly in the epigastrium and RUQ. No fevers or chills. She does have issues with constipation, but did have a BM today that was normal. She denies alcohol use.     Review of Systems    The 10 point Review of Systems is negative other than noted in the HPI or here.     Past Medical History    I have reviewed this patient's medical history and updated it with pertinent information if needed.   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/2011,Proximal left tibia     Dyspepsia and other specified disorders of function of stomach     No Comments Provided     Gastroesophageal reflux disease      Headache     No Comments Provided     Herpes simplex     No Comments Provided     Lateral epicondylitis of elbow     No Comments Provided     Other depressive disorder     No Comments Provided     Other  unspecified back disorder     No Comments Provided     Pain in joint, upper arm     No Comments Provided       Past Surgical History   I have reviewed this patient's surgical history and updated it with pertinent information if needed.  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  Social History     Tobacco Use     Smoking status: Never Smoker     Smokeless tobacco: Never Used   Vaping Use     Vaping Use: Never used   Substance Use Topics     Alcohol use: Yes     Comment: Alcoholic Drinks/day: Drinks socially only, 0-1 drinks per week.     Drug use: Never       Family History   I have reviewed this patient's family history and updated it with pertinent information if needed.  Family History   Problem Relation Age of Onset     Depression Mother      Osteoporosis Mother      Mental Illness Mother      Hypertension Father         Hypertension     Hyperlipidemia Father         Hyperlipidemia     Colon Cancer Father 56        Cancer-colon     Bladder Cancer Father 62        Cancer,bladder     Obesity Father      Family History Negative Sister         Good Health     Family History Negative Son      Mental Illness Maternal Aunt      Mental Illness Maternal Uncle        Prior to Admission Medications   Prior to Admission Medications   Prescriptions Last Dose Informant Patient Reported? Taking?   CALCIUM 600 + D  600-200 MG-UNIT OR TABS 9/24/2022 at PM Self Yes Yes   Sig: Take 1 tablet by mouth every evening   Cholecalciferol (VITAMIN D) 2000 UNITS tablet 9/24/2022 at PM Self No Yes   Sig: Take 2,000 Units by mouth daily.   LANsoprazole (PREVACID) 30 MG DR capsule 9/25/2022 at AM Self No Yes   Sig: Take 1 capsule (30 mg) by mouth daily   acetaminophen (TYLENOL) 500 MG tablet 9/25/2022 at x1 Self Yes Yes   Sig: Take 2 tablets (1,000 mg) by mouth every 4 hours as needed for mild pain   albuterol (PROAIR HFA/PROVENTIL HFA/VENTOLIN HFA) 108 (90 Base) MCG/ACT inhaler  at PRN Self No Yes   Sig: Inhale 2 puffs into the lungs every 4 hours as needed for shortness of breath / dyspnea or wheezing   albuterol (PROVENTIL) (2.5 MG/3ML) 0.083% neb solution  at PRN Self No Yes   Sig: Take 1 vial (2.5 mg) by nebulization every 6 hours as needed for shortness of breath / dyspnea or wheezing   apremilast (OTEZLA) 30 MG tablet NEW at Not started Self No No   Sig: Take 1 tablet (30 mg) by mouth 2 times daily   augmented betamethasone dipropionate (DIPROLENE) 0.05 % external gel Not Taking at Unknown time Self No No   Sig: Apply topically daily   Patient not taking: No sig reported   augmented betamethasone dipropionate (DIPROLENE) 0.05 % external lotion 9/24/2022 at AM Self No Yes   Sig: Apply topically daily   buPROPion (WELLBUTRIN XL) 300 MG 24 hr tablet 9/25/2022 at AM Self No Yes   Sig: Take 1 tablet (300 mg) by mouth every morning   budesonide-formoterol (SYMBICORT) 80-4.5 MCG/ACT Inhaler 9/25/2022 at AM Self No Yes   Sig: Inhale 2 puffs into the lungs 2 times daily   chlorzoxazone (PARAFON FORTE) 500 MG tablet  at PRN Self Yes Yes   Sig: TAKE 1/2-1&1/2 TABLETS BY MOUTH 3-4 TIMES DAILY AS NEEDED TO RELAX MUSCLES  Needs follow up for refills. Needs to establish care   fluticasone (FLONASE) 50 MCG/ACT nasal spray 9/24/2022 at AM Self Yes Yes   Sig: Spray 1 spray into both nostrils daily   fluvoxaMINE (LUVOX) 50 MG tablet 9/25/2022 at AM Self  No Yes   Sig: Take 1 tablet (50 mg) by mouth 2 times daily   lidocaine (LIDODERM) 5 % patch  at PRN Self Yes No   Sig: Place 1 patch onto the skin daily as needed for moderate pain Remove after 12 hours.   linaclotide (LINZESS) 72 MCG capsule 9/24/2022 at AM Self Yes Yes   Sig: Take 72 mcg by mouth every morning (before breakfast)   naproxen (NAPROSYN) 500 MG tablet  at PRN Self No Yes   Sig: Take 1 tablet (500 mg) by mouth 2 times daily as needed for moderate pain   valACYclovir (VALTREX) 1000 mg tablet  at PRN Self No No   Sig: take 2 tablets PO Q 12 hours x 2 doses at first signs of outbreak.      Facility-Administered Medications: None     Allergies   No Known Allergies    Physical Exam   Vital Signs: Temp: 98.2  F (36.8  C) Temp src: Oral BP: 122/86 Pulse: 97   Resp: 16 SpO2: 96 % O2 Device: None (Room air)    Weight: 202 lbs 0 oz    Constitutional: Awake, alert, cooperative, no apparent distress.  Eyes: Conjunctiva and pupils examined and normal.  HEENT: Moist mucous membranes, normal dentition.  Respiratory: Clear to auscultation bilaterally, no crackles or wheezing.  Cardiovascular: Regular rate and rhythm, normal S1 and S2, and no murmur noted.  GI: Soft, non-distended, tender over the epigastrium and RUQ, normal bowel sounds.  Skin: No rashes, no cyanosis, no edema.  Musculoskeletal: No joint swelling, erythema or tenderness.  Neurologic: Cranial nerves 2-12 intact, normal strength and sensation.  Psychiatric: Alert, oriented to person, place and time, no obvious anxiety or depression.      Data   Data reviewed today: I reviewed all medications, new labs and imaging results over the last 24 hours.    Recent Labs   Lab 09/25/22 1948   WBC 8.1   HGB 15.8*   MCV 85         POTASSIUM 3.7   CHLORIDE 103   CO2 26   BUN 13   CR 0.67   ANIONGAP 7   MCKENZIE 10.2*   *   ALBUMIN 4.4   PROTTOTAL 8.2   BILITOTAL 1.6*   ALKPHOS 269*   *   *   LIPASE >30,000*     Recent Results (from the  past 24 hour(s))   US Abdomen Limited (RUQ)    Narrative    EXAM: US ABDOMEN LIMITED  LOCATION: [facility]  DATE/TIME: 9/25/2022 9:56 PM    INDICATION: suspected cholecystitis or choledocholithiasis  COMPARISON: None.  TECHNIQUE: Limited abdominal ultrasound.    FINDINGS:    GALLBLADDER: The gallbladder is mildly distended, contains numerous stones, and has a mildly thickened wall measuring 4 mm. No pericholecystic fluid. The sonographer reports the patient was tender while scanning over the gallbladder.    BILE DUCTS: No biliary dilatation. The common duct measures 4 mm.    LIVER: Echogenic parenchyma. No focal mass.    RIGHT KIDNEY: No hydronephrosis.    PANCREAS: The visualized portions are normal.    No ascites.      Impression    IMPRESSION:  1.  Cholelithiasis and possible cholecystitis. Clinical correlation recommended.  2.  Hepatic steatosis.

## 2022-09-26 NOTE — ED PROVIDER NOTES
History   Chief Complaint:  Abdominal Pain (Epigastric pain wraps around to back)    The history is provided by the patient.      Manju Berg is a 52 year old female with history of GERD, obesity, prediabetes who presents with epigastric abdominal pain that began approximately eight hours prior to emergency department arrival. Manju explains that her pain began after eating breakfast this morning and has been constant since. She notes that the pain radiates to her bilateral upper quadrants and exacerbates with eating. Manju additionally reports emesis without fever, diarrhea, constipation, urinary frequency, dysuria, chest pain, shortness of breath, and cough. Manju shares that she has taken Pepcid, Protonix, and Maalox without relief. She notes that Protonix has relieved previous episodes of abdominal pain similar to what she presents with now.     Review of Systems   Constitutional: Negative for fever.   Respiratory: Negative for cough and shortness of breath.    Cardiovascular: Negative for chest pain.   Gastrointestinal: Positive for abdominal pain and vomiting. Negative for constipation and diarrhea.   Genitourinary: Negative for dysuria and frequency.   All other systems reviewed and are negative.    Allergies:  The patient has no known allergies.     Medications:  Albuterol sulfate   Apremilast    Benzonatate   Symbicort   Bupropion   Chlorzoxazone   Colace   Fluvoxamine   Lansoprazole   Naproxen   Propranolol   Valacyclovir     Past Medical History:     Chronic daily headache   Mild intermittent asthma   GERD  Morbid obesity   Herpes labialis   Prediabetes   Varicose veins of lower extremity   Scalp psoriasis   Shoulder girdle dystocia   IUD  Depression   Anxiety state   Benign neoplasms of soft tissues     Past Surgical History:    Bunionectomy bilateral   Right foot surgery   Left foot fusion   Bilateral carpal tunnel release    section   Right wrist flexor carpi radialis tendon  "release   Strip vein   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   Excise bartholin cyst      Family History:    Mother- depression, osteoporosis   Father- hypertension, hyperlipidemia, colon cancer, bladder cancer, obesity     Social History:  The patient presents to the ED with her spouse   PCP: Yarely Wise MD    Physical Exam     Patient Vitals for the past 24 hrs:   BP Temp Temp src Pulse Resp SpO2 Height Weight   09/25/22 2230 -- -- -- -- -- 96 % -- --   09/25/22 2215 -- -- -- -- -- 98 % -- --   09/25/22 2200 -- -- -- -- -- 98 % -- --   09/25/22 2115 -- -- -- -- -- 98 % -- --   09/25/22 1956 122/86 98.2  F (36.8  C) Oral 97 16 100 % 1.626 m (5' 4\") 91.6 kg (202 lb)     Physical Exam  Vitals and nursing note reviewed.   Constitutional:       General: She is in acute distress (moderate painful distress).      Appearance: She is ill-appearing. She is not toxic-appearing.   HENT:      Head: Normocephalic and atraumatic.      Right Ear: External ear normal.      Left Ear: External ear normal.   Eyes:      Extraocular Movements: Extraocular movements intact.      Conjunctiva/sclera: Conjunctivae normal.   Cardiovascular:      Rate and Rhythm: Normal rate and regular rhythm.      Heart sounds: No murmur heard.  Pulmonary:      Effort: Pulmonary effort is normal. No respiratory distress.      Breath sounds: Normal breath sounds. No wheezing, rhonchi or rales.   Abdominal:      General: Abdomen is flat. Bowel sounds are normal. There is no distension.      Palpations: Abdomen is soft.      Tenderness: There is abdominal tenderness in the right upper quadrant and epigastric area. There is guarding. There is no rebound. Positive signs include Akur's sign.   Musculoskeletal:         General: No deformity or signs of injury.      Cervical back: Normal range of motion and neck supple.   Skin:     General: Skin is warm and dry.      Findings: No rash.   Neurological:      Mental Status: She is alert and oriented " to person, place, and time.   Psychiatric:         Behavior: Behavior normal.           Emergency Department Course   ECG:  ECG taken at 1918, ECG read at 1920  Normal sinus rhythm  Cannot rule out Anterior infarct, age undetermined   Abnormal ECG  Rate 92 bpm. AL interval 140 ms. QRS duration 70 ms. QT/QTc 344/425 ms. P-R-T axes 68 36 57.    Imaging:  US Abdomen Limited (RUQ)   Final Result   IMPRESSION:   1.  Cholelithiasis and possible cholecystitis. Clinical correlation recommended.   2.  Hepatic steatosis.              Report per radiology    Laboratory:  Labs Ordered and Resulted from Time of ED Arrival to Time of ED Departure   COMPREHENSIVE METABOLIC PANEL - Abnormal       Result Value    Sodium 136      Potassium 3.7      Chloride 103      Carbon Dioxide (CO2) 26      Anion Gap 7      Urea Nitrogen 13      Creatinine 0.67      Calcium 10.2 (*)     Glucose 116 (*)     Alkaline Phosphatase 269 (*)      (*)      (*)     Protein Total 8.2      Albumin 4.4      Bilirubin Total 1.6 (*)     GFR Estimate >90     CBC WITH PLATELETS AND DIFFERENTIAL - Abnormal    WBC Count 8.1      RBC Count 5.49 (*)     Hemoglobin 15.8 (*)     Hematocrit 46.9      MCV 85      MCH 28.8      MCHC 33.7      RDW 11.4      Platelet Count 285      % Neutrophils 79      % Lymphocytes 14      % Monocytes 5      % Eosinophils 1      % Basophils 1      % Immature Granulocytes 0      NRBCs per 100 WBC 0      Absolute Neutrophils 6.4      Absolute Lymphocytes 1.1      Absolute Monocytes 0.4      Absolute Eosinophils 0.1      Absolute Basophils 0.0      Absolute Immature Granulocytes 0.0      Absolute NRBCs 0.0     LIPASE - Abnormal    Lipase >30,000 (*)    TROPONIN I - Normal    Troponin I High Sensitivity <3     HCG QUALITATIVE PREGNANCY - Normal    hCG Serum Qualitative Negative     COVID-19 VIRUS (CORONAVIRUS) BY PCR - Normal    SARS CoV2 PCR Negative       Emergency Department Course:       Reviewed:  I reviewed nursing notes,  vitals, past medical history and Care Everywhere    Assessments:   I obtained history and examined the patient as noted above.    I rechecked the patient and explained findings.     Consults:   I consulted with Dr. Portillo, hospitalist, regarding the patient's history and presentation here in the emergency department who accepted the patient for admission.    Interventions:   Zofran 4 MG IV    GI Cocktail 30 mL PO   NS 1L IV    Dilaudid 0.5 MG IV    Zofran 4 MG IV    NS 1L IV    Dilaudid 0.5 MG IV    Dilaudid 0.5 MG IV    Zosyn 4.5 G IV      Disposition:  The patient was admitted to the hospital under the care of Dr. Portillo.     Impression & Plan     Medical Decision Makin-year-old female presenting with upper abdominal pain.  Differential diagnosis includes pancreatitis, choledocholithiasis, cholecystitis, peptic ulcer disease, gastritis, etc.  Will check labs and right upper quadrant ultrasound for further evaluation and treat pain and give IV fluids.     patient's work-up is consistent with acute pancreatitis as well as possible cholecystitis.  Suspect gallstone pancreatitis, however her common bile duct is not significantly dilated at this point.  Will cover with a dose of Zosyn and admit for further care.  Discussed with Dr. Portillo who accepts the admission.    Diagnosis:    ICD-10-CM    1. Acute pancreatitis, unspecified complication status, unspecified pancreatitis type  K85.90    2. Acute cholecystitis  K81.0      Scribe Disclosure:  I, Nancy Echevarria, am serving as a scribe at 8:14 PM on 2022 to document services personally performed by Dirk Barnett MD based on my observations and the provider's statements to me.      Dirk Barnett MD  22 [id number]

## 2022-09-26 NOTE — ED NOTES
[facility]  ED Nurse Handoff Report    ED Chief complaint: Abdominal Pain (Epigastric pain wraps around to back)      ED Diagnosis:   Final diagnoses:   None       Code Status: Full Code    Allergies: No Known Allergies    Patient Story: Pt arrives after approximately 8 hours of severe abd pain  Focused Assessment:    Abnormal Labs Resulted from Time of ED Arrival to Time of ED Departure   COMPREHENSIVE METABOLIC PANEL - Abnormal       Result Value    Sodium 136      Potassium 3.7      Chloride 103      Carbon Dioxide (CO2) 26      Anion Gap 7      Urea Nitrogen 13      Creatinine 0.67      Calcium 10.2 (*)     Glucose 116 (*)     Alkaline Phosphatase 269 (*)      (*)      (*)     Protein Total 8.2      Albumin 4.4      Bilirubin Total 1.6 (*)     GFR Estimate >90     CBC WITH PLATELETS AND DIFFERENTIAL - Abnormal    WBC Count 8.1      RBC Count 5.49 (*)     Hemoglobin 15.8 (*)     Hematocrit 46.9      MCV 85      MCH 28.8      MCHC 33.7      RDW 11.4      Platelet Count 285      % Neutrophils 79      % Lymphocytes 14      % Monocytes 5      % Eosinophils 1      % Basophils 1      % Immature Granulocytes 0      NRBCs per 100 WBC 0      Absolute Neutrophils 6.4      Absolute Lymphocytes 1.1      Absolute Monocytes 0.4      Absolute Eosinophils 0.1      Absolute Basophils 0.0      Absolute Immature Granulocytes 0.0      Absolute NRBCs 0.0     LIPASE - Abnormal    Lipase >30,000 (*)      US Abdomen Limited (RUQ)   Final Result   IMPRESSION:   1.  Cholelithiasis and possible cholecystitis. Clinical correlation recommended.   2.  Hepatic steatosis.                Treatments and/or interventions provided: Dilaudid, Zofran, IVF  Patient's response to treatments and/or interventions: Reduction in pain    To be done/followed up on inpatient unit:  See inpatient orders    Does this patient have any cognitive concerns?: A&O    Activity level - Baseline/Home:  Independent  Activity Level -  Current:   Independent    Patient's Preferred language: English   Needed?: No    Isolation: None  Infection: Not Applicable  Patient tested for COVID 19 prior to admission: YES  Bariatric?: No    Vital Signs:   Vitals:    09/25/22 2115 09/25/22 2200 09/25/22 2215 09/25/22 2230   BP:       Pulse:       Resp:       Temp:       TempSrc:       SpO2: 98% 98% 98% 96%   Weight:       Height:           Cardiac Rhythm:     Was the PSS-3 completed:   Yes  What interventions are required if any?               Family Comments:  at bedside  OBS brochure/video discussed/provided to patient/family: N/A                  For the majority of the shift this patient's behavior was Green.   Behavioral interventions performed were None.    ED NURSE PHONE NUMBER: [phone]

## 2022-09-26 NOTE — CONSULTS
[facility]  Gastroenterology Consultation    Manju Berg  [address] [facility] [address]  52 year old female    Admission Date/Time: 9/25/2022  Primary Care Provider: Yarely Wise    We were asked to see the patient in consultation by Dr. Portillo for evaluation of choledocholithiasis.        HPI:  Manju Berg is a 52 year old female who a past medical history of GERD who presents with severe abdominal pain.    The pain began yesterday am.  It is located in the epigastrium and RUQ.  She has a history of significant reflux and took a lansoprazole with no relief.  No fever or chills.  History of constipation but she had a normal BM.    Lab work was significant for , , , total bilirubin of 1.6.  Lipase >30,000.  Normal WBC.    US with cholelithiasis and possible cholecystitis.  No biliary dilatation.     Repeat LFTs with worsened bilirubin and transaminases.  Repeat lipase is 8158.    Reports persistent abdominal pain.    ROS: A comprehensive ten point review of systems was negative aside from those in mentioned in the HPI.      MEDICATIONS: No current facility-administered medications on file prior to encounter.  acetaminophen (TYLENOL) 500 MG tablet, Take 2 tablets (1,000 mg) by mouth every 4 hours as needed for mild pain  albuterol (PROAIR HFA/PROVENTIL HFA/VENTOLIN HFA) 108 (90 Base) MCG/ACT inhaler, Inhale 2 puffs into the lungs every 4 hours as needed for shortness of breath / dyspnea or wheezing  albuterol (PROVENTIL) (2.5 MG/3ML) 0.083% neb solution, Take 1 vial (2.5 mg) by nebulization every 6 hours as needed for shortness of breath / dyspnea or wheezing  augmented betamethasone dipropionate (DIPROLENE) 0.05 % external lotion, Apply topically daily  budesonide-formoterol (SYMBICORT) 80-4.5 MCG/ACT Inhaler, Inhale 2 puffs into the lungs 2 times daily  buPROPion (WELLBUTRIN XL) 300 MG 24 hr tablet, Take 1 tablet (300 mg) by mouth every  morning  CALCIUM 600 + D 600-200 MG-UNIT OR TABS, Take 1 tablet by mouth every evening  cetirizine (ZYRTEC) 10 MG tablet, Take 10 mg by mouth daily  chlorzoxazone (PARAFON FORTE) 500 MG tablet, TAKE 1/2-1&1/2 TABLETS BY MOUTH 3-4 TIMES DAILY AS NEEDED TO RELAX MUSCLES  Needs follow up for refills. Needs to establish care  Cholecalciferol (VITAMIN D) 2000 UNITS tablet, Take 2,000 Units by mouth daily.  fluticasone (FLONASE) 50 MCG/ACT nasal spray, Spray 1 spray into both nostrils daily  fluvoxaMINE (LUVOX) 50 MG tablet, Take 1 tablet (50 mg) by mouth 2 times daily  LANsoprazole (PREVACID) 30 MG DR capsule, Take 1 capsule (30 mg) by mouth daily  linaclotide (LINZESS) 72 MCG capsule, Take 72 mcg by mouth every morning (before breakfast)  naproxen (NAPROSYN) 500 MG tablet, Take 1 tablet (500 mg) by mouth 2 times daily as needed for moderate pain  apremilast (OTEZLA) 30 MG tablet, Take 1 tablet (30 mg) by mouth 2 times daily  augmented betamethasone dipropionate (DIPROLENE) 0.05 % external gel, Apply topically daily (Patient not taking: No sig reported)  lidocaine (LIDODERM) 5 % patch, Place 1 patch onto the skin daily as needed for moderate pain Remove after 12 hours.  valACYclovir (VALTREX) 1000 mg tablet, take 2 tablets PO Q 12 hours x 2 doses at first signs of outbreak.        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/2011,Proximal left tibia     Dyspepsia and other specified disorders of function of stomach     No Comments Provided     Gastroesophageal reflux disease      Headache     No Comments Provided     Herpes simplex     No Comments Provided     Lateral epicondylitis of elbow     No Comments Provided     Other depressive disorder     No Comments Provided     Other unspecified back disorder     No Comments  "Provided     Pain in joint, upper arm     No Comments Provided       Past Surgical History:   Procedure Laterality Date     ------------OTHER------------- Right 12/07/2018    5th metatarsal fracture     BUNIONECTOMY Bilateral 1999 1999     C/SECTION, LOW TRANSVERSE  2013     FOOT SURGERY Left 30 august 2018    fusion of the large toe 30 august 2018     OTHER SURGICAL HISTORY      EXCIS BARTHOLIN GLAND/CYST     OTHER SURGICAL HISTORY Left 03/16/2012     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     Drug use: Never       FAMILY HISTORY:  Family History   Problem Relation Age of Onset     Depression Mother      Osteoporosis Mother      Mental Illness Mother      Hypertension Father         Hypertension     Hyperlipidemia Father         Hyperlipidemia     Colon Cancer Father 56        Cancer-colon     Bladder Cancer Father 62        Cancer,bladder     Obesity Father      Family History Negative Sister         Good Health     Family History Negative Son      Mental Illness Maternal Aunt      Mental Illness Maternal Uncle        PHYSICAL EXAM:   /78 (BP Location: Right arm, Patient Position: Semi-Amador's, Cuff Size: Adult Regular)   Pulse 81   Temp 98.4  F (36.9  C) (Oral)   Resp 16   Ht 1.626 m (5' 4\")   Wt 91.6 kg (202 lb)   SpO2 97%   BMI 34.67 kg/m      Constitutional: NAD, comfortable  Cardiovascular: RRR, normal S1 and S2, no r/c/g/m  Respiratory: CTAB  Psychiatric: mentation appears normal and affect normal  Head: Normocephalic. Atraumatic.    Neck: Neck supple. No adenopathy. Thyroid symmetric, normal size, " trachea midline  Eyes:  PERRL, no icterus  ENT: Hearing adequate, pharynx normal without erythema or exudate  Abdomen:   Auscultation: + BS  Appearance: non-distended  Palpation: + epigastric tenderness  NEURO: grossly negative  SKIN: no suspicious lesions or rashes  LYMPH:   anterior cervical: no adenopathy  posterior cervical: no adenopathy  supraclavicular: no adenopathy          ADDITIONAL COMMENTS:   I reviewed the patient's new clinical lab test results.   Recent Labs   Lab Test 09/26/22  0655 09/25/22  1948 09/15/18  0952   WBC 4.8 8.1 5.6   HGB 11.8 15.8* 14.3   MCV 86 85 85    285 226     Recent Labs   Lab Test 09/26/22  0655 09/25/22  1948 04/14/22  0908 09/15/18  0952    136  --  138   POTASSIUM 4.0 3.7  --  3.8   CHLORIDE 110* 103  --  104   CO2  --  26  --  32   BUN  --  13  --  13   CR  --  0.67  --  0.80   ANIONGAP  --  7  --  2*   MCKENZIE  --  10.2*  --  8.7   GLC  --  116* 107* 76     Recent Labs   Lab Test 09/25/22 1948   ALBUMIN 4.4   BILITOTAL 1.6*   *   *   ALKPHOS 269*   LIPASE >30,000*             .    CONSULTATION ASSESSMENT AND PLAN:      53 yo female with a history of GERD who presents with abdominal pain starting yesterday.  Found to have significantly elevated liver function testing and lipase on lab work.  US concerning for cholecystitis, no sign of biliary obstruction.    Suspect gallstone pancreatitis.    -  Will contact Dr. Tom (biliary) for recommendations regarding MRCP vs ERCP given rising lfts.    -  Keep npo.  -  Pain, nausea control per hospital team.    ADDENDUM:  Case discussed with Dr. Tom.  He recommends MRCP today.  If positive, plan for ERCP tomorrow.  Will order.    Total Time Spent: 40 minutes      I discussed the patient's findings and plan with Dr. Keys.          Camille Dubois, [facility]  Office:  [phone] call if needed after 12PM  Cell:  [phone], not available after 12PM at this number

## 2022-09-26 NOTE — PROGRESS NOTES
Pt A/Ox4, VSS on RA, IV Dilaudid for pain, Benadryl for itching/psoriasis, independent in room, no N/V, abdomen tender on palpation, NPO since 0000, IVF & intermittent IV Abx.

## 2022-09-26 NOTE — UTILIZATION REVIEW
Admission Status; Secondary Review Determination     Admission Date: 9/25/2022  8:10 PM       Under the authority of the Utilization Management Committee, the utilization review process indicated a secondary review on the above patient.  The review outcome is based on review of the medical records, discussions with staff, and applying clinical experience noted on the date of the review.        (x)      Inpatient Status Appropriate - This patient's medical care is consistent with medical management for inpatient care and reasonable inpatient medical practice.       RATIONALE FOR DETERMINATION      Brief clinical presentation, information copied from the chart, abbreviated and edited for relevant content:     Manju Berg is a 52 year old female with past medical history significant for GERD, prediabetes and obesity admitted on 9/25/2022 with abdominal pain.   Likely with Gallstone pancreatitis, with probable Cholelithiasis and Possible Cholecystitis   Labs notable for elevated transaminases with alk phos of 269, ALT of 515, AST of 683 and T bili of 1.6. She also had a markedly elevated lipase to >30,000. WBC count was normal. RUQ US showed mildly distended gallbladder with cholelithiasis. There is mildly thickened wall without pericholecystic fluid and positive sonographic Terell's sign. Despite no biliary dilatation, clinical picture is concerning for probable gallstone pancreatitis with associated cholecystitis. Perhaps the stone has now passed. GI consultation requested MRCP today and ERCP tomorrow if MRCP positive for choledocholithiasis    Surgery consultation recommended lap ephraim this hospitalization after pancreatitis cooled down. NPO, on IVF, and IV Zosyn. Trending labs.         At the time of admission with the information available to the attending physician, more than 2 nights hospital complex care was anticipated. Also, there was a risk of adverse outcome if patient was treated outpatient or  observation. High intensity of services anticipated. Inpatient admission appropriate based on Medicare guidelines.       The information on this document is developed by the utilization review team in order for the business office to ensure compliance.  This only denotes the appropriateness of proper admission status and does not reflect the quality of care rendered.         The definitions of Inpatient Status and Observation Status used in making the determination above are those provided in the CMS Coverage Manual, Chapter 1 and Chapter 6, section 70.4.      Sincerely,      Idania Luke MD   Utilization Review/ Case Management  [facility].      '

## 2022-09-26 NOTE — PHARMACY-ADMISSION MEDICATION HISTORY
Pharmacy Medication History  Admission medication history interview status for the 9/25/2022 admission is complete. See EPIC admission navigator for prior to admission medications     Location of Interview: Patient room  Medication history sources: Patient    Significant changes made to the medication list:      In the past week, patient estimated taking medication this percent of the time: greater than 90%    Additional medication history information:       Medication reconciliation completed by provider prior to medication history? No    Time spent in this activity: 15 minnuts    Prior to Admission medications    Medication Sig Last Dose Taking? Auth Provider Long Term End Date   acetaminophen (TYLENOL) 500 MG tablet Take 2 tablets (1,000 mg) by mouth every 4 hours as needed for mild pain 9/25/2022 at x1 Yes Se Morales MD     albuterol (PROAIR HFA/PROVENTIL HFA/VENTOLIN HFA) 108 (90 Base) MCG/ACT inhaler Inhale 2 puffs into the lungs every 4 hours as needed for shortness of breath / dyspnea or wheezing  at PRN Yes Liane Perez MD Yes    albuterol (PROVENTIL) (2.5 MG/3ML) 0.083% neb solution Take 1 vial (2.5 mg) by nebulization every 6 hours as needed for shortness of breath / dyspnea or wheezing  at PRN Yes Liane Perez MD Yes    augmented betamethasone dipropionate (DIPROLENE) 0.05 % external lotion Apply topically daily 9/24/2022 at AM Yes Pierce Velasquez MD     budesonide-formoterol (SYMBICORT) 80-4.5 MCG/ACT Inhaler Inhale 2 puffs into the lungs 2 times daily 9/25/2022 at AM Yes Liane Perez MD Yes    buPROPion (WELLBUTRIN XL) 300 MG 24 hr tablet Take 1 tablet (300 mg) by mouth every morning 9/25/2022 at AM Yes Yarely Wise MD Yes    CALCIUM 600 + D 600-200 MG-UNIT OR TABS Take 1 tablet by mouth every evening 9/24/2022 at PM Yes Horacio Vallejo MD     chlorzoxazone (PARAFON FORTE) 500 MG tablet TAKE 1/2-1&1/2 TABLETS BY MOUTH 3-4 TIMES DAILY AS NEEDED TO RELAX MUSCLES  Needs  follow up for refills. Needs to establish care  at PRN Yes Se Morales MD     Cholecalciferol (VITAMIN D) 2000 UNITS tablet Take 2,000 Units by mouth daily. 9/24/2022 at PM Yes Tessy Cotto MD     fluticasone (FLONASE) 50 MCG/ACT nasal spray Spray 1 spray into both nostrils daily 9/24/2022 at AM Yes Reported, Patient     fluvoxaMINE (LUVOX) 50 MG tablet Take 1 tablet (50 mg) by mouth 2 times daily 9/25/2022 at AM Yes Marie Jean, GALI Yes    LANsoprazole (PREVACID) 30 MG DR capsule Take 1 capsule (30 mg) by mouth daily 9/25/2022 at AM Yes Mariel Marquez PA-C     linaclotide (LINZESS) 72 MCG capsule Take 72 mcg by mouth every morning (before breakfast) 9/24/2022 at AM Yes Unknown, Entered By History     naproxen (NAPROSYN) 500 MG tablet Take 1 tablet (500 mg) by mouth 2 times daily as needed for moderate pain  at PRN Yes Jeffrey Mota MD     apremilast (OTEZLA) 30 MG tablet Take 1 tablet (30 mg) by mouth 2 times daily NEW at Not started  Pierce Velasquez MD     augmented betamethasone dipropionate (DIPROLENE) 0.05 % external gel Apply topically daily  Patient not taking: No sig reported Not Taking at Unknown time  Yarely Wise MD     lidocaine (LIDODERM) 5 % patch Place 1 patch onto the skin daily as needed for moderate pain Remove after 12 hours.  at PRN  Se Morales MD     valACYclovir (VALTREX) 1000 mg tablet take 2 tablets PO Q 12 hours x 2 doses at first signs of outbreak.  at PRN  Yarely Wise MD Yes        The information provided in this note is only as accurate as the sources available at the time of update(s)

## 2022-09-26 NOTE — CONSULTS
"[facility] General Surgery Consultation    Manju Berg MRN# [id number]   YOB: 1970 Age: 52 year old      Date of Admission:  2022  Date of Consult: 2022         Assessment and Plan:   Patient is a 52 year old female with gallstone pancreatitis and concern for possible cholecystitis on imaging, possible choledocholithiasis and GI ordered MRCP which is pending today.     PLAN:  Agree with MRCP  Recommend laparoscopic cholecystectomy this hospitalization after time for pancreatitis to improve and prior to discharge, likely Wednesday         Requesting Physician:      Dr. Castorena        Chief Complaint:     Chief Complaint   Patient presents with     Abdominal Pain     Epigastric pain wraps around to back          History of Present Illness:   Manju Berg is a 52 year old female who was seen in consultation at the request of Dr. Castorena who presented with abdominal pain. Pt reports episodes of prior epigastric pain and yesterday was more severe. She had a hot dog later in the day and the pain was more severe which prompted her to present to the ER. She had lab evaluation which revealed lipase >30,000. Mildly elevated LFTs. She reports ongoing epigastric pain. US revealed cholelithiasis with possible early cholecystitis.   Prior , no other abdominal surgeries.           Physical Exam:   Blood pressure 107/69, pulse 67, temperature 98.2  F (36.8  C), temperature source Oral, resp. rate 16, height 1.626 m (5' 4\"), weight 91.6 kg (202 lb), SpO2 93 %.  202 lbs 0 oz  General: lying in bed, appears comfortable  Psych: Alert and Oriented.  Normal affect  Neurological: grossly intact  Eyes: Sclera clear  Respiratory:  nonlabored breathing  Cardiovascular:  Regular Rate and Rhythm   GI: soft, mildly tender epigastrium. No hernias.    Lymphatic/Hematologic/Immune:  No femoral or cervical lymphadenopathy.  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     5/27/2011,Proximal left tibia     Dyspepsia and other specified disorders of function of stomach     No Comments Provided     Gastroesophageal reflux disease      Headache     No Comments Provided     Herpes simplex     No Comments Provided     Lateral epicondylitis of elbow     No Comments Provided     Other depressive disorder     No Comments Provided     Other unspecified back disorder     No Comments Provided     Pain in joint, upper arm     No Comments Provided            Past Surgical History:     Past Surgical History:   Procedure Laterality Date     ------------OTHER------------- Right 12/07/2018    5th metatarsal fracture     BUNIONECTOMY Bilateral 1999 1999     C/SECTION, LOW TRANSVERSE  2013     FOOT SURGERY Left 30 august 2018    fusion of the large toe 30 august 2018     OTHER SURGICAL HISTORY      EXCIS BARTHOLIN GLAND/CYST     OTHER SURGICAL HISTORY Left 03/16/2012     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     RELEASE CARPAL TUNNEL Bilateral     No Comments Provided     STRIP VEIN  2014    vein removal for varicose veins            Current Medications:           buPROPion  300 mg Oral QAM     fluvoxaMINE  50 mg Oral BID     linaclotide  72 mcg Oral QAM AC     pantoprazole  40 mg Intravenous BID     piperacillin-tazobactam  3.375 g Intravenous Q6H     sodium chloride (PF)  3 mL Intracatheter Q8H       acetaminophen **OR** acetaminophen, diphenhydrAMINE **OR** diphenhydrAMINE, HYDROmorphone, lidocaine 4%, lidocaine (buffered or not buffered), melatonin, naloxone **OR** naloxone **OR** naloxone **OR** naloxone, ondansetron **OR** ondansetron, oxyCODONE, polyethylene glycol, prochlorperazine **OR** prochlorperazine **OR** prochlorperazine, senna-docusate **OR** senna-docusate, sodium chloride (PF)          Home Medications:     Prior to Admission medications    Medication Sig Last Dose Taking? Auth Provider Long Term End Date   acetaminophen (TYLENOL) 500 MG tablet Take 2 tablets (1,000 mg) by mouth every 4 hours as needed for mild pain 9/25/2022 at x1 Yes Se Morales MD     albuterol (PROAIR HFA/PROVENTIL HFA/VENTOLIN HFA) 108 (90 Base) MCG/ACT inhaler Inhale 2 puffs into the lungs every 4 hours as needed for shortness of breath / dyspnea or wheezing  at PRN Yes Liane Perez MD Yes    albuterol (PROVENTIL) (2.5 MG/3ML) 0.083% neb solution Take 1 vial (2.5 mg) by nebulization every 6 hours as needed for shortness of breath / dyspnea or wheezing  at PRN Yes Liane Perez MD Yes    augmented betamethasone dipropionate (DIPROLENE) 0.05 % external lotion Apply topically daily 9/24/2022 at AM Yes Pierce Velasquez MD     budesonide-formoterol (SYMBICORT) 80-4.5 MCG/ACT Inhaler Inhale 2 puffs into the lungs 2 times daily 9/25/2022 at AM Yes Liane Perez MD Yes    buPROPion (WELLBUTRIN XL) 300 MG 24 hr tablet Take 1 tablet (300 mg) by mouth every morning 9/25/2022 at AM Yes Yarely Wise MD Yes    CALCIUM 600 + D 600-200 MG-UNIT OR TABS Take 1 tablet by mouth every evening 9/24/2022 at PM Yes Horacio Vallejo MD     cetirizine (ZYRTEC) 10 MG tablet Take 10 mg by mouth daily  Yes Unknown, Entered By History     chlorzoxazone (PARAFON FORTE) 500 MG tablet TAKE 1/2-1&1/2 TABLETS BY MOUTH 3-4 TIMES DAILY AS NEEDED TO RELAX MUSCLES  Needs follow up for refills. Needs to establish care  at PRN Yes Se Morales MD     Cholecalciferol (VITAMIN D) 2000 UNITS tablet Take 2,000 Units by mouth daily. 9/24/2022 at PM Yes Tessy Cotto MD     fluticasone (FLONASE) 50 MCG/ACT nasal spray Spray 1 spray into both nostrils daily 9/24/2022 at AM Yes Reported, Patient     fluvoxaMINE (LUVOX) 50 MG tablet Take 1 tablet (50 mg) by mouth 2 times daily 9/25/2022 at AM Yes Marie Jean, NP Yes     LANsoprazole (PREVACID) 30 MG DR capsule Take 1 capsule (30 mg) by mouth daily 9/25/2022 at AM Yes Mariel Marquez PA-C     linaclotide (LINZESS) 72 MCG capsule Take 72 mcg by mouth every morning (before breakfast) 9/24/2022 at AM Yes Unknown, Entered By History     naproxen (NAPROSYN) 500 MG tablet Take 1 tablet (500 mg) by mouth 2 times daily as needed for moderate pain  at PRN Yes Jeffrey Mota MD     apremilast (OTEZLA) 30 MG tablet Take 1 tablet (30 mg) by mouth 2 times daily NEW at Not started  Pierce Velasquez MD     augmented betamethasone dipropionate (DIPROLENE) 0.05 % external gel Apply topically daily  Patient not taking: No sig reported Not Taking at Unknown time  Yarely Wise MD     lidocaine (LIDODERM) 5 % patch Place 1 patch onto the skin daily as needed for moderate pain Remove after 12 hours.  at PRN  [facility], Se Christianson MD     valACYclovir (VALTREX) 1000 mg tablet take 2 tablets PO Q 12 hours x 2 doses at first signs of outbreak.  at PRN  Yarely Wise MD Yes             Allergies:   No Known Allergies         Family History:     Family History   Problem Relation Age of Onset     Depression Mother      Osteoporosis Mother      Mental Illness Mother      Hypertension Father         Hypertension     Hyperlipidemia Father         Hyperlipidemia     Colon Cancer Father 56        Cancer-colon     Bladder Cancer Father 62        Cancer,bladder     Obesity Father      Family History Negative Sister         Good Health     Family History Negative Son      Mental Illness Maternal Aunt      Mental Illness Maternal Uncle            Social History:   Manju JERONIMO Morena  reports that she has never smoked. She has never used smokeless tobacco. She reports current alcohol use. She reports that she does not use drugs.          Review of Systems:   The 10 point Review of Systems is negative other than noted in the HPI.         Labs/Imaging   All new lab and imaging data  was reviewed.   I have personally reviewed the imaging studies including her US        Bertha Potts MD

## 2022-09-26 NOTE — ED TRIAGE NOTES
Epigastric pain since 11a. Wraps around to back on both sides.  RUQ pain and more frequent heart burn     Triage Assessment     Row Name 09/25/22 1944       Triage Assessment (Adult)    Airway WDL WDL       Respiratory WDL    Respiratory WDL WDL       Skin Circulation/Temperature WDL    Skin Circulation/Temperature WDL WDL       Cardiac WDL    Cardiac WDL WDL       Peripheral/Neurovascular WDL    Peripheral Neurovascular WDL WDL       Cognitive/Neuro/Behavioral WDL    Cognitive/Neuro/Behavioral WDL WDL

## 2022-09-26 NOTE — PROGRESS NOTES
Shift Summary [id number]    Admitting Diagnosis: Acute cholecystitis.   Acute pancreatitis, unspecified complication status, unspecified pancreatitis type.  Vitals VSS  Pain 6/10. Taking IV dilaudid PRN. Last dose 1359. Available Po Oxycodone but Pt unable to tolerated,    A&Ox4  Voiding undependent   Mobility Independent  CMS Intact  Lung Sounds WDL  GI symptom/ pain      Orders Placed This Encounter      NPO for Medical/Clinical Reasons Except for: Meds, Ice Chips     Plan: MRI of the abdomen done on this shift. PIV NS infusing @ 125 mL/hrt

## 2022-09-26 NOTE — PROGRESS NOTES
[facility]    Hospitalist Progress Note    Date of Service (when I saw the patient): 09/26/2022    Assessment & Plan   Manju Berg is a 52 year old female who was admitted on 9/25/2022.    Manju Berg is a 52 year old female with past medical history significant for GERD, prediabetes and obesity admitted on 9/25/2022 with abdominal pain.      Gallstone pancreatitis, probable  Cholelithiasis   Possible Cholecystitis  The patient presents to the ED with epigastric and right upper quadrant pain that has been constant since after eating breakfast. She also reports a few episodes of emesis. No fevers or chills, diarrhea, or bloody stools. Laboratory evaluation in the ED was notable for elevated transaminases with alk phos of 269, ALT of 515, AST of 683 and T bili of 1.6. She also had a markedly elevated lipase to >30,000. WBC count was normal. RUQ US showed mildly distended gallbladder with cholelithiasis. There is mildly thickened wall without pericholecystic fluid and positive sonographic Terell's sign. There was not biliary dilatation.   Despite no biliary dilatation, clinical picture is concerning for probable gallstone pancreatitis with associated cholecystitis. Perhaps the stone has now passed.   - Admit to medicine  - GI consultation requested MRCP today and ERCP tomorrow if MRCP positive for choledocholithiasis   - Surgery consultation requested for lap ephraim   - Trend LFTs and lipase   - Continue IV fluid   - NPO   - Continue Zosyn   - Pain control as needed    GERD   - Continue PTA lansoprazole when verified      Constipation   - Continue PTA Linzess when verified   - PRN senna and miralax ordered as well      Hypercalcemia: Ca = 10.2 mg/dL (Ref range: 8.5 - 10.1 mg/dL) and/or iCa = N/A on admission, will monitor as appropriate         Anxiety/depression  - Continue PTA wellbutrin and fluvoxamine when verified         Obesity: Estimated body mass index is 34.67 kg/m  as  "calculated from the following:    Height as of this encounter: 1.626 m (5' 4\").    Weight as of this encounter: 91.6 kg (202 lb).        Diet: NPO for Medical/Clinical Reasons Except for: No Exceptions    DVT Prophylaxis: Pneumatic Compression Devices  Etienne Catheter: Not present  Central Lines: None  Cardiac Monitoring: None  Code Status: Full Code            Disposition Plan     The patient's care was discussed with the Patient and bedside RN     Esther Castorena MD, MD  [phone] (P)      Interval History   GI seen pt MRCP today. Pt c/o pain RUQ and epigastrium. Getting pain meds. No other acute issues since admission     -Data reviewed today: I reviewed all new labs and imaging results over the last 24 hours. I personally reviewed no images or EKG's today.    Physical Exam   Temp: 98.2  F (36.8  C) Temp src: Oral BP: 107/69 Pulse: 67   Resp: 16 SpO2: 93 % O2 Device: None (Room air)    Vitals:    09/25/22 1956   Weight: 91.6 kg (202 lb)     Vital Signs with Ranges  Temp:  [98.2  F (36.8  C)-98.4  F (36.9  C)] 98.2  F (36.8  C)  Pulse:  [67-97] 67  Resp:  [16] 16  BP: (107-122)/(69-86) 107/69  SpO2:  [93 %-100 %] 93 %  No intake/output data recorded.    Constitutional: Awake, alert, cooperative, no apparent distress  Respiratory: Clear to auscultation bilaterally, no crackles or wheezing  Cardiovascular: Regular rate and rhythm, normal S1 and S2, and no murmur noted  GI: Normal bowel sounds, soft, non-distended, TEnder in RUQ and epigastrium   Skin/Integumen: No rashes, no cyanosis, no edema  Other:     Medications     sodium chloride 150 mL/hr at 09/26/22 0754       buPROPion  300 mg Oral QAM     fluvoxaMINE  50 mg Oral BID     linaclotide  72 mcg Oral QAM AC     pantoprazole  40 mg Oral Daily     piperacillin-tazobactam  3.375 g Intravenous Q6H     sodium chloride (PF)  3 mL Intracatheter Q8H       Data   Recent Labs   Lab 09/26/22  0655 09/25/22 1948   WBC 4.8 8.1   HGB 11.8 15.8*   MCV 86 85    285 "    136   POTASSIUM 4.0 3.7   CHLORIDE 110* 103   CO2 27 26   BUN 12 13   CR 0.74 0.67   ANIONGAP 4 7   MCKENZIE 8.2* 10.2*   * 116*   ALBUMIN 3.2* 4.4   PROTTOTAL 5.7* 8.2   BILITOTAL 2.1* 1.6*   ALKPHOS 229* 269*   * 515*   * 683*   LIPASE 8,158* >30,000*       Recent Results (from the past 24 hour(s))   US Abdomen Limited (RUQ)    Narrative    EXAM: US ABDOMEN LIMITED  LOCATION: [facility]  DATE/TIME: 9/25/2022 9:56 PM    INDICATION: suspected cholecystitis or choledocholithiasis  COMPARISON: None.  TECHNIQUE: Limited abdominal ultrasound.    FINDINGS:    GALLBLADDER: The gallbladder is mildly distended, contains numerous stones, and has a mildly thickened wall measuring 4 mm. No pericholecystic fluid. The sonographer reports the patient was tender while scanning over the gallbladder.    BILE DUCTS: No biliary dilatation. The common duct measures 4 mm.    LIVER: Echogenic parenchyma. No focal mass.    RIGHT KIDNEY: No hydronephrosis.    PANCREAS: The visualized portions are normal.    No ascites.      Impression    IMPRESSION:  1.  Cholelithiasis and possible cholecystitis. Clinical correlation recommended.  2.  Hepatic steatosis.

## 2022-09-27 ENCOUNTER — ANESTHESIA EVENT (OUTPATIENT)
Dept: SURGERY | Facility: CLINIC | Age: 52
DRG: 418 | End: 2022-09-27
Payer: COMMERCIAL

## 2022-09-27 ENCOUNTER — ANESTHESIA (OUTPATIENT)
Dept: SURGERY | Facility: CLINIC | Age: 52
DRG: 418 | End: 2022-09-27
Payer: COMMERCIAL

## 2022-09-27 LAB
ALBUMIN SERPL-MCNC: 3.1 G/DL (ref 3.4–5)
ALP SERPL-CCNC: 208 U/L (ref 40–150)
ALT SERPL W P-5'-P-CCNC: 441 U/L (ref 0–50)
ANION GAP SERPL CALCULATED.3IONS-SCNC: 9 MMOL/L (ref 3–14)
AST SERPL W P-5'-P-CCNC: 188 U/L (ref 0–45)
BILIRUB DIRECT SERPL-MCNC: 0.4 MG/DL (ref 0–0.2)
BILIRUB SERPL-MCNC: 1.4 MG/DL (ref 0.2–1.3)
BUN SERPL-MCNC: 8 MG/DL (ref 7–30)
CALCIUM SERPL-MCNC: 8.4 MG/DL (ref 8.5–10.1)
CHLORIDE BLD-SCNC: 105 MMOL/L (ref 94–109)
CO2 SERPL-SCNC: 24 MMOL/L (ref 20–32)
CREAT SERPL-MCNC: 0.69 MG/DL (ref 0.52–1.04)
GFR SERPL CREATININE-BSD FRML MDRD: >90 ML/MIN/1.73M2
GLUCOSE BLD-MCNC: 90 MG/DL (ref 70–99)
GLUCOSE BLDC GLUCOMTR-MCNC: 151 MG/DL (ref 70–99)
GLUCOSE BLDC GLUCOMTR-MCNC: 90 MG/DL (ref 70–99)
GLUCOSE BLDC GLUCOMTR-MCNC: 91 MG/DL (ref 70–99)
LIPASE SERPL-CCNC: 803 U/L (ref 73–393)
POTASSIUM BLD-SCNC: 3.8 MMOL/L (ref 3.4–5.3)
PROT SERPL-MCNC: 5.6 G/DL (ref 6.8–8.8)
SODIUM SERPL-SCNC: 138 MMOL/L (ref 133–144)
UPPER EUS: NORMAL

## 2022-09-27 PROCEDURE — 999N000141 HC STATISTIC PRE-PROCEDURE NURSING ASSESSMENT: Performed by: INTERNAL MEDICINE

## 2022-09-27 PROCEDURE — 0DB78ZX EXCISION OF STOMACH, PYLORUS, VIA NATURAL OR ARTIFICIAL OPENING ENDOSCOPIC, DIAGNOSTIC: ICD-10-PCS | Performed by: INTERNAL MEDICINE

## 2022-09-27 PROCEDURE — 250N000025 HC SEVOFLURANE, PER MIN: Performed by: INTERNAL MEDICINE

## 2022-09-27 PROCEDURE — 36415 COLL VENOUS BLD VENIPUNCTURE: CPT | Performed by: INTERNAL MEDICINE

## 2022-09-27 PROCEDURE — 80053 COMPREHEN METABOLIC PANEL: CPT | Performed by: INTERNAL MEDICINE

## 2022-09-27 PROCEDURE — 120N000001 HC R&B MED SURG/OB

## 2022-09-27 PROCEDURE — 250N000013 HC RX MED GY IP 250 OP 250 PS 637: Performed by: STUDENT IN AN ORGANIZED HEALTH CARE EDUCATION/TRAINING PROGRAM

## 2022-09-27 PROCEDURE — 250N000011 HC RX IP 250 OP 636: Performed by: STUDENT IN AN ORGANIZED HEALTH CARE EDUCATION/TRAINING PROGRAM

## 2022-09-27 PROCEDURE — 250N000009 HC RX 250: Performed by: NURSE ANESTHETIST, CERTIFIED REGISTERED

## 2022-09-27 PROCEDURE — 250N000013 HC RX MED GY IP 250 OP 250 PS 637: Performed by: INTERNAL MEDICINE

## 2022-09-27 PROCEDURE — 99232 SBSQ HOSP IP/OBS MODERATE 35: CPT | Mod: 57 | Performed by: PHYSICIAN ASSISTANT

## 2022-09-27 PROCEDURE — 99207 PR APP CREDIT; MD BILLING SHARED VISIT: CPT | Performed by: INTERNAL MEDICINE

## 2022-09-27 PROCEDURE — 88305 TISSUE EXAM BY PATHOLOGIST: CPT | Mod: 26 | Performed by: PATHOLOGY

## 2022-09-27 PROCEDURE — 258N000003 HC RX IP 258 OP 636: Performed by: ANESTHESIOLOGY

## 2022-09-27 PROCEDURE — 258N000003 HC RX IP 258 OP 636: Performed by: INTERNAL MEDICINE

## 2022-09-27 PROCEDURE — 370N000017 HC ANESTHESIA TECHNICAL FEE, PER MIN: Performed by: INTERNAL MEDICINE

## 2022-09-27 PROCEDURE — 710N000009 HC RECOVERY PHASE 1, LEVEL 1, PER MIN: Performed by: INTERNAL MEDICINE

## 2022-09-27 PROCEDURE — 82040 ASSAY OF SERUM ALBUMIN: CPT | Performed by: INTERNAL MEDICINE

## 2022-09-27 PROCEDURE — 360N000075 HC SURGERY LEVEL 2, PER MIN: Performed by: INTERNAL MEDICINE

## 2022-09-27 PROCEDURE — 99232 SBSQ HOSP IP/OBS MODERATE 35: CPT | Performed by: PHYSICIAN ASSISTANT

## 2022-09-27 PROCEDURE — 83690 ASSAY OF LIPASE: CPT | Performed by: INTERNAL MEDICINE

## 2022-09-27 PROCEDURE — C9113 INJ PANTOPRAZOLE SODIUM, VIA: HCPCS | Performed by: INTERNAL MEDICINE

## 2022-09-27 PROCEDURE — 82248 BILIRUBIN DIRECT: CPT | Performed by: INTERNAL MEDICINE

## 2022-09-27 PROCEDURE — 88342 IMHCHEM/IMCYTCHM 1ST ANTB: CPT | Mod: 26 | Performed by: PATHOLOGY

## 2022-09-27 PROCEDURE — 88305 TISSUE EXAM BY PATHOLOGIST: CPT | Mod: TC | Performed by: INTERNAL MEDICINE

## 2022-09-27 PROCEDURE — 250N000011 HC RX IP 250 OP 636: Performed by: NURSE ANESTHETIST, CERTIFIED REGISTERED

## 2022-09-27 PROCEDURE — 250N000011 HC RX IP 250 OP 636: Performed by: INTERNAL MEDICINE

## 2022-09-27 RX ORDER — LANSOPRAZOLE 30 MG/1
30 CAPSULE, DELAYED RELEASE ORAL 2 TIMES DAILY
Status: DISCONTINUED | OUTPATIENT
Start: 2022-09-27 | End: 2022-09-29 | Stop reason: HOSPADM

## 2022-09-27 RX ORDER — ONDANSETRON 4 MG/1
4 TABLET, ORALLY DISINTEGRATING ORAL EVERY 30 MIN PRN
Status: DISCONTINUED | OUTPATIENT
Start: 2022-09-27 | End: 2022-09-27 | Stop reason: HOSPADM

## 2022-09-27 RX ORDER — CHLORZOXAZONE 500 MG/1
500 TABLET ORAL 4 TIMES DAILY PRN
Status: DISCONTINUED | OUTPATIENT
Start: 2022-09-27 | End: 2022-09-29 | Stop reason: HOSPADM

## 2022-09-27 RX ORDER — PROPOFOL 10 MG/ML
INJECTION, EMULSION INTRAVENOUS PRN
Status: DISCONTINUED | OUTPATIENT
Start: 2022-09-27 | End: 2022-09-27

## 2022-09-27 RX ORDER — LIDOCAINE 40 MG/G
CREAM TOPICAL
Status: DISCONTINUED | OUTPATIENT
Start: 2022-09-27 | End: 2022-09-27 | Stop reason: HOSPADM

## 2022-09-27 RX ORDER — ONDANSETRON 4 MG/1
4 TABLET, ORALLY DISINTEGRATING ORAL EVERY 6 HOURS PRN
Status: DISCONTINUED | OUTPATIENT
Start: 2022-09-27 | End: 2022-09-27

## 2022-09-27 RX ORDER — INDOMETHACIN 50 MG/1
100 SUPPOSITORY RECTAL
Status: DISCONTINUED | OUTPATIENT
Start: 2022-09-27 | End: 2022-09-27 | Stop reason: HOSPADM

## 2022-09-27 RX ORDER — LIDOCAINE HYDROCHLORIDE 20 MG/ML
INJECTION, SOLUTION INFILTRATION; PERINEURAL PRN
Status: DISCONTINUED | OUTPATIENT
Start: 2022-09-27 | End: 2022-09-27

## 2022-09-27 RX ORDER — HYDROMORPHONE HCL IN WATER/PF 6 MG/30 ML
0.4 PATIENT CONTROLLED ANALGESIA SYRINGE INTRAVENOUS EVERY 5 MIN PRN
Status: DISCONTINUED | OUTPATIENT
Start: 2022-09-27 | End: 2022-09-27 | Stop reason: HOSPADM

## 2022-09-27 RX ORDER — SODIUM CHLORIDE, SODIUM LACTATE, POTASSIUM CHLORIDE, CALCIUM CHLORIDE 600; 310; 30; 20 MG/100ML; MG/100ML; MG/100ML; MG/100ML
INJECTION, SOLUTION INTRAVENOUS CONTINUOUS
Status: DISCONTINUED | OUTPATIENT
Start: 2022-09-27 | End: 2022-09-27 | Stop reason: HOSPADM

## 2022-09-27 RX ORDER — ONDANSETRON 2 MG/ML
4 INJECTION INTRAMUSCULAR; INTRAVENOUS EVERY 30 MIN PRN
Status: DISCONTINUED | OUTPATIENT
Start: 2022-09-27 | End: 2022-09-27 | Stop reason: HOSPADM

## 2022-09-27 RX ORDER — FLUMAZENIL 0.1 MG/ML
0.2 INJECTION, SOLUTION INTRAVENOUS
Status: ACTIVE | OUTPATIENT
Start: 2022-09-27 | End: 2022-09-27

## 2022-09-27 RX ORDER — ONDANSETRON 2 MG/ML
4 INJECTION INTRAMUSCULAR; INTRAVENOUS EVERY 6 HOURS PRN
Status: DISCONTINUED | OUTPATIENT
Start: 2022-09-27 | End: 2022-09-27

## 2022-09-27 RX ORDER — FENTANYL CITRATE 0.05 MG/ML
50 INJECTION, SOLUTION INTRAMUSCULAR; INTRAVENOUS
Status: DISCONTINUED | OUTPATIENT
Start: 2022-09-27 | End: 2022-09-27 | Stop reason: HOSPADM

## 2022-09-27 RX ORDER — OXYCODONE HYDROCHLORIDE 5 MG/1
5 TABLET ORAL EVERY 4 HOURS PRN
Status: DISCONTINUED | OUTPATIENT
Start: 2022-09-27 | End: 2022-09-27 | Stop reason: HOSPADM

## 2022-09-27 RX ORDER — FENTANYL CITRATE 50 UG/ML
INJECTION, SOLUTION INTRAMUSCULAR; INTRAVENOUS PRN
Status: DISCONTINUED | OUTPATIENT
Start: 2022-09-27 | End: 2022-09-27

## 2022-09-27 RX ORDER — FENTANYL CITRATE 50 UG/ML
50 INJECTION, SOLUTION INTRAMUSCULAR; INTRAVENOUS EVERY 5 MIN PRN
Status: DISCONTINUED | OUTPATIENT
Start: 2022-09-27 | End: 2022-09-27 | Stop reason: HOSPADM

## 2022-09-27 RX ORDER — ONDANSETRON 2 MG/ML
INJECTION INTRAMUSCULAR; INTRAVENOUS PRN
Status: DISCONTINUED | OUTPATIENT
Start: 2022-09-27 | End: 2022-09-27

## 2022-09-27 RX ADMIN — HYDROMORPHONE HYDROCHLORIDE 0.5 MG: 1 INJECTION, SOLUTION INTRAMUSCULAR; INTRAVENOUS; SUBCUTANEOUS at 04:26

## 2022-09-27 RX ADMIN — PANTOPRAZOLE SODIUM 40 MG: 40 INJECTION, POWDER, FOR SOLUTION INTRAVENOUS at 08:47

## 2022-09-27 RX ADMIN — PIPERACILLIN AND TAZOBACTAM 3.38 G: 3; .375 INJECTION, POWDER, FOR SOLUTION INTRAVENOUS at 04:26

## 2022-09-27 RX ADMIN — LIDOCAINE HYDROCHLORIDE 70 MG: 20 INJECTION, SOLUTION INFILTRATION; PERINEURAL at 11:39

## 2022-09-27 RX ADMIN — ACETAMINOPHEN 650 MG: 325 TABLET, FILM COATED ORAL at 15:40

## 2022-09-27 RX ADMIN — MIDAZOLAM 2 MG: 1 INJECTION INTRAMUSCULAR; INTRAVENOUS at 11:33

## 2022-09-27 RX ADMIN — PIPERACILLIN AND TAZOBACTAM 3.38 G: 3; .375 INJECTION, POWDER, FOR SOLUTION INTRAVENOUS at 16:19

## 2022-09-27 RX ADMIN — SODIUM CHLORIDE, POTASSIUM CHLORIDE, SODIUM LACTATE AND CALCIUM CHLORIDE: 600; 310; 30; 20 INJECTION, SOLUTION INTRAVENOUS at 13:08

## 2022-09-27 RX ADMIN — FLUVOXAMINE MALEATE 50 MG: 50 TABLET ORAL at 20:01

## 2022-09-27 RX ADMIN — PIPERACILLIN AND TAZOBACTAM 3.38 G: 3; .375 INJECTION, POWDER, FOR SOLUTION INTRAVENOUS at 21:33

## 2022-09-27 RX ADMIN — LANSOPRAZOLE 30 MG: 30 CAPSULE, DELAYED RELEASE ORAL at 20:01

## 2022-09-27 RX ADMIN — FENTANYL CITRATE 50 MCG: 50 INJECTION, SOLUTION INTRAMUSCULAR; INTRAVENOUS at 11:33

## 2022-09-27 RX ADMIN — SODIUM CHLORIDE: 9 INJECTION, SOLUTION INTRAVENOUS at 23:34

## 2022-09-27 RX ADMIN — SUCCINYLCHOLINE CHLORIDE 120 MG: 20 INJECTION, SOLUTION INTRAMUSCULAR; INTRAVENOUS; PARENTERAL at 11:39

## 2022-09-27 RX ADMIN — SODIUM CHLORIDE, POTASSIUM CHLORIDE, SODIUM LACTATE AND CALCIUM CHLORIDE: 600; 310; 30; 20 INJECTION, SOLUTION INTRAVENOUS at 11:16

## 2022-09-27 RX ADMIN — HYDROMORPHONE HYDROCHLORIDE 0.5 MG: 1 INJECTION, SOLUTION INTRAMUSCULAR; INTRAVENOUS; SUBCUTANEOUS at 10:17

## 2022-09-27 RX ADMIN — ONDANSETRON 4 MG: 2 INJECTION INTRAMUSCULAR; INTRAVENOUS at 11:57

## 2022-09-27 RX ADMIN — ACETAMINOPHEN 650 MG: 325 TABLET, FILM COATED ORAL at 23:33

## 2022-09-27 RX ADMIN — ONDANSETRON 4 MG: 2 INJECTION INTRAMUSCULAR; INTRAVENOUS at 00:24

## 2022-09-27 RX ADMIN — ROCURONIUM BROMIDE 30 MG: 50 INJECTION, SOLUTION INTRAVENOUS at 11:49

## 2022-09-27 RX ADMIN — CHLORZOXAZONE 500 MG: 500 TABLET ORAL at 06:22

## 2022-09-27 RX ADMIN — SODIUM CHLORIDE, POTASSIUM CHLORIDE, SODIUM LACTATE AND CALCIUM CHLORIDE: 600; 310; 30; 20 INJECTION, SOLUTION INTRAVENOUS at 11:33

## 2022-09-27 RX ADMIN — FLUVOXAMINE MALEATE 50 MG: 50 TABLET ORAL at 08:47

## 2022-09-27 RX ADMIN — SUGAMMADEX 400 MG: 100 INJECTION, SOLUTION INTRAVENOUS at 12:01

## 2022-09-27 RX ADMIN — SODIUM CHLORIDE: 9 INJECTION, SOLUTION INTRAVENOUS at 13:57

## 2022-09-27 RX ADMIN — ACETAMINOPHEN 650 MG: 325 TABLET, FILM COATED ORAL at 05:24

## 2022-09-27 RX ADMIN — SODIUM CHLORIDE: 9 INJECTION, SOLUTION INTRAVENOUS at 02:33

## 2022-09-27 RX ADMIN — BUPROPION HYDROCHLORIDE 300 MG: 300 TABLET, EXTENDED RELEASE ORAL at 08:47

## 2022-09-27 RX ADMIN — PROPOFOL 200 MG: 10 INJECTION, EMULSION INTRAVENOUS at 11:39

## 2022-09-27 RX ADMIN — PIPERACILLIN AND TAZOBACTAM 3.38 G: 3; .375 INJECTION, POWDER, FOR SOLUTION INTRAVENOUS at 10:21

## 2022-09-27 RX ADMIN — FLUTICASONE FUROATE AND VILANTEROL TRIFENATATE 1 PUFF: 100; 25 POWDER RESPIRATORY (INHALATION) at 08:48

## 2022-09-27 ASSESSMENT — ACTIVITIES OF DAILY LIVING (ADL)
ADLS_ACUITY_SCORE: 18

## 2022-09-27 NOTE — PROGRESS NOTES
[facility] note    Patient with gallstone pancreatitis, appears to be improving, just mild epigastric pain exam. MRCP with CBD stones, thus we'll pursue MRCP today.    Agree with plan for lab ephraim tomorrow, per surgery    Brant Keys MD   [facility]  [phone]

## 2022-09-27 NOTE — PROGRESS NOTES
[facility]    Medicine Progress Note - Hospitalist Service    Date of Admission:  9/25/2022    Assessment & Plan        Manju Berg is a 52 year old female with past medical history significant for GERD, prediabetes and obesity admitted on 9/25/2022 with abdominal pain.  Found to have evidence of gallstone pancreatitis with possible cholecystitis     Gallstone pancreatitis  Cholecystitis  The patient presents to the ED with epigastric and right upper quadrant pain that has been constant since after eating breakfast. She also reports a few episodes of emesis. No fevers or chills, diarrhea, or bloody stools. Laboratory evaluation in the ED was notable for elevated transaminases with alk phos of 269, ALT of 515, AST of 683 and T bili of 1.6. She also had a markedly elevated lipase to >30,000. WBC count was normal. RUQ US showed mildly distended gallbladder with cholelithiasis. There is mildly thickened wall without pericholecystic fluid and positive sonographic Kaur's sign. There was not biliary dilatation.   Despite no biliary dilatation, clinical picture is concerning for gallstone pancreatitis with associated cholecystitis. Perhaps the stone has now passed.   - Admit to medicine  - GI consulted  - MRCP: Choledocholithiasis, Changes compatible with pancreatitis, question gallstone pancreatitis.  Cholelithiasis without cholecystitis.   -EUS reportedly did not show evidence of choledocholithiasis  - Surgery consulted, recommended laparoscopic cholecystectomy.  Currently scheduled for tomorrow 9/28  - Trend LFTs and lipase   - Continue IV fluid   -Okay for clear liquid diet NPO after midnight  - Continue Zosyn   - Pain control as needed     GERD   - Continue PTA lansoprazole when verified      Constipation   - Continue PTA Linzess   - PRN senna and miralax ordered as well      Hypercalcemia: Ca = 10.2 mg/dL (Ref range: 8.5 - 10.1 mg/dL) and/or iCa = N/A on admission, will monitor as  "appropriate          Anxiety/depression  - Continue PTA wellbutrin and fluvoxamine         Obesity: Estimated body mass index is 34.67 kg/m  as calculated from the following:    Height as of this encounter: 1.626 m (5' 4\").    Weight as of this encounter: 91.6 kg (202 lb).       Diet: NPO per Anesthesia Guidelines for Procedure/Surgery Except for: Meds  NPO per Anesthesia Guidelines for Procedure/Surgery Except for: Meds    DVT Prophylaxis: Pneumatic Compression Devices  Etienne Catheter: Not present  Central Lines: None  Cardiac Monitoring: None  Code Status: Full Code      Disposition Plan      Expected Discharge Date: 09/29/2022                 Anticipated discharge in the next 1-2 days pending cholecystectomy tomorrow, general surgery recommendations, pain control, tolerating diet, etc.  Anticipate patient will be able to discharge home in the care of her family.      The patient's care was discussed with the attending physician, Dr. Castorena.  She is in agreement with my assessment plan of care.    VERONICA Hughes  Hospitalist Service  [facility]  Securely message with the Vocera Web Console (learn more here)  Text page via RealtimeBoard Paging/Directory         Clinically Significant Risk Factors Present on Admission               # Obesity: Estimated body mass index is 34.67 kg/m  as calculated from the following:    Height as of this encounter: 1.626 m (5' 4\").    Weight as of this encounter: 91.6 kg (202 lb).        ______________________________________________________________________    Interval History   Patient was resting in bed after her EUS procedure.  She has her spouse present at the bedside.  Patient denies any fever, chills, nausea, vomiting, diarrhea.  She does report some mild abdominal pain, but nothing like when she came in.  She is aware of plan for cholecystectomy tomorrow.    Data reviewed today: I reviewed all medications, new labs and imaging results over the last " 24 hours.     Physical Exam   Vital Signs: Temp: 98.7  F (37.1  C) Temp src: Oral BP: 114/73 Pulse: 81   Resp: 16 SpO2: 93 % O2 Device: None (Room air)    Weight: 202 lbs 0 oz    Constitutional: Very pleasant adult, alert, oriented to person, place, date, situation.  Cooperative, lying in bed in NAD.  Respiratory:  Lungs CTAB.  No crackles, wheezes.  Breathing unlabored.  Cardiovascular:  Heart RRR, no murmur, no peripheral edema noted  GI:  Abdomen soft, NT/ND and with normoactive BS  Skin/Integumen:  Warm, dry, non-diaphoretic.  MSK: CMS x4 grossly intact.      Data   Recent Labs   Lab 09/27/22  0644 09/26/22  0655 09/25/22  1948   WBC  --  4.8 8.1   HGB  --  11.8 15.8*   MCV  --  86 85   PLT  --  199 285    141 136   POTASSIUM 3.8 4.0 3.7   CHLORIDE 105 110* 103   CO2 24 27 26   BUN 8 12 13   CR 0.69 0.74 0.67   ANIONGAP 9 4 7   MCKENZIE 8.4* 8.2* 10.2*   GLC 90 110* 116*   ALBUMIN 3.1* 3.2* 4.4   PROTTOTAL 5.6* 5.7* 8.2   BILITOTAL 1.4* 2.1* 1.6*   ALKPHOS 208* 229* 269*   * 738* 515*   * 752* 683*   LIPASE 803* 8,158* >30,000*     Recent Results (from the past 24 hour(s))   MR Abdomen MRCP w/o & w Contrast    Narrative    MAGNETIC RESONANCE CHOLANGIOPANCREATOGRAPHY  9/26/2022 3:00 PM     HISTORY: Elevated liver function tests.    COMPARISON: None.    TECHNIQUE: Multiplanar, multisequence images of the abdomen acquired  before and after administration of 9 mL Gadavist intravenous contrast.  MRCP images and coronal 3-D thin section T2-weighted MRCP images  through the biliary tree. 3D image reformatting was performed on the  acquisition scanner.    FINDINGS: There is is extensive cholelithiasis without cholecystitis.  Two to three stones are seen in the distal common bile duct.  Borderline generous extrahepatic bile duct at 6 mm. No pancreatic  ductal dilatation. Some surrounding fluid around the pancreas along  with stranding, question pancreatitis.    Kidneys, adrenal glands, and spleen  demonstrate no worrisome focal  lesion. Minimal splenomegaly at 13.8 cm.      Impression    IMPRESSION:  1. Choledocholithiasis.  2. Changes compatible with pancreatitis, question gallstone  pancreatitis.  3. Cholelithiasis without cholecystitis.    SWATI MATOS MD         SYSTEM ID:  [id number]     Medications     sodium chloride 125 mL/hr at 09/27/22 0233       buPROPion  300 mg Oral QAM     fluticasone-vilanterol  1 puff Inhalation Daily     fluvoxaMINE  50 mg Oral BID     linaclotide  72 mcg Oral QAM AC     pantoprazole  40 mg Intravenous BID     piperacillin-tazobactam  3.375 g Intravenous Q6H     sodium chloride (PF)  3 mL Intracatheter Q8H

## 2022-09-27 NOTE — ANESTHESIA PREPROCEDURE EVALUATION
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,Proximal left tibia     Dyspepsia and other specified disorders of function of stomach     No Comments Provided     Gastroesophageal reflux disease      Headache     No Comments Provided     Herpes simplex     No Comments Provided     Lateral epicondylitis of elbow     No Comments Provided     Other depressive disorder     No Comments Provided     Other unspecified back disorder     No Comments Provided     Pain in joint, upper arm     No Comments Provided      Past Surgical History:   Procedure Laterality Date     ------------OTHER------------- Right 2018    5th metatarsal fracture     BUNIONECTOMY Bilateral 1999     C/SECTION, LOW TRANSVERSE  2013     FOOT SURGERY Left 2018    fusion of the large toe 2018     OTHER SURGICAL HISTORY      EXCIS BARTHOLIN GLAND/CYST     OTHER SURGICAL HISTORY Left 2012     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      Wt Readings from Last 1 Encounters:   22 91.6 kg (202 lb)        Anesthesia Evaluation   Pt has had prior anesthetic.     No history of anesthetic complications       ROS/MED HX  ENT/Pulmonary:      (+) asthma  (-) sleep apnea   Neurologic:       Cardiovascular:       METS/Exercise Tolerance:     Hematologic:       Musculoskeletal:       GI/Hepatic:     (+) GERD, cholecystitis/cholelithiasis,     Renal/Genitourinary:       Endo:     (+) Obesity (BMI 35),     Psychiatric/Substance Use:     (+) psychiatric history anxiety and depression     Infectious Disease:       Malignancy:       Other:            Physical Exam    Airway  airway exam normal      Mallampati: II   TM distance: > 3 FB   Neck ROM: full   Mouth opening: > 3 cm    Respiratory Devices and Support         Dental  no notable dental history         Cardiovascular             Pulmonary   pulmonary exam normal                OUTSIDE LABS:  CBC:   Lab Results   Component Value Date    WBC 4.8 09/26/2022    WBC 8.1 09/25/2022    HGB 11.8 09/26/2022    HGB 15.8 (H) 09/25/2022    HCT 35.5 09/26/2022    HCT 46.9 09/25/2022     09/26/2022     09/25/2022     BMP:   Lab Results   Component Value Date     09/27/2022     09/26/2022    POTASSIUM 3.8 09/27/2022    POTASSIUM 4.0 09/26/2022    CHLORIDE 105 09/27/2022    CHLORIDE 110 (H) 09/26/2022    CO2 24 09/27/2022    CO2 27 09/26/2022    BUN 8 09/27/2022    BUN 12 09/26/2022    CR 0.69 09/27/2022    CR 0.74 09/26/2022    GLC 90 09/27/2022     (H) 09/26/2022     COAGS:   Lab Results   Component Value Date    PTT 31 03/01/2013    INR 0.93 03/01/2013     POC:   Lab Results   Component Value Date    HCG Negative 02/04/2005    HCGS Negative 09/25/2022     HEPATIC:   Lab Results   Component Value Date    ALBUMIN 3.1 (L) 09/27/2022    PROTTOTAL 5.6 (L) 09/27/2022     (H) 09/27/2022     (H) 09/27/2022    ALKPHOS 208 (H) 09/27/2022    BILITOTAL 1.4 (H) 09/27/2022     OTHER:   Lab Results   Component Value Date    A1C 5.2 04/14/2022    MCKENZIE 8.4 (L) 09/27/2022    LIPASE 803 (H) 09/27/2022    TSH 1.01 10/26/2012    CRP <2.9 09/15/2018    SED 5 09/15/2018       Anesthesia  Plan    ASA Status:  2   NPO Status:  NPO Appropriate    Anesthesia Type: General.     - Airway: ETT   Induction: Intravenous, RSI.   Maintenance: Balanced.        Consents    Anesthesia Plan(s) and associated risks, benefits, and realistic alternatives discussed. Questions answered and patient/representative(s) expressed understanding.    - Discussed:     - Discussed with:  Patient         Postoperative Care    Pain management: IV analgesics, Multi-modal analgesia.   PONV prophylaxis: Ondansetron (or other 5HT-3), Background Propofol Infusion     Comments:    Other Comments: H&P reviewed    RSI with ETT            Hema Funez MD

## 2022-09-27 NOTE — ANESTHESIA CARE TRANSFER NOTE
Patient: Manju Berg    Procedure: Procedure(s):  ENDOSCOPIC ULTRASOUND, ESOPHAGOSCOPY / UPPER GASTROINTESTINAL TRACT (GI) WITH BIOPSY       Diagnosis: Choledocholithiasis [K80.50]  Diagnosis Additional Information: No value filed.    Anesthesia Type:   General     Note:    Oropharynx: oropharynx clear of all foreign objects and spontaneously breathing  Level of Consciousness: drowsy  Oxygen Supplementation: nasal cannula  Level of Supplemental Oxygen (L/min / FiO2): 4  Independent Airway: airway patency satisfactory and stable  Dentition: dentition unchanged  Vital Signs Stable: post-procedure vital signs reviewed and stable  Report to RN Given: handoff report given  Patient transferred to: PACU  Comments: Neuromuscular blockade reversed with sugammadex, spontaneous respirations, adequate tidal volumes, followed commands to voice, oropharynx suctioned with soft flexible catheter, extubated atraumatically, extubated with suction, airway patent after extubation.  Oxygen via nasal cannula at 4 liters per minute to PACU. Oxygen tubing connected to wall O2 in PACU, SpO2, NiBP, and EKG monitors and alarms on and functioning, report on patient's clinical status given to PACU RN, RN questions answered.     Handoff Report: Identifed the Patient, Identified the Reponsible Provider, Reviewed the pertinent medical history, Discussed the surgical course, Reviewed Intra-OP anesthesia mangement and issues during anesthesia, Set expectations for post-procedure period and Allowed opportunity for questions and acknowledgement of understanding      Vitals:  Vitals Value Taken Time   BP     Temp     Pulse     Resp     SpO2         Electronically Signed By: BELINDA Kolb CRNA  September 27, 2022  12:17 PM

## 2022-09-27 NOTE — PROGRESS NOTES
Shift Summary [id number]    Admitting Diagnosis: Acute cholecystitis.  Acute pancreatitis, unspecified complication status, unspecified pancreatitis type.  Vitals VSS  Pain denied pain after procedure.. Taking PIV  Dilaudid PRN. dose 0940  A&Ox4  Voiding independent   Mobility independent  Tele None  CMS Intact  Lung Sounds WDL  GI Upper GI pain  Dressing None  PIV NS infusing @  125 mL/hr  Orders Placed This Encounter      Clear Liquid Diet       Plan: EGD w/ biopsy & EUS. Possible Laparoscopic cholecystectomy, possible cholangiogram Tomorrow.

## 2022-09-27 NOTE — PLAN OF CARE
Goal Outcome Evaluation:  Alert, oriented, calm and pleasant, VSS on RA, dilaudid IV x 3 for pain, continent, reported loose BM today, avoid stool softeners, independent in room, MRI done this shift, with findings, ERCP pending, Lap cholecystitis pending,  continue to monitor.

## 2022-09-27 NOTE — PROGRESS NOTES
"General Surgery Progress Note    Admission Date: 9/25/2022  Today's Date: 9/27/2022         Assessment:      Patient is a 52 year old female with gallstone pancreatitis, possible cholecystitis on imaging, MRCP positive for choledocholithiasis. Symptoms improving, lipase significantly improved.         Plan:   - ERCP today with GI. NPO for now. If patient is doing well after ERCP, OK for some clear liquids today then resume NPO at midnight  - Plan for laparoscopic cholecystectomy tomorrow. Patient in agreement with plan  - Anticipate discharge home later tomorrow vs Thursday pending continued improvement, surgical timing, postoperative recovery  - Activity as tolerated, PCDs while resting.   - IV fluids, Protonix BID. Continue Zosyn q6 hours  - OK for Excedrin (acetaminophen-caffeine, no aspirin) from our standpoint, defer to GI with upcoming procedure today  - Medical management per hospitalist        Interval History:   Afebrile, vitals stable on room air. Manju reports that pain is gradually improving.  on exam but fairly comfortable at rest. Has a headache today.           Physical Exam:   BP (P) 104/59 (BP Location: Right arm)   Pulse (P) 68   Temp (P) 98.1  F (36.7  C) (Oral)   Resp (P) 16   Ht 1.626 m (5' 4\")   Wt 91.6 kg (202 lb)   SpO2 (P) 93%   BMI 34.67 kg/m    No intake/output data recorded.  General: NAD, pleasant, alert and oriented x3  Respiratory: non-labored breathing  Abdomen: soft, nondistended, tender across upper abdomen primarily epigastrium, no rebound or guarding  Extremities: no lower extremity edema, no calf tenderness    LABS:  Recent Labs   Lab Test 09/26/22  0655 09/25/22  1948 09/15/18  0952   WBC 4.8 8.1 5.6   HGB 11.8 15.8* 14.3   MCV 86 85 85    285 226      Recent Labs   Lab Test 09/27/22  0644 09/26/22  0655 09/25/22 1948   POTASSIUM 3.8 4.0 3.7   CHLORIDE 105 110* 103   CO2 24 27 26   BUN 8 12 13   CR 0.69 0.74 0.67   ANIONGAP 9 4 7      Recent Labs "   Lab Test 09/27/22  0644 09/26/22  0655 09/25/22 1948   ALBUMIN 3.1* 3.2* 4.4   BILITOTAL 1.4* 2.1* 1.6*   * 738* 515*   * 752* 683*   ALKPHOS 208* 229* 269*      Recent Labs   Lab Test 09/27/22  0644 09/26/22  0655 09/25/22 1948   LIPASE 803* 8,158* >30,000*         -------------------------------    Cristy Vigil PA-C  [facility]  [phone]

## 2022-09-27 NOTE — ANESTHESIA POSTPROCEDURE EVALUATION
Patient: Manju Berg    Procedure: Procedure(s):  ENDOSCOPIC ULTRASOUND, ESOPHAGOSCOPY / UPPER GASTROINTESTINAL TRACT (GI) WITH BIOPSY       Anesthesia Type:  General    Note:  Disposition: Inpatient   Postop Pain Control: Uneventful            Sign Out: Well controlled pain   PONV: No   Neuro/Psych: Uneventful            Sign Out: Acceptable/Baseline neuro status   Airway/Respiratory: Uneventful            Sign Out: Acceptable/Baseline resp. status   CV/Hemodynamics: Uneventful            Sign Out: Acceptable CV status; No obvious hypovolemia; No obvious fluid overload   Other NRE: NONE   DID A NON-ROUTINE EVENT OCCUR? No           Last vitals:  Vitals Value Taken Time   BP     Temp     Pulse     Resp     SpO2         Electronically Signed By: Hema Funez MD  September 27, 2022  12:39 PM

## 2022-09-27 NOTE — ANESTHESIA PROCEDURE NOTES
Airway       Patient location during procedure: OR       Procedure Start/Stop Times: 9/27/2022 11:42 AM  Staff -        CRNA: Kp Martel APRN CRNA       Performed By: anesthesiologist  Consent for Airway        Urgency: elective  Indications and Patient Condition       Indications for airway management: dwight-procedural       Induction type:RSI       Mask difficulty assessment: 0 - not attempted    Final Airway Details       Final airway type: endotracheal airway       Successful airway: ETT - single and Oral  Endotracheal Airway Details        ETT size (mm): 7.0       Cuffed: yes       Successful intubation technique: direct laryngoscopy       DL Blade Type: Frey 2       Adjucts: stylet       Position: Right       Measured from: lips       Secured at (cm): 22       Bite Block used: endo bite block.    Post intubation assessment        Placement verified by: capnometry, equal breath sounds and chest rise        Number of attempts at approach: 1       Secured with: plastic tape       Ease of procedure: easy       Dentition: Intact and Unchanged    Medication(s) Administered   Medication Administration Time: 9/27/2022 11:42 AM

## 2022-09-27 NOTE — PLAN OF CARE
"Goal Outcome Evaluation:    Denies CP, SOB. Pt reports that her abdominal pain is \"better than at admission\". Possible surgery tomorrow. NPO at midnight. All pt needs met at this time.     "

## 2022-09-27 NOTE — TELEPHONE ENCOUNTER
Manju states she would rather try the new medication she was prescribed instead at this time.    Ruma Hernandez, CMA

## 2022-09-27 NOTE — PLAN OF CARE
Goal Outcome Evaluation:  Pt. A&O, vss, up independently, taking IV dilaudid and tylenol for pain, voiding in b.r one dose of zofran given for c/o nausea with a relief, NPO, plan: ERCP today, will continue to monitor.

## 2022-09-28 ENCOUNTER — ANESTHESIA (OUTPATIENT)
Dept: SURGERY | Facility: CLINIC | Age: 52
DRG: 418 | End: 2022-09-28
Payer: COMMERCIAL

## 2022-09-28 LAB
ALBUMIN SERPL-MCNC: 3.2 G/DL (ref 3.4–5)
ALP SERPL-CCNC: 197 U/L (ref 40–150)
ALT SERPL W P-5'-P-CCNC: 310 U/L (ref 0–50)
ANION GAP SERPL CALCULATED.3IONS-SCNC: 4 MMOL/L (ref 3–14)
AST SERPL W P-5'-P-CCNC: 81 U/L (ref 0–45)
BILIRUB SERPL-MCNC: 0.6 MG/DL (ref 0.2–1.3)
BUN SERPL-MCNC: 6 MG/DL (ref 7–30)
CALCIUM SERPL-MCNC: 8.8 MG/DL (ref 8.5–10.1)
CHLORIDE BLD-SCNC: 109 MMOL/L (ref 94–109)
CO2 SERPL-SCNC: 29 MMOL/L (ref 20–32)
CREAT SERPL-MCNC: 0.73 MG/DL (ref 0.52–1.04)
ERYTHROCYTE [DISTWIDTH] IN BLOOD BY AUTOMATED COUNT: 11.6 % (ref 10–15)
GFR SERPL CREATININE-BSD FRML MDRD: >90 ML/MIN/1.73M2
GLUCOSE BLD-MCNC: 106 MG/DL (ref 70–99)
GLUCOSE BLDC GLUCOMTR-MCNC: 120 MG/DL (ref 70–99)
HCT VFR BLD AUTO: 33 % (ref 35–47)
HGB BLD-MCNC: 11.2 G/DL (ref 11.7–15.7)
LIPASE SERPL-CCNC: 286 U/L (ref 73–393)
MCH RBC QN AUTO: 29.1 PG (ref 26.5–33)
MCHC RBC AUTO-ENTMCNC: 33.9 G/DL (ref 31.5–36.5)
MCV RBC AUTO: 86 FL (ref 78–100)
PLATELET # BLD AUTO: 169 10E3/UL (ref 150–450)
POTASSIUM BLD-SCNC: 3.8 MMOL/L (ref 3.4–5.3)
PROT SERPL-MCNC: 6 G/DL (ref 6.8–8.8)
RBC # BLD AUTO: 3.85 10E6/UL (ref 3.8–5.2)
SODIUM SERPL-SCNC: 142 MMOL/L (ref 133–144)
WBC # BLD AUTO: 4.7 10E3/UL (ref 4–11)

## 2022-09-28 PROCEDURE — 999N000141 HC STATISTIC PRE-PROCEDURE NURSING ASSESSMENT: Performed by: SURGERY

## 2022-09-28 PROCEDURE — 88304 TISSUE EXAM BY PATHOLOGIST: CPT | Mod: 26 | Performed by: PATHOLOGY

## 2022-09-28 PROCEDURE — 250N000011 HC RX IP 250 OP 636: Performed by: NURSE ANESTHETIST, CERTIFIED REGISTERED

## 2022-09-28 PROCEDURE — 360N000083 HC SURGERY LEVEL 3 W/ FLUORO, PER MIN: Performed by: SURGERY

## 2022-09-28 PROCEDURE — 250N000025 HC SEVOFLURANE, PER MIN: Performed by: SURGERY

## 2022-09-28 PROCEDURE — 250N000011 HC RX IP 250 OP 636: Performed by: STUDENT IN AN ORGANIZED HEALTH CARE EDUCATION/TRAINING PROGRAM

## 2022-09-28 PROCEDURE — 370N000017 HC ANESTHESIA TECHNICAL FEE, PER MIN: Performed by: SURGERY

## 2022-09-28 PROCEDURE — 83690 ASSAY OF LIPASE: CPT | Performed by: ANESTHESIOLOGY

## 2022-09-28 PROCEDURE — 88304 TISSUE EXAM BY PATHOLOGIST: CPT | Mod: TC | Performed by: SURGERY

## 2022-09-28 PROCEDURE — 36415 COLL VENOUS BLD VENIPUNCTURE: CPT | Performed by: ANESTHESIOLOGY

## 2022-09-28 PROCEDURE — 250N000013 HC RX MED GY IP 250 OP 250 PS 637: Performed by: PHYSICIAN ASSISTANT

## 2022-09-28 PROCEDURE — 85027 COMPLETE CBC AUTOMATED: CPT | Performed by: ANESTHESIOLOGY

## 2022-09-28 PROCEDURE — 80053 COMPREHEN METABOLIC PANEL: CPT | Performed by: ANESTHESIOLOGY

## 2022-09-28 PROCEDURE — 272N000001 HC OR GENERAL SUPPLY STERILE: Performed by: SURGERY

## 2022-09-28 PROCEDURE — 99232 SBSQ HOSP IP/OBS MODERATE 35: CPT | Performed by: INTERNAL MEDICINE

## 2022-09-28 PROCEDURE — 250N000013 HC RX MED GY IP 250 OP 250 PS 637: Performed by: INTERNAL MEDICINE

## 2022-09-28 PROCEDURE — 250N000009 HC RX 250: Performed by: NURSE ANESTHETIST, CERTIFIED REGISTERED

## 2022-09-28 PROCEDURE — 47562 LAPAROSCOPIC CHOLECYSTECTOMY: CPT | Performed by: SURGERY

## 2022-09-28 PROCEDURE — 258N000001 HC RX 258: Performed by: SURGERY

## 2022-09-28 PROCEDURE — 258N000003 HC RX IP 258 OP 636: Performed by: INTERNAL MEDICINE

## 2022-09-28 PROCEDURE — 710N000009 HC RECOVERY PHASE 1, LEVEL 1, PER MIN: Performed by: SURGERY

## 2022-09-28 PROCEDURE — 250N000009 HC RX 250: Performed by: SURGERY

## 2022-09-28 PROCEDURE — 250N000011 HC RX IP 250 OP 636: Performed by: ANESTHESIOLOGY

## 2022-09-28 PROCEDURE — 0FT44ZZ RESECTION OF GALLBLADDER, PERCUTANEOUS ENDOSCOPIC APPROACH: ICD-10-PCS | Performed by: SURGERY

## 2022-09-28 PROCEDURE — 250N000013 HC RX MED GY IP 250 OP 250 PS 637: Performed by: STUDENT IN AN ORGANIZED HEALTH CARE EDUCATION/TRAINING PROGRAM

## 2022-09-28 PROCEDURE — 120N000001 HC R&B MED SURG/OB

## 2022-09-28 PROCEDURE — 258N000003 HC RX IP 258 OP 636: Performed by: ANESTHESIOLOGY

## 2022-09-28 RX ORDER — ONDANSETRON 2 MG/ML
INJECTION INTRAMUSCULAR; INTRAVENOUS PRN
Status: DISCONTINUED | OUTPATIENT
Start: 2022-09-28 | End: 2022-09-28

## 2022-09-28 RX ORDER — OXYCODONE HYDROCHLORIDE 5 MG/1
5 TABLET ORAL EVERY 4 HOURS PRN
Status: DISCONTINUED | OUTPATIENT
Start: 2022-09-28 | End: 2022-09-28 | Stop reason: HOSPADM

## 2022-09-28 RX ORDER — HYDROMORPHONE HCL IN WATER/PF 6 MG/30 ML
0.4 PATIENT CONTROLLED ANALGESIA SYRINGE INTRAVENOUS EVERY 5 MIN PRN
Status: DISCONTINUED | OUTPATIENT
Start: 2022-09-28 | End: 2022-09-28 | Stop reason: HOSPADM

## 2022-09-28 RX ORDER — SODIUM CHLORIDE, SODIUM LACTATE, POTASSIUM CHLORIDE, CALCIUM CHLORIDE 600; 310; 30; 20 MG/100ML; MG/100ML; MG/100ML; MG/100ML
INJECTION, SOLUTION INTRAVENOUS CONTINUOUS
Status: DISCONTINUED | OUTPATIENT
Start: 2022-09-28 | End: 2022-09-28 | Stop reason: HOSPADM

## 2022-09-28 RX ORDER — ONDANSETRON 2 MG/ML
4 INJECTION INTRAMUSCULAR; INTRAVENOUS EVERY 30 MIN PRN
Status: DISCONTINUED | OUTPATIENT
Start: 2022-09-28 | End: 2022-09-28 | Stop reason: HOSPADM

## 2022-09-28 RX ORDER — FENTANYL CITRATE 50 UG/ML
INJECTION, SOLUTION INTRAMUSCULAR; INTRAVENOUS PRN
Status: DISCONTINUED | OUTPATIENT
Start: 2022-09-28 | End: 2022-09-28

## 2022-09-28 RX ORDER — HYDROCODONE BITARTRATE AND ACETAMINOPHEN 5; 325 MG/1; MG/1
1-2 TABLET ORAL EVERY 4 HOURS PRN
Qty: 12 TABLET | Refills: 0 | Status: SHIPPED | OUTPATIENT
Start: 2022-09-28 | End: 2022-10-01

## 2022-09-28 RX ORDER — FENTANYL CITRATE 50 UG/ML
50 INJECTION, SOLUTION INTRAMUSCULAR; INTRAVENOUS
Status: DISCONTINUED | OUTPATIENT
Start: 2022-09-28 | End: 2022-09-28 | Stop reason: HOSPADM

## 2022-09-28 RX ORDER — PROPOFOL 10 MG/ML
INJECTION, EMULSION INTRAVENOUS PRN
Status: DISCONTINUED | OUTPATIENT
Start: 2022-09-28 | End: 2022-09-28

## 2022-09-28 RX ORDER — NEOSTIGMINE METHYLSULFATE 1 MG/ML
VIAL (ML) INJECTION PRN
Status: DISCONTINUED | OUTPATIENT
Start: 2022-09-28 | End: 2022-09-28

## 2022-09-28 RX ORDER — ONDANSETRON 4 MG/1
4 TABLET, ORALLY DISINTEGRATING ORAL EVERY 30 MIN PRN
Status: DISCONTINUED | OUTPATIENT
Start: 2022-09-28 | End: 2022-09-28 | Stop reason: HOSPADM

## 2022-09-28 RX ORDER — ACETAMINOPHEN 325 MG/1
650 TABLET ORAL EVERY 6 HOURS PRN
Status: DISCONTINUED | OUTPATIENT
Start: 2022-09-28 | End: 2022-09-28

## 2022-09-28 RX ORDER — DEXAMETHASONE SODIUM PHOSPHATE 4 MG/ML
INJECTION, SOLUTION INTRA-ARTICULAR; INTRALESIONAL; INTRAMUSCULAR; INTRAVENOUS; SOFT TISSUE PRN
Status: DISCONTINUED | OUTPATIENT
Start: 2022-09-28 | End: 2022-09-28

## 2022-09-28 RX ORDER — ACETAMINOPHEN 325 MG/1
650 TABLET ORAL
Status: DISCONTINUED | OUTPATIENT
Start: 2022-09-28 | End: 2022-09-28

## 2022-09-28 RX ORDER — HYDROMORPHONE HYDROCHLORIDE 1 MG/ML
INJECTION, SOLUTION INTRAMUSCULAR; INTRAVENOUS; SUBCUTANEOUS PRN
Status: DISCONTINUED | OUTPATIENT
Start: 2022-09-28 | End: 2022-09-28

## 2022-09-28 RX ORDER — FENTANYL CITRATE 0.05 MG/ML
50 INJECTION, SOLUTION INTRAMUSCULAR; INTRAVENOUS EVERY 5 MIN PRN
Status: DISCONTINUED | OUTPATIENT
Start: 2022-09-28 | End: 2022-09-28 | Stop reason: HOSPADM

## 2022-09-28 RX ORDER — GLYCOPYRROLATE 0.2 MG/ML
INJECTION, SOLUTION INTRAMUSCULAR; INTRAVENOUS PRN
Status: DISCONTINUED | OUTPATIENT
Start: 2022-09-28 | End: 2022-09-28

## 2022-09-28 RX ORDER — LIDOCAINE HYDROCHLORIDE 20 MG/ML
INJECTION, SOLUTION INFILTRATION; PERINEURAL PRN
Status: DISCONTINUED | OUTPATIENT
Start: 2022-09-28 | End: 2022-09-28

## 2022-09-28 RX ORDER — DIPHENHYDRAMINE HYDROCHLORIDE 50 MG/ML
25 INJECTION INTRAMUSCULAR; INTRAVENOUS EVERY 6 HOURS PRN
Status: DISCONTINUED | OUTPATIENT
Start: 2022-09-28 | End: 2022-09-28 | Stop reason: HOSPADM

## 2022-09-28 RX ORDER — OXYCODONE HYDROCHLORIDE 5 MG/1
5-10 TABLET ORAL EVERY 4 HOURS PRN
Status: DISCONTINUED | OUTPATIENT
Start: 2022-09-28 | End: 2022-09-29 | Stop reason: HOSPADM

## 2022-09-28 RX ORDER — PROPOFOL 10 MG/ML
INJECTION, EMULSION INTRAVENOUS CONTINUOUS PRN
Status: DISCONTINUED | OUTPATIENT
Start: 2022-09-28 | End: 2022-09-28

## 2022-09-28 RX ORDER — KETOROLAC TROMETHAMINE 30 MG/ML
INJECTION, SOLUTION INTRAMUSCULAR; INTRAVENOUS PRN
Status: DISCONTINUED | OUTPATIENT
Start: 2022-09-28 | End: 2022-09-28

## 2022-09-28 RX ORDER — MAGNESIUM HYDROXIDE 1200 MG/15ML
LIQUID ORAL PRN
Status: DISCONTINUED | OUTPATIENT
Start: 2022-09-28 | End: 2022-09-28 | Stop reason: HOSPADM

## 2022-09-28 RX ADMIN — PIPERACILLIN AND TAZOBACTAM 3.38 G: 3; .375 INJECTION, POWDER, FOR SOLUTION INTRAVENOUS at 08:33

## 2022-09-28 RX ADMIN — Medication 1 MG: at 23:25

## 2022-09-28 RX ADMIN — KETOROLAC TROMETHAMINE 15 MG: 30 INJECTION, SOLUTION INTRAMUSCULAR at 09:38

## 2022-09-28 RX ADMIN — OXYCODONE HYDROCHLORIDE 5 MG: 5 TABLET ORAL at 15:40

## 2022-09-28 RX ADMIN — HYDROMORPHONE HYDROCHLORIDE 0.4 MG: 0.2 INJECTION, SOLUTION INTRAMUSCULAR; INTRAVENOUS; SUBCUTANEOUS at 10:53

## 2022-09-28 RX ADMIN — MIDAZOLAM 2 MG: 1 INJECTION INTRAMUSCULAR; INTRAVENOUS at 08:25

## 2022-09-28 RX ADMIN — PROPOFOL 200 MG: 10 INJECTION, EMULSION INTRAVENOUS at 08:28

## 2022-09-28 RX ADMIN — ONDANSETRON 4 MG: 2 INJECTION INTRAMUSCULAR; INTRAVENOUS at 09:27

## 2022-09-28 RX ADMIN — FENTANYL CITRATE 50 MCG: 50 INJECTION, SOLUTION INTRAMUSCULAR; INTRAVENOUS at 10:20

## 2022-09-28 RX ADMIN — DIPHENHYDRAMINE HYDROCHLORIDE 25 MG: 50 INJECTION, SOLUTION INTRAMUSCULAR; INTRAVENOUS at 10:52

## 2022-09-28 RX ADMIN — FLUVOXAMINE MALEATE 50 MG: 50 TABLET ORAL at 21:21

## 2022-09-28 RX ADMIN — PIPERACILLIN AND TAZOBACTAM 3.38 G: 3; .375 INJECTION, POWDER, FOR SOLUTION INTRAVENOUS at 03:51

## 2022-09-28 RX ADMIN — DEXAMETHASONE SODIUM PHOSPHATE 4 MG: 4 INJECTION, SOLUTION INTRA-ARTICULAR; INTRALESIONAL; INTRAMUSCULAR; INTRAVENOUS; SOFT TISSUE at 08:34

## 2022-09-28 RX ADMIN — HYDROMORPHONE HYDROCHLORIDE 0.5 MG: 1 INJECTION, SOLUTION INTRAMUSCULAR; INTRAVENOUS; SUBCUTANEOUS at 08:46

## 2022-09-28 RX ADMIN — GLYCOPYRROLATE 0.8 MG: 0.2 INJECTION, SOLUTION INTRAMUSCULAR; INTRAVENOUS at 09:40

## 2022-09-28 RX ADMIN — FENTANYL CITRATE 50 MCG: 50 INJECTION, SOLUTION INTRAMUSCULAR; INTRAVENOUS at 10:42

## 2022-09-28 RX ADMIN — HYDROMORPHONE HYDROCHLORIDE 0.5 MG: 1 INJECTION, SOLUTION INTRAMUSCULAR; INTRAVENOUS; SUBCUTANEOUS at 13:45

## 2022-09-28 RX ADMIN — LANSOPRAZOLE 30 MG: 30 CAPSULE, DELAYED RELEASE ORAL at 21:22

## 2022-09-28 RX ADMIN — FENTANYL CITRATE 50 MCG: 50 INJECTION, SOLUTION INTRAMUSCULAR; INTRAVENOUS at 08:46

## 2022-09-28 RX ADMIN — SODIUM CHLORIDE, POTASSIUM CHLORIDE, SODIUM LACTATE AND CALCIUM CHLORIDE: 600; 310; 30; 20 INJECTION, SOLUTION INTRAVENOUS at 08:25

## 2022-09-28 RX ADMIN — ROCURONIUM BROMIDE 40 MG: 50 INJECTION, SOLUTION INTRAVENOUS at 08:29

## 2022-09-28 RX ADMIN — OXYCODONE HYDROCHLORIDE 5 MG: 5 TABLET ORAL at 04:12

## 2022-09-28 RX ADMIN — FENTANYL CITRATE 50 MCG: 50 INJECTION, SOLUTION INTRAMUSCULAR; INTRAVENOUS at 10:09

## 2022-09-28 RX ADMIN — FENTANYL CITRATE 50 MCG: 50 INJECTION, SOLUTION INTRAMUSCULAR; INTRAVENOUS at 10:32

## 2022-09-28 RX ADMIN — OXYCODONE HYDROCHLORIDE 10 MG: 5 TABLET ORAL at 21:23

## 2022-09-28 RX ADMIN — FLUTICASONE FUROATE AND VILANTEROL TRIFENATATE 1 PUFF: 100; 25 POWDER RESPIRATORY (INHALATION) at 12:54

## 2022-09-28 RX ADMIN — ROCURONIUM BROMIDE 10 MG: 50 INJECTION, SOLUTION INTRAVENOUS at 09:01

## 2022-09-28 RX ADMIN — PROPOFOL 50 MCG/KG/MIN: 10 INJECTION, EMULSION INTRAVENOUS at 08:28

## 2022-09-28 RX ADMIN — SODIUM CHLORIDE: 9 INJECTION, SOLUTION INTRAVENOUS at 13:03

## 2022-09-28 RX ADMIN — HYDROMORPHONE HYDROCHLORIDE 0.5 MG: 1 INJECTION, SOLUTION INTRAMUSCULAR; INTRAVENOUS; SUBCUTANEOUS at 03:06

## 2022-09-28 RX ADMIN — ONDANSETRON 4 MG: 4 TABLET, ORALLY DISINTEGRATING ORAL at 23:23

## 2022-09-28 RX ADMIN — LIDOCAINE HYDROCHLORIDE 80 MG: 20 INJECTION, SOLUTION INFILTRATION; PERINEURAL at 08:28

## 2022-09-28 RX ADMIN — HYDROMORPHONE HYDROCHLORIDE 0.5 MG: 1 INJECTION, SOLUTION INTRAMUSCULAR; INTRAVENOUS; SUBCUTANEOUS at 17:00

## 2022-09-28 RX ADMIN — NEOSTIGMINE METHYLSULFATE 5 MG: 1 INJECTION, SOLUTION INTRAVENOUS at 09:40

## 2022-09-28 RX ADMIN — FENTANYL CITRATE 50 MCG: 50 INJECTION, SOLUTION INTRAMUSCULAR; INTRAVENOUS at 08:25

## 2022-09-28 ASSESSMENT — ACTIVITIES OF DAILY LIVING (ADL)
ADLS_ACUITY_SCORE: 20
ADLS_ACUITY_SCORE: 18
ADLS_ACUITY_SCORE: 18
ADLS_ACUITY_SCORE: 20
ADLS_ACUITY_SCORE: 18
ADLS_ACUITY_SCORE: 20
ADLS_ACUITY_SCORE: 18
ADLS_ACUITY_SCORE: 20
ADLS_ACUITY_SCORE: 18
ADLS_ACUITY_SCORE: 18

## 2022-09-28 NOTE — PROGRESS NOTES
Pt is A&O x4. VSS. Dilaudid and Oxycodone given for Abdominal pain.  Rated pain  3-7/10. Stated relief of Abdominal pain. Continues to have slight headache. .9NS infusing at 125 ml/hr. Pt is NPO for surgery 9/28/22. Pt is independent in room. Voiding adequately.

## 2022-09-28 NOTE — ANESTHESIA POSTPROCEDURE EVALUATION
Patient: Manju Berg    Procedure: Procedure(s):  Laparoscopic cholecystectomy       Anesthesia Type:  General    Note:  Disposition: Inpatient   Postop Pain Control: Uneventful            Sign Out: Well controlled pain   PONV: No   Neuro/Psych: Uneventful            Sign Out: Acceptable/Baseline neuro status   Airway/Respiratory: Uneventful            Sign Out: Acceptable/Baseline resp. status   CV/Hemodynamics: Uneventful            Sign Out: Acceptable CV status; No obvious hypovolemia; No obvious fluid overload   Other NRE: NONE   DID A NON-ROUTINE EVENT OCCUR? No           Last vitals:  Vitals Value Taken Time   /75 09/28/22 1115   Temp 36.9  C (98.5  F) 09/28/22 1000   Pulse 61 09/28/22 1117   Resp 12 09/28/22 1117   SpO2 94 % 09/28/22 1117   Vitals shown include unvalidated device data.    Electronically Signed By: Hema Funez MD  September 28, 2022  11:19 AM

## 2022-09-28 NOTE — ANESTHESIA PREPROCEDURE EVALUATION
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,Proximal left tibia     Dyspepsia and other specified disorders of function of stomach     No Comments Provided     Gastroesophageal reflux disease      Headache     No Comments Provided     Herpes simplex     No Comments Provided     Lateral epicondylitis of elbow     No Comments Provided     Other depressive disorder     No Comments Provided     Other unspecified back disorder     No Comments Provided     Pain in joint, upper arm     No Comments Provided      Past Surgical History:   Procedure Laterality Date     ------------OTHER------------- Right 2018    5th metatarsal fracture     BUNIONECTOMY Bilateral 1999     C/SECTION, LOW TRANSVERSE       ENDOSCOPIC ULTRASOUND UPPER GASTROINTESTINAL TRACT (GI) N/A 2022    Procedure: ENDOSCOPIC ULTRASOUND, ESOPHAGOSCOPY / UPPER GASTROINTESTINAL TRACT (GI) WITH BIOPSY;  Surgeon: Alissa Lubin MD;  Location: [facility]     FOOT SURGERY Left 2018    fusion of the large toe 2018     OTHER SURGICAL HISTORY      EXCIS BARTHOLIN GLAND/CYST     OTHER SURGICAL HISTORY Left 2012     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      Wt Readings from Last 1 Encounters:    09/25/22 91.6 kg (202 lb)        Anesthesia Evaluation   Pt has had prior anesthetic.     No history of anesthetic complications       ROS/MED HX  ENT/Pulmonary:     (+) asthma  (-) sleep apnea   Neurologic:       Cardiovascular:       METS/Exercise Tolerance:     Hematologic:       Musculoskeletal:       GI/Hepatic:     (+) GERD, cholecystitis/cholelithiasis,     Renal/Genitourinary:       Endo:     (+) Obesity (BMI 35),     Psychiatric/Substance Use:     (+) psychiatric history anxiety and depression     Infectious Disease:       Malignancy:       Other:            Physical Exam    Airway        Mallampati: II   TM distance: > 3 FB   Neck ROM: full   Mouth opening: > 3 cm    Respiratory Devices and Support         Dental  no notable dental history         Cardiovascular   cardiovascular exam normal          Pulmonary   pulmonary exam normal                OUTSIDE LABS:  CBC:   Lab Results   Component Value Date    WBC 4.7 09/28/2022    WBC 4.8 09/26/2022    HGB 11.2 (L) 09/28/2022    HGB 11.8 09/26/2022    HCT 33.0 (L) 09/28/2022    HCT 35.5 09/26/2022     09/28/2022     09/26/2022     BMP:   Lab Results   Component Value Date     09/28/2022     09/27/2022    POTASSIUM 3.8 09/28/2022    POTASSIUM 3.8 09/27/2022    CHLORIDE 109 09/28/2022    CHLORIDE 105 09/27/2022    CO2 29 09/28/2022    CO2 24 09/27/2022    BUN 6 (L) 09/28/2022    BUN 8 09/27/2022    CR 0.73 09/28/2022    CR 0.69 09/27/2022     (H) 09/28/2022    GLC 90 09/27/2022     COAGS:   Lab Results   Component Value Date    PTT 31 03/01/2013    INR 0.93 03/01/2013     POC:   Lab Results   Component Value Date    HCG Negative 02/04/2005    HCGS Negative 09/25/2022     HEPATIC:   Lab Results   Component Value Date    ALBUMIN 3.2 (L) 09/28/2022    PROTTOTAL 6.0 (L) 09/28/2022     (H) 09/28/2022    AST 81 (H) 09/28/2022    ALKPHOS 197 (H) 09/28/2022    BILITOTAL 0.6 09/28/2022     OTHER:   Lab Results   Component Value  Date    A1C 5.2 04/14/2022    MCKENZIE 8.8 09/28/2022    LIPASE 286 09/28/2022    TSH 1.01 10/26/2012    CRP <2.9 09/15/2018    SED 5 09/15/2018       Anesthesia Plan    ASA Status:  2   NPO Status:  NPO Appropriate    Anesthesia Type: General.     - Airway: ETT   Induction: Intravenous.   Maintenance: Balanced.        Consents    Anesthesia Plan(s) and associated risks, benefits, and realistic alternatives discussed. Questions answered and patient/representative(s) expressed understanding.    - Discussed:     - Discussed with:  Patient         Postoperative Care    Pain management: IV analgesics, Multi-modal analgesia.   PONV prophylaxis: Ondansetron (or other 5HT-3), Background Propofol Infusion, Dexamethasone or Solumedrol     Comments:    Other Comments: H&P reviewed    Propofol gtt primary anesthetic            Hema Funez MD

## 2022-09-28 NOTE — ANESTHESIA CARE TRANSFER NOTE
Patient: Manju Berg    Procedure: Procedure(s):  Laparoscopic cholecystectomy       Diagnosis: Acute cholecystitis [K81.0]  Diagnosis Additional Information: No value filed.    Anesthesia Type:   General     Note:    Oropharynx: oropharynx clear of all foreign objects and spontaneously breathing  Level of Consciousness: awake  Oxygen Supplementation: face mask  Level of Supplemental Oxygen (L/min / FiO2): 6  Independent Airway: airway patency satisfactory and stable  Dentition: dentition unchanged  Vital Signs Stable: post-procedure vital signs reviewed and stable  Report to RN Given: handoff report given  Patient transferred to: PACU    Handoff Report: Identifed the Patient, Identified the Reponsible Provider, Reviewed the pertinent medical history, Discussed the surgical course, Reviewed Intra-OP anesthesia mangement and issues during anesthesia, Set expectations for post-procedure period and Allowed opportunity for questions and acknowledgement of understanding      Vitals:  Vitals Value Taken Time   /93    Temp     Pulse 71    Resp     SpO2 97 % 09/28/22 0954   Vitals shown include unvalidated device data.    Electronically Signed By: BELINDA Ruiz CRNA  September 28, 2022  9:56 AM

## 2022-09-28 NOTE — DISCHARGE INSTRUCTIONS
[facility]  Discharge Instructions: Post-Operative Laparoscopic Cholecystectomy    ACTIVITY  Expect to feel tired after your surgery.  This will gradually resolve.    Take frequent, short walks and increase your activity gradually.    Avoid strenuous physical activity or heavy lifting greater than 15-20 lbs. for 2-3 weeks.  You may climb stairs.  You may drive without restrictions when you are not using any prescription pain medication and feel comfortable in a car.  You may return to work/school when you are comfortable without any prescription pain medication.    WOUND CARE  You may remove your outer dressing or Band-Aids and shower 48 hours after the surgery.  Pat your incisions dry and leave them open to air.  Re-apply dressing (Band-Aids or gauze/tape) as needed for comfort or drainage.  You may have steri-strips (looks like white tape) on your incision.  You may peel off the steri-strips 2 weeks after your surgery if they have not peeled off on their own.   Do not soak your incisions in a tub or pool for 2 weeks.   Do not apply any lotions, creams, or ointments to your incisions.  A ridge under your incisions is normal and will gradually resolve.    DIET  Start with liquids, then gradually resume your regular diet as tolerated.  Avoid heavy, spicy, and greasy meals for 2-3 days.  Drink plenty of fluids to stay hydrated.  It is not uncommon to experience some loose stools or diarrhea after surgery.  This is your body's way of adapting to the bile which will slowly drain into your intestine.  A low fat diet may help with this.  This should improve over 1-2 months.    PAIN  Expect some tenderness and discomfort at the incision sites.  Use the prescribed pain medication at your discretion.  Expect gradual resolution of your pain over several days.  You may take ibuprofen with food (unless you have been told not to) or acetaminophen/Tylenol instead of or in addition to your prescribed  pain medication.  However, if you are taking Norco or Percocet, do not take any additional acetaminophen/Tylenol.  Do not drink alcohol or drive while you are taking pain medications.  You may apply ice to your incisions in 20 minute intervals as needed for the next 48 hours.  After that time, consider switching to heat if you prefer.    EXPECTATIONS  Pain medications can cause constipation.  Limit use when possible.  Take an over the counter or prescribed stool softener/stimulant, such as Colace or Senna, 1-2 times a day with plenty of water.  You may take a mild over the counter laxative, such as Miralax or a suppository, as needed.  You may discontinue these medications once you are having regular bowel movements and/or are no longer taking your narcotic pain medication.    You may have shoulder or upper back discomfort due to the gas used in surgery.  This is temporary and should resolve in 48-72 hours.  Short, frequent walks may help with this.  If you are unable to urinate for 8 hours or feel as though you are not emptying your bladder adequately, we recommend you seek care at an ER or Urgent Care facility for possible catheter placement.     FOLLOW UP  Our office will contact you in approximately 2-3 weeks to check on your progress and answer any questions you may have.  If you are doing well, you will not need to return for a follow up appointment.  If any concerns are identified over the phone, we will help you make an appointment to see a provider.   If you have not received a phone call, have any questions or concerns, or would like to be seen, please call us at [phone] and ask to speak with our nurse.  We are located at [address] [place] [address].    CALL OUR OFFICE -[phone] IF YOU HAVE:   Chills or fever above 101 F.  Increased redness, warmth, or drainage at your incisions.  Significant bleeding.  Pain not relieved by your pain medication or rest.  Increasing pain  after the first 48 hours.  Any other concerns or questions.                      Revised December 2021

## 2022-09-28 NOTE — PROGRESS NOTES
"GENERAL SURGERY PROGRESS NOTE    S:  S/p ERCP yesterday. No other acute events. Had some pain early this morning, now improved. Denies N/V.    O:  /68 (BP Location: Right arm, Patient Position: Left side, Cuff Size: Adult Regular)   Pulse 72   Temp 98.5  F (36.9  C) (Oral)   Resp 16   Ht 1.626 m (5' 4\")   Wt 91.6 kg (202 lb)   SpO2 95%   BMI 34.67 kg/m    I/O last 3 completed shifts:  In: 1440 [P.O.:440; I.V.:1000]  Out: -     Gen: Awake, alert, NAD  Resp: NLB on room air  Abd: Soft, ND, minimally tender to palpation in the RUQ  Ext: WWP    LABS  Lipase 106, Hgb 11.2, WBC 4.7, Cr 0.73    IMAGING  Reviewed    A/P:   52 year old female with gallstone pancreatitis, choledocholithiasis and possible cholecystitis now s/p ERCP. Symptoms improving, lipase improved.    - Plan for laparoscopic cholecystectomy this AM  - Anticipate discharge home later today vs tomorrow pending postoperative recovery  - Remainder of cares per hospitalist primary    Gregor Hastings MD on 9/28/2022 at 7:57 AM  [facility]  p:[phone]    "

## 2022-09-28 NOTE — TELEPHONE ENCOUNTER
PA denied, stating patient must try and fail on of the following, methotrexate, cyclosporine, PUVA, acitretin or have contraindication to methotrexate. Please advise

## 2022-09-28 NOTE — TELEPHONE ENCOUNTER
PRIOR AUTHORIZATION DENIED    Medication: Dayo    Denial Date: 9/26/2022    Denial Rational:      Appeal Information:

## 2022-09-28 NOTE — OP NOTE
General Surgery Operative Note    PREOPERATIVE DIAGNOSIS:  cholecystitis.    POSTOPERATIVE DIAGNOSIS:  cholecystitis.    PROCEDURE:  Laparoscopic Cholecystectomy    SURGEON:  Bertha Potts MD    ASSISTANT:  Gregor Hastings MD [facility] Resident  The physician s assistant was medically necessary for their expertise in camera management, suctioning and retraction.  ANESTHESIA:  General.    BLOOD LOSS: 10ml    FINDINGS: gallbladder wall edema consistent with cholecystitis    INDICATIONS:   Mrs. Berg is a 52yof who presented with gallstone pancreatitis and possible choledocholithiasis. ERCP completed yesterday and now is presenting for laparoscopic cholecystectomy.    I explained the risks, benefits, complications for laparoscopic cholecystectomy including but not limited to bleeding, infection, possible need to open, possible postop hematoma, seroma, bowel, bladder or bile duct injury, MI, PE, and if any of these occurred the patient would require additional procedures. The patient agreed and did sign consent.    DETAILS OF PROCEDURE:   The patient was brought to the operating room. They were positioned on the operating room table with the left arm tucked at their side. General anesthesia was induced. Perioperative antibiotics were administered. The abdomen was prepped and draped in standard fashion.    A small skin incision was made in the left upper quadrant. A 5mm 0degree laparoscope was used with a visiport to obtain access to the abdomen. Insufflation was connected and flowed freely. Insufflation pressure was sent to 15mmhg. The abdomen was surveyed and revealed no acute findings.  There was no injury from abdominal entrance noted. A 12mm port was placed in the midline just beneath the umbilicus. A 5mm 30 degree laparoscope was inserted and revealed no trocar injuries. Local was injected to the upper abdomen and two 5mm ports were placed in the right upper quadrant under direct visualization. The gallbladder was  retracted superiorly and laterally. There were surrounding omental adhesions to the liver edge and infundibulum which were carefully taken down with cautery. The gallbladder wall was edematous and the gallbladder was distended. Cautery was used to take down the medial and lateral peritoneal attachments. I was able to delineate the cystic artery and cystic duct and got under the undersurface of the gallbladder to help further declinate the duct and artery. This dissection was taken up high along the gallbladder to confirm the critical view on multiple views. The cystic duct was milked using a maryland clamp as it felt firm. I placed a clip distally on the duct and then created a ductotomy using a laparoscopic scissors and milked a small stone out of the cystic duct. Two clips were placed proximally and the duct completed transected. Two clips were placed proximally on the cystic artery and one clip distally. Both the cystic artery and cystic duct were then completely transected with laparoscopic scissors. I further secured the cystic duct as it measured 6mm using an 0 PDS endoloop.     The gallbladder was taken off the liver bed with cautery. There was no bile spillage or significant bleeding. The gallbladder was then placed in an Endocatch bag and removed through the umbilical trocar site. The camera was reinserted which showed no bleeding or bile spillage. Copious irrigation was used until clear. The wound bed was inspected multiple times showing that it was completely dry and that the clips were in place. The omentum was packed into the perihepatic fossa. The 12mm port fascia closed with 0 Vicryl in figure-of-eight fashion using the arline keiko device. All gas was expelled and the trocars were taken out under direct visualization. Marcaine 0.5% were injected to all the wounds. The skin was closed with a 4-0 Monocryl in a subcuticular fashion. Steri-strips and sterile dressings were applied. The patient  tolerated the procedure well. There were no complications. They were awoken from anesthesia and transferred to PACU in stable condition. All sponge, instrument and needle counts were correct.       JENNA DARBY MD    Please route or send letter to:  Primary Care Provider (PCP) and Referring Provider

## 2022-09-28 NOTE — PROGRESS NOTES
[facility]    Medicine Progress Note - Hospitalist Service    Date of Admission:  9/25/2022    Assessment & Plan        Manju Berg is a 52 year old female with past medical history significant for GERD, prediabetes and obesity admitted on 9/25/2022 with abdominal pain.  Found to have evidence of gallstone pancreatitis with possible cholecystitis     Gallstone pancreatitis  Cholecystitis S/p LAp ephraim on 9/28/22  The patient presents to the ED with epigastric and right upper quadrant pain that has been constant since after eating breakfast. She also reports a few episodes of emesis. No fevers or chills, diarrhea, or bloody stools. Laboratory evaluation in the ED was notable for elevated transaminases with alk phos of 269, ALT of 515, AST of 683 and T bili of 1.6. She also had a markedly elevated lipase to >30,000. WBC count was normal. RUQ US showed mildly distended gallbladder with cholelithiasis. There is mildly thickened wall without pericholecystic fluid and positive sonographic Kaur's sign. There was not biliary dilatation.   Despite no biliary dilatation, clinical picture is concerning for gallstone pancreatitis with associated cholecystitis. Perhaps the stone has now passed.   - Admit to medicine  - GI consulted  - MRCP: Choledocholithiasis, Changes compatible with pancreatitis, question gallstone pancreatitis.  Cholelithiasis without cholecystitis.   -EUS reportedly did not show evidence of choledocholithiasis  - Surgery consulted, recommended laparoscopic cholecystectomy.  Lap ephraim done on 9/28/22  - Trend LFTs and lipase    Continue Zosyn   - Pain control as needed  Post op management per General surgery. Appreciate assistance      GERD   - Continue PTA lansoprazole   On protonix BID now      Constipation   - Continue PTA Linzess   - PRN senna and miralax ordered as well      Hypercalcemia: Ca = 10.2 mg/dL (Ref range: 8.5 - 10.1 mg/dL) and/or iCa = N/A on admission, will  "monitor as appropriate          Anxiety/depression  - Continue PTA wellbutrin and fluvoxamine         Obesity: Estimated body mass index is 34.67 kg/m  as calculated from the following:    Height as of this encounter: 1.626 m (5' 4\").    Weight as of this encounter: 91.6 kg (202 lb).       Diet: Clear Liquid Diet    DVT Prophylaxis: Pneumatic Compression Devices  Etienne Catheter: Not present  Central Lines: None  Cardiac Monitoring: None  Code Status: Full Code      Disposition Plan     Expected Discharge Date: 09/29/2022                 Esther Castorena MD  Hospitalist Service  [facility]  Securely message with the Vocera Web Console (learn more here)  Text page via [employer] Paging/Directory         Clinically Significant Risk Factors Present on Admission               # Obesity: Estimated body mass index is 34.67 kg/m  as calculated from the following:    Height as of this encounter: 1.626 m (5' 4\").    Weight as of this encounter: 91.6 kg (202 lb).        ______________________________________________________________________    Interval History   Patient underwent lap ephraim today. Doing well post op with minimal pain. No n/v. On liquids advance as tolerated     Data reviewed today: I reviewed all medications, new labs and imaging results over the last 24 hours.     Physical Exam   Vital Signs: Temp: 97.8  F (36.6  C) Temp src: Oral BP: 132/81 Pulse: 58   Resp: 15 SpO2: 96 % O2 Device: None (Room air) Oxygen Delivery: 2 LPM  Weight: 202 lbs 0 oz    Constitutional: Very pleasant adult, alert, oriented to person, place, date, situation.  Cooperative, lying in bed in NAD.  Respiratory:  Lungs CTAB.  No crackles, wheezes.  Breathing unlabored.  Cardiovascular:  Heart RRR, no murmur, no peripheral edema noted  GI:  Abdomen soft, mild tender at the incision sites  and with normoactive BS  Skin/Integumen:  Warm, dry, non-diaphoretic.  MSK: CMS x4 grossly intact.      Data   Recent Labs   Lab " 09/28/22  1222 09/28/22  0542 09/27/22  2155 09/27/22  1024 09/27/22  0644 09/26/22  0655 09/25/22  1948   WBC  --  4.7  --   --   --  4.8 8.1   HGB  --  11.2*  --   --   --  11.8 15.8*   MCV  --  86  --   --   --  86 85   PLT  --  169  --   --   --  199 285   NA  --  142  --   --  138 141 136   POTASSIUM  --  3.8  --   --  3.8 4.0 3.7   CHLORIDE  --  109  --   --  105 110* 103   CO2  --  29  --   --  24 27 26   BUN  --  6*  --   --  8 12 13   CR  --  0.73  --   --  0.69 0.74 0.67   ANIONGAP  --  4  --   --  9 4 7   MCKENZIE  --  8.8  --   --  8.4* 8.2* 10.2*   * 106* 90   < > 90 110* 116*   ALBUMIN  --  3.2*  --   --  3.1* 3.2* 4.4   PROTTOTAL  --  6.0*  --   --  5.6* 5.7* 8.2   BILITOTAL  --  0.6  --   --  1.4* 2.1* 1.6*   ALKPHOS  --  197*  --   --  208* 229* 269*   ALT  --  310*  --   --  441* 738* 515*   AST  --  81*  --   --  188* 752* 683*   LIPASE  --  286  --   --  803* 8,158* >30,000*    < > = values in this interval not displayed.     No results found for this or any previous visit (from the past 24 hour(s)).  Medications     sodium chloride 125 mL/hr at 09/28/22 1303       buPROPion  300 mg Oral QAM     fluticasone-vilanterol  1 puff Inhalation Daily     fluvoxaMINE  50 mg Oral BID     LANsoprazole  30 mg Oral BID     linaclotide  72 mcg Oral QAM AC     sodium chloride (PF)  3 mL Intracatheter Q8H

## 2022-09-28 NOTE — PROGRESS NOTES
[facility] note    Patient is s/p EUS yesterday, no stones in the CBD. Now in OR for cholecystectomy. We will defer post-op management to surgery.    We will sign off, please call with any questions or concerns.    Brant Keys MD   [facility]  [phone]

## 2022-09-28 NOTE — ANESTHESIA PROCEDURE NOTES
Airway       Patient location during procedure: OR ([facility] - Operating Room or Procedural Area)       Procedure Start/Stop Times: 9/28/2022 8:31 AM  Staff -        Anesthesiologist:  Hema Funez MD       CRNA: Tanya Vaughn APRN CRNA       Performed By: CRNAIndications and Patient Condition       Indications for airway management: dwight-procedural       Induction type:intravenous       Mask difficulty assessment: 2 - vent by mask + OA or adjuvant +/- NMBA    Final Airway Details       Final airway type: endotracheal airway       Successful airway: ETT - single and Oral  Endotracheal Airway Details        ETT size (mm): 7.0       Cuffed: yes       Successful intubation technique: video laryngoscopy       VL Blade Size: Wen 3       Grade View of Cords: 1       Adjucts: stylet       Position: Right       Measured from: gums/teeth       Secured at (cm): 22       Bite block used: None    Post intubation assessment        Placement verified by: capnometry, equal breath sounds and chest rise        Number of attempts at approach: 1       Number of other approaches attempted: 0       Secured with: pink tape       Ease of procedure: easy       Dentition: Intact and Unchanged    Medication(s) Administered   Medication Administration Time: 9/28/2022 8:31 AM

## 2022-09-29 VITALS
DIASTOLIC BLOOD PRESSURE: 69 MMHG | HEIGHT: 64 IN | TEMPERATURE: 98.1 F | HEART RATE: 57 BPM | BODY MASS INDEX: 34.49 KG/M2 | SYSTOLIC BLOOD PRESSURE: 111 MMHG | RESPIRATION RATE: 18 BRPM | OXYGEN SATURATION: 97 % | WEIGHT: 202 LBS

## 2022-09-29 LAB
PATH REPORT.ADDENDUM SPEC: NORMAL
PATH REPORT.COMMENTS IMP SPEC: NORMAL
PATH REPORT.FINAL DX SPEC: NORMAL
PATH REPORT.FINAL DX SPEC: NORMAL
PATH REPORT.GROSS SPEC: NORMAL
PATH REPORT.GROSS SPEC: NORMAL
PATH REPORT.MICROSCOPIC SPEC OTHER STN: NORMAL
PATH REPORT.MICROSCOPIC SPEC OTHER STN: NORMAL
PATH REPORT.RELEVANT HX SPEC: NORMAL
PATH REPORT.RELEVANT HX SPEC: NORMAL
PHOTO IMAGE: NORMAL
PHOTO IMAGE: NORMAL

## 2022-09-29 PROCEDURE — 250N000013 HC RX MED GY IP 250 OP 250 PS 637: Performed by: SURGERY

## 2022-09-29 PROCEDURE — 250N000013 HC RX MED GY IP 250 OP 250 PS 637: Performed by: INTERNAL MEDICINE

## 2022-09-29 PROCEDURE — 258N000003 HC RX IP 258 OP 636: Performed by: INTERNAL MEDICINE

## 2022-09-29 PROCEDURE — 250N000013 HC RX MED GY IP 250 OP 250 PS 637: Performed by: PHYSICIAN ASSISTANT

## 2022-09-29 PROCEDURE — 250N000013 HC RX MED GY IP 250 OP 250 PS 637: Performed by: STUDENT IN AN ORGANIZED HEALTH CARE EDUCATION/TRAINING PROGRAM

## 2022-09-29 PROCEDURE — 99239 HOSP IP/OBS DSCHRG MGMT >30: CPT | Performed by: INTERNAL MEDICINE

## 2022-09-29 RX ORDER — POLYETHYLENE GLYCOL 3350 17 G/17G
17 POWDER, FOR SOLUTION ORAL DAILY
Status: DISCONTINUED | OUTPATIENT
Start: 2022-09-29 | End: 2022-09-29 | Stop reason: HOSPADM

## 2022-09-29 RX ORDER — AMOXICILLIN 250 MG
1 CAPSULE ORAL 2 TIMES DAILY
Status: DISCONTINUED | OUTPATIENT
Start: 2022-09-29 | End: 2022-09-29 | Stop reason: HOSPADM

## 2022-09-29 RX ADMIN — BUPROPION HYDROCHLORIDE 300 MG: 300 TABLET, EXTENDED RELEASE ORAL at 10:30

## 2022-09-29 RX ADMIN — LINACLOTIDE 72 MCG: 72 CAPSULE, GELATIN COATED ORAL at 06:39

## 2022-09-29 RX ADMIN — OXYCODONE HYDROCHLORIDE 5 MG: 5 TABLET ORAL at 06:23

## 2022-09-29 RX ADMIN — ACETAMINOPHEN 650 MG: 325 TABLET, FILM COATED ORAL at 06:14

## 2022-09-29 RX ADMIN — SENNOSIDES AND DOCUSATE SODIUM 1 TABLET: 50; 8.6 TABLET ORAL at 10:30

## 2022-09-29 RX ADMIN — OXYCODONE HYDROCHLORIDE 5 MG: 5 TABLET ORAL at 10:30

## 2022-09-29 RX ADMIN — FLUVOXAMINE MALEATE 50 MG: 50 TABLET ORAL at 10:30

## 2022-09-29 RX ADMIN — SODIUM CHLORIDE: 9 INJECTION, SOLUTION INTRAVENOUS at 01:19

## 2022-09-29 RX ADMIN — LANSOPRAZOLE 30 MG: 30 CAPSULE, DELAYED RELEASE ORAL at 10:30

## 2022-09-29 RX ADMIN — FLUTICASONE FUROATE AND VILANTEROL TRIFENATATE 1 PUFF: 100; 25 POWDER RESPIRATORY (INHALATION) at 10:31

## 2022-09-29 ASSESSMENT — ACTIVITIES OF DAILY LIVING (ADL)
ADLS_ACUITY_SCORE: 20
ADLS_ACUITY_SCORE: 26
ADLS_ACUITY_SCORE: 26
ADLS_ACUITY_SCORE: 20

## 2022-09-29 NOTE — PROGRESS NOTES
Pt is A&O x4. VSS. Pt was medicated with Zofran for Gastric indigestion with effective relief.  IV .9NS infusing at 75 ml/hr. Voiding adequately in the bathroom. Needs standby assistance for transfers and ambulation.  Abdominal incisions /bandaids have scant amt of drainage, no increase of drainage. Abdomen is soft with faint bowel sounds. Pt stated she has not passed gas at this time.  Pt medicated with Oxycodone for abdominal pain relief.

## 2022-09-29 NOTE — PLAN OF CARE
Goal Outcome Evaluation:    Denies CP, SOB. Advanced to full liquid diet, pt tolerated well. Pt did not want to try low fat diet for this shift because she wanted to wait. Possible discharge home tomorrow. All pt needs met at this time.

## 2022-09-29 NOTE — PROGRESS NOTES
To whom it may concern.  Ms. Manju Jimenez was hospitalized from 9/25/2022 to 9/29/2022 due to acute medical illness.  Please excuse her from work till October 10, 2022.  She can return to work without any restrictions on the day    Esther Castorena MD   Hospitalist   [facility]   Phone [phone]

## 2022-09-29 NOTE — DISCHARGE SUMMARY
[facility]  Discharge Summary        Manju Berg MRN# [id number]   YOB: 1970 Age: 52 year old     Date of Admission:  9/25/2022  Date of Discharge:  9/29/2022  Admitting Physician:  Jessica Portillo  Discharge Physician: Esther Castorena MD  Discharging Service: Hospitalist     Primary Provider: Yarely Wise  Primary Care Physician Phone Number: [phone]         Discharge Diagnoses/Problem Oriented Hospital Course (Providers):    Manju Berg was admitted on 9/25/2022 by Jessica Portillo and I would refer you to their history and physical.  The following problems were addressed during her hospitalization:    Manju Berg is a 52 year old female with past medical history significant for GERD, prediabetes and obesity admitted on 9/25/2022 with abdominal pain.  Found to have evidence of gallstone pancreatitis with possible cholecystitis     Gallstone pancreatitis  Cholecystitis S/p LAp ephraim on 9/28/22  The patient presents to the ED with epigastric and right upper quadrant pain that has been constant since after eating breakfast. She also reports a few episodes of emesis. No fevers or chills, diarrhea, or bloody stools. Laboratory evaluation in the ED was notable for elevated transaminases with alk phos of 269, ALT of 515, AST of 683 and T bili of 1.6. She also had a markedly elevated lipase to >30,000. WBC count was normal. RUQ US showed mildly distended gallbladder with cholelithiasis. There is mildly thickened wall without pericholecystic fluid and positive sonographic Kaur's sign. There was not biliary dilatation.   Despite no biliary dilatation, clinical picture is concerning for gallstone pancreatitis with associated cholecystitis. Perhaps the stone has now passed.   - Admit to medicine  - GI consulted  - MRCP: Choledocholithiasis, Changes compatible with pancreatitis, question gallstone pancreatitis.  Cholelithiasis without cholecystitis.  "  -EUS reportedly did not show evidence of choledocholithiasis  - Surgery consulted, recommended laparoscopic cholecystectomy.  Lap ephraim done on 9/28/22  - Trend LFTs and lipase   D/laron zosyn now  Tolerating diet well    Doing well post op      GERD   - Continue PTA lansoprazole   On protonix BID now      Constipation   - Continue PTA Linzess   - PRN senna and miralax ordered as well      Hypercalcemia: Ca = 10.2 mg/dL (Ref range: 8.5 - 10.1 mg/dL) and/or iCa = N/A on admission, will monitor as appropriate          Anxiety/depression  - Continue PTA wellbutrin and fluvoxamine         Obesity: Estimated body mass index is 34.67 kg/m  as calculated from the following:    Height as of this encounter: 1.626 m (5' 4\").    Weight as of this encounter: 91.6 kg (202 lb).           Diet: low fat diet   DVT Prophylaxis: Pneumatic Compression Devices  Etienne Catheter: Not present  Central Lines: None  Cardiac Monitoring: None  Code Status: Full Code          Disposition Plan        Expected Discharge Date: home in stable condition     Esther Castorena MD   Pager [phone](7AM-6PM)                   Code Status:      Full Code        Brief Hospital Stay Summary Sent Home With Patient in AVS:               Important Results:      See below         Pending Results:        Unresulted Labs Ordered in the Past 30 Days of this Admission     Date and Time Order Name Status Description    9/28/2022  9:26 AM Surgical Pathology Exam In process             Discharge Instructions and Follow-Up:            Discharge Disposition:      Discharged to home        Discharge Medications:        Current Discharge Medication List      START taking these medications    Details   HYDROcodone-acetaminophen (NORCO) 5-325 MG tablet Take 1-2 tablets by mouth every 4 hours as needed for severe pain or moderate to severe pain  Qty: 12 tablet, Refills: 0    Associated Diagnoses: Postoperative pain         CONTINUE these medications which have NOT CHANGED    " Details   acetaminophen (TYLENOL) 500 MG tablet Take 2 tablets (1,000 mg) by mouth every 4 hours as needed for mild pain      albuterol (PROAIR HFA/PROVENTIL HFA/VENTOLIN HFA) 108 (90 Base) MCG/ACT inhaler Inhale 2 puffs into the lungs every 4 hours as needed for shortness of breath / dyspnea or wheezing  Qty: 8.5 g, Refills: 11    Comments: Pharmacy may dispense brand covered by insurance (Proair, or proventil or ventolin or generic albuterol inhaler)  Associated Diagnoses: Moderate persistent asthma, unspecified whether complicated      albuterol (PROVENTIL) (2.5 MG/3ML) 0.083% neb solution Take 1 vial (2.5 mg) by nebulization every 6 hours as needed for shortness of breath / dyspnea or wheezing  Qty: 90 mL, Refills: 3    Associated Diagnoses: Cough      augmented betamethasone dipropionate (DIPROLENE) 0.05 % external lotion Apply topically daily  Qty: 180 mL, Refills: 3    Associated Diagnoses: Plaque psoriasis      budesonide-formoterol (SYMBICORT) 80-4.5 MCG/ACT Inhaler Inhale 2 puffs into the lungs 2 times daily  Qty: 30.4 g, Refills: 3    Associated Diagnoses: Moderate persistent asthma without complication      buPROPion (WELLBUTRIN XL) 300 MG 24 hr tablet Take 1 tablet (300 mg) by mouth every morning  Qty: 90 tablet, Refills: 1    Associated Diagnoses: Anxiety; Major depression in complete remission (H)      CALCIUM 600 + D 600-200 MG-UNIT OR TABS Take 1 tablet by mouth every evening      cetirizine (ZYRTEC) 10 MG tablet Take 10 mg by mouth daily      chlorzoxazone (PARAFON FORTE) 500 MG tablet TAKE 1/2-1&1/2 TABLETS BY MOUTH 3-4 TIMES DAILY AS NEEDED TO RELAX MUSCLES  Needs follow up for refills. Needs to establish care  Qty: 56 tablet, Refills: 0    Associated Diagnoses: Cervicalgia      Cholecalciferol (VITAMIN D) 2000 UNITS tablet Take 2,000 Units by mouth daily.  Qty: 100 tablet, Refills: 3    Associated Diagnoses: Unspecified vitamin D deficiency      fluticasone (FLONASE) 50 MCG/ACT nasal spray Spray  1 spray into both nostrils daily      fluvoxaMINE (LUVOX) 50 MG tablet Take 1 tablet (50 mg) by mouth 2 times daily  Qty: 180 tablet, Refills: 0    Comments: Patient is requesting a refill on their Fluvoxamine Maleate 50 mg. Current rx is out of refills. Please send a new rx if appropriate. Thank you!  Associated Diagnoses: Depression, unspecified depression type      LANsoprazole (PREVACID) 30 MG DR capsule Take 1 capsule (30 mg) by mouth daily  Qty: 90 capsule, Refills: 1    Associated Diagnoses: Gastroesophageal reflux disease without esophagitis      linaclotide (LINZESS) 72 MCG capsule Take 72 mcg by mouth every morning (before breakfast)      naproxen (NAPROSYN) 500 MG tablet Take 1 tablet (500 mg) by mouth 2 times daily as needed for moderate pain  Qty: 30 tablet, Refills: 1    Associated Diagnoses: Hand joint pain, right      apremilast (OTEZLA) 30 MG tablet Take 1 tablet (30 mg) by mouth 2 times daily  Qty: 60 tablet, Refills: 2    Associated Diagnoses: Plaque psoriasis      augmented betamethasone dipropionate (DIPROLENE) 0.05 % external gel Apply topically daily  Qty: 50 g, Refills: 3    Associated Diagnoses: Psoriasis of scalp      lidocaine (LIDODERM) 5 % patch Place 1 patch onto the skin daily as needed for moderate pain Remove after 12 hours.  Qty: 30 patch, Refills: 0    Associated Diagnoses: Chronic bilateral low back pain with right-sided sciatica      valACYclovir (VALTREX) 1000 mg tablet take 2 tablets PO Q 12 hours x 2 doses at first signs of outbreak.  Qty: 12 tablet, Refills: 3    Associated Diagnoses: H/O cold sores               Allergies:       No Known Allergies        Consultations This Hospital Stay:      Consultation during this admission received from gastroenterology and surgery        Condition and Physical on Discharge:      Discharge condition: Stable   Vitals: Blood pressure 111/69, pulse 57, temperature 98.1  F (36.7  C), temperature source Oral, resp. rate 18, height 1.626 m (5'  "4\"), weight 91.6 kg (202 lb), SpO2 97 %.   Constitutional: Very pleasant adult, alert, oriented to person, place, date, situation.  Cooperative, lying in bed in NAD.  Respiratory:  Lungs CTAB.  No crackles, wheezes.  Breathing unlabored.  Cardiovascular:  Heart RRR, no murmur, no peripheral edema noted  GI:  Abdomen soft, mild tender at the incision sites  and with normoactive BS  Skin/Integumen:  Warm, dry, non-diaphoretic.  MSK: CMS x4 grossly intact.                 Discharge Time:      Greater than 30 minutes.        Image Results From This Hospital Stay (For Non-EPIC Providers):        Results for orders placed or performed during the hospital encounter of 09/25/22   US Abdomen Limited (RUQ)    Narrative    EXAM: US ABDOMEN LIMITED  LOCATION: [facility]  DATE/TIME: 9/25/2022 9:56 PM    INDICATION: suspected cholecystitis or choledocholithiasis  COMPARISON: None.  TECHNIQUE: Limited abdominal ultrasound.    FINDINGS:    GALLBLADDER: The gallbladder is mildly distended, contains numerous stones, and has a mildly thickened wall measuring 4 mm. No pericholecystic fluid. The sonographer reports the patient was tender while scanning over the gallbladder.    BILE DUCTS: No biliary dilatation. The common duct measures 4 mm.    LIVER: Echogenic parenchyma. No focal mass.    RIGHT KIDNEY: No hydronephrosis.    PANCREAS: The visualized portions are normal.    No ascites.      Impression    IMPRESSION:  1.  Cholelithiasis and possible cholecystitis. Clinical correlation recommended.  2.  Hepatic steatosis.       MR Abdomen MRCP w/o & w Contrast    Narrative    MAGNETIC RESONANCE CHOLANGIOPANCREATOGRAPHY  9/26/2022 3:00 PM     HISTORY: Elevated liver function tests.    COMPARISON: None.    TECHNIQUE: Multiplanar, multisequence images of the abdomen acquired  before and after administration of 9 mL Gadavist intravenous contrast.  MRCP images and coronal 3-D thin section T2-weighted MRCP " images  through the biliary tree. 3D image reformatting was performed on the  acquisition scanner.    FINDINGS: There is is extensive cholelithiasis without cholecystitis.  Two to three stones are seen in the distal common bile duct.  Borderline generous extrahepatic bile duct at 6 mm. No pancreatic  ductal dilatation. Some surrounding fluid around the pancreas along  with stranding, question pancreatitis.    Kidneys, adrenal glands, and spleen demonstrate no worrisome focal  lesion. Minimal splenomegaly at 13.8 cm.      Impression    IMPRESSION:  1. Choledocholithiasis.  2. Changes compatible with pancreatitis, question gallstone  pancreatitis.  3. Cholelithiasis without cholecystitis.    SWATI MATOS MD         SYSTEM ID:  [id number]               Most Recent Lab Results In EPIC (For Non-EPIC Providers):    Most Recent 3 CBC's:  Recent Labs   Lab Test 09/28/22  0542 09/26/22  0655 09/25/22  1948   WBC 4.7 4.8 8.1   HGB 11.2* 11.8 15.8*   MCV 86 86 85    199 285      Most Recent 3 BMP's:  Recent Labs   Lab Test 09/28/22  1222 09/28/22  0542 09/27/22  2155 09/27/22  1024 09/27/22  0644 09/26/22  0655   NA  --  142  --   --  138 141   POTASSIUM  --  3.8  --   --  3.8 4.0   CHLORIDE  --  109  --   --  105 110*   CO2  --  29  --   --  24 27   BUN  --  6*  --   --  8 12   CR  --  0.73  --   --  0.69 0.74   ANIONGAP  --  4  --   --  9 4   MCKENZIE  --  8.8  --   --  8.4* 8.2*   * 106* 90   < > 90 110*    < > = values in this interval not displayed.     Most Recent 3 Troponin's:No lab results found.    Invalid input(s): TROP, TROPONINIES  Most Recent 3 INR's:No lab results found.  Most Recent 2 LFT's:  Recent Labs   Lab Test 09/28/22  0542 09/27/22  0644   AST 81* 188*   * 441*   ALKPHOS 197* 208*   BILITOTAL 0.6 1.4*     Most Recent Cholesterol Panel:  Recent Labs   Lab Test 04/14/22  0908   CHOL 220*   *   HDL 70   TRIG 106     Most Recent 6 Bacteria Isolates From Any Culture (See EPIC Reports for  Culture Details):No lab results found.  Most Recent TSH, T4 and HgbA1c:   Recent Labs   Lab Test 04/14/22  0908   A1C 5.2

## 2022-09-29 NOTE — PLAN OF CARE
Goal Outcome Evaluation:    Denies CP, SOB. Okay to discharge per surgery team and hospitalist. Pt verbalized understanding to all of med and discharge instructions. Pt PIVs out. Pt left with all belongings. Confirmed with MD that pt did not need labs today.

## 2022-09-29 NOTE — PROGRESS NOTES
"GENERAL SURGERY PROGRESS NOTE    S:  KUN overnight. Pain well-controlled, reports mild right shoulder pain. Tolerating a regular diet, had scrambled eggs this morning. Denies N/V this morning. Has not passed flatus. Ambulatory.    O:  /69 (BP Location: Right arm)   Pulse 57   Temp 98.1  F (36.7  C) (Oral)   Resp 18   Ht 1.626 m (5' 4\")   Wt 91.6 kg (202 lb)   SpO2 97%   BMI 34.67 kg/m    I/O last 3 completed shifts:  In: 800 [I.V.:800]  Out: 2055 [Urine:2050; Blood:5]    Gen: Awake, alert, NAD, sitting up on edge of bed  Resp: NLB on room air  Abd: Soft, ND, appropriately tender to palpation over incision sites  Ext: WWP  Dressing/Incision: Laparoscopic incision sites with overlying steri-strips and band-aids, c/d/i.    LABS  Reviewed    IMAGING  Reviewed    A/P:   Manju Berg is a 52 year old female now POD 1 s/p laparoscopic cholecystectomy for gallstone pancreatitis and acute calculous cholecystitis. She had an ERCP the day prior. Doing well post-op. Pain to right shoulder likely referred from diagram from residual pneumoperitoneum. Hemodynamically stable and tolerating a diet.    - Continue to encourage OOB, ambulation, IS  - Continue regular diet  - Analgesics prn  - Ok to discharge home from surgery perspective, return precautions provided  - Remainder of excellent cares per Hospitalist primary    Gregor Hastings MD on 9/29/2022 at 10:31 AM  p:[phone]    "

## 2022-09-29 NOTE — PLAN OF CARE
Goal Outcome Evaluation:    Denies CP, SOB. CMS intact.  Possible discharge today per surgery and hospitalist. All pt needs met at this time. Pt not passing gas, surgery team aware and let pt know to call surgery office if still not passing gas in couple of days or worsening symptoms. Pt verbalized understanding.

## 2022-09-30 ENCOUNTER — PATIENT OUTREACH (OUTPATIENT)
Dept: CARE COORDINATION | Facility: CLINIC | Age: 52
End: 2022-09-30

## 2022-09-30 NOTE — PROGRESS NOTES
"Clinic Care Coordination Contact  [facility]: Post-Discharge Note  SITUATION                                                      Admission:    Admission Date: 09/25/22   Reason for Admission: Gallstone pancreatitis  Cholecystitis S/p LAp ephraim on 9/28/22  Discharge:   Discharge Date: 09/29/22  Discharge Diagnosis: Gallstone pancreatitis  Cholecystitis S/p LAp ephraim on 9/28/22    BACKGROUND                                                      Per hospital discharge summary and inpatient provider notes:    Manju Berg is a 52 year old female who presents to the ED with severe abdominal pain since this morning. She has had a hx of intermittent abdominal. She has a hx of fairly significant GERD so tried taking some of her lansoprazole this morning to see if this would improve her pain but she had no relief. The pain is located mainly in the epigastrium and RUQ. No fevers or chills. She does have issues with constipation, but did have a BM today that was normal. She denies alcohol use.     ASSESSMENT      Discharge Assessment  How are you doing now that you are home?: \"Pretty good, got some sleep\"  How are your symptoms? (Red Flag symptoms escalate to triage hotline per guidelines): Improved  Do you feel your condition is stable enough to be safe at home until your provider visit?: Yes  Does the patient have their discharge instructions? : Yes  Does the patient have questions regarding their discharge instructions? : No  Were you started on any new medications or were there changes to any of your previous medications? : Yes  Does the patient have all of their medications?: Yes  Do you have questions regarding any of your medications? : No  Do you have all of your needed medical supplies or equipment (DME)?  (i.e. oxygen tank, CPAP, cane, etc.): Yes  Discharge follow-up appointment scheduled within 14 calendar days? : Yes  Discharge Follow Up Appointment Date: 10/04/22  Discharge Follow Up Appointment " Scheduled with?: Primary Care Provider    Post-op ([facility] CTA Only)  If the patient had a surgery or procedure, do they have any questions for a nurse?: No      PLAN                                                      Outpatient Plan:    Follow up with primary care provider, Yarely Wise, within 7 days for hospital follow- up. The following labs/tests are recommended: CBC, CMP in 1week  F/u with General surgery in 2weeks for post op follow up .    Future Appointments   Date Time Provider Department Center   10/4/2022  1:00 PM Marie Jean NP [facility]   11/28/2022 11:20 AM Pierce Velasquez MD [facility]   12/29/2022 10:20 AM Billie, Yarely Emerson MD [facility]   1/23/2023 11:00 AM Liane Perez MD [facility]         For any urgent concerns, please contact our 24 hour nurse triage line: [phone] ([phone])         ANITA Murray  [phone]  Connected Care Resource [facility]

## 2022-10-04 ENCOUNTER — VIRTUAL VISIT (OUTPATIENT)
Dept: FAMILY MEDICINE | Facility: CLINIC | Age: 52
End: 2022-10-04
Payer: COMMERCIAL

## 2022-10-04 DIAGNOSIS — Z92.89 HISTORY OF BEING HOSPITALIZED: Primary | ICD-10-CM

## 2022-10-04 DIAGNOSIS — R05.9 COUGH, UNSPECIFIED TYPE: ICD-10-CM

## 2022-10-04 DIAGNOSIS — R73.09 ELEVATED GLUCOSE: ICD-10-CM

## 2022-10-04 DIAGNOSIS — J45.20 MILD INTERMITTENT ASTHMA WITHOUT COMPLICATION: ICD-10-CM

## 2022-10-04 DIAGNOSIS — K85.90 ACUTE PANCREATITIS, UNSPECIFIED COMPLICATION STATUS, UNSPECIFIED PANCREATITIS TYPE: ICD-10-CM

## 2022-10-04 DIAGNOSIS — F41.1 ANXIETY STATE: ICD-10-CM

## 2022-10-04 DIAGNOSIS — K59.01 SLOW TRANSIT CONSTIPATION: ICD-10-CM

## 2022-10-04 DIAGNOSIS — Z90.49 HISTORY OF CHOLECYSTECTOMY: ICD-10-CM

## 2022-10-04 DIAGNOSIS — F32.0 CURRENT MILD EPISODE OF MAJOR DEPRESSIVE DISORDER WITHOUT PRIOR EPISODE (H): ICD-10-CM

## 2022-10-04 DIAGNOSIS — R74.8 ELEVATED LIVER ENZYMES: ICD-10-CM

## 2022-10-04 PROCEDURE — 99495 TRANSJ CARE MGMT MOD F2F 14D: CPT | Performed by: NURSE PRACTITIONER

## 2022-10-04 NOTE — PATIENT INSTRUCTIONS
Get labs done on Thursday morning. If you feel bloating or coughing is not improved, send a message and I can order an x-ray if needed for when you come in for labs also    No alcohol until your labs are back to normal  Continue increasing diet-may note loose stools with fatty/greasy foods    For your constipation:     Food:   Drink at least 8  6-8 oz glasses of water daily at a minimum  Increase fiber in diet, can also take fiber supplements OTC such as Metamucil   Pitted prunes, 100% apple or pear juice, pears with the skin on, continue oatmeal also can help with constipation    Medications:  Miralax 17 gm (1 capful) once to twice a day in water (softener, pulls water from colon, make sure drinking lots of fluids)  Senna-s 1-2 tabs once to twice a day (softener/laxative)  Colace 100 mg 1 tab once to twice a day (softener)    Laxatives: start Thursday if you do not have a good bowel movement by then  Dulcolax 10 mg oral daily (laxative)  May try Dulcolax 10 mg suppository daily instead of oral tabs (laxative)

## 2022-10-04 NOTE — PROGRESS NOTES
Manju is a 52 year old who is being evaluated via a billable video visit.      How would you like to obtain your AVS? MyChart  If the video visit is dropped, the invitation should be resent by: Text to cell phone: [phone]  Will anyone else be joining your video visit? No         Assessment & Plan     History of being hospitalized/Acute pancreatitis, unspecified complication status, unspecified pancreatitis type/History of cholecystectomy  To the ER on 9/25/22 with RUQ pain, dx with pancreatitis and cholecystitis. Lap kareem on 9/28, discharged 9/29/22. Is not using the norco, she didn't like how it made her feel. Using tylenol and ibuprofen as needed, with ice packs on abdomen. Some soreness. +constipation, see below. Overall feeling okay but noting more bloating and some wrap around pain. Will monitor closely, if not improving will get abdominal x-ray. No fever/chills. Due for labs in 2 days due to elevated liver enzymes. Lipase was also 30,000, will recheck that too  - Comprehensive metabolic panel (BMP + Alb, Alk Phos, ALT, AST, Total. Bili, TP); Future  - Lipase; Future  - CBC with platelets; Future      Cough, unspecified type  Noted cough when she left hospital, feels like it is continuing, no fever/chills. Will let provider know if not improving or worsening, would then get chest x-ray.     Elevated liver enzymes  Rechecking levels, were quite high in hospital. Doesn't drink a lot of alcohol. Is going to abstain until levels are back to normal  - Comprehensive metabolic panel (BMP + Alb, Alk Phos, ALT, AST, Total. Bili, TP); Future      Current mild episode of major depressive disorder without prior episode (H)/Anxiety state  stable    Slow transit constipation  Continue linzess, add miralax and colace, call if bloating not improving. Start laxatives in 2 days if not a good BM by then. Only had 1 small BM since leaving hospital last week    Mild intermittent asthma without complication  Stable for now.  "States was not on all steroid inhalers in hospital, cough could be related to this. Continue inhalers, monitor cough closely     Elevated glucose  Screening for prediabetes  - Hemoglobin A1c; Future    BMI:   Estimated body mass index is 34.67 kg/m  as calculated from the following:    Height as of 9/25/22: 1.626 m (5' 4\").    Weight as of 9/25/22: 91.6 kg (202 lb).     Return in about 1 week (around 10/11/2022), or if symptoms worsen or fail to improve.    BELINDA Mays, NP-C  [facility]    Evelyn Nunes is a 52 year old accompanied by her self, presenting for the following health issues:  Hospital F/U      HPI     Post Discharge Outreach 9/30/2022   Admission Date 9/25/2022   Reason for Admission Gallstone pancreatitis  Cholecystitis S/p LAp ephraim on 9/28/22   Discharge Date 9/29/2022   Discharge Diagnosis Gallstone pancreatitis  Cholecystitis S/p LAp ephraim on 9/28/22   How are you doing now that you are home? \"Pretty good, got some sleep\"   How are your symptoms? (Red Flag symptoms escalate to triage hotline per guidelines) Improved   Do you feel your condition is stable enough to be safe at home until your provider visit? Yes   Does the patient have their discharge instructions?  Yes   Does the patient have questions regarding their discharge instructions?  No   Were you started on any new medications or were there changes to any of your previous medications?  Yes   Does the patient have all of their medications? Yes   Do you have questions regarding any of your medications?  No   Do you have all of your needed medical supplies or equipment (DME)?  (i.e. oxygen tank, CPAP, cane, etc.) Yes   Discharge follow-up appointment scheduled within 14 calendar days?  Yes   Discharge Follow Up Appointment Date 10/4/2022   Discharge Follow Up Appointment Scheduled with? Primary Care Provider     Hospital Follow-up Visit:    Hospital/Nursing Home/IP Rehab Facility: [facility]  Date of Admission: 09/25/2022  Date of Discharge: 09/29/2022  Reason(s) for Admission:    Gallstone pancreatitis  Cholecystitis S/p LAp ephraim    Was your hospitalization related to COVID-19? No   Problems taking medications regularly:  None  Medication changes since discharge: Pain medication as needed  Problems adhering to non-medication therapy:  None    Summary of hospitalization:  [facility] discharge summary reviewed  Diagnostic Tests/Treatments reviewed.  Follow up needed: none  Other Healthcare Providers Involved in Patient s Care:         None  Update since discharge: improved.   Post Medication Reconciliation Status:        Plan of care communicated with patient       Other hx of prediabetes, obesity, GERD.   To the ER with RUQ pain, few emesis episodes.  Had elevated liver enzymes, high lipase >30,000. Calcium slightly high.  Had lap ephraim on 9/28/22. Given norco for pain at discharge. Took 1 and made her feel \"gross\".       Feeling sore, bloated. Only 1 small BM yesterday. Using tylenol for pain, today is having wrap around pain.  Appetite is okay. Eating cream of wheat, jello, had some chicken, veggies. Using linzess, last night did miralax.     Hx of asthma: also has pulmonology. Has been coughing up yellow stuff since leaving the hospital. Wasn't getting all her steroid inhalers when she was there.     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 Has a return to work letter. She started the short term disability paper work in the hospital, she can send information to NP.       Review of Systems   Constitutional, HEENT, cardiovascular, pulmonary, gi -as above, and gu systems are negative, except as otherwise noted.      Objective           Vitals:  No vitals were obtained today due to virtual visit.    Physical Exam   GENERAL: Healthy, alert and no distress  EYES: Eyes grossly normal to inspection.  No discharge or erythema, or obvious scleral/conjunctival " abnormalities.  RESP: No audible wheeze, cough, or visible cyanosis.  No visible retractions or increased work of breathing.    SKIN: Visible skin clear. No significant rash, abnormal pigmentation or lesions.  NEURO: Cranial nerves grossly intact.  Mentation and speech appropriate for age.  PSYCH: Mentation appears normal, affect normal/bright, judgement and insight intact, normal speech and appearance well-groomed.    Reviewed past labs and recent hospitalization notes            Video-Visit Details    Video Start Time: 12:50 PM    Type of service:  Video Visit    Video End Time:1:07 PM    Originating Location (pt. Location): Home    Distant Location (provider location):  Home secure location    Platform used for Video Visit: [employer]

## 2022-10-06 ENCOUNTER — LAB (OUTPATIENT)
Dept: LAB | Facility: CLINIC | Age: 52
End: 2022-10-06
Payer: COMMERCIAL

## 2022-10-06 DIAGNOSIS — Z92.89 HISTORY OF BEING HOSPITALIZED: ICD-10-CM

## 2022-10-06 DIAGNOSIS — R73.09 ELEVATED GLUCOSE: ICD-10-CM

## 2022-10-06 DIAGNOSIS — K85.90 ACUTE PANCREATITIS, UNSPECIFIED COMPLICATION STATUS, UNSPECIFIED PANCREATITIS TYPE: ICD-10-CM

## 2022-10-06 DIAGNOSIS — R74.8 ELEVATED LIVER ENZYMES: ICD-10-CM

## 2022-10-06 DIAGNOSIS — Z90.49 HISTORY OF CHOLECYSTECTOMY: ICD-10-CM

## 2022-10-06 LAB
ALBUMIN SERPL-MCNC: 4.2 G/DL (ref 3.4–5)
ALP SERPL-CCNC: 141 U/L (ref 40–150)
ALT SERPL W P-5'-P-CCNC: 84 U/L (ref 0–50)
ANION GAP SERPL CALCULATED.3IONS-SCNC: 4 MMOL/L (ref 3–14)
AST SERPL W P-5'-P-CCNC: 23 U/L (ref 0–45)
BILIRUB SERPL-MCNC: 0.3 MG/DL (ref 0.2–1.3)
BUN SERPL-MCNC: 14 MG/DL (ref 7–30)
CALCIUM SERPL-MCNC: 9.7 MG/DL (ref 8.5–10.1)
CHLORIDE BLD-SCNC: 106 MMOL/L (ref 94–109)
CO2 SERPL-SCNC: 30 MMOL/L (ref 20–32)
CREAT SERPL-MCNC: 0.74 MG/DL (ref 0.52–1.04)
ERYTHROCYTE [DISTWIDTH] IN BLOOD BY AUTOMATED COUNT: 11.7 % (ref 10–15)
GFR SERPL CREATININE-BSD FRML MDRD: >90 ML/MIN/1.73M2
GLUCOSE BLD-MCNC: 92 MG/DL (ref 70–99)
HBA1C MFR BLD: 5.1 % (ref 0–5.6)
HCT VFR BLD AUTO: 42.1 % (ref 35–47)
HGB BLD-MCNC: 14 G/DL (ref 11.7–15.7)
LIPASE SERPL-CCNC: 265 U/L (ref 73–393)
MCH RBC QN AUTO: 28.7 PG (ref 26.5–33)
MCHC RBC AUTO-ENTMCNC: 33.3 G/DL (ref 31.5–36.5)
MCV RBC AUTO: 86 FL (ref 78–100)
PLATELET # BLD AUTO: 298 10E3/UL (ref 150–450)
POTASSIUM BLD-SCNC: 3.9 MMOL/L (ref 3.4–5.3)
PROT SERPL-MCNC: 7.6 G/DL (ref 6.8–8.8)
RBC # BLD AUTO: 4.88 10E6/UL (ref 3.8–5.2)
SODIUM SERPL-SCNC: 140 MMOL/L (ref 133–144)
WBC # BLD AUTO: 6.8 10E3/UL (ref 4–11)

## 2022-10-06 PROCEDURE — 85027 COMPLETE CBC AUTOMATED: CPT

## 2022-10-06 PROCEDURE — 83690 ASSAY OF LIPASE: CPT

## 2022-10-06 PROCEDURE — 83036 HEMOGLOBIN GLYCOSYLATED A1C: CPT

## 2022-10-06 PROCEDURE — 36415 COLL VENOUS BLD VENIPUNCTURE: CPT

## 2022-10-06 PROCEDURE — 80053 COMPREHEN METABOLIC PANEL: CPT

## 2022-10-07 ENCOUNTER — MYC MEDICAL ADVICE (OUTPATIENT)
Dept: FAMILY MEDICINE | Facility: CLINIC | Age: 52
End: 2022-10-07

## 2022-10-07 DIAGNOSIS — R74.8 ELEVATED LIVER ENZYMES: Primary | ICD-10-CM

## 2022-10-07 NOTE — TELEPHONE ENCOUNTER
Patient requesting to repeat the  hepatic panel next week to see if it normalized. Please advise.    Giulia Heredia RN, BSN   [facility]

## 2022-10-11 ENCOUNTER — HOSPITAL ENCOUNTER (OUTPATIENT)
Facility: CLINIC | Age: 52
End: 2022-10-11
Attending: INTERNAL MEDICINE | Admitting: INTERNAL MEDICINE
Payer: COMMERCIAL

## 2022-10-16 ENCOUNTER — HEALTH MAINTENANCE LETTER (OUTPATIENT)
Age: 52
End: 2022-10-16

## 2022-10-17 NOTE — TELEPHONE ENCOUNTER
Pierce Velasquez MD  You; [facility] 23 hours ago (1:10 PM)     DP  Patient does not have a contraindication to methotrexate. Please let her know that insurance will not cover apremilast until we've tried methotrexate. Thanks!

## 2022-10-18 ENCOUNTER — TELEPHONE (OUTPATIENT)
Dept: SURGERY | Facility: CLINIC | Age: 52
End: 2022-10-18

## 2022-10-18 NOTE — CONFIDENTIAL NOTE
[facility]  Post op call note - Laparoscopic Cholecystectomy    Manju Berg was called for an update regarding her recovery.  She underwent surgery with Dr. Potts on 9/28/2022.  Today she tells me she is doing well. She has had some intermittent but improving nausea and intermittent pain to the right upper quadrant but reports this is much improved from prior to surgery. She had some constipation after surgery, however this improved after adjusting how she takes  her stool softeners. She is otheriw eating a normal diet and her bowels are now regular.  She states her wounds are healing well.  The patient has no complaints about fever/chills, emesis, diarrhea, changes in urination or wound issues.     Pathology:  -- Chronic cholecystitis and cholelithiasis.  -- One benign lymph node.    This was discussed with the patient.       She has removed her steri-strips and will continue to keep wounds clean.  She was advised to advance her activity as tolerated with no heavy lifting > 20 lbs or strenuous exercise x 1-2 weeks.  The patient states all of her questions were answered and she understands our discussion.  She agrees to follow up as needed and to call our office with any concerns.    Addendum: I did review her recent labs which showed lipase now within normal limits and no leukocytosis. She has an order for future LFTs.    Gregor Hastings MD  p:[phone]

## 2022-10-27 ENCOUNTER — LAB (OUTPATIENT)
Dept: LAB | Facility: CLINIC | Age: 52
End: 2022-10-27
Payer: COMMERCIAL

## 2022-10-27 DIAGNOSIS — R74.8 ELEVATED LIVER ENZYMES: ICD-10-CM

## 2022-10-27 LAB
ALBUMIN SERPL BCG-MCNC: 4.9 G/DL (ref 3.5–5.2)
ALP SERPL-CCNC: 84 U/L (ref 35–104)
ALT SERPL W P-5'-P-CCNC: 36 U/L (ref 10–35)
AST SERPL W P-5'-P-CCNC: 26 U/L (ref 10–35)
BILIRUB DIRECT SERPL-MCNC: <0.2 MG/DL (ref 0–0.3)
BILIRUB SERPL-MCNC: 0.3 MG/DL
PROT SERPL-MCNC: 7.4 G/DL (ref 6.4–8.3)

## 2022-10-27 PROCEDURE — 80076 HEPATIC FUNCTION PANEL: CPT | Performed by: PATHOLOGY

## 2022-10-27 PROCEDURE — 36415 COLL VENOUS BLD VENIPUNCTURE: CPT | Performed by: PATHOLOGY

## 2022-10-27 NOTE — RESULT ENCOUNTER NOTE
Daniel Nunes,   Your liver tests are almost to normal. Please let me know if you have questions.Thank you,  BELINDA Mays, NP-C  [facility]

## 2022-11-09 ENCOUNTER — TRANSFERRED RECORDS (OUTPATIENT)
Dept: HEALTH INFORMATION MANAGEMENT | Facility: CLINIC | Age: 52
End: 2022-11-09

## 2022-11-10 NOTE — TELEPHONE ENCOUNTER
Medication Appeal Initiation    We have initiated an appeal for the requested medication:  Medication: Otezla  Appeal Start Date:  11/10/2022  Insurance Company: [employer] - Phone [phone] Fax [phone]  Comments:  Faxed [phone]

## 2022-11-17 NOTE — TELEPHONE ENCOUNTER
Spoke with [employer] and appeal was received and is pending. It is due back by 11/21/22. Reft #[id number]

## 2022-11-23 NOTE — TELEPHONE ENCOUNTER
Spoke with [employer] they are reviewing the case and will fax us over the out come later today.

## 2022-11-28 ENCOUNTER — TELEPHONE (OUTPATIENT)
Dept: DERMATOLOGY | Facility: CLINIC | Age: 52
End: 2022-11-28

## 2022-11-28 ENCOUNTER — VIRTUAL VISIT (OUTPATIENT)
Dept: DERMATOLOGY | Facility: CLINIC | Age: 52
End: 2022-11-28
Payer: COMMERCIAL

## 2022-11-28 DIAGNOSIS — D84.9 IMMUNOSUPPRESSION (H): ICD-10-CM

## 2022-11-28 DIAGNOSIS — L40.0 PLAQUE PSORIASIS: Primary | ICD-10-CM

## 2022-11-28 PROCEDURE — 99214 OFFICE O/P EST MOD 30 MIN: CPT | Mod: 95 | Performed by: DERMATOLOGY

## 2022-11-28 RX ORDER — METHOTREXATE 2.5 MG/1
15 TABLET ORAL WEEKLY
Qty: 30 TABLET | Refills: 4 | Status: SHIPPED | OUTPATIENT
Start: 2022-11-28 | End: 2023-05-10

## 2022-11-28 RX ORDER — FOLIC ACID 1 MG/1
1 TABLET ORAL DAILY
Qty: 90 TABLET | Refills: 3 | Status: SHIPPED | OUTPATIENT
Start: 2022-11-28 | End: 2023-05-10

## 2022-11-28 ASSESSMENT — PATIENT HEALTH QUESTIONNAIRE - PHQ9
10. IF YOU CHECKED OFF ANY PROBLEMS, HOW DIFFICULT HAVE THESE PROBLEMS MADE IT FOR YOU TO DO YOUR WORK, TAKE CARE OF THINGS AT HOME, OR GET ALONG WITH OTHER PEOPLE: SOMEWHAT DIFFICULT
SUM OF ALL RESPONSES TO PHQ QUESTIONS 1-9: 6
SUM OF ALL RESPONSES TO PHQ QUESTIONS 1-9: 6

## 2022-11-28 NOTE — TELEPHONE ENCOUNTER
REGINOM with scheduling number. Dr. Velasquez would like to see pt back for a 3 month follow up, around 2/28/2023    Lala JOSE

## 2022-11-28 NOTE — PROGRESS NOTES
[facility] Dermatology Note  Encounter Date: Nov 28, 2022  Store-and-Forward and Telephone ([phone]). Location of teledermatologist: [facility] [place].  Start time: 11:35. End time: 11:55.    Dermatology Problem List:  1. Plaque psoriasis: scalp, trunk, with some ear involvement  - methotrexate 15 mg weekly, augmented betamethasone lotion  - in reserve: apremilast  - prior: augmented betamethasone gel, coal tar/salcylic acid shampoo, ketoconazole shampoo, zinc pyrithione shampoo, compounded betamethasone/calcipotriene solution, Excimer    ____________________________________________    Assessment & Plan:     1. Plaque psoriasis: insurance did not cover apremilast, preferred methotrexate, cyclosporine, acitretin, PUVA. The latter 3 are not appropriate medications for this patient. Discussed risks/benefits of methotrexate and will start. If refractory, would try to obtain apremilast.  - methotrexate 15 mg weekly with folate supplementation  - CBC, CMP, hepatitis panel, QFN; repeat CBC/CMP at 1 week, 1 month, then q3 months  - topicals    Procedures Performed:    None    Follow-up: 3 months    Staff:     Pierce Velasquez MD, FAAD   of Dermatology  Department of Dermatology  [facility] [employer] [facility]    ____________________________________________    CC: Telephone (Starting Excimer for recalcitrant scalp psoriasis, 6 mo follow-up )    HPI:  Ms. Manju Berg is a(n) 52 year old female who presents today as a return patient for psoriasis    Psoriasis - insurance denied   - now has spread to abdomen  - scalp still quite active    Patient is otherwise feeling well, without additional skin concerns.    Labs Reviewed:  9-10/2022 ALT downtrending    Physical Exam:  Vitals: There were no vitals taken for this visit.  SKIN: Teledermatology photos were reviewed; image quality and interpretability: acceptable. Image date:  22.  - erythematous scaly papules on the abdomen  - No other lesions of concern on areas examined.     Medications:  Current Outpatient Medications   Medication     albuterol (PROAIR HFA/PROVENTIL HFA/VENTOLIN HFA) 108 (90 Base) MCG/ACT inhaler     albuterol (PROVENTIL) (2.5 MG/3ML) 0.083% neb solution     augmented betamethasone dipropionate (DIPROLENE) 0.05 % external lotion     budesonide-formoterol (SYMBICORT) 80-4.5 MCG/ACT Inhaler     buPROPion (WELLBUTRIN XL) 300 MG 24 hr tablet     CALCIUM 600 + D 600-200 MG-UNIT OR TABS     cetirizine (ZYRTEC) 10 MG tablet     chlorzoxazone (PARAFON FORTE) 500 MG tablet     Cholecalciferol (VITAMIN D) 2000 UNITS tablet     fluticasone (FLONASE) 50 MCG/ACT nasal spray     fluvoxaMINE (LUVOX) 50 MG tablet     LANsoprazole (PREVACID) 30 MG DR capsule     lidocaine (LIDODERM) 5 % patch     linaclotide (LINZESS) 72 MCG capsule     naproxen (NAPROSYN) 500 MG tablet     valACYclovir (VALTREX) 1000 mg tablet     apremilast (OTEZLA) 30 MG tablet     augmented betamethasone dipropionate (DIPROLENE) 0.05 % external gel     No current facility-administered medications for this visit.      Past Medical/Surgical History:   Patient Active Problem List   Diagnosis     Recurrent herpes labialis     GERD (gastroesophageal reflux disease)     Other specified counseling     Mild intermittent asthma     Depression, major, single episode     Family history of colon cancer     CARDIOVASCULAR SCREENING; LDL GOAL LESS THAN 160     Pain in joint, upper arm     Benign neoplasm of soft tissues     Constipation     Vitamin D deficiency     Anxiety state      delivery delivered     Leg pain, bilateral     Pain of finger of right hand     Prediabetes     Shoulder (girdle) dystocia during labor and deliver, delivered     Toe pain, left     Varicose veins of lower extremity     Backache     Headache, chronic daily     IUD (intrauterine device) in place     Psoriasis of scalp     Morbid  obesity (H)     H/O cold sores     Acute cholecystitis     Acute pancreatitis, unspecified complication status, unspecified pancreatitis type     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/2011,Proximal left tibia     Dyspepsia and other specified disorders of function of stomach     No Comments Provided     Gastroesophageal reflux disease      Headache     No Comments Provided     Herpes simplex     No Comments Provided     Lateral epicondylitis of elbow     No Comments Provided     Other depressive disorder     No Comments Provided     Other unspecified back disorder     No Comments Provided     Pain in joint, upper arm     No Comments Provided       CC Yarely Wise MD  [address] [address] on close of this encounter.

## 2022-11-28 NOTE — LETTER
11/28/2022       RE: Manju Berg  [address]  [place] [address]     Dear Colleague,    Thank you for referring your patient, Manju Berg, to the [facility] [place] at [facility]. Please see a copy of my visit note below.    [facility] Dermatology Note  Encounter Date: Nov 28, 2022  Store-and-Forward and Telephone ([phone]). Location of teledermatologist: [facility] [place].  Start time: 11:35. End time: 11:55.    Dermatology Problem List:  1. Plaque psoriasis: scalp, trunk, with some ear involvement  - methotrexate 15 mg weekly, augmented betamethasone lotion  - in reserve: apremilast  - prior: augmented betamethasone gel, coal tar/salcylic acid shampoo, ketoconazole shampoo, zinc pyrithione shampoo, compounded betamethasone/calcipotriene solution, Excimer    ____________________________________________    Assessment & Plan:     1. Plaque psoriasis: insurance did not cover apremilast, preferred methotrexate, cyclosporine, acitretin, PUVA. The latter 3 are not appropriate medications for this patient. Discussed risks/benefits of methotrexate and will start. If refractory, would try to obtain apremilast.  - methotrexate 15 mg weekly with folate supplementation  - CBC, CMP, hepatitis panel, QFN; repeat CBC/CMP at 1 week, 1 month, then q3 months  - topicals    Procedures Performed:    None    Follow-up: 3 months    Staff:     Pierce Velasquez MD, FAAD   of Dermatology  Department of Dermatology  [facility] [employer] [facility]    ____________________________________________    CC: Telephone (Starting Excimer for recalcitrant scalp psoriasis, 6 mo follow-up )    HPI:  Ms. Manju Berg is a(n) 52 year old female who presents today as a return patient for psoriasis    Psoriasis - insurance denied   - now has spread to abdomen  -  scalp still quite active    Patient is otherwise feeling well, without additional skin concerns.    Labs Reviewed:  -10/2022 ALT downtrending    Physical Exam:  Vitals: There were no vitals taken for this visit.  SKIN: Teledermatology photos were reviewed; image quality and interpretability: acceptable. Image date: 22.  - erythematous scaly papules on the abdomen  - No other lesions of concern on areas examined.     Medications:  Current Outpatient Medications   Medication     albuterol (PROAIR HFA/PROVENTIL HFA/VENTOLIN HFA) 108 (90 Base) MCG/ACT inhaler     albuterol (PROVENTIL) (2.5 MG/3ML) 0.083% neb solution     augmented betamethasone dipropionate (DIPROLENE) 0.05 % external lotion     budesonide-formoterol (SYMBICORT) 80-4.5 MCG/ACT Inhaler     buPROPion (WELLBUTRIN XL) 300 MG 24 hr tablet     CALCIUM 600 + D 600-200 MG-UNIT OR TABS     cetirizine (ZYRTEC) 10 MG tablet     chlorzoxazone (PARAFON FORTE) 500 MG tablet     Cholecalciferol (VITAMIN D) 2000 UNITS tablet     fluticasone (FLONASE) 50 MCG/ACT nasal spray     fluvoxaMINE (LUVOX) 50 MG tablet     LANsoprazole (PREVACID) 30 MG DR capsule     lidocaine (LIDODERM) 5 % patch     linaclotide (LINZESS) 72 MCG capsule     naproxen (NAPROSYN) 500 MG tablet     valACYclovir (VALTREX) 1000 mg tablet     apremilast (OTEZLA) 30 MG tablet     augmented betamethasone dipropionate (DIPROLENE) 0.05 % external gel     No current facility-administered medications for this visit.      Past Medical/Surgical History:   Patient Active Problem List   Diagnosis     Recurrent herpes labialis     GERD (gastroesophageal reflux disease)     Other specified counseling     Mild intermittent asthma     Depression, major, single episode     Family history of colon cancer     CARDIOVASCULAR SCREENING; LDL GOAL LESS THAN 160     Pain in joint, upper arm     Benign neoplasm of soft tissues     Constipation     Vitamin D deficiency     Anxiety state      delivery delivered      Leg pain, bilateral     Pain of finger of right hand     Prediabetes     Shoulder (girdle) dystocia during labor and deliver, delivered     Toe pain, left     Varicose veins of lower extremity     Backache     Headache, chronic daily     IUD (intrauterine device) in place     Psoriasis of scalp     Morbid obesity (H)     H/O cold sores     Acute cholecystitis     Acute pancreatitis, unspecified complication status, unspecified pancreatitis type     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      Sincerely,    Pierce Velasquez MD

## 2022-11-28 NOTE — NURSING NOTE
Chief Complaint   Patient presents with     Telephone     Starting Excimer for recalcitrant scalp psoriasis, 6 mo follow-up      Teledermatology Nurse Call Patients:     Are you in the [facility] at the time of the encounter? yes    Today's visit will be billed to you and your insurance.    A teledermatology visit is not as thorough as an in-person visit and the quality of the photograph sent may not be of the same quality as that taken by the dermatology clinic.

## 2022-11-29 ENCOUNTER — TRANSFERRED RECORDS (OUTPATIENT)
Dept: HEALTH INFORMATION MANAGEMENT | Facility: CLINIC | Age: 52
End: 2022-11-29

## 2022-11-30 NOTE — TELEPHONE ENCOUNTER
MEDICATION APPEAL DENIED    Medication: Dayo    Denial Date: 11/21/2022    Denial Rational: denial upheld

## 2022-12-05 NOTE — TELEPHONE ENCOUNTER
Please advise. Spoke with patient about denials and free drug program. Patient states that hs has elevated LFTs and need to know if Otezla would be safe to be on. If no patient would to know if there are any other alternatives available.

## 2022-12-05 NOTE — TELEPHONE ENCOUNTER
Will hold off on treatment until LFTs normalize, then proceed with methotrexate. See MyChart. Thanks!

## 2022-12-06 NOTE — TELEPHONE ENCOUNTER
See 11/30/2022 mychart encounter for more information. Patient was replied to by provider in that message. Closing this encounter.    Jona Maynard, EMT

## 2022-12-08 ENCOUNTER — LAB (OUTPATIENT)
Dept: LAB | Facility: CLINIC | Age: 52
End: 2022-12-08
Payer: COMMERCIAL

## 2022-12-08 DIAGNOSIS — D84.9 IMMUNOSUPPRESSION (H): ICD-10-CM

## 2022-12-08 LAB
ALBUMIN SERPL BCG-MCNC: 4.8 G/DL (ref 3.5–5.2)
ALP SERPL-CCNC: 125 U/L (ref 35–104)
ALT SERPL W P-5'-P-CCNC: 109 U/L (ref 10–35)
ANION GAP SERPL CALCULATED.3IONS-SCNC: 11 MMOL/L (ref 7–15)
AST SERPL W P-5'-P-CCNC: 29 U/L (ref 10–35)
BASOPHILS # BLD AUTO: 0.1 10E3/UL (ref 0–0.2)
BASOPHILS NFR BLD AUTO: 1 %
BILIRUB SERPL-MCNC: 0.4 MG/DL
BUN SERPL-MCNC: 20.6 MG/DL (ref 6–20)
CALCIUM SERPL-MCNC: 10.1 MG/DL (ref 8.6–10)
CHLORIDE SERPL-SCNC: 103 MMOL/L (ref 98–107)
CREAT SERPL-MCNC: 0.83 MG/DL (ref 0.51–0.95)
DEPRECATED HCO3 PLAS-SCNC: 27 MMOL/L (ref 22–29)
EOSINOPHIL # BLD AUTO: 0.3 10E3/UL (ref 0–0.7)
EOSINOPHIL NFR BLD AUTO: 5 %
ERYTHROCYTE [DISTWIDTH] IN BLOOD BY AUTOMATED COUNT: 11.9 % (ref 10–15)
GFR SERPL CREATININE-BSD FRML MDRD: 85 ML/MIN/1.73M2
GLUCOSE SERPL-MCNC: 100 MG/DL (ref 70–99)
HBV CORE AB SERPL QL IA: NONREACTIVE
HBV SURFACE AB SERPL IA-ACNC: 742.37 M[IU]/ML
HBV SURFACE AB SERPL IA-ACNC: REACTIVE M[IU]/ML
HBV SURFACE AG SERPL QL IA: NONREACTIVE
HCT VFR BLD AUTO: 43.7 % (ref 35–47)
HCV AB SERPL QL IA: NONREACTIVE
HGB BLD-MCNC: 14.8 G/DL (ref 11.7–15.7)
HOLD SPECIMEN: NORMAL
IMM GRANULOCYTES # BLD: 0 10E3/UL
IMM GRANULOCYTES NFR BLD: 0 %
LYMPHOCYTES # BLD AUTO: 1.9 10E3/UL (ref 0.8–5.3)
LYMPHOCYTES NFR BLD AUTO: 30 %
MCH RBC QN AUTO: 28.3 PG (ref 26.5–33)
MCHC RBC AUTO-ENTMCNC: 33.9 G/DL (ref 31.5–36.5)
MCV RBC AUTO: 84 FL (ref 78–100)
MONOCYTES # BLD AUTO: 0.3 10E3/UL (ref 0–1.3)
MONOCYTES NFR BLD AUTO: 5 %
NEUTROPHILS # BLD AUTO: 3.8 10E3/UL (ref 1.6–8.3)
NEUTROPHILS NFR BLD AUTO: 59 %
NRBC # BLD AUTO: 0 10E3/UL
NRBC BLD AUTO-RTO: 0 /100
PLATELET # BLD AUTO: 299 10E3/UL (ref 150–450)
POTASSIUM SERPL-SCNC: 4.3 MMOL/L (ref 3.4–5.3)
PROT SERPL-MCNC: 7.4 G/DL (ref 6.4–8.3)
RBC # BLD AUTO: 5.23 10E6/UL (ref 3.8–5.2)
SODIUM SERPL-SCNC: 141 MMOL/L (ref 136–145)
WBC # BLD AUTO: 6.4 10E3/UL (ref 4–11)

## 2022-12-08 PROCEDURE — 86706 HEP B SURFACE ANTIBODY: CPT | Mod: 90 | Performed by: PATHOLOGY

## 2022-12-08 PROCEDURE — 36415 COLL VENOUS BLD VENIPUNCTURE: CPT | Performed by: PATHOLOGY

## 2022-12-08 PROCEDURE — 99000 SPECIMEN HANDLING OFFICE-LAB: CPT | Performed by: PATHOLOGY

## 2022-12-08 PROCEDURE — 87340 HEPATITIS B SURFACE AG IA: CPT | Mod: 90 | Performed by: PATHOLOGY

## 2022-12-08 PROCEDURE — 86704 HEP B CORE ANTIBODY TOTAL: CPT | Mod: 90 | Performed by: PATHOLOGY

## 2022-12-08 PROCEDURE — 86481 TB AG RESPONSE T-CELL SUSP: CPT | Mod: 90 | Performed by: PATHOLOGY

## 2022-12-08 PROCEDURE — 85025 COMPLETE CBC W/AUTO DIFF WBC: CPT | Performed by: PATHOLOGY

## 2022-12-08 PROCEDURE — 80053 COMPREHEN METABOLIC PANEL: CPT | Performed by: PATHOLOGY

## 2022-12-08 PROCEDURE — 86803 HEPATITIS C AB TEST: CPT | Mod: 90 | Performed by: PATHOLOGY

## 2022-12-10 LAB
QUANTIFERON MITOGEN: 10 IU/ML
QUANTIFERON NIL TUBE: 0.03 IU/ML
QUANTIFERON TB1 TUBE: 0.04 IU/ML
QUANTIFERON TB2 TUBE: 0.04

## 2022-12-11 LAB
GAMMA INTERFERON BACKGROUND BLD IA-ACNC: 0.03 IU/ML
M TB IFN-G BLD-IMP: NEGATIVE
M TB IFN-G CD4+ BCKGRND COR BLD-ACNC: 9.97 IU/ML
MITOGEN IGNF BCKGRD COR BLD-ACNC: 0.01 IU/ML
MITOGEN IGNF BCKGRD COR BLD-ACNC: 0.01 IU/ML

## 2022-12-14 DIAGNOSIS — R74.8 ELEVATED LIVER ENZYMES: Primary | ICD-10-CM

## 2022-12-20 ENCOUNTER — LAB (OUTPATIENT)
Dept: LAB | Facility: CLINIC | Age: 52
End: 2022-12-20
Payer: COMMERCIAL

## 2022-12-20 ENCOUNTER — VIRTUAL VISIT (OUTPATIENT)
Dept: FAMILY MEDICINE | Facility: CLINIC | Age: 52
End: 2022-12-20
Payer: COMMERCIAL

## 2022-12-20 DIAGNOSIS — F33.42 RECURRENT MAJOR DEPRESSIVE DISORDER, IN FULL REMISSION (H): ICD-10-CM

## 2022-12-20 DIAGNOSIS — N95.1 MENOPAUSAL SYNDROME (HOT FLASHES): ICD-10-CM

## 2022-12-20 DIAGNOSIS — F41.9 ANXIETY: ICD-10-CM

## 2022-12-20 DIAGNOSIS — R74.8 ELEVATED LIVER ENZYMES: ICD-10-CM

## 2022-12-20 DIAGNOSIS — R51.9 CHRONIC DAILY HEADACHE: Primary | ICD-10-CM

## 2022-12-20 DIAGNOSIS — M54.2 CERVICALGIA: ICD-10-CM

## 2022-12-20 DIAGNOSIS — R06.83 SNORING: ICD-10-CM

## 2022-12-20 LAB
ALBUMIN SERPL BCG-MCNC: 4.6 G/DL (ref 3.5–5.2)
ALP SERPL-CCNC: 82 U/L (ref 35–104)
ALT SERPL W P-5'-P-CCNC: 33 U/L (ref 10–35)
AST SERPL W P-5'-P-CCNC: 21 U/L (ref 10–35)
BILIRUB DIRECT SERPL-MCNC: <0.2 MG/DL (ref 0–0.3)
BILIRUB SERPL-MCNC: 0.3 MG/DL
PROT SERPL-MCNC: 7.1 G/DL (ref 6.4–8.3)
TSH SERPL DL<=0.005 MIU/L-ACNC: 1.06 UIU/ML (ref 0.3–4.2)

## 2022-12-20 PROCEDURE — 99214 OFFICE O/P EST MOD 30 MIN: CPT | Mod: 95 | Performed by: FAMILY MEDICINE

## 2022-12-20 PROCEDURE — 84443 ASSAY THYROID STIM HORMONE: CPT | Performed by: PATHOLOGY

## 2022-12-20 PROCEDURE — 80076 HEPATIC FUNCTION PANEL: CPT | Performed by: PATHOLOGY

## 2022-12-20 PROCEDURE — 36415 COLL VENOUS BLD VENIPUNCTURE: CPT | Performed by: PATHOLOGY

## 2022-12-20 RX ORDER — BUPROPION HYDROCHLORIDE 300 MG/1
300 TABLET ORAL EVERY MORNING
Qty: 90 TABLET | Refills: 1 | Status: SHIPPED | OUTPATIENT
Start: 2022-12-20 | End: 2023-05-10

## 2022-12-20 RX ORDER — FLUVOXAMINE MALEATE 50 MG
50 TABLET ORAL 2 TIMES DAILY
Qty: 180 TABLET | Refills: 1 | Status: SHIPPED | OUTPATIENT
Start: 2022-12-20 | End: 2023-05-10

## 2022-12-20 RX ORDER — CHLORZOXAZONE 500 MG/1
TABLET ORAL
Qty: 90 TABLET | Refills: 3 | Status: SHIPPED | OUTPATIENT
Start: 2022-12-20 | End: 2023-06-15

## 2022-12-20 NOTE — PROGRESS NOTES
"Manju is a 52 year old who is being evaluated via a billable telephone visit.      What phone number would you like to be contacted at? [phone]  How would you like to obtain your AVS? Capri    Distant Location (provider location):  Off-site    Assessment & Plan     Chronic daily headache  Cervicalgia  Unclear if component of migraine vs tension type on combination. rec eval of milly, f/u with neuro and trial of PHYSICAL THERAPY. Can continue muscle relaxants.   - chlorzoxazone (PARAFON FORTE) 500 MG tablet; TAKE 1/2-1&1/2 TABLETS BY MOUTH 3-4 TIMES DAILY AS NEEDED TO RELAX MUSCLES  - Adult Neurology  Referral; Future  - Physical Therapy Referral; Future    Menopausal syndrome (hot flashes)  Noting more intermittent hot flashes.no recent tsh so will screen for that  - TSH with free T4 reflex; Future    Snoring  Improving with wt loss. rec eval through sleep clinic  - Adult Sleep Eval & Management  Referral; Future    Anxiety  Recurrent major depressive disorder, in full remission (H)  Well controlled currently.. continue same meds  - fluvoxaMINE (LUVOX) 50 MG tablet; Take 1 tablet (50 mg) by mouth 2 times daily  - buPROPion (WELLBUTRIN XL) 300 MG 24 hr tablet; Take 1 tablet (300 mg) by mouth every morning    Recent Choledocholithiasis s/p biliary sphincterotomy and balloon extraction- sx's resolved. Repeat liver function test's planned today. F/u with gi prn if recurrent sx's.            BMI:   Estimated body mass index is 34.67 kg/m  as calculated from the following:    Height as of 9/25/22: 1.626 m (5' 4\").    Weight as of 9/25/22: 91.6 kg (202 lb).         Return in about 3 months (around 3/20/2023) for Routine preventive.    Yarely Wise MD  [facility]    Evelyn Nunes is a 52 year old, presenting for the following health issues:  Headache      HPI     Concern - headache  Onset: intermittently for years  Description: sides  Intensity: Moderate " 6/7-10  Progression of Symptoms:  intermittent  Accompanying Signs & Symptoms: none  Previous history of similar problem: none  Precipitating factors:        Worsened by: none  Alleviating factors:        Improved by: muscle relaxer  Therapies tried and outcome: muscle relaxer, effective    Overall doing much better since recent hospitalizations  Eating and drinking normally. No abd   Lost 15# with eating healthier and low fat diet.   No f/u with gi planned (had appt with them prior to 2nd hospitalization)  Has routine visits with gi for the constipation-on linzess.     Starting to notice feeling hot on daily basis. Not constant but intermittent feeling of being very warm. Lasts 1-2 hours.   No cycles with IUD, thinks it been a few years in place  TB test recently was negative.     Headaches since high school  Dx as chronic daily headaches. Saw neuro long time ago.   Was on low dose nortriptyline for few years   Tried again few years ago and caused severe fatigue. Also gabapentin 100 mg in past. Thinks severe fatigue also with this med  Has had chiro care and trigger point injections (this has helped) and muscle relaxer (helpful) along with tylenol. Sleep, hot and ice can also help.   Constant discomfort/ache in the back of her head. Sensitive to bright lights, computer screens. No impact on vision. No associated with n/v.   No confusion/speech/strength issues. No numbness/tingling.   Pain can get up to 6/10.   Can keep working with the headache but only with meds if headaches above 3.   Sister has shoulder/msk pain issues but no migraines.   Can wake with he headaches but usually start afternoon to later in the day.  No jaw pain   No hx of ROZINA but started to snore the last few years. ? Better since loosing wt.               Objective           Vitals:  No vitals were obtained today due to virtual visit.    Physical Exam healthy, alert and no distress  PSYCH: Alert and oriented times 3; coherent speech, normal    rate and volume, able to articulate logical thoughts, able   to abstract reason, no tangential thoughts, no hallucinations   or delusions  Her affect is normal and pleasant  RESP: No cough, no audible wheezing, able to talk in full sentences  Remainder of exam unable to be completed due to telephone visits                Phone call duration: 34 minutes

## 2022-12-21 NOTE — RESULT ENCOUNTER NOTE
Manju,  It was a pleasure to talk with you yesterday.  Your thyroid test was normal.  Thankfully, your liver panel is completely back to normal range.  This is great news!   Please MyChart or call if you have any concerns or questions.   Sincerely,  Yarely Wise MD

## 2023-01-04 ENCOUNTER — TELEPHONE (OUTPATIENT)
Dept: FAMILY MEDICINE | Facility: CLINIC | Age: 53
End: 2023-01-04

## 2023-01-04 NOTE — TELEPHONE ENCOUNTER
chlorzoxazone (PARAFON FORTE) 500 MG tablet    Please initiate PA  Login to: [email or web address]  Key: [id number]

## 2023-01-10 ENCOUNTER — THERAPY VISIT (OUTPATIENT)
Dept: PHYSICAL THERAPY | Facility: CLINIC | Age: 53
End: 2023-01-10
Attending: FAMILY MEDICINE
Payer: COMMERCIAL

## 2023-01-10 DIAGNOSIS — R51.9 CHRONIC DAILY HEADACHE: ICD-10-CM

## 2023-01-10 DIAGNOSIS — M54.2 CERVICALGIA: ICD-10-CM

## 2023-01-10 PROCEDURE — 97140 MANUAL THERAPY 1/> REGIONS: CPT | Mod: GP

## 2023-01-10 PROCEDURE — 97110 THERAPEUTIC EXERCISES: CPT | Mod: GP

## 2023-01-10 PROCEDURE — 97161 PT EVAL LOW COMPLEX 20 MIN: CPT | Mod: GP

## 2023-01-10 NOTE — PROGRESS NOTES
Physical Therapy Initial Evaluation  1/10/2023     Precautions/Restrictions/MD instructions: eval and treat    Therapist Assessment: Manju Berg is a 52 year old female patient presenting to Physical Therapy with neck pain and headaches. Patient demonstrates limited cervical ROM, deep neck flexor weakness, limited thoracic spine mobility and hypertonicity in cervical musculature. These impairments limit their ability to work, look up, drive and sleep without pain. Skilled PT services are necessary in order to reduce impairments and improve independent function.    Subjective:   Chief Complaint: headaches 5x/week  Associated symptoms: neck pain (R>L)  Onset date: since patient was a teenager   ELICEO: unknown   Pain severity:  1/10 current, 5/10 worst  Location of pain: variable location, feels pain is in the top of her head  Quality of Pain: ache, tight muscles   Better: muscle relaxants, hot packs, ibuprofen, ice pack, TENS unit  Worse: computer work, stress, looking up, driving, bright lights, falling asleep  Progression of Symptoms since onset:  Since onset, these symptoms are Unchanged  Previous treatment: has included chiropractor (2-3x/week), medication; effect was good  Previous Functional Status:  fully functional prior to pain onset/injury.      General health as reported by patient: good.    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 [employer] Job duties: sitting, computer work  Current exercise regimen/Recreational activities: walking   Barriers to treatment: none    Red Flags: (Bold when present) - reviewed the following and denies  Vertebrobasilar Artery   Symptom   Dysphagia Drop Attack   Dysarthria Dizziness   Diplopia Paresthesia of lips   Spinal Cord  Symptom   Bi/Quadriparesthesia Ataxia   Hemiparesthesia  Urinary incontinence   Bi/Quadriparesis Fecal incontinence     Cranial Nerves - bold when abnormality is present  Cranial nerve   II-Scotoma VIII-Loss of Balance   III-Diplopia VIII-Hypoacousia   V-Facial paresthesia IX-Dysarthria   VII-Altered Taste IX-Dysphagia    X-Nausea         Patient's Goal(s): decrease headaches     Objective  Neuro Screen    Myotome    R L   Upper trap 5/5 5/5   Deltoid/  Supraspinatus 5/5 5/5   Biceps/wrist ex 5/5 5/5   Triceps/ wrist flex 5/5 5/5   Thumb ext/Ul. Dev 5/5 5/5   Adduction of fingers 5/5 5/5   KEY: H=Hypersensitive; h=hyposensitive; N=normal; NT=not tested      Posture/Observation:  Rounded shoulders, increased thoracic kyphosis        AROM: (Major, Moderate, Minimal or Nil loss)  Movement Loss  Pain   Flexion wnl    Extension Min     Left Rotation wnl    Right Rotation min Pain right side   Left Side Bending wnl    Right Side bending wnl        Palpation: tenderness right suboccipitals, right levator scap, left upper trap         Muscular Testing:  Deep neck flexor endurance: unable to perform without engaging superficial musculature     Shoulder ROM (Degrees) Flexion Abduction   Left wnl wnl   Right wnl wnl       Special Tests   Thoracic spine mobility: limited right rotation and extension      ASSESSMENT/PLAN  Patient is a 52 year old female with cervical complaints.    Patient has the following significant findings with corresponding treatment plan.                Diagnosis 1:  Neck pain with headaches    Pain -  hot/cold therapy, manual therapy, self management, education, directional preference exercise and home program  Decreased ROM/flexibility - manual therapy, therapeutic exercise and home program  Decreased joint mobility - manual therapy, therapeutic exercise and home program  Decreased strength - therapeutic exercise, therapeutic activities and home program  Impaired posture - neuro re-education and home program    Therapy Evaluation Codes:   1) History comprised  of:   Personal factors that impact the plan of care:      Time since onset of symptoms.    Comorbidity factors that impact the plan of care are:      Asthma, Migraines/headaches and Overweight.     Medications impacting care: Anti-depressant, Anti-inflammatory, Muscle relaxant and Pain.  2) Examination of Body Systems comprised of:   Body structures and functions that impact the plan of care:      Cervical spine.   Activity limitations that impact the plan of care are:      Driving, Reading/Computer work, Working and Sleeping.  3) Clinical presentation characteristics are:   Stable/Uncomplicated.  4) Decision-Making    Low complexity using standardized patient assessment instrument and/or measureable assessment of functional outcome.  Cumulative Therapy Evaluation is: Low complexity.    Previous and current functional limitations:  (See Goal Flow Sheet for this information)    Short term and Long term goals: (See Goal Flow Sheet for this information)     Communication ability:  Patient appears to be able to clearly communicate and understand verbal and written communication and follow directions correctly.  Treatment Explanation - The following has been discussed with the patient:   RX ordered/plan of care  Anticipated outcomes  Possible risks and side effects  This patient would benefit from PT intervention to resume normal activities.   Rehab potential is good.    Frequency:  1 X week, once daily  Duration:  for 3 weeks tapering to 2 X a month over 2 months  Discharge Plan:  Achieve all LTG.  Independent in home treatment program.  Reach maximal therapeutic benefit.    Please refer to the daily flowsheet for treatment today, total treatment time and time spent performing 1:1 timed codes.

## 2023-01-23 ENCOUNTER — LAB (OUTPATIENT)
Dept: LAB | Facility: CLINIC | Age: 53
End: 2023-01-23
Payer: COMMERCIAL

## 2023-01-23 ENCOUNTER — OFFICE VISIT (OUTPATIENT)
Dept: PULMONOLOGY | Facility: CLINIC | Age: 53
End: 2023-01-23
Payer: COMMERCIAL

## 2023-01-23 VITALS
HEIGHT: 64 IN | OXYGEN SATURATION: 96 % | RESPIRATION RATE: 14 BRPM | HEART RATE: 74 BPM | DIASTOLIC BLOOD PRESSURE: 81 MMHG | SYSTOLIC BLOOD PRESSURE: 116 MMHG | WEIGHT: 189 LBS | BODY MASS INDEX: 32.27 KG/M2

## 2023-01-23 DIAGNOSIS — J45.40 MODERATE PERSISTENT ASTHMA WITHOUT COMPLICATION: ICD-10-CM

## 2023-01-23 DIAGNOSIS — D84.9 IMMUNOSUPPRESSION (H): ICD-10-CM

## 2023-01-23 DIAGNOSIS — R05.3 CHRONIC COUGH: ICD-10-CM

## 2023-01-23 DIAGNOSIS — J45.40 MODERATE PERSISTENT ASTHMA, UNSPECIFIED WHETHER COMPLICATED: ICD-10-CM

## 2023-01-23 LAB
ALBUMIN SERPL-MCNC: 4.3 G/DL (ref 3.4–5)
ALP SERPL-CCNC: 73 U/L (ref 40–150)
ALT SERPL W P-5'-P-CCNC: 45 U/L (ref 0–50)
ANION GAP SERPL CALCULATED.3IONS-SCNC: 5 MMOL/L (ref 3–14)
AST SERPL W P-5'-P-CCNC: 19 U/L (ref 0–45)
BASOPHILS # BLD AUTO: 0.1 10E3/UL (ref 0–0.2)
BASOPHILS NFR BLD AUTO: 1 %
BILIRUB SERPL-MCNC: 0.3 MG/DL (ref 0.2–1.3)
BUN SERPL-MCNC: 15 MG/DL (ref 7–30)
CALCIUM SERPL-MCNC: 9.7 MG/DL (ref 8.5–10.1)
CHLORIDE BLD-SCNC: 109 MMOL/L (ref 94–109)
CO2 SERPL-SCNC: 30 MMOL/L (ref 20–32)
CREAT SERPL-MCNC: 0.78 MG/DL (ref 0.52–1.04)
EOSINOPHIL # BLD AUTO: 0.2 10E3/UL (ref 0–0.7)
EOSINOPHIL NFR BLD AUTO: 3 %
ERYTHROCYTE [DISTWIDTH] IN BLOOD BY AUTOMATED COUNT: 12.6 % (ref 10–15)
GFR SERPL CREATININE-BSD FRML MDRD: >90 ML/MIN/1.73M2
GLUCOSE BLD-MCNC: 93 MG/DL (ref 70–99)
HCT VFR BLD AUTO: 41.5 % (ref 35–47)
HGB BLD-MCNC: 14.1 G/DL (ref 11.7–15.7)
IMM GRANULOCYTES # BLD: 0 10E3/UL
IMM GRANULOCYTES NFR BLD: 0 %
LYMPHOCYTES # BLD AUTO: 2.3 10E3/UL (ref 0.8–5.3)
LYMPHOCYTES NFR BLD AUTO: 39 %
MCH RBC QN AUTO: 29.4 PG (ref 26.5–33)
MCHC RBC AUTO-ENTMCNC: 34 G/DL (ref 31.5–36.5)
MCV RBC AUTO: 87 FL (ref 78–100)
MONOCYTES # BLD AUTO: 0.3 10E3/UL (ref 0–1.3)
MONOCYTES NFR BLD AUTO: 6 %
NEUTROPHILS # BLD AUTO: 3.1 10E3/UL (ref 1.6–8.3)
NEUTROPHILS NFR BLD AUTO: 51 %
NRBC # BLD AUTO: 0 10E3/UL
NRBC BLD AUTO-RTO: 0 /100
PLATELET # BLD AUTO: 283 10E3/UL (ref 150–450)
POTASSIUM BLD-SCNC: 3.9 MMOL/L (ref 3.4–5.3)
PROT SERPL-MCNC: 7.5 G/DL (ref 6.8–8.8)
RBC # BLD AUTO: 4.8 10E6/UL (ref 3.8–5.2)
SODIUM SERPL-SCNC: 144 MMOL/L (ref 133–144)
WBC # BLD AUTO: 5.9 10E3/UL (ref 4–11)

## 2023-01-23 PROCEDURE — 36415 COLL VENOUS BLD VENIPUNCTURE: CPT

## 2023-01-23 PROCEDURE — 99215 OFFICE O/P EST HI 40 MIN: CPT | Performed by: INTERNAL MEDICINE

## 2023-01-23 PROCEDURE — 80053 COMPREHEN METABOLIC PANEL: CPT

## 2023-01-23 PROCEDURE — 85025 COMPLETE CBC W/AUTO DIFF WBC: CPT

## 2023-01-23 RX ORDER — ALBUTEROL SULFATE 0.83 MG/ML
2.5 SOLUTION RESPIRATORY (INHALATION) EVERY 6 HOURS PRN
Qty: 90 ML | Refills: 3 | Status: SHIPPED | OUTPATIENT
Start: 2023-01-23 | End: 2023-12-20

## 2023-01-23 RX ORDER — ALBUTEROL SULFATE 90 UG/1
2 AEROSOL, METERED RESPIRATORY (INHALATION) EVERY 4 HOURS PRN
Qty: 25.5 G | Refills: 3 | Status: SHIPPED | OUTPATIENT
Start: 2023-01-23 | End: 2023-12-20

## 2023-01-23 RX ORDER — PREDNISONE 20 MG/1
40 TABLET ORAL DAILY
Qty: 10 TABLET | Refills: 0 | Status: SHIPPED | OUTPATIENT
Start: 2023-01-23 | End: 2023-01-28

## 2023-01-23 RX ORDER — BUDESONIDE AND FORMOTEROL FUMARATE DIHYDRATE 160; 4.5 UG/1; UG/1
2 AEROSOL RESPIRATORY (INHALATION) 2 TIMES DAILY
Qty: 10.2 G | Refills: 11 | Status: SHIPPED | OUTPATIENT
Start: 2023-01-23 | End: 2023-12-20

## 2023-01-23 RX ORDER — BUDESONIDE AND FORMOTEROL FUMARATE DIHYDRATE 80; 4.5 UG/1; UG/1
2 AEROSOL RESPIRATORY (INHALATION) 2 TIMES DAILY
Qty: 30.4 G | Refills: 3 | Status: CANCELLED | OUTPATIENT
Start: 2023-01-23

## 2023-01-23 RX ORDER — BENZONATATE 200 MG/1
200 CAPSULE ORAL 3 TIMES DAILY PRN
Qty: 90 CAPSULE | Refills: 3 | Status: SHIPPED | OUTPATIENT
Start: 2023-01-23 | End: 2023-05-10

## 2023-01-23 ASSESSMENT — ASTHMA QUESTIONNAIRES
ACUTE_EXACERBATION_TODAY: NO
QUESTION_3 LAST FOUR WEEKS HOW OFTEN DID YOUR ASTHMA SYMPTOMS (WHEEZING, COUGHING, SHORTNESS OF BREATH, CHEST TIGHTNESS OR PAIN) WAKE YOU UP AT NIGHT OR EARLIER THAN USUAL IN THE MORNING: FOUR OR MORE NIGHTS A WEEK
QUESTION_2 LAST FOUR WEEKS HOW OFTEN HAVE YOU HAD SHORTNESS OF BREATH: ONCE OR TWICE A WEEK
QUESTION_5 LAST FOUR WEEKS HOW WOULD YOU RATE YOUR ASTHMA CONTROL: SOMEWHAT CONTROLLED
ACT_TOTALSCORE: 12
QUESTION_4 LAST FOUR WEEKS HOW OFTEN HAVE YOU USED YOUR RESCUE INHALER OR NEBULIZER MEDICATION (SUCH AS ALBUTEROL): THREE OR MORE TIMES PER DAY
ACT_TOTALSCORE: 12
QUESTION_1 LAST FOUR WEEKS HOW MUCH OF THE TIME DID YOUR ASTHMA KEEP YOU FROM GETTING AS MUCH DONE AT WORK, SCHOOL OR AT HOME: SOME OF THE TIME

## 2023-01-23 ASSESSMENT — PAIN SCALES - GENERAL: PAINLEVEL: NO PAIN (0)

## 2023-01-23 NOTE — PROGRESS NOTES
"Manju Berg's goals for this visit include: Return  She requests these members of her care team be copied on today's visit information: PCP    PCP: Yarely Wise    Referring Provider:  No referring provider defined for this encounter.    /81   Pulse 74   Resp 14   Ht 1.626 m (5' 4.02\")   Wt 85.7 kg (189 lb)   SpO2 96%   BMI 32.43 kg/m      Do you need any medication refills at today's visit? JONATHAN Weinstein LPN  Pulmonary Medicine:  [facility]  Phone: [phone] Fax: [phone]      "

## 2023-01-23 NOTE — PROGRESS NOTES
Pulmonary Clinic Return Patient Visit  Reason for Visit: Asthma/Cough  History of Present Illness  Manju Berg is a 52-year-old female who presents to clinic for follow up of a chronic cough. I last saw her in 7/2022.  To briefly review, Manju was diagnosed with asthma as a teenager which was mostly associated with exercise (exercise-induced) and only requiring albuterol inhaler as needed.  More recently, her asthma has been poorly controlled with frequent asthma flares as well as requirements for antibiotics as well as steroids.  She was started on a regimen of Symbicort 80 mcg 2 puffs twice daily and albuterol as needed.  When I saw her last in clinic, she was recuperating from her recent asthma flare which was likely triggered due to seasonal allergies with associated postnasal drip due to allergic rhinitis.  She did have an eventful 2022 with repeated admissions in the fall for gallstone pancreatitis and required ERCP and cholecystectomy.  Today, she is having worsened symptoms likely exacerbated by a current viral URI that she is dealing with. I had treated her about 2 months ago for an asthma flare with prednisone. Otherwise, she has done mostly okay for the most part. She has also noted significant control of her environmental triggers and associated allergic rhinitis with postnasal drip.    She denies any chest pain, no orthopnea, no PND, no leg swellings, no palpitations etc. she denies any dysphagia no dysphonia.  Has pets including a cat and 2 dogs and she is not allergic to them    Review of Systems:  10 of 14 systems reviewed and are negative unless otherwise stated in HPI.    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/2011,Proximal left tibia     Dyspepsia and other specified disorders of function of stomach     No Comments  Provided     Gastroesophageal reflux disease      Headache     No Comments Provided     Herpes simplex     No Comments Provided     Lateral epicondylitis of elbow     No Comments Provided     Other depressive disorder     No Comments Provided     Other unspecified back disorder     No Comments Provided     Pain in joint, upper arm     No Comments Provided       Past Surgical History:   Procedure Laterality Date     ------------OTHER------------- Right 12/07/2018    5th metatarsal fracture     BUNIONECTOMY Bilateral 1999 1999     C/SECTION, LOW TRANSVERSE  2013     ENDOSCOPIC ULTRASOUND UPPER GASTROINTESTINAL TRACT (GI) N/A 9/27/2022    Procedure: ENDOSCOPIC ULTRASOUND, ESOPHAGOSCOPY / UPPER GASTROINTESTINAL TRACT (GI) WITH BIOPSY;  Surgeon: Alissa Lubin MD;  Location: [facility] OR     FOOT SURGERY Left 30 august 2018    fusion of the large toe 30 august 2018     LAPAROSCOPIC CHOLECYSTECTOMY N/A 9/28/2022    Procedure: Laparoscopic cholecystectomy;  Surgeon: Bertha Potts MD;  Location:  OR     OTHER SURGICAL HISTORY      EXCIS BARTHOLIN GLAND/CYST     OTHER SURGICAL HISTORY Left 03/16/2012     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     RELEASE CARPAL TUNNEL Bilateral     No Comments Provided     STRIP VEIN  2014    vein removal for varicose veins       Family History   Problem Relation Age of Onset     Depression Mother      Osteoporosis Mother      Mental Illness Mother      Hypertension Father         Hypertension     Hyperlipidemia Father         Hyperlipidemia     Colon Cancer Father 56        Cancer-colon     Bladder Cancer Father 62        Cancer,bladder     Obesity Father      Family History Negative Sister         Good Health     Family History Negative Son      Mental Illness Maternal Aunt      Mental Illness Maternal Uncle        Social History     Socioeconomic History     Marital status:      Spouse name: Not on file     Number of children: Not on file     Years of education: Not on  file     Highest education level: Not on file   Occupational History     Occupation: RN     Employer: OTHER   Tobacco Use     Smoking status: Never Smoker     Smokeless tobacco: Never Used   Substance and Sexual Activity     Alcohol use: Yes     Comment: Alcoholic Drinks/day: Drinks socially only     Drug use: Unknown     Types: Other     Comment: Drug use: No     Sexual activity: Not on file   Other Topics Concern     Not on file   Social History Narrative    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        . lives in [facility]. [facility] spouse     Social Determinants of Health     Financial Resource Strain: Not on file   Food Insecurity: Not on file   Transportation Needs: Not on file   Physical Activity: Not on file   Stress: Not on file   Social Connections: Not on file   Intimate Partner Violence: Not on file   Housing Stability: Not on file         No Known Allergies      Current Outpatient Medications:      albuterol (PROAIR HFA/PROVENTIL HFA/VENTOLIN HFA) 108 (90 Base) MCG/ACT inhaler, Inhale 2 puffs into the lungs every 4 hours as needed for shortness of breath / dyspnea or wheezing, Disp: 8.5 g, Rfl: 11     albuterol (PROVENTIL) (2.5 MG/3ML) 0.083% neb solution, Take 1 vial (2.5 mg) by nebulization every 6 hours as needed for shortness of breath / dyspnea or wheezing, Disp: 90 mL, Rfl: 3     augmented betamethasone dipropionate (DIPROLENE) 0.05 % external lotion, Apply topically daily, Disp: 180 mL, Rfl: 3     benzonatate (TESSALON) 200 MG capsule, Take 1 capsule (200 mg) by mouth 3 times daily as needed for cough, Disp: 90 capsule, Rfl: 3     budesonide-formoterol (SYMBICORT) 80-4.5 MCG/ACT Inhaler, Inhale 2 puffs into the lungs 2 times daily, Disp: 30.4 g, Rfl: 3     buPROPion (WELLBUTRIN XL) 300 MG 24 hr tablet, Take 1 tablet (300 mg) by mouth every morning, Disp: 90 tablet, Rfl: 1     CALCIUM 600 + D 600-200 MG-UNIT OR TABS, Take 1 tablet by mouth every evening, Disp: , Rfl:       "cetirizine (ZYRTEC) 10 MG tablet, Take 10 mg by mouth daily, Disp: , Rfl:      chlorzoxazone (PARAFON FORTE) 500 MG tablet, TAKE 1/2-1&1/2 TABLETS BY MOUTH 3-4 TIMES DAILY AS NEEDED TO RELAX MUSCLES, Disp: 90 tablet, Rfl: 3     Cholecalciferol (VITAMIN D) 2000 UNITS tablet, Take 2,000 Units by mouth daily., Disp: 100 tablet, Rfl: 3     fluticasone (FLONASE) 50 MCG/ACT nasal spray, Spray 1 spray into both nostrils daily, Disp: , Rfl:      fluvoxaMINE (LUVOX) 50 MG tablet, Take 1 tablet (50 mg) by mouth 2 times daily, Disp: 180 tablet, Rfl: 1     folic acid (FOLVITE) 1 MG tablet, Take 1 tablet (1 mg) by mouth daily, Disp: 90 tablet, Rfl: 3     LANsoprazole (PREVACID) 30 MG DR capsule, Take 1 capsule (30 mg) by mouth daily, Disp: 90 capsule, Rfl: 1     lidocaine (LIDODERM) 5 % patch, Place 1 patch onto the skin daily as needed for moderate pain Remove after 12 hours., Disp: 30 patch, Rfl: 0     methotrexate sodium 2.5 MG TABS, Take 6 tablets (15 mg) by mouth once a week, Disp: 30 tablet, Rfl: 4     naproxen (NAPROSYN) 500 MG tablet, Take 1 tablet (500 mg) by mouth 2 times daily as needed for moderate pain, Disp: 30 tablet, Rfl: 1     valACYclovir (VALTREX) 1000 mg tablet, take 2 tablets PO Q 12 hours x 2 doses at first signs of outbreak., Disp: 12 tablet, Rfl: 3     augmented betamethasone dipropionate (DIPROLENE) 0.05 % external gel, Apply topically daily (Patient not taking: Reported on 11/28/2022), Disp: 50 g, Rfl: 3     linaclotide (LINZESS) 72 MCG capsule, Take 72 mcg by mouth every morning (before breakfast) (Patient not taking: Reported on 1/23/2023), Disp: , Rfl:       Physical Exam:  /81   Pulse 74   Resp 14   Ht 1.626 m (5' 4.02\")   Wt 85.7 kg (189 lb)   SpO2 96%   BMI 32.43 kg/m    GENERAL: Well developed, well nourished, alert, and in no apparent distress.  HEENT: Normocephalic, atraumatic. PERRL, EOMI. Oral mucosa is moist. No perioral cyanosis.  NECK: supple, no masses, no thyromegaly.  RESP:  " Normal respiratory effort.  CTAB.  No rales, wheezes, rhonchi.  No cyanosis or clubbing.  CV: Normal S1, S2, regular rhythm, normal rate. No murmur.  No LE edema.   ABDOMEN:  Soft, non-tender, non-distended.   SKIN: warm and dry. No rash.  NEURO: AAOx3.  Normal gait.  Fluent speech.  PSYCH: mentation appears normal.   Results:  PFTs: Normal spirometric study, normal lung volumes, no evidence of airflow obstruction, normal flow volume loops as well as normal diffusion capacity  Most Recent [facility] Pulmonary Function Testing    FVC-Pred   Date Value Ref Range Status   01/12/2022 3.42 L      FVC-Pre   Date Value Ref Range Status   01/12/2022 4.16 L      FVC-%Pred-Pre   Date Value Ref Range Status   01/12/2022 121 %      FEV1-Pre   Date Value Ref Range Status   01/12/2022 3.54 L      FEV1-%Pred-Pre   Date Value Ref Range Status   01/12/2022 128 %      FEV1FVC-Pred   Date Value Ref Range Status   01/12/2022 81 %      FEV1FVC-Pre   Date Value Ref Range Status   01/12/2022 85 %      No results found for: 20029  FEFMax-Pred   Date Value Ref Range Status   01/12/2022 6.69 L/sec      FEFMax-Pre   Date Value Ref Range Status   01/12/2022 10.00 L/sec      FEFMax-%Pred-Pre   Date Value Ref Range Status   01/12/2022 149 %      ExpTime-Pre   Date Value Ref Range Status   01/12/2022 6.41 sec      FIFMax-Pre   Date Value Ref Range Status   01/12/2022 8.90 L/sec      FEV1FEV6-Pred   Date Value Ref Range Status   01/12/2022 82 %      FEV1FEV6-Pre   Date Value Ref Range Status   01/12/2022 85 %      No results found for: 20055  Imaging (personally reviewed in clinic today): CXR 01/12/2022  Minimal perihilar bronchial wall thickening.      Assessment and Plan:   Moderate persistent asthma  ACT score today is 12 down from 20. She is also having worsened symptoms likely exacerbated by a current viral URI that she is dealing with. I will escalate her regimen of Symbicort 160 mcg 2 puffs twice daily and albuterol as needed. I will also treat  her with prednisone 40 mg for 5 days.   Flonase has been helpful with seasonal allergies which are also triggers  Long conversation about trigger avoidance and we formulated an asthma action plan in clinic today.  Allergic rhinitis  Started on Flonase and has noticed improved symptoms  Questions and concerns were answered to the patient's satisfaction.  she was provided with my contact information should new questions or concerns arise in the interim.  She should return to clinic in 6 months.  I spent a total of 40 minutes face to face with Manju Berg during today's office visit. Over 50% of this time was spent counseling the patient and/or coordinating care regarding their pulmonary disease.    Up to date on Pneumovax (2010)  Liane Perez MD  Pulmonary, Critical Care and Sleep Medicine  [facility]  Pager: [phone]        The above note was dictated using voice recognition software and may include typographical errors. Please contact the author for any clarifications.

## 2023-02-03 ENCOUNTER — TELEPHONE (OUTPATIENT)
Dept: DERMATOLOGY | Facility: CLINIC | Age: 53
End: 2023-02-03
Payer: COMMERCIAL

## 2023-02-03 ENCOUNTER — TELEPHONE (OUTPATIENT)
Dept: DERMATOLOGY | Facility: CLINIC | Age: 53
End: 2023-02-03

## 2023-02-03 DIAGNOSIS — L40.0 PLAQUE PSORIASIS: Primary | ICD-10-CM

## 2023-02-03 NOTE — TELEPHONE ENCOUNTER
PA Initiation    Medication: Otezla  Insurance Company: [employer] - Phone [phone] Fax [phone]  Pharmacy Filling the Rx: [facility] - [place], [place] - [facility] [address]  Filling Pharmacy Phone:    Filling Pharmacy Fax:    Start Date: 2/3/2023    Key: [id number]

## 2023-02-06 NOTE — TELEPHONE ENCOUNTER
Prior Authorization Approval    Authorization Effective Date: 2/4/2023  Authorization Expiration Date: 3/3/2023  Medication: Otezla  Approved Dose/Quantity: 1 pack per 28 days  Reference #: Key: [id number]   Insurance Company: [employer] - Phone [phone] Fax [phone]  Expected CoPay: $4187.08     CoPay Card Available: Yes    Foundation Assistance Needed: Otezla  Which Pharmacy is filling the prescription (Not needed for infusion/clinic administered): [facility] - [place], [place] - [address] [place] [address]  Pharmacy Notified: Yes  Patient Notified: Yes    The Copay assistance will only cover $3000 off the fill and a max of $9,000 per 12 months.  Pt is not eligible for Free drug with commerical insurance unless they deny it which they didn't and suggest pt apply for state insurance as well.  Would you like to try a different therapy?

## 2023-02-10 NOTE — TELEPHONE ENCOUNTER
Is patient aware of potential cost? Please let her know and she can decide if she would like to go with this option or look at an injectable biologic. Thanks!

## 2023-02-24 ENCOUNTER — TELEPHONE (OUTPATIENT)
Dept: DERMATOLOGY | Facility: CLINIC | Age: 53
End: 2023-02-24
Payer: COMMERCIAL

## 2023-02-24 NOTE — TELEPHONE ENCOUNTER
Prior Authorization Approval    Authorization Effective Date: 2/24/2023  Authorization Expiration Date: 8/16/2023  Medication: Humira  Approved Dose/Quantity:   Reference #: [id number]   Insurance Company: [employer] - Phone [phone] Fax [phone]  Expected CoPay:   $3,300    CoPay Card Available:    Yes, offered  Foundation Assistance Needed:    Which Pharmacy is filling the prescription (Not needed for infusion/clinic administered): [facility] - [facility] [address]  Pharmacy Notified: Yes  Patient Notified: Yes

## 2023-02-24 NOTE — TELEPHONE ENCOUNTER
PA Initiation    Medication: Humira  Insurance Company: [id number] - Phone [phone] Fax [phone]  Pharmacy Filling the Rx: [facility] - [place], [place] - [facility] [address]  Filling Pharmacy Phone:    Filling Pharmacy Fax:    Start Date: 2/24/2023    [id number]        Tried to submit for both 80mg and 40mg, but when I started 40mg dose I was given a reject that it was a duplicate.

## 2023-03-12 NOTE — TELEPHONE ENCOUNTER
RECORDS RECEIVED FROM:    REASON FOR VISIT: Headaches    Date of Appt: 5/30/2023   NOTES (FOR ALL VISITS) STATUS DETAILS   OFFICE NOTE from referring provider Jacki Wise-12/20/2022, 3/23/2022   OFFICE NOTE from other specialist     DISCHARGE SUMMARY from hospital     DISCHARGE REPORT from the ER     OPERATIVE REPORT     [facility] (MS ONLY)     EMG     EEG     MEDICATION LIST     IMAGING  (FOR ALL VISITS)     LUMBAR PUNCTURE     JASWANT SCAN     ULTRASOUND (CAROTID BILAT) *VASCULAR*     MRI (HEAD, NECK, SPINE)     CT (HEAD, NECK, SPINE)

## 2023-03-27 ENCOUNTER — OFFICE VISIT (OUTPATIENT)
Dept: DERMATOLOGY | Facility: CLINIC | Age: 53
End: 2023-03-27
Payer: COMMERCIAL

## 2023-03-27 DIAGNOSIS — L40.0 PLAQUE PSORIASIS: Primary | ICD-10-CM

## 2023-03-27 DIAGNOSIS — D84.9 IMMUNOSUPPRESSION (H): ICD-10-CM

## 2023-03-27 PROCEDURE — 99213 OFFICE O/P EST LOW 20 MIN: CPT | Performed by: DERMATOLOGY

## 2023-03-27 ASSESSMENT — PAIN SCALES - GENERAL: PAINLEVEL: NO PAIN (0)

## 2023-03-27 NOTE — LETTER
3/27/2023       RE: Manju Berg  [address]  [facility] [address]     Dear Colleague,    Thank you for referring your patient, Manju Berg, to the [facility] DERMATOLOG[facility] [place] at [facility]. Please see a copy of my visit note below.    [facility] Dermatology Note    Encounter Date: Mar 27, 2023    Dermatology Problem List:  1. Plaque psoriasis: scalp, trunk, with some ear involvement  - adalimumab, augmented betamethasone lotion  - in reserve: apremilast (exorbitant copay)  - prior: augmented betamethasone gel, coal tar/salcylic acid shampoo, ketoconazole shampoo, zinc pyrithione shampoo, compounded betamethasone/calcipotriene solution, Excimer, methotrexate 15 mg weekly       ______________________________________    Impression/Plan:  1. Plaque psoriasis: with significant scalp involvement, now much improved after starting adalimumab. Tolerating well. Anticipated continued improvement from current level of activity. Will continue, with adjunctive topical therapy.  - adalimumab QOW  - augmented betamethasone lotion PRN      Follow-up in 6 months.       Staff Involved:  Staff Only    Pierce Velasquez MD   of Dermatology  Department of Dermatology  [facility]      CC:   Chief Complaint   Patient presents with     Derm Problem     Manju is here today for psoriasis follow up       History of Present Illness:  Ms. Manju Berg is a 52 year old female who presents as a return patient.    Psoriasis - maybe slight improvement with reduction in itch  - took second dose yesterday  - some itching on inferior occipital scalp but this has already started to improve  - abdomen - itching slightly better  - scalp responded to methotrexate    Labs:  12/2022 QFN negative    Physical exam:  Vitals: There were no vitals taken for this visit.  GEN: This is a well  developed, well-nourished female in no acute distress, in a pleasant mood.    SKIN: Lowe phototype II  - Focused examination of the scalp, ears, neck, abdomen was performed.  - erythematous scaly patches on the occipital scalp > abdomen  - No other lesions of concern on areas examined.     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/2011,Proximal left tibia     Dyspepsia and other specified disorders of function of stomach     No Comments Provided     Gastroesophageal reflux disease      Headache     No Comments Provided     Herpes simplex     No Comments Provided     Lateral epicondylitis of elbow     No Comments Provided     Other depressive disorder     No Comments Provided     Other unspecified back disorder     No Comments Provided     Pain in joint, upper arm     No Comments Provided     Past Surgical History:   Procedure Laterality Date     ------------OTHER------------- Right 12/07/2018    5th metatarsal fracture     BUNIONECTOMY Bilateral 1999 1999     C/SECTION, LOW TRANSVERSE  2013     ENDOSCOPIC ULTRASOUND UPPER GASTROINTESTINAL TRACT (GI) N/A 9/27/2022    Procedure: ENDOSCOPIC ULTRASOUND, ESOPHAGOSCOPY / UPPER GASTROINTESTINAL TRACT (GI) WITH BIOPSY;  Surgeon: Alissa Lubin MD;  Location: [facility] OR     FOOT SURGERY Left 30 august 2018    fusion of the large toe 30 august 2018     LAPAROSCOPIC CHOLECYSTECTOMY N/A 9/28/2022    Procedure: Laparoscopic cholecystectomy;  Surgeon: Bertha Potts MD;  Location:  OR     OTHER SURGICAL HISTORY      EXCIS BARTHOLIN GLAND/CYST     OTHER SURGICAL HISTORY Left 03/16/2012     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 She has never used smokeless tobacco. She reports current alcohol use. She reports that she does not use drugs.    Family History:  Family History   Problem Relation Age of Onset     Depression Mother      Osteoporosis Mother      Mental Illness Mother      Hypertension Father         Hypertension     Hyperlipidemia Father         Hyperlipidemia     Colon Cancer Father 56        Cancer-colon     Bladder Cancer Father 62        Cancer,bladder     Obesity Father      Family History Negative Sister         Good Health     Family History Negative Son      Mental Illness Maternal Aunt      Mental Illness Maternal Uncle        Medications:  Current Outpatient Medications   Medication Sig Dispense Refill     adalimumab (HUMIRA *CF*) 40 MG/0.4ML pen kit Inject 0.4 mLs (40 mg) Subcutaneous every 14 days MAINTENANCE DOSE 0.8 mL 11     adalimumab (HUMIRA *CF*) 80 MG/0.8ML pen kit Inject 0.8 mL (80 mg) subcutaneous for 1 dose, followed by 40 mg every other week beginning 1 week after initial dose (see separate order) 0.8 mL 0     albuterol (PROAIR HFA/PROVENTIL HFA/VENTOLIN HFA) 108 (90 Base) MCG/ACT inhaler Inhale 2 puffs into the lungs every 4 hours as needed for shortness of breath or wheezing 25.5 g 3     albuterol (PROVENTIL) (2.5 MG/3ML) 0.083% neb solution Take 1 vial (2.5 mg) by nebulization every 6 hours as needed for shortness of breath or wheezing 90 mL 3     Apremilast (OTEZLA) 10 & 20 & 30 MG TBPK Take 1 tablet in the morning on day 1, then take 1 tablet every 12 hours on day 2 through 14, according to the instructions on the packet. 55 each 0     apremilast (OTEZLA) 30 MG tablet Take 1 tablet (30 mg) by mouth 2 times daily 60 tablet 11     augmented betamethasone dipropionate (DIPROLENE) 0.05 % external gel Apply topically daily 50 g 3     augmented betamethasone dipropionate (DIPROLENE) 0.05 % external lotion Apply topically daily 180 mL 3     benzonatate (TESSALON) 200 MG  capsule Take 1 capsule (200 mg) by mouth 3 times daily as needed for cough 90 capsule 3     budesonide-formoterol (SYMBICORT) 80-4.5 MCG/ACT Inhaler Inhale 2 puffs into the lungs 2 times daily 30.4 g 3     buPROPion (WELLBUTRIN XL) 300 MG 24 hr tablet Take 1 tablet (300 mg) by mouth every morning 90 tablet 1     CALCIUM 600 + D 600-200 MG-UNIT OR TABS Take 1 tablet by mouth every evening       cetirizine (ZYRTEC) 10 MG tablet Take 10 mg by mouth daily       chlorzoxazone (PARAFON FORTE) 500 MG tablet TAKE 1/2-1&1/2 TABLETS BY MOUTH 3-4 TIMES DAILY AS NEEDED TO RELAX MUSCLES 90 tablet 3     Cholecalciferol (VITAMIN D) 2000 UNITS tablet Take 2,000 Units by mouth daily. 100 tablet 3     fluticasone (FLONASE) 50 MCG/ACT nasal spray Spray 1 spray into both nostrils daily       fluvoxaMINE (LUVOX) 50 MG tablet Take 1 tablet (50 mg) by mouth 2 times daily 180 tablet 1     folic acid (FOLVITE) 1 MG tablet Take 1 tablet (1 mg) by mouth daily 90 tablet 3     LANsoprazole (PREVACID) 30 MG DR capsule Take 1 capsule (30 mg) by mouth daily 90 capsule 1     lidocaine (LIDODERM) 5 % patch Place 1 patch onto the skin daily as needed for moderate pain Remove after 12 hours. 30 patch 0     methotrexate sodium 2.5 MG TABS Take 6 tablets (15 mg) by mouth once a week 30 tablet 4     naproxen (NAPROSYN) 500 MG tablet Take 1 tablet (500 mg) by mouth 2 times daily as needed for moderate pain 30 tablet 1     valACYclovir (VALTREX) 1000 mg tablet take 2 tablets PO Q 12 hours x 2 doses at first signs of outbreak. 12 tablet 3     budesonide-formoterol (SYMBICORT) 160-4.5 MCG/ACT Inhaler Inhale 2 puffs into the lungs 2 times daily for 30 days 10.2 g 11     linaclotide (LINZESS) 72 MCG capsule Take 72 mcg by mouth every morning (before breakfast) (Patient not taking: Reported on 3/27/2023)       No Known Allergies

## 2023-03-27 NOTE — PROGRESS NOTES
[facility] Dermatology Note    Encounter Date: Mar 27, 2023    Dermatology Problem List:  1. Plaque psoriasis: scalp, trunk, with some ear involvement  - adalimumab, augmented betamethasone lotion  - in reserve: apremilast (exorbitant copay)  - prior: augmented betamethasone gel, coal tar/salcylic acid shampoo, ketoconazole shampoo, zinc pyrithione shampoo, compounded betamethasone/calcipotriene solution, Excimer, methotrexate 15 mg weekly       ______________________________________    Impression/Plan:  1. Plaque psoriasis: with significant scalp involvement, now much improved after starting adalimumab. Tolerating well. Anticipated continued improvement from current level of activity. Will continue, with adjunctive topical therapy.  - adalimumab QOW  - augmented betamethasone lotion PRN      Follow-up in 6 months.       Staff Involved:  Staff Only    Pierce Velasquez MD   of Dermatology  Department of Dermatology  [facility]  Medicine      CC:   Chief Complaint   Patient presents with     Derm Problem     Manju is here today for psoriasis follow up       History of Present Illness:  Ms. Manju Berg is a 52 year old female who presents as a return patient.    Psoriasis - maybe slight improvement with reduction in itch  - took second dose yesterday  - some itching on inferior occipital scalp but this has already started to improve  - abdomen - itching slightly better  - scalp responded to methotrexate    Labs:  12/2022 QFN negative    Physical exam:  Vitals: There were no vitals taken for this visit.  GEN: This is a well developed, well-nourished female in no acute distress, in a pleasant mood.    SKIN: Lowe phototype II  - Focused examination of the scalp, ears, neck, abdomen was performed.  - erythematous scaly patches on the occipital scalp > abdomen  - No other lesions of concern on areas examined.     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     5/27/2011,Proximal left tibia     Dyspepsia and other specified disorders of function of stomach     No Comments Provided     Gastroesophageal reflux disease      Headache     No Comments Provided     Herpes simplex     No Comments Provided     Lateral epicondylitis of elbow     No Comments Provided     Other depressive disorder     No Comments Provided     Other unspecified back disorder     No Comments Provided     Pain in joint, upper arm     No Comments Provided     Past Surgical History:   Procedure Laterality Date     ------------OTHER------------- Right 12/07/2018    5th metatarsal fracture     BUNIONECTOMY Bilateral 1999 1999     C/SECTION, LOW TRANSVERSE  2013     ENDOSCOPIC ULTRASOUND UPPER GASTROINTESTINAL TRACT (GI) N/A 9/27/2022    Procedure: ENDOSCOPIC ULTRASOUND, ESOPHAGOSCOPY / UPPER GASTROINTESTINAL TRACT (GI) WITH BIOPSY;  Surgeon: Alissa Lubin MD;  Location: [facility] OR     FOOT SURGERY Left 30 august 2018    fusion of the large toe 30 august 2018     LAPAROSCOPIC CHOLECYSTECTOMY N/A 9/28/2022    Procedure: Laparoscopic cholecystectomy;  Surgeon: Bertha Potts MD;  Location:  OR     OTHER SURGICAL HISTORY      EXCIS BARTHOLIN GLAND/CYST     OTHER SURGICAL HISTORY Left 03/16/2012     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 She has never used smokeless tobacco. She reports current alcohol use. She reports that she does not use drugs.    Family History:  Family History   Problem Relation Age of Onset     Depression Mother      Osteoporosis Mother      Mental Illness Mother      Hypertension Father          Hypertension     Hyperlipidemia Father         Hyperlipidemia     Colon Cancer Father 56        Cancer-colon     Bladder Cancer Father 62        Cancer,bladder     Obesity Father      Family History Negative Sister         Good Health     Family History Negative Son      Mental Illness Maternal Aunt      Mental Illness Maternal Uncle        Medications:  Current Outpatient Medications   Medication Sig Dispense Refill     adalimumab (HUMIRA *CF*) 40 MG/0.4ML pen kit Inject 0.4 mLs (40 mg) Subcutaneous every 14 days MAINTENANCE DOSE 0.8 mL 11     adalimumab (HUMIRA *CF*) 80 MG/0.8ML pen kit Inject 0.8 mL (80 mg) subcutaneous for 1 dose, followed by 40 mg every other week beginning 1 week after initial dose (see separate order) 0.8 mL 0     albuterol (PROAIR HFA/PROVENTIL HFA/VENTOLIN HFA) 108 (90 Base) MCG/ACT inhaler Inhale 2 puffs into the lungs every 4 hours as needed for shortness of breath or wheezing 25.5 g 3     albuterol (PROVENTIL) (2.5 MG/3ML) 0.083% neb solution Take 1 vial (2.5 mg) by nebulization every 6 hours as needed for shortness of breath or wheezing 90 mL 3     Apremilast (OTEZLA) 10 & 20 & 30 MG TBPK Take 1 tablet in the morning on day 1, then take 1 tablet every 12 hours on day 2 through 14, according to the instructions on the packet. 55 each 0     apremilast (OTEZLA) 30 MG tablet Take 1 tablet (30 mg) by mouth 2 times daily 60 tablet 11     augmented betamethasone dipropionate (DIPROLENE) 0.05 % external gel Apply topically daily 50 g 3     augmented betamethasone dipropionate (DIPROLENE) 0.05 % external lotion Apply topically daily 180 mL 3     benzonatate (TESSALON) 200 MG capsule Take 1 capsule (200 mg) by mouth 3 times daily as needed for cough 90 capsule 3     budesonide-formoterol (SYMBICORT) 80-4.5 MCG/ACT Inhaler Inhale 2 puffs into the lungs 2 times daily 30.4 g 3     buPROPion (WELLBUTRIN XL) 300 MG 24 hr tablet Take 1 tablet (300 mg) by mouth every morning 90 tablet 1     CALCIUM 600  + D 600-200 MG-UNIT OR TABS Take 1 tablet by mouth every evening       cetirizine (ZYRTEC) 10 MG tablet Take 10 mg by mouth daily       chlorzoxazone (PARAFON FORTE) 500 MG tablet TAKE 1/2-1&1/2 TABLETS BY MOUTH 3-4 TIMES DAILY AS NEEDED TO RELAX MUSCLES 90 tablet 3     Cholecalciferol (VITAMIN D) 2000 UNITS tablet Take 2,000 Units by mouth daily. 100 tablet 3     fluticasone (FLONASE) 50 MCG/ACT nasal spray Spray 1 spray into both nostrils daily       fluvoxaMINE (LUVOX) 50 MG tablet Take 1 tablet (50 mg) by mouth 2 times daily 180 tablet 1     folic acid (FOLVITE) 1 MG tablet Take 1 tablet (1 mg) by mouth daily 90 tablet 3     LANsoprazole (PREVACID) 30 MG DR capsule Take 1 capsule (30 mg) by mouth daily 90 capsule 1     lidocaine (LIDODERM) 5 % patch Place 1 patch onto the skin daily as needed for moderate pain Remove after 12 hours. 30 patch 0     methotrexate sodium 2.5 MG TABS Take 6 tablets (15 mg) by mouth once a week 30 tablet 4     naproxen (NAPROSYN) 500 MG tablet Take 1 tablet (500 mg) by mouth 2 times daily as needed for moderate pain 30 tablet 1     valACYclovir (VALTREX) 1000 mg tablet take 2 tablets PO Q 12 hours x 2 doses at first signs of outbreak. 12 tablet 3     budesonide-formoterol (SYMBICORT) 160-4.5 MCG/ACT Inhaler Inhale 2 puffs into the lungs 2 times daily for 30 days 10.2 g 11     linaclotide (LINZESS) 72 MCG capsule Take 72 mcg by mouth every morning (before breakfast) (Patient not taking: Reported on 3/27/2023)       No Known Allergies

## 2023-03-27 NOTE — NURSING NOTE
Dermatology Rooming Note    Manju Berg's goals for this visit include:   Chief Complaint   Patient presents with     Derm Problem     Manju is here today for psoriasis follow up     Suzy Hickey RN

## 2023-05-08 ENCOUNTER — MYC MEDICAL ADVICE (OUTPATIENT)
Dept: DERMATOLOGY | Facility: CLINIC | Age: 53
End: 2023-05-08
Payer: COMMERCIAL

## 2023-05-08 DIAGNOSIS — T80.90XA INJECTION SITE REACTION, INITIAL ENCOUNTER: Primary | ICD-10-CM

## 2023-05-10 ENCOUNTER — OFFICE VISIT (OUTPATIENT)
Dept: FAMILY MEDICINE | Facility: CLINIC | Age: 53
End: 2023-05-10
Payer: COMMERCIAL

## 2023-05-10 VITALS
TEMPERATURE: 98.6 F | BODY MASS INDEX: 32.67 KG/M2 | WEIGHT: 184.4 LBS | SYSTOLIC BLOOD PRESSURE: 110 MMHG | HEIGHT: 63 IN | RESPIRATION RATE: 20 BRPM | OXYGEN SATURATION: 97 % | DIASTOLIC BLOOD PRESSURE: 76 MMHG | HEART RATE: 87 BPM

## 2023-05-10 DIAGNOSIS — F41.9 ANXIETY: ICD-10-CM

## 2023-05-10 DIAGNOSIS — Z12.31 VISIT FOR SCREENING MAMMOGRAM: ICD-10-CM

## 2023-05-10 DIAGNOSIS — F32.0 CURRENT MILD EPISODE OF MAJOR DEPRESSIVE DISORDER WITHOUT PRIOR EPISODE (H): ICD-10-CM

## 2023-05-10 DIAGNOSIS — K59.01 SLOW TRANSIT CONSTIPATION: ICD-10-CM

## 2023-05-10 DIAGNOSIS — Z01.84 IMMUNITY STATUS TESTING: ICD-10-CM

## 2023-05-10 DIAGNOSIS — Z13.220 SCREENING CHOLESTEROL LEVEL: ICD-10-CM

## 2023-05-10 DIAGNOSIS — K21.9 GASTROESOPHAGEAL REFLUX DISEASE, UNSPECIFIED WHETHER ESOPHAGITIS PRESENT: ICD-10-CM

## 2023-05-10 DIAGNOSIS — Z00.00 ROUTINE GENERAL MEDICAL EXAMINATION AT A HEALTH CARE FACILITY: Primary | ICD-10-CM

## 2023-05-10 DIAGNOSIS — Z97.5 IUD (INTRAUTERINE DEVICE) IN PLACE: ICD-10-CM

## 2023-05-10 PROCEDURE — 90715 TDAP VACCINE 7 YRS/> IM: CPT | Performed by: FAMILY MEDICINE

## 2023-05-10 PROCEDURE — 90677 PCV20 VACCINE IM: CPT | Performed by: FAMILY MEDICINE

## 2023-05-10 PROCEDURE — 99396 PREV VISIT EST AGE 40-64: CPT | Mod: 25 | Performed by: FAMILY MEDICINE

## 2023-05-10 PROCEDURE — 90471 IMMUNIZATION ADMIN: CPT | Performed by: FAMILY MEDICINE

## 2023-05-10 PROCEDURE — 90750 HZV VACC RECOMBINANT IM: CPT | Performed by: FAMILY MEDICINE

## 2023-05-10 PROCEDURE — 99214 OFFICE O/P EST MOD 30 MIN: CPT | Mod: 25 | Performed by: FAMILY MEDICINE

## 2023-05-10 PROCEDURE — 90472 IMMUNIZATION ADMIN EACH ADD: CPT | Performed by: FAMILY MEDICINE

## 2023-05-10 RX ORDER — HYDROXYZINE PAMOATE 25 MG/1
25-50 CAPSULE ORAL 3 TIMES DAILY PRN
Qty: 60 CAPSULE | Refills: 3 | Status: SHIPPED | OUTPATIENT
Start: 2023-05-10

## 2023-05-10 RX ORDER — FLUVOXAMINE MALEATE 50 MG
50 TABLET ORAL 2 TIMES DAILY
Qty: 180 TABLET | Refills: 3 | Status: SHIPPED | OUTPATIENT
Start: 2023-05-10 | End: 2024-08-12

## 2023-05-10 RX ORDER — BUPROPION HYDROCHLORIDE 300 MG/1
300 TABLET ORAL EVERY MORNING
Qty: 90 TABLET | Refills: 3 | Status: SHIPPED | OUTPATIENT
Start: 2023-05-10 | End: 2023-06-07

## 2023-05-10 RX ORDER — LANSOPRAZOLE 30 MG/1
30 CAPSULE, DELAYED RELEASE ORAL DAILY
Qty: 90 CAPSULE | Refills: 3 | Status: SHIPPED | OUTPATIENT
Start: 2023-05-10 | End: 2024-08-12

## 2023-05-10 ASSESSMENT — ENCOUNTER SYMPTOMS
SORE THROAT: 0
HEARTBURN: 0
NAUSEA: 0
NERVOUS/ANXIOUS: 0
COUGH: 1
PARESTHESIAS: 1
HEADACHES: 1
DIZZINESS: 0
HEMATOCHEZIA: 0
JOINT SWELLING: 0
CONSTIPATION: 1
BREAST MASS: 0
DYSURIA: 0
CHILLS: 0
ARTHRALGIAS: 0
HEMATURIA: 0
ABDOMINAL PAIN: 0
DIARRHEA: 0
PALPITATIONS: 0
FEVER: 0
SHORTNESS OF BREATH: 0
MYALGIAS: 0
EYE PAIN: 0
WEAKNESS: 0
FREQUENCY: 0

## 2023-05-10 ASSESSMENT — PATIENT HEALTH QUESTIONNAIRE - PHQ9
10. IF YOU CHECKED OFF ANY PROBLEMS, HOW DIFFICULT HAVE THESE PROBLEMS MADE IT FOR YOU TO DO YOUR WORK, TAKE CARE OF THINGS AT HOME, OR GET ALONG WITH OTHER PEOPLE: SOMEWHAT DIFFICULT
SUM OF ALL RESPONSES TO PHQ QUESTIONS 1-9: 8
SUM OF ALL RESPONSES TO PHQ QUESTIONS 1-9: 8

## 2023-05-10 ASSESSMENT — PAIN SCALES - GENERAL: PAINLEVEL: NO PAIN (1)

## 2023-05-10 NOTE — NURSING NOTE
Prior to immunization administration, verified patients identity using patient s name and date of birth. Please see Immunization Activity for additional information.     Screening Questionnaire for Adult Immunization    Are you sick today?   No   Do you have allergies to medications, food, a vaccine component or latex?   No   Have you ever had a serious reaction after receiving a vaccination?   No   Do you have a long-term health problem with heart, lung, kidney, or metabolic disease (e.g., diabetes), asthma, a blood disorder, no spleen, complement component deficiency, a cochlear implant, or a spinal fluid leak?  Are you on long-term aspirin therapy?   No   Do you have cancer, leukemia, HIV/AIDS, or any other immune system problem?   No   Do you have a parent, brother, or sister with an immune system problem?   No   In the past 3 months, have you taken medications that affect  your immune system, such as prednisone, other steroids, or anticancer drugs; drugs for the treatment of rheumatoid arthritis, Crohn s disease, or psoriasis; or have you had radiation treatments?   No   Have you had a seizure, or a brain or other nervous system problem?   No   During the past year, have you received a transfusion of blood or blood    products, or been given immune (gamma) globulin or antiviral drug?   No   For women: Are you pregnant or is there a chance you could become       pregnant during the next month?   No   Have you received any vaccinations in the past 4 weeks?   No     Immunization questionnaire answers were all negative.      Injection of PREVNAR 20, SHINGRIX, AND TDAP given by Deisy Garcia MA. Patient instructed to remain in clinic for 15 minutes afterwards, and to report any adverse reactions.     Screening performed by Deisy Garcia MA on 5/10/2023 at 2:33 PM.

## 2023-05-10 NOTE — PROGRESS NOTES
SUBJECTIVE:   CC: Manju is an 52 year old who presents for preventive health visit.        View : No data to display.            Patient has been advised of split billing requirements and indicates understanding: Yes  Healthy Habits:     Getting at least 3 servings of Calcium per day:  Yes    Bi-annual eye exam:  Yes    Dental care twice a year:  Yes    Sleep apnea or symptoms of sleep apnea:  None    Diet:  Low fat/cholesterol    Frequency of exercise:  None    Taking medications regularly:  Yes    Medication side effects:  Other    PHQ-2 Total Score: 2    Additional concerns today:  Yes      humira for her psoriasis. Site reactions of redness, swelling and itching that lasts for several day and are quite large..   Cortisone/benadryl cream, oral benadryl. Ice helps the most.     Holding off on further covid vaccines    Mental health- life is stressful but doing overall okay.     gerd is controlled. Taking in AM. Has been on this med for a very long time. Get sx's every time she comes off.   Pancreatitis/gallstones last year.    Headaches- s/p PHYSICAL THERAPY and was very helpful. Only one episode was very helpful. Feels tension/muscle related.  Has upcoming appointment with neurology    Constipation- linzess (gives gas but helpful). Got too expensive so hasn't used recently. Using fiber and stool softeners daily with good results.     No menses with iud, mirena.   Get hot at night or day some times.   No vaginal dryness.         Today's PHQ-2 Score:       11/28/2022    10:03 AM   PHQ-2 ( 1999 [employer])   Q1: Little interest or pleasure in doing things 3   Q2: Feeling down, depressed or hopeless 2   PHQ-2 Score 5   Q1: Little interest or pleasure in doing things Nearly every day   Q2: Feeling down, depressed or hopeless More than half the days   PHQ-2 Score 5           Social History     Tobacco Use     Smoking status: Never     Smokeless tobacco: Never   Vaping Use     Vaping status: Never Used   Substance Use  Topics     Alcohol use: Yes     Comment: Alcoholic Drinks/day: Drinks socially only, 0-1 drinks per week.             3/21/2022     3:44 PM   Alcohol Use   Prescreen: >3 drinks/day or >7 drinks/week? No     Reviewed orders with patient.  Reviewed health maintenance and updated orders accordingly - Yes      Breast Cancer Screening:        3/21/2022     3:45 PM 3/29/2022     9:10 AM   Breast CA Risk Assessment (FHS-7)   Do you have a family history of breast, colon, or ovarian cancer? No / Unknown No / Unknown       Mammogram Screening: Recommended annual mammography  Pertinent mammograms are reviewed under the imaging tab.    History of abnormal Pap smear: NO - age 30-65 PAP every 5 years with negative HPV co-testing recommended      Latest Ref Rng & Units 3/23/2022     3:41 PM 10/26/2012     1:14 PM   PAP / HPV   PAP  Negative for Intraepithelial Lesion or Malignancy (NILM)      PAP (Historical)   NIL     HPV 16 DNA Negative Negative      HPV 18 DNA Negative Negative      Other HR HPV Negative Negative        Reviewed and updated as needed this visit by clinical staff                  Reviewed and updated as needed this visit by Provider                     Review of Systems   Constitutional: Negative for chills and fever.   HENT: Negative for congestion, ear pain, hearing loss and sore throat.    Eyes: Negative for pain and visual disturbance.   Respiratory: Positive for cough. Negative for shortness of breath.    Cardiovascular: Negative for chest pain, palpitations and peripheral edema.   Gastrointestinal: Positive for constipation. Negative for abdominal pain, diarrhea, heartburn, hematochezia and nausea.   Breasts:  Negative for tenderness, breast mass and discharge.   Genitourinary: Negative for dysuria, frequency, genital sores, hematuria, pelvic pain, urgency, vaginal bleeding and vaginal discharge.   Musculoskeletal: Negative for arthralgias, joint swelling and myalgias.   Skin: Positive for rash.  "  Neurological: Positive for headaches and paresthesias. Negative for dizziness and weakness.   Psychiatric/Behavioral: Negative for mood changes. The patient is not nervous/anxious.      Slight cough on the way to the clinic today. Using flonase daily. Voice has been off also    Had lost 20 Lbs after hospitalization. Wanting to loose more    Recently hemorrhoids flared.  Stools generally are soft and easy to pass     OBJECTIVE:   /76 (BP Location: Right arm, Patient Position: Sitting, Cuff Size: Adult Large)   Pulse 87   Temp 98.6  F (37  C) (Oral)   Resp 20   Ht 1.6 m (5' 3\")   Wt 83.6 kg (184 lb 6.4 oz)   LMP  (LMP Unknown)   SpO2 97%   BMI 32.66 kg/m    Physical Exam  GENERAL: healthy, alert and no distress  EYES: Eyes grossly normal to inspection, PERRL and conjunctivae and sclerae normal  HENT: ear canals and TM's normal, nose and mouth without ulcers or lesions  NECK: no adenopathy, no asymmetry, masses, or scars and thyroid normal to palpation  RESP: lungs clear to auscultation - no rales, rhonchi or wheezes  BREAST: normal without masses, tenderness or nipple discharge and no palpable axillary masses or adenopathy  CV: regular rate and rhythm, normal S1 S2, no S3 or S4, no murmur, click or rub, no peripheral edema and peripheral pulses strong  ABDOMEN: soft, nontender, no hepatosplenomegaly, no masses and bowel sounds normal   (female): normal female external genitalia, normal urethral meatus, vaginal mucosa pink, moist, well rugated, and normal cervix/adnexa/uterus without masses or discharge.  IUD string present in cervical os  RECTAL: 3 large external hemorrhoids present on the left.  They are tender to palpation.  MS: no gross musculoskeletal defects noted, no edema  SKIN: no suspicious lesions.  Oval area approximately 10 cm of erythema, slight edema and slight warmth present on right mid abdomen (site reaction)  NEURO: Normal strength and tone, mentation intact and speech " normal  PSYCH: mentation appears normal, affect normal/bright        ASSESSMENT/PLAN:   (Z00.00) Routine general medical examination at a health care facility  (primary encounter diagnosis)    (Z12.31) Visit for screening mammogram  Comment:   Plan: MA SCREENING DIGITAL BILAT - Future  (s+30)            (Z97.5) IUD (intrauterine device) in place  Comment:   Plan: Happy with the IUD and would like to keep it in place.  Updated her chart with date of placement per past [facility] records    (Z13.220) Screening cholesterol level  Comment:   Plan: Lipid panel reflex to direct LDL Fasting            (K21.9) Gastroesophageal reflux disease, unspecified whether esophagitis present  Comment: This is well controlled.  She reports recurrence of symptoms when trying to wean off this medication several times in the past   Plan: Vitamin B12, LANsoprazole (PREVACID) 30 MG DR         capsule        Continue current medication.  Screen B12 level every other year    (K59.01) Slow transit constipation  Comment: Fair control although recent significant hemorrhoids.  She is not currently taking Linzess due to cost  Plan: Comprehensive metabolic panel (BMP + Alb, Alk         Phos, ALT, AST, Total. Bili, TP)        We discussed options to help with cost concerns with Linzess.  Discussed hemorrhoid care with OTC creams, sitz bath's, soft stools.    (F32.0) Current mild episode of major depressive disorder without prior episode (H)  (F41.9) Anxiety  Comment: Under good control  Plan: buPROPion (WELLBUTRIN XL) 300 MG 24 hr tablet,         fluvoxaMINE (LUVOX) 50 MG tablet        Continue current medication    (Z01.84) Immunity status testing  Comment: We can only see 1 previous hepatitis A vaccine on her chart.  She would like to screen for immunity to see if a second vaccine was may be given previously  Plan: Hepatitis Antibody A IgG            Pneumococcal, shingles and Tdap vaccines updated today.          COUNSELING:  Reviewed preventive  health counseling, as reflected in patient instructions       Regular exercise       Healthy diet/nutrition       Pneumococcal Vaccine          Contraception       Colorectal Cancer Screening        She reports that she has never smoked. She has never used smokeless tobacco.          Yarely Wise MD  [facility]  Answers for HPI/ROS submitted by the patient on 5/10/2023  If you checked off any problems, how difficult have these problems made it for you to do your work, take care of things at home, or get along with other people?: Somewhat difficult  PHQ9 TOTAL SCORE: 8

## 2023-05-10 NOTE — PATIENT INSTRUCTIONS
Increase zyrtec to twice daily  Add saline irrigation     Schedule lab only visit.,   Consider covid booster vaccine      Preventive Health Recommendations  Female Ages 50 - 64    Yearly exam: See your health care provider every year in order to  Review health changes.   Discuss preventive care.    Review your medicines if your doctor has prescribed any.    Get a Pap test every three years (unless you have an abnormal result and your provider advises testing more often).  If you get Pap tests with HPV test, you only need to test every 5 years, unless you have an abnormal result.   You do not need a Pap test if your uterus was removed (hysterectomy) and you have not had cancer.  You should be tested each year for STDs (sexually transmitted diseases) if you're at risk.   Have a mammogram every 1 to 2 years.  Have a colonoscopy at age 50, or have a yearly FIT test (stool test). These exams screen for colon cancer.    Have a cholesterol test every 5 years, or more often if advised.  Have a diabetes test (fasting glucose) every three years. If you are at risk for diabetes, you should have this test more often.   If you are at risk for osteoporosis (brittle bone disease), think about having a bone density scan (DEXA).    Shots: Get a flu shot each year. Get a tetanus shot every 10 years.    Nutrition:   Eat at least 5 servings of fruits and vegetables each day.  Eat whole-grain bread, whole-wheat pasta and brown rice instead of white grains and rice.  Get adequate Calcium and Vitamin D.     Lifestyle  Exercise at least 150 minutes a week (30 minutes a day, 5 days a week). This will help you control your weight and prevent disease.  Limit alcohol to one drink per day.  No smoking.   Wear sunscreen to prevent skin cancer.   See your dentist every six months for an exam and cleaning.  See your eye doctor every 1 to 2 years.

## 2023-05-12 ENCOUNTER — TELEPHONE (OUTPATIENT)
Dept: DERMATOLOGY | Facility: CLINIC | Age: 53
End: 2023-05-12
Payer: COMMERCIAL

## 2023-05-12 NOTE — TELEPHONE ENCOUNTER
[facility] Call Center    Phone Message    May a detailed message be left on voicemail: no     Reason for Call: Other: Patient has been having reaction at her injection sight, and the last couple swelled up badly. Pharmacist explained that they were swollen to about the size of a palm. Patient has tries taking benadryl before the treatment and nothing seems to be helping. Please follow up with patient to decide if she should do her next injection or stop doing it due to the reactions.     Action Taken: Message routed to:  [facility] ([facility] Derm    Travel Screening: Not Applicable

## 2023-05-18 ENCOUNTER — LAB (OUTPATIENT)
Dept: LAB | Facility: CLINIC | Age: 53
End: 2023-05-18
Attending: FAMILY MEDICINE
Payer: COMMERCIAL

## 2023-05-18 DIAGNOSIS — K21.9 GASTROESOPHAGEAL REFLUX DISEASE, UNSPECIFIED WHETHER ESOPHAGITIS PRESENT: ICD-10-CM

## 2023-05-18 DIAGNOSIS — Z13.220 SCREENING CHOLESTEROL LEVEL: ICD-10-CM

## 2023-05-18 DIAGNOSIS — K59.01 SLOW TRANSIT CONSTIPATION: ICD-10-CM

## 2023-05-18 DIAGNOSIS — Z01.84 IMMUNITY STATUS TESTING: ICD-10-CM

## 2023-05-18 LAB
ALBUMIN SERPL BCG-MCNC: 5 G/DL (ref 3.5–5.2)
ALP SERPL-CCNC: 71 U/L (ref 35–104)
ALT SERPL W P-5'-P-CCNC: 38 U/L (ref 10–35)
ANION GAP SERPL CALCULATED.3IONS-SCNC: 11 MMOL/L (ref 7–15)
AST SERPL W P-5'-P-CCNC: 29 U/L (ref 10–35)
BILIRUB SERPL-MCNC: 0.3 MG/DL
BUN SERPL-MCNC: 13.2 MG/DL (ref 6–20)
CALCIUM SERPL-MCNC: 10 MG/DL (ref 8.6–10)
CHLORIDE SERPL-SCNC: 102 MMOL/L (ref 98–107)
CHOLEST SERPL-MCNC: 233 MG/DL
CREAT SERPL-MCNC: 0.82 MG/DL (ref 0.51–0.95)
DEPRECATED HCO3 PLAS-SCNC: 29 MMOL/L (ref 22–29)
GFR SERPL CREATININE-BSD FRML MDRD: 87 ML/MIN/1.73M2
GLUCOSE SERPL-MCNC: 99 MG/DL (ref 70–99)
HAV IGG SER QL IA: REACTIVE
HDLC SERPL-MCNC: 85 MG/DL
LDLC SERPL CALC-MCNC: 124 MG/DL
NONHDLC SERPL-MCNC: 148 MG/DL
POTASSIUM SERPL-SCNC: 3.9 MMOL/L (ref 3.4–5.3)
PROT SERPL-MCNC: 7.6 G/DL (ref 6.4–8.3)
SODIUM SERPL-SCNC: 142 MMOL/L (ref 136–145)
TRIGL SERPL-MCNC: 121 MG/DL
VIT B12 SERPL-MCNC: 839 PG/ML (ref 232–1245)

## 2023-05-18 PROCEDURE — 80053 COMPREHEN METABOLIC PANEL: CPT | Performed by: PATHOLOGY

## 2023-05-18 PROCEDURE — 99000 SPECIMEN HANDLING OFFICE-LAB: CPT | Performed by: PATHOLOGY

## 2023-05-18 PROCEDURE — 80061 LIPID PANEL: CPT | Performed by: PATHOLOGY

## 2023-05-18 PROCEDURE — 82607 VITAMIN B-12: CPT | Mod: 90 | Performed by: PATHOLOGY

## 2023-05-18 PROCEDURE — 86708 HEPATITIS A ANTIBODY: CPT | Mod: 90 | Performed by: PATHOLOGY

## 2023-05-18 PROCEDURE — 36415 COLL VENOUS BLD VENIPUNCTURE: CPT | Performed by: PATHOLOGY

## 2023-05-18 NOTE — TELEPHONE ENCOUNTER
Can you contact the patient and set up a televisit to discuss? If we are going to change systemic agents, we need to have a bit more time and context than via [place]. Thanks!

## 2023-05-18 NOTE — TELEPHONE ENCOUNTER
Appointment made for tomorrow 5.19.23 to discuss injection site reaction per Dr. Velasquez request.    Iwona JERONIMO RN

## 2023-05-18 NOTE — LETTER
May 22, 2023      Manju Berg  [address]  [place] [address]        Dear ,    We are writing to inform you of your test results.    Manju,  Thanks for coming in for the labs.      - you show immunity to hepatitis A     - cholesterol is about the same as last year. The cholesterol panel shows the LDL is slightly elevated above ideal range.  I would encourage you to work on lifestyle measures to bring your cholesterol down and reduce cardiovascular risks. Plan to recheck your cholesterol yearly.   Recommendations to reduce cholesterol and cardiovascular risks:  Diet:  -Try to eat more vegetables, fruits, legumes, nuts/seeds, whole grains, and fish.  - try to eat less red meat, processed meats, processed foods, sweetened beverages.   - try to replace saturated fat in your diet with mono- and poly-unsaturated fats   Exercise:  Aim for regular exercise with a goal of 150 min of moderate to high intensity aerobic exercise per week     - kidney, liver and electrolyte panel was normal except for mild elevation of the ALT liver test. Definitely follow-up with the gi docs if you are noting any abdominal pain/nausea. Let's also get an ultrasound to ensure the ducts coming from the liver all look good. I'll place an order and a  will call you.      - The fasting blood glucose (diabetes screening test) was negative/normal.      Please MyChart or call if you have any concerns or questions.   Sincerely,    Resulted Orders   Hepatitis Antibody A IgG   Result Value Ref Range    Hepatitis A Antibody IgG Reactive (A) Nonreactive    Narrative    This assay cannot be used for the diagnosis of acute HAV infection.   Vitamin B12   Result Value Ref Range    Vitamin B12 839 232 - 1,245 pg/mL   Lipid panel reflex to direct LDL Fasting   Result Value Ref Range    Cholesterol 233 (H) <200 mg/dL    Triglycerides 121 <150 mg/dL    Direct Measure HDL 85 >=50 mg/dL    LDL Cholesterol Calculated 124 (H) <=100  mg/dL    Non HDL Cholesterol 148 (H) <130 mg/dL    Narrative    Cholesterol  Desirable:  <200 mg/dL    Triglycerides  Normal:  Less than 150 mg/dL  Borderline High:  150-199 mg/dL  High:  200-499 mg/dL  Very High:  Greater than or equal to 500 mg/dL    Direct Measure HDL  Female:  Greater than or equal to 50 mg/dL   Male:  Greater than or equal to 40 mg/dL    LDL Cholesterol  Desirable:  <100mg/dL  Above Desirable:  100-129 mg/dL   Borderline High:  130-159 mg/dL   High:  160-189 mg/dL   Very High:  >= 190 mg/dL    Non HDL Cholesterol  Desirable:  130 mg/dL  Above Desirable:  130-159 mg/dL  Borderline High:  160-189 mg/dL  High:  190-219 mg/dL  Very High:  Greater than or equal to 220 mg/dL   Comprehensive metabolic panel (BMP + Alb, Alk Phos, ALT, AST, Total. Bili, TP)   Result Value Ref Range    Sodium 142 136 - 145 mmol/L    Potassium 3.9 3.4 - 5.3 mmol/L    Chloride 102 98 - 107 mmol/L    Carbon Dioxide (CO2) 29 22 - 29 mmol/L    Anion Gap 11 7 - 15 mmol/L    Urea Nitrogen 13.2 6.0 - 20.0 mg/dL    Creatinine 0.82 0.51 - 0.95 mg/dL    Calcium 10.0 8.6 - 10.0 mg/dL    Glucose 99 70 - 99 mg/dL    Alkaline Phosphatase 71 35 - 104 U/L    AST 29 10 - 35 U/L    ALT 38 (H) 10 - 35 U/L    Protein Total 7.6 6.4 - 8.3 g/dL    Albumin 5.0 3.5 - 5.2 g/dL    Bilirubin Total 0.3 <=1.2 mg/dL    GFR Estimate 87 >60 mL/min/1.73m2      Comment:      eGFR calculated using 2021 CKD-EPI equation.       If you have any questions or concerns, please call the clinic at the number listed above.       Sincerely,      Yarely Wise MD

## 2023-05-19 ENCOUNTER — VIRTUAL VISIT (OUTPATIENT)
Dept: DERMATOLOGY | Facility: CLINIC | Age: 53
End: 2023-05-19
Payer: COMMERCIAL

## 2023-05-19 ENCOUNTER — TELEPHONE (OUTPATIENT)
Dept: DERMATOLOGY | Facility: CLINIC | Age: 53
End: 2023-05-19

## 2023-05-19 DIAGNOSIS — L40.0 PLAQUE PSORIASIS: Primary | ICD-10-CM

## 2023-05-19 DIAGNOSIS — D84.9 IMMUNOSUPPRESSION (H): ICD-10-CM

## 2023-05-19 DIAGNOSIS — Z98.890 HISTORY OF ERCP: ICD-10-CM

## 2023-05-19 DIAGNOSIS — Z90.49 HISTORY OF CHOLECYSTECTOMY: ICD-10-CM

## 2023-05-19 DIAGNOSIS — R79.89 ELEVATED LFTS: Primary | ICD-10-CM

## 2023-05-19 PROCEDURE — 99213 OFFICE O/P EST LOW 20 MIN: CPT | Mod: 93 | Performed by: DERMATOLOGY

## 2023-05-19 NOTE — LETTER
5/19/2023       RE: Manju Berg  [address]  [place] [address]     Dear Colleague,    Thank you for referring your patient, Manju Berg, to the [facility] [place] [facility]. Please see a copy of my visit note below.    [facility] Dermatology Note  Encounter Date: May 19, 2023  Telephone ([phone] ). Location of teledermatologist: [facility] [place]. Start time: 10:06. End time: 10:15.    Dermatology Problem List:  1. Plaque psoriasis: scalp, trunk, with some ear involvement  - ustekinumab, augmented betamethasone lotion  - in reserve: apremilast (exorbitant copay)  - prior: augmented betamethasone gel, coal tar/salcylic acid shampoo, ketoconazole shampoo, zinc pyrithione shampoo, compounded betamethasone/calcipotriene solution, Excimer, methotrexate 15 mg weekly, adalimumab (severe injection site reaction)       ____________________________________________    Assessment & Plan:     1. Plaque psoriasis: developing significant injection site reactions with adalimumab despite antihistamines, topicals steroids, ice, pre-warming. We discussed switch to an alternative biologic and will plan to start ustekinumab. If develops injection site reactions with this agent, given low frequency of injections, will be much  More manageable.  - switch from adalimumab to ustekinumab    Procedures Performed:    None    Follow-up: 3 months    Staff:     Pierce Velasquez MD, FAAD   of Dermatology  Department of Dermatology  [facility]      ____________________________________________    CC: Telephone (Discuss medication reactions )    HPI:  Ms. Manju Berg is a(n) 52 year old female who presents today as a return patient for psoriasis    Injection site reaction - enlarging - now about hand print-sized  - has tried  premedication with antihistamines, ice, topical steroids  - takes a week to resolve, then recurs with next injection    Skin is totally clear    Patient is otherwise feeling well, without additional skin concerns.    Labs Reviewed:  N/A    Physical Exam:  Vitals: LMP  (LMP Unknown)   SKIN: No photos    Medications:  Current Outpatient Medications   Medication    Cholecalciferol (VITAMIN D) 2000 UNITS tablet    fluticasone (FLONASE) 50 MCG/ACT nasal spray    fluvoxaMINE (LUVOX) 50 MG tablet    hydrOXYzine (VISTARIL) 25 MG capsule    LANsoprazole (PREVACID) 30 MG DR capsule    lidocaine (LIDODERM) 5 % patch    linaclotide (LINZESS) 72 MCG capsule    Multiple Vitamin (MULTIVITAMIN ADULT PO)    naproxen (NAPROSYN) 500 MG tablet    valACYclovir (VALTREX) 1000 mg tablet    adalimumab (HUMIRA *CF*) 40 MG/0.4ML pen kit    albuterol (PROAIR HFA/PROVENTIL HFA/VENTOLIN HFA) 108 (90 Base) MCG/ACT inhaler    albuterol (PROVENTIL) (2.5 MG/3ML) 0.083% neb solution    augmented betamethasone dipropionate (DIPROLENE) 0.05 % external lotion    budesonide-formoterol (SYMBICORT) 160-4.5 MCG/ACT Inhaler    buPROPion (WELLBUTRIN XL) 300 MG 24 hr tablet    CALCIUM 600 + D 600-200 MG-UNIT OR TABS    cetirizine (ZYRTEC) 10 MG tablet    chlorzoxazone (PARAFON FORTE) 500 MG tablet     No current facility-administered medications for this visit.      Past Medical/Surgical History:   Patient Active Problem List   Diagnosis    Recurrent herpes labialis    GERD (gastroesophageal reflux disease)    Other specified counseling    Mild intermittent asthma    Depression, major, single episode    Family history of colon cancer    CARDIOVASCULAR SCREENING; LDL GOAL LESS THAN 160    Pain in joint, upper arm    Benign neoplasm of soft tissues    Constipation    Vitamin D deficiency    Anxiety state     delivery delivered    Leg pain, bilateral    Pain of finger of right hand    Prediabetes    Shoulder (girdle) dystocia during labor and deliver,  delivered    Toe pain, left    Varicose veins of lower extremity    Backache    Headache, chronic daily    IUD (intrauterine device) in place    Psoriasis of scalp    Morbid obesity (H)    H/O cold sores    Acute cholecystitis    Acute pancreatitis, unspecified complication status, unspecified pancreatitis type    Anxiety     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     5/27/2011,Proximal left tibia    Dyspepsia and other specified disorders of function of stomach     No Comments Provided    Gastroesophageal reflux disease     Headache     No Comments Provided    Herpes simplex     No Comments Provided    Lateral epicondylitis of elbow     No Comments Provided    Other depressive disorder     No Comments Provided    Other unspecified back disorder     No Comments Provided    Pain in joint, upper arm     No Comments Provided       CC No referring provider defined for this encounter. on close of this encounter.

## 2023-05-19 NOTE — PROGRESS NOTES
[facility] Dermatology Note  Encounter Date: May 19, 2023  Telephone ([phone] ). Location of teledermatologist: [facility]ERMATOLOGY [facility] [place]. Start time: 10:06. End time: 10:15.    Dermatology Problem List:  1. Plaque psoriasis: scalp, trunk, with some ear involvement  - ustekinumab, augmented betamethasone lotion  - in reserve: apremilast (exorbitant copay)  - prior: augmented betamethasone gel, coal tar/salcylic acid shampoo, ketoconazole shampoo, zinc pyrithione shampoo, compounded betamethasone/calcipotriene solution, Excimer, methotrexate 15 mg weekly, adalimumab (severe injection site reaction)       ____________________________________________    Assessment & Plan:     1. Plaque psoriasis: developing significant injection site reactions with adalimumab despite antihistamines, topicals steroids, ice, pre-warming. We discussed switch to an alternative biologic and will plan to start ustekinumab. If develops injection site reactions with this agent, given low frequency of injections, will be much  More manageable.  - switch from adalimumab to ustekinumab    Procedures Performed:    None    Follow-up: 3 months    Staff:     Pierce Velasquez MD, FAAD   of Dermatology  Department of Dermatology  [facility]      ____________________________________________    CC: Telephone (Discuss medication reactions )    HPI:  Ms. Manju Berg is a(n) 52 year old female who presents today as a return patient for psoriasis    Injection site reaction - enlarging - now about hand print-sized  - has tried premedication with antihistamines, ice, topical steroids  - takes a week to resolve, then recurs with next injection    Skin is totally clear    Patient is otherwise feeling well, without additional skin concerns.    Labs Reviewed:  N/A    Physical Exam:  Vitals: LMP  (LMP Unknown)   SKIN: No photos    Medications:  Current  Outpatient Medications   Medication     Cholecalciferol (VITAMIN D) 2000 UNITS tablet     fluticasone (FLONASE) 50 MCG/ACT nasal spray     fluvoxaMINE (LUVOX) 50 MG tablet     hydrOXYzine (VISTARIL) 25 MG capsule     LANsoprazole (PREVACID) 30 MG DR capsule     lidocaine (LIDODERM) 5 % patch     linaclotide (LINZESS) 72 MCG capsule     Multiple Vitamin (MULTIVITAMIN ADULT PO)     naproxen (NAPROSYN) 500 MG tablet     valACYclovir (VALTREX) 1000 mg tablet     adalimumab (HUMIRA *CF*) 40 MG/0.4ML pen kit     albuterol (PROAIR HFA/PROVENTIL HFA/VENTOLIN HFA) 108 (90 Base) MCG/ACT inhaler     albuterol (PROVENTIL) (2.5 MG/3ML) 0.083% neb solution     augmented betamethasone dipropionate (DIPROLENE) 0.05 % external lotion     budesonide-formoterol (SYMBICORT) 160-4.5 MCG/ACT Inhaler     buPROPion (WELLBUTRIN XL) 300 MG 24 hr tablet     CALCIUM 600 + D 600-200 MG-UNIT OR TABS     cetirizine (ZYRTEC) 10 MG tablet     chlorzoxazone (PARAFON FORTE) 500 MG tablet     No current facility-administered medications for this visit.      Past Medical/Surgical History:   Patient Active Problem List   Diagnosis     Recurrent herpes labialis     GERD (gastroesophageal reflux disease)     Other specified counseling     Mild intermittent asthma     Depression, major, single episode     Family history of colon cancer     CARDIOVASCULAR SCREENING; LDL GOAL LESS THAN 160     Pain in joint, upper arm     Benign neoplasm of soft tissues     Constipation     Vitamin D deficiency     Anxiety state      delivery delivered     Leg pain, bilateral     Pain of finger of right hand     Prediabetes     Shoulder (girdle) dystocia during labor and deliver, delivered     Toe pain, left     Varicose veins of lower extremity     Backache     Headache, chronic daily     IUD (intrauterine device) in place     Psoriasis of scalp     Morbid obesity (H)     H/O cold sores     Acute cholecystitis     Acute pancreatitis, unspecified complication status,  unspecified pancreatitis type     Anxiety     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/2011,Proximal left tibia     Dyspepsia and other specified disorders of function of stomach     No Comments Provided     Gastroesophageal reflux disease      Headache     No Comments Provided     Herpes simplex     No Comments Provided     Lateral epicondylitis of elbow     No Comments Provided     Other depressive disorder     No Comments Provided     Other unspecified back disorder     No Comments Provided     Pain in joint, upper arm     No Comments Provided       CC No referring provider defined for this encounter. on close of this encounter.

## 2023-05-19 NOTE — RESULT ENCOUNTER NOTE
Manju,  Thanks for coming in for the labs.     - you show immunity to hepatitis A    - cholesterol is about the same as last year. The cholesterol panel shows the LDL is slightly elevated above ideal range.  I would encourage you to work on lifestyle measures to bring your cholesterol down and reduce cardiovascular risks. Plan to recheck your cholesterol yearly.   Recommendations to reduce cholesterol and cardiovascular risks:  Diet:  -Try to eat more vegetables, fruits, legumes, nuts/seeds, whole grains, and fish.  - try to eat less red meat, processed meats, processed foods, sweetened beverages.   - try to replace saturated fat in your diet with mono- and poly-unsaturated fats   Exercise:  Aim for regular exercise with a goal of 150 min of moderate to high intensity aerobic exercise per week    - kidney, liver and electrolyte panel was normal except for mild elevation of the ALT liver test. Definitely follow-up with the gi docs if you are noting any abdominal pain/nausea. Let's also get an ultrasound to ensure the ducts coming from the liver all look good. I'll place an order and a  will call you.     - The fasting blood glucose (diabetes screening test) was negative/normal.     Please MyChart or call if you have any concerns or questions.   Sincerely,  Yarely Wise MD

## 2023-05-19 NOTE — NURSING NOTE
Chief Complaint   Patient presents with     Telephone     Discuss medication reactions      Teledermatology Nurse Call Patients:     Are you in the [facility] at the time of the encounter? yes    Today's visit will be billed to you and your insurance.    A teledermatology visit is not as thorough as an in-person visit and the quality of the photograph sent may not be of the same quality as that taken by the dermatology clinic.    Shelby Kocher

## 2023-05-19 NOTE — TELEPHONE ENCOUNTER
PA Initiation    Med[facility]: [facility] 45 [facility]  Insurance Company: [employer] - Phone [phone] Fax [phone]  Pharmacy Filling the Rx: [facility] - [place], [place] - [facility] [address]  Filling Pharmacy Phone: [phone]  Filling Pharmacy Fax:    Start Date: 5/19/2023    Key: [id number]

## 2023-05-23 NOTE — TELEPHONE ENCOUNTER
Prior Authorization Approval    Medication: STELARA 45 MG/0.5ML SC [facility]  Authorization Effective Date: 5/19/2023  Authorization Expiration Date: 11/6/2023  Approved Dose/Quantity: 1 for 28 days  Reference #: Key: [id number]   Insurance Company: [employer] - Phone [phone] Fax [phone]  Expected CoPay: $0.00     CoPay Card Available: No    Financial Assistance Needed: no  Which Pharmacy is filling the prescription: [facility] - [place], [place]  [facility] [address]   Pharmacy Notified: Yes  Patient Notified: Yes

## 2023-05-25 ENCOUNTER — TELEPHONE (OUTPATIENT)
Dept: DERMATOLOGY | Facility: CLINIC | Age: 53
End: 2023-05-25
Payer: COMMERCIAL

## 2023-05-25 NOTE — TELEPHONE ENCOUNTER
[facility] Call Center    Phone Message    May a detailed message be left on voicemail: no     Reason for Call: Medication Question or concern regarding medication   Prescription Clarification  Name of Medication: Stelara 4MG  Prescribing Provider:Pierce Velasquez MD   Pharmacy: [facility]    What on the order needs clarification?Is there a maintaince dose that goes with this order. Please call Pharmacy to discuss.           Action Taken: Message routed to:  [facility] DERM    Travel Screening: Not Applicable

## 2023-05-30 ENCOUNTER — PRE VISIT (OUTPATIENT)
Dept: NEUROLOGY | Facility: CLINIC | Age: 53
End: 2023-05-30

## 2023-05-30 ENCOUNTER — OFFICE VISIT (OUTPATIENT)
Dept: NEUROLOGY | Facility: CLINIC | Age: 53
End: 2023-05-30
Attending: FAMILY MEDICINE
Payer: COMMERCIAL

## 2023-05-30 VITALS — HEART RATE: 68 BPM | DIASTOLIC BLOOD PRESSURE: 87 MMHG | SYSTOLIC BLOOD PRESSURE: 123 MMHG | OXYGEN SATURATION: 97 %

## 2023-05-30 DIAGNOSIS — R51.9 CHRONIC DAILY HEADACHE: ICD-10-CM

## 2023-05-30 DIAGNOSIS — L40.0 PLAQUE PSORIASIS: ICD-10-CM

## 2023-05-30 DIAGNOSIS — G43.709 CHRONIC MIGRAINE WITHOUT AURA WITHOUT STATUS MIGRAINOSUS, NOT INTRACTABLE: Primary | ICD-10-CM

## 2023-05-30 DIAGNOSIS — G43.009 MIGRAINE WITHOUT AURA AND WITHOUT STATUS MIGRAINOSUS, NOT INTRACTABLE: ICD-10-CM

## 2023-05-30 DIAGNOSIS — M54.2 CERVICALGIA: ICD-10-CM

## 2023-05-30 PROCEDURE — 99204 OFFICE O/P NEW MOD 45 MIN: CPT | Performed by: NURSE PRACTITIONER

## 2023-05-30 RX ORDER — RIZATRIPTAN BENZOATE 5 MG/1
5-10 TABLET, ORALLY DISINTEGRATING ORAL
Qty: 18 TABLET | Refills: 6 | Status: SHIPPED | OUTPATIENT
Start: 2023-05-30

## 2023-05-30 RX ORDER — PROPRANOLOL HCL 60 MG
60 CAPSULE, EXTENDED RELEASE 24HR ORAL DAILY
Qty: 30 CAPSULE | Refills: 6 | Status: SHIPPED | OUTPATIENT
Start: 2023-05-30 | End: 2023-11-01 | Stop reason: SINTOL

## 2023-05-30 ASSESSMENT — HEADACHE IMPACT TEST (HIT 6)
HOW OFTEN HAVE YOU FELT FED UP OR IRRITATED BECAUSE OF YOUR HEADACHES: VERY OFTEN
WHEN YOU HAVE A HEADACHE HOW OFTEN DO YOU WISH YOU COULD LIE DOWN: RARELY
WHEN YOU HAVE HEADACHES HOW OFTEN IS THE PAIN SEVERE: SOMETIMES
HIT6 TOTAL SCORE: 57
HOW OFTEN DID HEADACHS LIMIT CONCENTRATION ON WORK OR DAILY ACTIVITY: SOMETIMES
HOW OFTEN DO HEADACHES LIMIT YOUR DAILY ACTIVITIES: SOMETIMES
HOW OFTEN HAVE YOU FELT TOO TIRED TO WORK BECAUSE OF YOUR HEADACHES: RARELY

## 2023-05-30 ASSESSMENT — PAIN SCALES - GENERAL: PAINLEVEL: MILD PAIN (2)

## 2023-05-30 NOTE — PROGRESS NOTES
ASSESSMENT AND PLAN:  Headaches the same for many years -since HS. No new changes. Headaches are chronic.   Would wait with further diagnostic work including imaging at this time but certainly we can do it in the future if symptoms change.   Eye exam presumed normal a few years ago  Discussed migraine treatment -oral meds, monthly injections   Plan:  A trial of propranolol 60 ER at bedtime -side effects -stop if mood changes or asthma symptoms worsening. May cause fatigue, dizziness, low heart rate or blood pressure  Rescue treatment -rizatriptan as needed at the onset of severe headache. Side effects-fatigue, dizziness, nausea. Limit use to no more than 9 days per month.   Limit use of OTC analgesics to no more than 14 days per month   May explore antimigraine devices-Cefaly, Nerivio, Relivion, e-Noel   Follow up in 3 months or sooner if needed         Subjective   Manju is a 53 year old, presenting for the following health issues:  Consult   Plaque psoriasis, asthma, s/p cholecysectomy, elevated LFTs  HPI   Since HS daily headaches. Started on nortriptyline and worked well for a few years than stopped. Tried gabapentin and caused tiredness.   Headache in the temples and throbbing. Pain can be moderate or severe at times. Takes tylenol +chlorzoxazone when headache 4/10  Fr 3-4 out of 7 days per week and may last for days. Pain affects daily activity. Staying active but pushes thru. Sometimes light sensitivity but no noise sensitivity.   No nausea or vomiting  No visual symptoms    Headache Tx  Tylenol +chlorzoxazone   Occasional ibuprofen, naproxen   Gabapentin   Nortriptyline   Propranolol     On wellbutrin and fluvoxamine-controlled depression/anxiety    FH-may be parents have headaches  Daughter has headaches -she is 9 years old    SH:  Has 3 kids   RN [employer]        Objective    /87 (BP Location: Right arm, Patient Position: Chair, Cuff Size: Adult Regular)   Pulse 68   LMP  (LMP Unknown)    SpO2 97%   There is no height or weight on file to calculate BMI.  Physical Exam   GENERAL: healthy, alert and no distress  EYES: Eyes grossly normal to inspection, PERRL and conjunctivae and sclerae normal, no apparent papilledema   NECK: no adenopathy, no asymmetry, masses, or scars and thyroid normal to palpation  MS: no gross musculoskeletal defects noted, no edema  NEURO: Normal strength and tone, sensory exam grossly normal, mentation intact, speech normal, cranial nerves 2-12 intact, DTR's normal and symmetric and Romberg normal, normal casual gait  PSYCH: mentation appears normal, affect normal    I discussed all my recommendations with Manju Berg who verbalizes understanding and comfortable with the plan.     49 minutes spent on the date of the encounter doing face to face, chart  review, exam,  meds review, treatment plan, documentation and further activities as noted above    BELINDA Wagner, CNP [facility]  Headache certified  [facility]

## 2023-05-30 NOTE — PATIENT INSTRUCTIONS
Plan:  A trial of propranolol 60 ER at bedtime -side effects -stop if mood changes or asthma symptoms worsening. May cause fatigue, dizziness, low heart rate or blood pressure  Rescue treatment -rizatriptan as needed at the onset of severe headache. Side effects-fatigue, dizziness, nausea. Limit use to no more than 9 days per month.   Limit use of OTC analgesics to no more than 14 days per month   May explore antimigraine devices-Cefaly, Nerivio, Relivion, e-Noel   Follow up in 3 months or sooner if needed

## 2023-05-30 NOTE — LETTER
5/30/2023       RE: Manju Berg  [address] [place] [address]     Dear Colleague,    Thank you for referring your patient, Manju Berg, to the [facility] NEUROLOGY [facility]INIC [place] at [facility]. Please see a copy of my visit note below.    ASSESSMENT AND PLAN:  Headaches the same for many years -since HS. No new changes. Headaches are chronic.   Would wait with further diagnostic work including imaging at this time but certainly we can do it in the future if symptoms change.   Eye exam presumed normal a few years ago  Discussed migraine treatment -oral meds, monthly injections   Plan:  A trial of propranolol 60 ER at bedtime -side effects -stop if mood changes or asthma symptoms worsening. May cause fatigue, dizziness, low heart rate or blood pressure  Rescue treatment -rizatriptan as needed at the onset of severe headache. Side effects-fatigue, dizziness, nausea. Limit use to no more than 9 days per month.   Limit use of OTC analgesics to no more than 14 days per month   May explore antimigraine devices-Cefaly, Nerivio, Relivion, e-Noel   Follow up in 3 months or sooner if needed         Subjective   Manju is a 53 year old, presenting for the following health issues:  Consult   Plaque psoriasis, asthma, s/p cholecysectomy, elevated LFTs  HPI   Since HS daily headaches. Started on nortriptyline and worked well for a few years than stopped. Tried gabapentin and caused tiredness.   Headache in the temples and throbbing. Pain can be moderate or severe at times. Takes tylenol +chlorzoxazone when headache 4/10  Fr 3-4 out of 7 days per week and may last for days. Pain affects daily activity. Staying active but pushes thru. Sometimes light sensitivity but no noise sensitivity.   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  Physical Exam   GENERAL: healthy, alert and no distress  EYES: Eyes grossly normal to inspection, PERRL and conjunctivae and sclerae normal, no apparent papilledema   NECK: no adenopathy, no asymmetry, masses, or scars and thyroid normal to palpation  MS: no gross musculoskeletal defects noted, no edema  NEURO: Normal strength and tone, sensory exam grossly normal, mentation intact, speech normal, cranial nerves 2-12 intact, DTR's normal and symmetric and Romberg normal, normal casual gait  PSYCH: mentation appears normal, affect normal    I discussed all my recommendations with Manju Berg who verbalizes understanding and comfortable with the plan.     49 minutes spent on the date of the encounter doing face to face, chart  review, exam,  meds review, treatment plan, documentation and further activities as noted above    BELINDA Wagner, CNP [facility]  Headache certified  [facility]

## 2023-06-02 NOTE — TELEPHONE ENCOUNTER
The free text in the initial order had the dosing schedule, but I sent a new order which will hopefully clarify for them. Thanks!

## 2023-06-07 DIAGNOSIS — F32.0 CURRENT MILD EPISODE OF MAJOR DEPRESSIVE DISORDER WITHOUT PRIOR EPISODE (H): ICD-10-CM

## 2023-06-07 DIAGNOSIS — F41.9 ANXIETY: ICD-10-CM

## 2023-06-07 RX ORDER — BUPROPION HYDROCHLORIDE 300 MG/1
300 TABLET ORAL EVERY MORNING
Qty: 90 TABLET | Refills: 2 | Status: SHIPPED | OUTPATIENT
Start: 2023-06-07 | End: 2024-07-02

## 2023-06-08 ENCOUNTER — ANCILLARY PROCEDURE (OUTPATIENT)
Dept: ULTRASOUND IMAGING | Facility: CLINIC | Age: 53
End: 2023-06-08
Attending: FAMILY MEDICINE
Payer: COMMERCIAL

## 2023-06-08 ENCOUNTER — ANCILLARY PROCEDURE (OUTPATIENT)
Dept: MAMMOGRAPHY | Facility: CLINIC | Age: 53
End: 2023-06-08
Attending: FAMILY MEDICINE
Payer: COMMERCIAL

## 2023-06-08 DIAGNOSIS — Z98.890 HISTORY OF ERCP: ICD-10-CM

## 2023-06-08 DIAGNOSIS — R79.89 ELEVATED LFTS: ICD-10-CM

## 2023-06-08 DIAGNOSIS — Z12.31 VISIT FOR SCREENING MAMMOGRAM: ICD-10-CM

## 2023-06-08 DIAGNOSIS — Z90.49 HISTORY OF CHOLECYSTECTOMY: ICD-10-CM

## 2023-06-08 PROCEDURE — 77067 SCR MAMMO BI INCL CAD: CPT | Performed by: STUDENT IN AN ORGANIZED HEALTH CARE EDUCATION/TRAINING PROGRAM

## 2023-06-08 PROCEDURE — 77063 BREAST TOMOSYNTHESIS BI: CPT | Performed by: STUDENT IN AN ORGANIZED HEALTH CARE EDUCATION/TRAINING PROGRAM

## 2023-06-08 PROCEDURE — 76705 ECHO EXAM OF ABDOMEN: CPT | Performed by: RADIOLOGY

## 2023-06-08 NOTE — RESULT ENCOUNTER NOTE
Manju,  Your ultrasound was normal/as expected. The bile ducts all look good and back to normal. Liver appeared normal also.   Please MyChart or call if you have any concerns or questions.   Sincerely,  Yarely Wise MD

## 2023-06-11 ENCOUNTER — MYC MEDICAL ADVICE (OUTPATIENT)
Dept: FAMILY MEDICINE | Facility: CLINIC | Age: 53
End: 2023-06-11
Payer: COMMERCIAL

## 2023-06-11 DIAGNOSIS — R79.89 ELEVATED LFTS: Primary | ICD-10-CM

## 2023-06-19 DIAGNOSIS — G43.709 CHRONIC MIGRAINE WITHOUT AURA WITHOUT STATUS MIGRAINOSUS, NOT INTRACTABLE: Primary | ICD-10-CM

## 2023-07-21 ENCOUNTER — TELEPHONE (OUTPATIENT)
Dept: NEUROLOGY | Facility: CLINIC | Age: 53
End: 2023-07-21
Payer: COMMERCIAL

## 2023-07-21 NOTE — TELEPHONE ENCOUNTER
LVM for pt to reschedule her virtual appt on 9/1/23 @ 9:00 am with Brittany Aguilar as Brittany is double booked on that day.    7/21/23 BD

## 2023-07-24 ENCOUNTER — PHARMACY VISIT (OUTPATIENT)
Dept: PHARMACY | Facility: CLINIC | Age: 53
End: 2023-07-24

## 2023-07-24 NOTE — PROGRESS NOTES
Psoriasis Clinical Follow Up Assessment      Activity Medications    STELARA     Summary Notes  Spoke with Manju for assessment. Current Regimen: Stelara 45 mg every 12 wks     Med/Allergy Changes: None     Dosing Schedule: Was out of town and missed her dose that was due 7/14, didnt give it until 7/21. Discussed she can keep the normal schedule, next dose due 10/6.     Tolerability: Denies SE/ISR, still going great.     PsO: Still clear! Seeing derm on 9/18     Follow up: Will call again in October after next dose.     Cathy Lopez, MichaelD  Therapy Management Pharmacist  [facility]

## 2023-08-03 ENCOUNTER — OFFICE VISIT (OUTPATIENT)
Dept: NEUROLOGY | Facility: CLINIC | Age: 53
End: 2023-08-03
Payer: COMMERCIAL

## 2023-08-03 VITALS
SYSTOLIC BLOOD PRESSURE: 121 MMHG | WEIGHT: 184 LBS | RESPIRATION RATE: 16 BRPM | HEIGHT: 63 IN | DIASTOLIC BLOOD PRESSURE: 86 MMHG | BODY MASS INDEX: 32.6 KG/M2 | HEART RATE: 90 BPM | OXYGEN SATURATION: 96 %

## 2023-08-03 DIAGNOSIS — G43.709 CHRONIC MIGRAINE WITHOUT AURA WITHOUT STATUS MIGRAINOSUS, NOT INTRACTABLE: Primary | ICD-10-CM

## 2023-08-03 PROCEDURE — 64615 CHEMODENERV MUSC MIGRAINE: CPT | Performed by: NURSE PRACTITIONER

## 2023-08-03 ASSESSMENT — HEADACHE IMPACT TEST (HIT 6)
HOW OFTEN DO HEADACHES LIMIT YOUR DAILY ACTIVITIES: SOMETIMES
HOW OFTEN DID HEADACHS LIMIT CONCENTRATION ON WORK OR DAILY ACTIVITY: VERY OFTEN
WHEN YOU HAVE HEADACHES HOW OFTEN IS THE PAIN SEVERE: VERY OFTEN
HOW OFTEN HAVE YOU FELT FED UP OR IRRITATED BECAUSE OF YOUR HEADACHES: ALWAYS
HIT6 TOTAL SCORE: 64
HOW OFTEN HAVE YOU FELT TOO TIRED TO WORK BECAUSE OF YOUR HEADACHES: RARELY
WHEN YOU HAVE A HEADACHE HOW OFTEN DO YOU WISH YOU COULD LIE DOWN: VERY OFTEN

## 2023-08-03 ASSESSMENT — PAIN SCALES - GENERAL: PAINLEVEL: MILD PAIN (2)

## 2023-08-03 NOTE — PROGRESS NOTES
PROCEDURE NOTE:    BOTULINUM NEUROTOXIN INJECTION PROCEDURE:  DATA:8/3/2023  INDICATIONS FOR PROCEDURES:   chronic migraine headaches.   Fr 3-4 out of 7 days per week and may last for days. Pain affects daily activity. Staying active but pushes thru    baseline symptoms have been recalcitrant to oral medications and conservative therapy.   Treatment tried propranolol, nortriptyline, ibuprofen, naproxen, gabapentin  On wellbutrin and fluvoxamine-controlled depression/anxiety   Patient is here today for an initial injection with Botox.    GOAL OF PROCEDURE:  The goal of this procedure is to decrease pain and enhance functional independence.    TOTAL DOSE ADMINISTERED:  Dose Administered:  155 units  Botox (Botulinum Toxin Type A)       2:1 Dilution    Wasted 45 units  Medication guide was given to patient    CONSENT:  The risks, benefits, and treatment options were discussed with the patient who agreed to proceed.    Written consent was obtained     EQUIPMENT USED:  Needles-30 gauge, 0.5 inches for injections  Four 1-ml tuberculin syringes for injections  One sodium chloride 10 ml vial preservative free  Alcohol swabs    SKIN PREPARATION:  Skin preparation was performed using an alcohol wipe.      AREA/MUSCLE INJECTED:  155 units of Botox  Right upper Trapezius (upper cervical) - 5 units of Botox at 3 site/s.   Left upper Trapezius (upper cervical) - 5 units of Botox at 3 site/s.     Right cervical paraspinals - 5 units of Botox at 2 site/s.   Left cervical paraspinals - 5 units of Botox at 2 site/s.     Left occipitalis - 5 units of Botox at 3 site/s.  Right occipitalis -5 units of Botox at 3 site/s      Right Frontalis - 5 units of Botox at 2 site/s.  Left Frontalis - 5 units of Botox at 2 site/s.    Right Temporalis - 5 units of Botox at 4 site/s.  Left Temporalis - 5 units of Botox at 4 site/s.    Right  - 5 units of Botox at 1 site/s.              Left  - 5 units of Botox at 1  site/s.    Procerus - 5 units of Botox at 1 site/s.    RESPONSE TO PROCEDURE:  tolerated the procedure well and there were no immediate complications.  Patient was allowed to recover for an appropriate period of time and was discharged home in stable condition.    FOLLOW UP:    Repeat Botox injections in 12 weeks      This procedure was performed under a hospital privileging agreement with Dr. Dom Aguilar, BELINDA, [facility]

## 2023-08-03 NOTE — NURSING NOTE
Chief Complaint   Patient presents with    Procedure     First botox today, confirmed by pt   Jackie Mejia

## 2023-08-03 NOTE — LETTER
8/3/2023       RE: Manju Berg  [address]  [place] [address]     Dear Colleague,    Thank you for referring your patient, Manju Berg, to the [facility] NEUROLOGY CLINIC [place] at [facility]. Please see a copy of my visit note below.    PROCEDURE NOTE:    BOTULINUM NEUROTOXIN INJECTION PROCEDURE:  DATA:8/3/2023  INDICATIONS FOR PROCEDURES:   chronic migraine headaches.   Fr 3-4 out of 7 days per week and may last for days. Pain affects daily activity. Staying active but pushes thru    baseline symptoms have been recalcitrant to oral medications and conservative therapy.   Treatment tried propranolol, nortriptyline, ibuprofen, naproxen, gabapentin  On wellbutrin and fluvoxamine-controlled depression/anxiety   Patient is here today for an initial injection with Botox.    GOAL OF PROCEDURE:  The goal of this procedure is to decrease pain and enhance functional independence.    TOTAL DOSE ADMINISTERED:  Dose Administered:  155 units  Botox (Botulinum Toxin Type A)       2:1 Dilution    Wasted 45 units  Medication guide was given to patient    CONSENT:  The risks, benefits, and treatment options were discussed with the patient who agreed to proceed.    Written consent was obtained     EQUIPMENT USED:  Needles-30 gauge, 0.5 inches for injections  Four 1-ml tuberculin syringes for injections  One sodium chloride 10 ml vial preservative free  Alcohol swabs    SKIN PREPARATION:  Skin preparation was performed using an alcohol wipe.      AREA/MUSCLE INJECTED:  155 units of Botox  Right upper Trapezius (upper cervical) - 5 units of Botox at 3 site/s.   Left upper Trapezius (upper cervical) - 5 units of Botox at 3 site/s.     Right cervical paraspinals - 5 units of Botox at 2 site/s.   Left cervical paraspinals - 5 units of Botox at 2 site/s.     Left occipitalis - 5 units of Botox at 3 site/s.  Right occipitalis -5 units of Botox at 3  site/s      Right Frontalis - 5 units of Botox at 2 site/s.  Left Frontalis - 5 units of Botox at 2 site/s.    Right Temporalis - 5 units of Botox at 4 site/s.  Left Temporalis - 5 units of Botox at 4 site/s.    Right  - 5 units of Botox at 1 site/s.              Left  - 5 units of Botox at 1 site/s.    Procerus - 5 units of Botox at 1 site/s.    RESPONSE TO PROCEDURE:  tolerated the procedure well and there were no immediate complications.  Patient was allowed to recover for an appropriate period of time and was discharged home in stable condition.    FOLLOW UP:    Repeat Botox injections in 12 weeks    This procedure was performed under a hospital privileging agreement with Dr. Fernandes        Again, thank you for allowing me to participate in the care of your patient.      Sincerely,    BELINDA Montalvo CNP

## 2023-08-08 DIAGNOSIS — G43.709 CHRONIC MIGRAINE WITHOUT AURA WITHOUT STATUS MIGRAINOSUS, NOT INTRACTABLE: Primary | ICD-10-CM

## 2023-08-08 RX ORDER — ONDANSETRON 4 MG/1
4 TABLET, ORALLY DISINTEGRATING ORAL EVERY 8 HOURS PRN
Qty: 20 TABLET | Refills: 1 | Status: SHIPPED | OUTPATIENT
Start: 2023-08-08 | End: 2024-05-29

## 2023-08-14 DIAGNOSIS — G43.709 CHRONIC MIGRAINE WITHOUT AURA WITHOUT STATUS MIGRAINOSUS, NOT INTRACTABLE: ICD-10-CM

## 2023-08-14 RX ORDER — ONDANSETRON 4 MG/1
4 TABLET, ORALLY DISINTEGRATING ORAL EVERY 8 HOURS PRN
Qty: 20 TABLET | Refills: 1 | OUTPATIENT
Start: 2023-08-14

## 2023-09-14 ENCOUNTER — LAB (OUTPATIENT)
Dept: LAB | Facility: CLINIC | Age: 53
End: 2023-09-14
Payer: COMMERCIAL

## 2023-09-14 DIAGNOSIS — R79.89 ELEVATED LFTS: ICD-10-CM

## 2023-09-14 LAB
ALBUMIN SERPL BCG-MCNC: 4.4 G/DL (ref 3.5–5.2)
ALP SERPL-CCNC: 55 U/L (ref 35–104)
ALT SERPL W P-5'-P-CCNC: 32 U/L (ref 0–50)
AST SERPL W P-5'-P-CCNC: 26 U/L (ref 0–45)
BILIRUB DIRECT SERPL-MCNC: <0.2 MG/DL (ref 0–0.3)
BILIRUB SERPL-MCNC: 0.4 MG/DL
PROT SERPL-MCNC: 6.6 G/DL (ref 6.4–8.3)

## 2023-09-14 PROCEDURE — 80076 HEPATIC FUNCTION PANEL: CPT | Performed by: PATHOLOGY

## 2023-09-14 PROCEDURE — 36415 COLL VENOUS BLD VENIPUNCTURE: CPT | Performed by: PATHOLOGY

## 2023-09-14 NOTE — PROGRESS NOTES
[facility] Dermatology Note  Encounter Date: Sep 18, 2023  Store-and-Forward and Telephone ([phone]). Location of teledermatologist: [facility].  Start time: 11:16. End time: 11:22.    Dermatology Problem List:  1. Plaque psoriasis: scalp, trunk, with some ear involvement  - ustekinumab, augmented betamethasone lotion  - in reserve: apremilast (exorbitant copay)  - prior: augmented betamethasone gel, coal tar/salcylic acid shampoo, ketoconazole shampoo, zinc pyrithione shampoo, compounded betamethasone/calcipotriene solution, Excimer, methotrexate 15 mg weekly, adalimumab (severe injection site reaction)       ____________________________________________    Assessment & Plan:     Psoriasis: doing very well on ustekinumab with clearance of psoriasis and no injection site reactions. Not needing topicals. Will continue. Due for QFN in December.  - ustekinumab  - topicals PRN  - QFN in December    Procedures Performed:    None    Follow-up: 1 year    Staff:     Pierce Velasquez MD, FAAD   of Dermatology  Department of Dermatology  [facility]    ____________________________________________    CC: RECHECK    HPI:  Ms. Manju Berg is a(n) 53 year old female who presents today as a return patient for psoriasis    Psoriasis - doing well on ustekinumab  - no injection site reactions  - skin clear    Patient is otherwise feeling well, without additional skin concerns.    Labs Reviewed:  12/2022 QFN negative    Physical Exam:  Vitals: There were no vitals taken for this visit.  SKIN: Teledermatology photos were reviewed; image quality and interpretability: acceptable. Image date: 9/18/23.  - skin clear  - No other lesions of concern on areas examined.     Medications:  Current Outpatient Medications   Medication    adalimumab (HUMIRA *CF*) 40 MG/0.4ML pen kit    albuterol (PROAIR HFA/PROVENTIL HFA/VENTOLIN HFA) 108 (90  Base) MCG/ACT inhaler    albuterol (PROVENTIL) (2.5 MG/3ML) 0.083% neb solution    augmented betamethasone dipropionate (DIPROLENE) 0.05 % external lotion    budesonide-formoterol (SYMBICORT) 160-4.5 MCG/ACT Inhaler    buPROPion (WELLBUTRIN XL) 300 MG 24 hr tablet    CALCIUM 600 + D 600-200 MG-UNIT OR TABS    cetirizine (ZYRTEC) 10 MG tablet    chlorzoxazone (PARAFON FORTE) 500 MG tablet    Cholecalciferol (VITAMIN D) 2000 UNITS tablet    fluticasone (FLONASE) 50 MCG/ACT nasal spray    fluvoxaMINE (LUVOX) 50 MG tablet    hydrOXYzine (VISTARIL) 25 MG capsule    LANsoprazole (PREVACID) 30 MG DR capsule    lidocaine (LIDODERM) 5 % patch    linaclotide (LINZESS) 72 MCG capsule    Multiple Vitamin (MULTIVITAMIN ADULT PO)    naproxen (NAPROSYN) 500 MG tablet    ondansetron (ZOFRAN ODT) 4 MG ODT tab    propranolol ER (INDERAL LA) 60 MG 24 hr capsule    rizatriptan (MAXALT-MLT) 5 MG ODT    ustekinumab (STELARA) 45 MG/0.5ML SOSY    valACYclovir (VALTREX) 1000 mg tablet     Current Facility-Administered Medications   Medication    Botulinum Toxin Type A (BOTOX) 200 units injection 155 Units      Past Medical/Surgical History:   Patient Active Problem List   Diagnosis    Recurrent herpes labialis    GERD (gastroesophageal reflux disease)    Other specified counseling    Mild intermittent asthma    Depression, major, single episode    Family history of colon cancer    CARDIOVASCULAR SCREENING; LDL GOAL LESS THAN 160    Pain in joint, upper arm    Benign neoplasm of soft tissues    Constipation    Vitamin D deficiency    Anxiety state     delivery delivered    Leg pain, bilateral    Pain of finger of right hand    Prediabetes    Shoulder (girdle) dystocia during labor and deliver, delivered    Toe pain, left    Varicose veins of lower extremity    Backache    Headache, chronic daily    IUD (intrauterine device) in place    Psoriasis of scalp    Morbid obesity (H)    H/O cold sores    Acute cholecystitis    Acute  pancreatitis, unspecified complication status, unspecified pancreatitis type    Anxiety     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     5/27/2011,Proximal left tibia    Dyspepsia and other specified disorders of function of stomach     No Comments Provided    Gastroesophageal reflux disease     Headache     No Comments Provided    Herpes simplex     No Comments Provided    Lateral epicondylitis of elbow     No Comments Provided    Other depressive disorder     No Comments Provided    Other unspecified back disorder     No Comments Provided    Pain in joint, upper arm     No Comments Provided       CC Pierce Velasquez MD  [address] [place] [address] on close of this encounter.

## 2023-09-18 ENCOUNTER — TELEPHONE (OUTPATIENT)
Dept: DERMATOLOGY | Facility: CLINIC | Age: 53
End: 2023-09-18

## 2023-09-18 ENCOUNTER — VIRTUAL VISIT (OUTPATIENT)
Dept: DERMATOLOGY | Facility: CLINIC | Age: 53
End: 2023-09-18
Payer: COMMERCIAL

## 2023-09-18 DIAGNOSIS — D84.9 IMMUNOSUPPRESSION (H): ICD-10-CM

## 2023-09-18 DIAGNOSIS — L40.0 PLAQUE PSORIASIS: Primary | ICD-10-CM

## 2023-09-18 PROCEDURE — 99214 OFFICE O/P EST MOD 30 MIN: CPT | Mod: 93 | Performed by: DERMATOLOGY

## 2023-09-18 ASSESSMENT — PAIN SCALES - GENERAL: PAINLEVEL: NO PAIN (0)

## 2023-09-18 NOTE — LETTER
9/18/2023         RE: Manju Berg  [address]  [place] [address]        Dear Colleague,    Thank you for referring your patient, Manju Berg, to the [facility]. Please see a copy of my visit note below.    [facility] Dermatology Note  Encounter Date: Sep 18, 2023  Store-and-Forward and Telephone ([phone]). Location of teledermatologist: [facility].  Start time: 11:16. End time: 11:22.    Dermatology Problem List:  1. Plaque psoriasis: scalp, trunk, with some ear involvement  - ustekinumab, augmented betamethasone lotion  - in reserve: apremilast (exorbitant copay)  - prior: augmented betamethasone gel, coal tar/salcylic acid shampoo, ketoconazole shampoo, zinc pyrithione shampoo, compounded betamethasone/calcipotriene solution, Excimer, methotrexate 15 mg weekly, adalimumab (severe injection site reaction)       ____________________________________________    Assessment & Plan:     Psoriasis: doing very well on ustekinumab with clearance of psoriasis and no injection site reactions. Not needing topicals. Will continue. Due for QFN in December.  - ustekinumab  - topicals PRN  - QFN in December    Procedures Performed:    None    Follow-up: 1 year    Staff:     Pierce Velasquez MD, FAAD   of Dermatology  Department of Dermatology  [facility]    ____________________________________________    CC: RECHECK    HPI:  Ms. Manju Berg is a(n) 53 year old female who presents today as a return patient for psoriasis    Psoriasis - doing well on ustekinumab  - no injection site reactions  - skin clear    Patient is otherwise feeling well, without additional skin concerns.    Labs Reviewed:  12/2022 QFN negative    Physical Exam:  Vitals: There were no vitals taken for this visit.  SKIN: Teledermatology photos were reviewed; image quality and interpretability:  acceptable. Image date: 23.  - skin clear  - No other lesions of concern on areas examined.     Medications:  Current Outpatient Medications   Medication     adalimumab (HUMIRA *CF*) 40 MG/0.4ML pen kit     albuterol (PROAIR HFA/PROVENTIL HFA/VENTOLIN HFA) 108 (90 Base) MCG/ACT inhaler     albuterol (PROVENTIL) (2.5 MG/3ML) 0.083% neb solution     augmented betamethasone dipropionate (DIPROLENE) 0.05 % external lotion     budesonide-formoterol (SYMBICORT) 160-4.5 MCG/ACT Inhaler     buPROPion (WELLBUTRIN XL) 300 MG 24 hr tablet     CALCIUM 600 + D 600-200 MG-UNIT OR TABS     cetirizine (ZYRTEC) 10 MG tablet     chlorzoxazone (PARAFON FORTE) 500 MG tablet     Cholecalciferol (VITAMIN D) 2000 UNITS tablet     fluticasone (FLONASE) 50 MCG/ACT nasal spray     fluvoxaMINE (LUVOX) 50 MG tablet     hydrOXYzine (VISTARIL) 25 MG capsule     LANsoprazole (PREVACID) 30 MG DR capsule     lidocaine (LIDODERM) 5 % patch     linaclotide (LINZESS) 72 MCG capsule     Multiple Vitamin (MULTIVITAMIN ADULT PO)     naproxen (NAPROSYN) 500 MG tablet     ondansetron (ZOFRAN ODT) 4 MG ODT tab     propranolol ER (INDERAL LA) 60 MG 24 hr capsule     rizatriptan (MAXALT-MLT) 5 MG ODT     ustekinumab (STELARA) 45 MG/0.5ML SOSY     valACYclovir (VALTREX) 1000 mg tablet     Current Facility-Administered Medications   Medication     Botulinum Toxin Type A (BOTOX) 200 units injection 155 Units      Past Medical/Surgical History:   Patient Active Problem List   Diagnosis     Recurrent herpes labialis     GERD (gastroesophageal reflux disease)     Other specified counseling     Mild intermittent asthma     Depression, major, single episode     Family history of colon cancer     CARDIOVASCULAR SCREENING; LDL GOAL LESS THAN 160     Pain in joint, upper arm     Benign neoplasm of soft tissues     Constipation     Vitamin D deficiency     Anxiety state      delivery delivered     Leg pain, bilateral     Pain of finger of right hand      Prediabetes     Shoulder (girdle) dystocia during labor and deliver, delivered     Toe pain, left     Varicose veins of lower extremity     Backache     Headache, chronic daily     IUD (intrauterine device) in place     Psoriasis of scalp     Morbid obesity (H)     H/O cold sores     Acute cholecystitis     Acute pancreatitis, unspecified complication status, unspecified pancreatitis type     Anxiety     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/2011,Proximal left tibia     Dyspepsia and other specified disorders of function of stomach     No Comments Provided     Gastroesophageal reflux disease      Headache     No Comments Provided     Herpes simplex     No Comments Provided     Lateral epicondylitis of elbow     No Comments Provided     Other depressive disorder     No Comments Provided     Other unspecified back disorder     No Comments Provided     Pain in joint, upper arm     No Comments Provided       CC Pierce Velasquez MD  [address] [place] [id number] on close of this encounter.       Again, thank you for allowing me to participate in the care of your patient.        Sincerely,        Pierce Velasquez MD

## 2023-09-18 NOTE — TELEPHONE ENCOUNTER
Message seen now. Pt came in person for visit today with Dr Velasquez.    Marialuisa Fam RN on 9/18/2023 at 11:53 AM

## 2023-09-18 NOTE — TELEPHONE ENCOUNTER
[facility] Call Center    Phone Message    May a detailed message be left on voicemail: yes     Reason for Call: Pt would like to know if today's 10:45 in person visit can be changed to a virtual visit. Please call Pt back to discuss. Thank you.     Action Taken: Message routed to:  Adult Clinics: Dermatology p [id number]    Travel Screening: Not Applicable

## 2023-09-18 NOTE — NURSING NOTE
Teledermatology Nurse Call Patients:     Are you in the [facility] at the time of the encounter? yes    Today's visit will be billed to you and your insurance.    A teledermatology visit is not as thorough as an in-person visit and the quality of the photograph sent may not be of the same quality as that taken by the dermatology clinic.  Chief Complaint   Patient presents with    ROSA Elise  Due to time, we didn't review meds.  Pt stated skin is clear.  She will send two photos through [employer]).

## 2023-11-01 ENCOUNTER — VIRTUAL VISIT (OUTPATIENT)
Dept: NEUROLOGY | Facility: CLINIC | Age: 53
End: 2023-11-01
Payer: COMMERCIAL

## 2023-11-01 VITALS — WEIGHT: 188 LBS | BODY MASS INDEX: 33.3 KG/M2

## 2023-11-01 DIAGNOSIS — G43.709 CHRONIC MIGRAINE WITHOUT AURA WITHOUT STATUS MIGRAINOSUS, NOT INTRACTABLE: Primary | ICD-10-CM

## 2023-11-01 PROCEDURE — 99213 OFFICE O/P EST LOW 20 MIN: CPT | Mod: VID | Performed by: NURSE PRACTITIONER

## 2023-11-01 RX ORDER — PROPRANOLOL HYDROCHLORIDE 40 MG/1
40 TABLET ORAL PRN
COMMUNITY

## 2023-11-01 RX ORDER — ACETAMINOPHEN 500 MG
1000 TABLET ORAL EVERY 6 HOURS PRN
COMMUNITY

## 2023-11-01 RX ORDER — VERAPAMIL HYDROCHLORIDE 40 MG/1
TABLET ORAL
Qty: 90 TABLET | Refills: 5 | Status: SHIPPED | OUTPATIENT
Start: 2023-11-01 | End: 2023-11-21 | Stop reason: SINTOL

## 2023-11-01 ASSESSMENT — HEADACHE IMPACT TEST (HIT 6)
WHEN YOU HAVE HEADACHES HOW OFTEN IS THE PAIN SEVERE: VERY OFTEN
HIT6 TOTAL SCORE: 57
HOW OFTEN HAVE YOU FELT TOO TIRED TO WORK BECAUSE OF YOUR HEADACHES: RARELY
HOW OFTEN DO HEADACHES LIMIT YOUR DAILY ACTIVITIES: RARELY
HOW OFTEN HAVE YOU FELT FED UP OR IRRITATED BECAUSE OF YOUR HEADACHES: VERY OFTEN
HOW OFTEN DID HEADACHS LIMIT CONCENTRATION ON WORK OR DAILY ACTIVITY: VERY OFTEN
WHEN YOU HAVE A HEADACHE HOW OFTEN DO YOU WISH YOU COULD LIE DOWN: RARELY

## 2023-11-01 ASSESSMENT — PAIN SCALES - GENERAL: PAINLEVEL: NO PAIN (0)

## 2023-11-01 NOTE — PATIENT INSTRUCTIONS
Plan:  Optimize headache prevention   A trial of verapamil and side effects reviewed. Patient will avoid to take propranolol as needed during verapamil trial. Aware of interactions  Continue Botox   Rescue treatment -OTC meds-limit use to no more than 14 days per month   Try rizatriptan as needed   Ondansetron as needed for nausea   Follow up in 3 months after starting verapamil  Botox on 11/6 -as discussed -will give Botox sometime to work if followed by a protocol

## 2023-11-01 NOTE — LETTER
11/1/2023       RE: Manju Berg  [address] [facility] [address]     Dear Colleague,    Thank you for referring your patient, Majnu Berg, to the [facility] NEUROLOGY CLINIC [place] at [facility]. Please see a copy of my visit note below.    Manju is a 53 year old who is being evaluated via a billable video visit.        Subjective  Manju is a 53 year old, presenting for the following health issues:headache  Follow Up  Initial Headache Clinic visit 5/30/2023, see note for details  Botox 8/3/2023 -first round and no difference or may be tiny. May be 1-2 days without a headaches. Has to take pain medications naproxen and tylenol and muscle relaxant 5 out 7 days.   Has been taking propranolol as needed and was helpful with headaches causing mood changes -feeling depressed and stopped. Takes propranolol as needed   Did not try rizatriptan -because was scarred of side effects.        Plan:  Optimize headache prevention   A trial of verapamil and side effects reviewed. Patient will avoid to take propranolol as needed during verapamil trial. Aware of interactions  Continue Botox   Rescue treatment -OTC meds-limit use to no more than 14 days per month   Try rizatriptan as needed   Ondansetron as needed for nausea   Follow up in 3 months after starting verapamil  Botox on 11/6 -as discussed -will give Botox sometime to work if followed by a protocol           Objective   Vitals - Patient Reported  Pain Score: No Pain (0)    Physical Exam   Patient is alert and no in apparent acute distress,  mentation appears normal, judgement and insight intact, normal speech, no weakness    I discussed all my recommendations with Manju Berg who verbalizes understanding and comfortable with the plan.      21 minutes spent on the date of the encounter doing video access, chart  review, meds review, treatment plan, documentation and further activities  as noted above        Again, thank you for allowing me to participate in the care of your patient.      Sincerely,    BELINDA Montalvo CNP

## 2023-11-01 NOTE — PROGRESS NOTES
Manju is a 53 year old who is being evaluated via a billable video visit.        Subjective   Manju is a 53 year old, presenting for the following health issues:headache  Follow Up  Initial Headache Clinic visit 5/30/2023, see note for details  Botox 8/3/2023 -first round and no difference or may be tiny. May be 1-2 days without a headaches. Has to take pain medications naproxen and tylenol and muscle relaxant 5 out 7 days.   Has been taking propranolol as needed and was helpful with headaches causing mood changes -feeling depressed and stopped. Takes propranolol as needed   Did not try rizatriptan -because was scarred of side effects.        Plan:  Optimize headache prevention   A trial of verapamil and side effects reviewed. Patient will avoid to take propranolol as needed during verapamil trial. Aware of interactions  Continue Botox   Rescue treatment -OTC meds-limit use to no more than 14 days per month   Try rizatriptan as needed   Ondansetron as needed for nausea   Follow up in 3 months after starting verapamil  Botox on 11/6 -as discussed -will give Botox sometime to work if followed by a protocol           Objective    Vitals - Patient Reported  Pain Score: No Pain (0)    Physical Exam   Patient is alert and no in apparent acute distress,  mentation appears normal, judgement and insight intact, normal speech, no weakness    I discussed all my recommendations with Manju Berg who verbalizes understanding and comfortable with the plan.      21 minutes spent on the date of the encounter doing video access, chart  review, meds review, treatment plan, documentation and further activities as noted above    BELINDA Wagner, CNP [facility]  Headache certified  [facility]    Video-Visit Details    Type of service:  Video Visit   Originating Location (pt. Location): Home  Distant Location (provider location):  Off-site  Platform used for Video Visit: Tarah

## 2023-11-01 NOTE — NURSING NOTE
Is the patient currently in the state of [place]? YES    Visit mode:VIDEO    If the visit is dropped, the patient can be reconnected by: VIDEO VISIT: Text to cell phone:   Telephone Information:   Mobile [phone]       Will anyone else be joining the visit? NO  (If patient encounters technical issues they should call [phone])    How would you like to obtain your AVS? MyChart    Are changes needed to the allergy or medication list? Pt stated no changes to allergies and Pt stated no med changes    Reason for visit: Follow Up    Yen GUZMAN

## 2023-11-03 DIAGNOSIS — L40.0 PLAQUE PSORIASIS: ICD-10-CM

## 2023-11-03 RX ORDER — BETAMETHASONE DIPROPIONATE 0.5 MG/ML
LOTION, AUGMENTED TOPICAL DAILY
Qty: 180 ML | Refills: 1 | Status: SHIPPED | OUTPATIENT
Start: 2023-11-03 | End: 2024-06-04

## 2023-11-03 NOTE — TELEPHONE ENCOUNTER
augmented betamethasone dipropionate (DIPROLENE) 0.05 % external lotion     Topical Steroids and Nonsteroidals Protocol Passed     Pierce Velasquez MD  Dermatology    9/18/2023  [facility]    Process 1

## 2023-11-06 ENCOUNTER — OFFICE VISIT (OUTPATIENT)
Dept: NEUROLOGY | Facility: CLINIC | Age: 53
End: 2023-11-06
Payer: COMMERCIAL

## 2023-11-06 DIAGNOSIS — G43.709 CHRONIC MIGRAINE WITHOUT AURA WITHOUT STATUS MIGRAINOSUS, NOT INTRACTABLE: Primary | ICD-10-CM

## 2023-11-06 PROCEDURE — 64615 CHEMODENERV MUSC MIGRAINE: CPT | Performed by: NURSE PRACTITIONER

## 2023-11-06 NOTE — PROGRESS NOTES
PROCEDURE NOTE:     BOTULINUM NEUROTOXIN INJECTION PROCEDURE:  DATA:11/6/2023  INDICATIONS FOR PROCEDURES:   chronic migraine headaches.   Last Botox 8/3/2023 -first round and no difference or may be tiny. May be 1-2 days without a headaches. Has to take pain medications naproxen and tylenol and muscle relaxant 5 out 7 days.   Headache today 2/10   Has been taking propranolol as needed and was helpful with headaches causing mood changes -feeling depressed and stopped. Takes propranolol as needed   Did not try rizatriptan -because was scarred of side effects.    Treatment tried propranolol, nortriptyline, ibuprofen, naproxen, gabapentin  On wellbutrin and fluvoxamine-controlled depression/anxiety   Patient is here today for an initial injection with Botox.     GOAL OF PROCEDURE:  The goal of this procedure is to decrease pain and enhance functional independence.     TOTAL DOSE ADMINISTERED:  Dose Administered:  155 units  Botox (Botulinum Toxin Type A)       2:1 Dilution    Wasted 45 units  Medication guide was given to patient     CONSENT:  The risks, benefits, and treatment options were discussed with the patient who agreed to proceed.     Written consent was obtained      EQUIPMENT USED:  Needles-30 gauge, 0.5 inches for injections  Four 1-ml tuberculin syringes for injections  One sodium chloride 10 ml vial preservative free  Alcohol swabs     SKIN PREPARATION:  Skin preparation was performed using an alcohol wipe.        AREA/MUSCLE INJECTED:  155 units of Botox  Right upper Trapezius (upper cervical) - 5 units of Botox at 3 site/s.   Left upper Trapezius (upper cervical) - 5 units of Botox at 3 site/s.      Right cervical paraspinals - 5 units of Botox at 2 site/s.   Left cervical paraspinals - 5 units of Botox at 2 site/s.      Left occipitalis - 5 units of Botox at 3 site/s.  Right occipitalis -5 units of Botox at 3 site/s        Right Frontalis - 5 units of Botox at 2 site/s.  Left Frontalis - 5 units of  Botox at 2 site/s.     Right Temporalis - 5 units of Botox at 4 site/s.  Left Temporalis - 5 units of Botox at 4 site/s.     Right  - 5 units of Botox at 1 site/s.              Left  - 5 units of Botox at 1 site/s.     Procerus - 5 units of Botox at 1 site/s.     RESPONSE TO PROCEDURE:  tolerated the procedure well and there were no immediate complications.  Patient was allowed to recover for an appropriate period of time and was discharged home in stable condition.     FOLLOW UP:     Repeat Botox injections in 12 weeks        This procedure was performed under a hospital privileging agreement with BELINDA Cast, [facility]

## 2023-11-06 NOTE — LETTER
11/6/2023       RE: Manju Berg  [address]  [place] [place] [address]     Dear Colleague,    Thank you for referring your patient, Manju Berg, to the [facility] NEUROLOGY CLINIC [place] [facility]. Please see a copy of my visit note below.    PROCEDURE NOTE:     BOTULINUM NEUROTOXIN INJECTION PROCEDURE:  DATA:11/6/2023  INDICATIONS FOR PROCEDURES:   chronic migraine headaches.   Last Botox 8/3/2023 -first round and no difference or may be tiny. May be 1-2 days without a headaches. Has to take pain medications naproxen and tylenol and muscle relaxant 5 out 7 days.   Headache today 2/10   Has been taking propranolol as needed and was helpful with headaches causing mood changes -feeling depressed and stopped. Takes propranolol as needed   Did not try rizatriptan -because was scarred of side effects.    Treatment tried propranolol, nortriptyline, ibuprofen, naproxen, gabapentin  On wellbutrin and fluvoxamine-controlled depression/anxiety   Patient is here today for an initial injection with Botox.     GOAL OF PROCEDURE:  The goal of this procedure is to decrease pain and enhance functional independence.     TOTAL DOSE ADMINISTERED:  Dose Administered:  155 units  Botox (Botulinum Toxin Type A)       2:1 Dilution    Wasted 45 units  Medication guide was given to patient     CONSENT:  The risks, benefits, and treatment options were discussed with the patient who agreed to proceed.     Written consent was obtained      EQUIPMENT USED:  Needles-30 gauge, 0.5 inches for injections  Four 1-ml tuberculin syringes for injections  One sodium chloride 10 ml vial preservative free  Alcohol swabs     SKIN PREPARATION:  Skin preparation was performed using an alcohol wipe.        AREA/MUSCLE INJECTED:  155 units of Botox  Right upper Trapezius (upper cervical) - 5 units of Botox at 3 site/s.   Left upper Trapezius (upper cervical) - 5 units of Botox at 3  site/s.      Right cervical paraspinals - 5 units of Botox at 2 site/s.   Left cervical paraspinals - 5 units of Botox at 2 site/s.      Left occipitalis - 5 units of Botox at 3 site/s.  Right occipitalis -5 units of Botox at 3 site/s        Right Frontalis - 5 units of Botox at 2 site/s.  Left Frontalis - 5 units of Botox at 2 site/s.     Right Temporalis - 5 units of Botox at 4 site/s.  Left Temporalis - 5 units of Botox at 4 site/s.     Right  - 5 units of Botox at 1 site/s.              Left  - 5 units of Botox at 1 site/s.     Procerus - 5 units of Botox at 1 site/s.     RESPONSE TO PROCEDURE:  tolerated the procedure well and there were no immediate complications.  Patient was allowed to recover for an appropriate period of time and was discharged home in stable condition.     FOLLOW UP:     Repeat Botox injections in 12 weeks     This procedure was performed under a hospital privileging agreement with Dr. Fernandes       Again, thank you for allowing me to participate in the care of your patient.      Sincerely,    BELINDA Montalvo CNP

## 2023-11-14 ENCOUNTER — LAB REQUISITION (OUTPATIENT)
Dept: LAB | Facility: CLINIC | Age: 53
End: 2023-11-14

## 2023-11-14 LAB — SARS-COV-2 RNA RESP QL NAA+PROBE: NEGATIVE

## 2023-11-14 PROCEDURE — 87635 SARS-COV-2 COVID-19 AMP PRB: CPT | Performed by: INTERNAL MEDICINE

## 2023-11-17 ENCOUNTER — MYC MEDICAL ADVICE (OUTPATIENT)
Dept: PULMONOLOGY | Facility: CLINIC | Age: 53
End: 2023-11-17
Payer: COMMERCIAL

## 2023-11-17 DIAGNOSIS — R05.3 CHRONIC COUGH: ICD-10-CM

## 2023-11-17 DIAGNOSIS — J45.40 MODERATE PERSISTENT ASTHMA, UNSPECIFIED WHETHER COMPLICATED: Primary | ICD-10-CM

## 2023-11-20 RX ORDER — PREDNISONE 20 MG/1
40 TABLET ORAL DAILY
Qty: 10 TABLET | Refills: 0 | Status: SHIPPED | OUTPATIENT
Start: 2023-11-20 | End: 2023-11-25

## 2023-12-05 DIAGNOSIS — M54.2 CERVICALGIA: ICD-10-CM

## 2023-12-05 RX ORDER — CHLORZOXAZONE 500 MG/1
TABLET ORAL
Qty: 90 TABLET | Refills: 1 | Status: SHIPPED | OUTPATIENT
Start: 2023-12-05 | End: 2024-01-22

## 2023-12-15 ENCOUNTER — TELEPHONE (OUTPATIENT)
Dept: DERMATOLOGY | Facility: CLINIC | Age: 53
End: 2023-12-15
Payer: COMMERCIAL

## 2023-12-15 NOTE — TELEPHONE ENCOUNTER
PA Needed    Medication: Stelara  QTY/DS: 0.5 ml per 84 days  NEW INS:  Insurance Company:  [employer]  Pharmacy Filling the Rx:  DOMINIQUE MARTIN :  23  Date of last fill:23

## 2023-12-19 NOTE — TELEPHONE ENCOUNTER
PA Initiation    Medication: STELARA 45 MG/0.5ML SC SOSY  Insurance Company: [employer] - Phone [phone] Fax [phone]  Pharmacy Filling the Rx:    Filling Pharmacy Phone:    Filling Pharmacy Fax:    Start Date: 12/19/2023    [email or web address]        Hallie Rodriguez [facility] [email or web address]  Phone: [phone]  Fax: [phone]

## 2023-12-20 ENCOUNTER — OFFICE VISIT (OUTPATIENT)
Dept: PULMONOLOGY | Facility: CLINIC | Age: 53
End: 2023-12-20
Payer: COMMERCIAL

## 2023-12-20 VITALS — DIASTOLIC BLOOD PRESSURE: 86 MMHG | HEART RATE: 83 BPM | SYSTOLIC BLOOD PRESSURE: 121 MMHG | OXYGEN SATURATION: 96 %

## 2023-12-20 DIAGNOSIS — J45.40 MODERATE PERSISTENT ASTHMA, UNSPECIFIED WHETHER COMPLICATED: ICD-10-CM

## 2023-12-20 DIAGNOSIS — R06.83 SNORING: Primary | ICD-10-CM

## 2023-12-20 DIAGNOSIS — R05.3 CHRONIC COUGH: ICD-10-CM

## 2023-12-20 PROCEDURE — 99215 OFFICE O/P EST HI 40 MIN: CPT | Performed by: INTERNAL MEDICINE

## 2023-12-20 RX ORDER — BUDESONIDE AND FORMOTEROL FUMARATE DIHYDRATE 160; 4.5 UG/1; UG/1
2 AEROSOL RESPIRATORY (INHALATION)
Qty: 30.6 G | Refills: 3 | Status: SHIPPED | OUTPATIENT
Start: 2023-12-20 | End: 2024-03-05

## 2023-12-20 RX ORDER — ALBUTEROL SULFATE 0.83 MG/ML
2.5 SOLUTION RESPIRATORY (INHALATION) EVERY 6 HOURS PRN
Qty: 90 ML | Refills: 3 | Status: SHIPPED | OUTPATIENT
Start: 2023-12-20

## 2023-12-20 RX ORDER — ALBUTEROL SULFATE 90 UG/1
2 AEROSOL, METERED RESPIRATORY (INHALATION) EVERY 4 HOURS PRN
Qty: 25.5 G | Refills: 3 | Status: SHIPPED | OUTPATIENT
Start: 2023-12-20

## 2023-12-20 RX ORDER — MONTELUKAST SODIUM 10 MG/1
10 TABLET ORAL AT BEDTIME
Qty: 90 TABLET | Refills: 3 | Status: SHIPPED | OUTPATIENT
Start: 2023-12-20 | End: 2024-12-14

## 2023-12-20 ASSESSMENT — ASTHMA QUESTIONNAIRES
QUESTION_3 LAST FOUR WEEKS HOW OFTEN DID YOUR ASTHMA SYMPTOMS (WHEEZING, COUGHING, SHORTNESS OF BREATH, CHEST TIGHTNESS OR PAIN) WAKE YOU UP AT NIGHT OR EARLIER THAN USUAL IN THE MORNING: ONCE OR TWICE
ACT_TOTALSCORE: 19
ACUTE_EXACERBATION_TODAY: NO
QUESTION_4 LAST FOUR WEEKS HOW OFTEN HAVE YOU USED YOUR RESCUE INHALER OR NEBULIZER MEDICATION (SUCH AS ALBUTEROL): ONE OR TWO TIMES PER DAY
ACT_TOTALSCORE: 19
QUESTION_5 LAST FOUR WEEKS HOW WOULD YOU RATE YOUR ASTHMA CONTROL: SOMEWHAT CONTROLLED
QUESTION_1 LAST FOUR WEEKS HOW MUCH OF THE TIME DID YOUR ASTHMA KEEP YOU FROM GETTING AS MUCH DONE AT WORK, SCHOOL OR AT HOME: NONE OF THE TIME
QUESTION_2 LAST FOUR WEEKS HOW OFTEN HAVE YOU HAD SHORTNESS OF BREATH: NOT AT ALL

## 2023-12-20 NOTE — PROGRESS NOTES
Pulmonary Clinic Return Patient Visit  Reason for Visit: Asthma/Cough  History of Present Illness  Manju Berg is a 53-year-old female who presents to clinic for follow up of a chronic cough. I last saw her in 1/2023.  To briefly review, Manju was diagnosed with asthma as a teenager which was mostly associated with exercise (exercise-induced) and only requiring albuterol inhaler as needed. During the last clinic visit, her asthma had been poorly controlled with frequent asthma flares as well as requirements for antibiotics as well as steroids and so I escalated her regimen to high dose Symbicort with good results. She also has seasonal allergies which is controlled with Flonase.  She did have an eventful 2022 with repeated admissions in the fall for gallstone pancreatitis and required ERCP and cholecystectomy.  Today, doing better and there has been no asthma flares since the last clinic visit. Otherwise, she has done mostly okay for the most part. She has also noted significant improved control of her environmental triggers and associated allergic rhinitis with postnasal drip.  She has noted that she does have loud snoring which is very bothersome to her spouse  She denies any chest pain, no orthopnea, no PND, no leg swellings, no palpitations etc. she denies any dysphagia no dysphonia.  Has pets including a cat and 2 dogs and she is not allergic to them    Review of Systems:  10 of 14 systems reviewed and are negative unless otherwise stated in HPI.    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     5/27/2011,Proximal left tibia    Dyspepsia and other specified disorders of function of stomach     No Comments Provided    Gastroesophageal reflux disease     Headache     No Comments Provided    Herpes simplex     No Comments Provided    Lateral  epicondylitis of elbow     No Comments Provided    Other depressive disorder     No Comments Provided    Other unspecified back disorder     No Comments Provided    Pain in joint, upper arm     No Comments Provided       Past Surgical History:   Procedure Laterality Date    ------------OTHER------------- Right 12/07/2018    5th metatarsal fracture    BUNIONECTOMY Bilateral 1999 1999    C/SECTION, LOW TRANSVERSE  2013    ENDOSCOPIC ULTRASOUND UPPER GASTROINTESTINAL TRACT (GI) N/A 9/27/2022    Procedure: ENDOSCOPIC ULTRASOUND, ESOPHAGOSCOPY / UPPER GASTROINTESTINAL TRACT (GI) WITH BIOPSY;  Surgeon: Alissa Lubin MD;  Location: [facility] OR    FOOT SURGERY Left 30 august 2018    fusion of the large toe 30 august 2018    LAPAROSCOPIC CHOLECYSTECTOMY N/A 9/28/2022    Procedure: Laparoscopic cholecystectomy;  Surgeon: Bertha Potts MD;  Location: [facility] OR    OTHER SURGICAL HISTORY      EXCIS BARTHOLIN GLAND/CYST    OTHER SURGICAL HISTORY Left 03/16/2012     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    RELEASE CARPAL TUNNEL Bilateral     No Comments Provided    STRIP VEIN  2014    vein removal for varicose veins       Family History   Problem Relation Age of Onset    Depression Mother     Osteoporosis Mother     Mental Illness Mother     Hypertension Father         Hypertension    Hyperlipidemia Father         Hyperlipidemia    Colon Cancer Father 56        Cancer-colon    Bladder Cancer Father 62        Cancer,bladder    Obesity Father     Family History Negative Sister         Good Health    Family History Negative Son     Mental Illness Maternal Aunt     Mental Illness Maternal Uncle        Social History     Socioeconomic History    Marital status:      Spouse name: Not on file    Number of children: Not on file    Years of education: Not on file    Highest education level: Not on file   Occupational History    Occupation: RN     Employer: OTHER   Tobacco Use    Smoking status: Never Smoker    Smokeless  tobacco: Never Used   Substance and Sexual Activity    Alcohol use: Yes     Comment: Alcoholic Drinks/day: Drinks socially only    Drug use: Unknown     Types: Other     Comment: Drug use: No    Sexual activity: Not on file   Other Topics Concern    Not on file   Social History Narrative    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        . lives in [facility]. [facility] spouse     Social Determinants of Health     Financial Resource Strain: Not on file   Food Insecurity: Not on file   Transportation Needs: Not on file   Physical Activity: Not on file   Stress: Not on file   Social Connections: Not on file   Intimate Partner Violence: Not on file   Housing Stability: Not on file         No Known Allergies      Current Outpatient Medications:     acetaminophen (TYLENOL) 500 MG tablet, Take 1,000 mg by mouth every 6 hours as needed for pain, Disp: , Rfl:     albuterol (PROAIR HFA/PROVENTIL HFA/VENTOLIN HFA) 108 (90 Base) MCG/ACT inhaler, Inhale 2 puffs into the lungs every 4 hours as needed for shortness of breath or wheezing, Disp: 25.5 g, Rfl: 3    albuterol (PROVENTIL) (2.5 MG/3ML) 0.083% neb solution, Take 1 vial (2.5 mg) by nebulization every 6 hours as needed for shortness of breath or wheezing, Disp: 90 mL, Rfl: 3    augmented betamethasone dipropionate (DIPROLENE) 0.05 % external lotion, Apply topically daily, Disp: 180 mL, Rfl: 1    budesonide-formoterol (SYMBICORT) 160-4.5 MCG/ACT Inhaler, Inhale 2 puffs into the lungs two times daily for 360 days, Disp: 30.6 g, Rfl: 3    buPROPion (WELLBUTRIN XL) 300 MG 24 hr tablet, Take 1 tablet (300 mg) by mouth every morning, Disp: 90 tablet, Rfl: 2    CALCIUM 600 + D 600-200 MG-UNIT OR TABS, Take 1 tablet by mouth every evening, Disp: , Rfl:     cetirizine (ZYRTEC) 10 MG tablet, Take 10 mg by mouth daily, Disp: , Rfl:     chlorzoxazone (PARAFON FORTE) 500 MG tablet, TAKE 1/2-1&1/2 TABLETS BY MOUTH 3-4 TIMES DAILY AS NEEDED TO RELAX MUSCLES, Disp: 90  tablet, Rfl: 1    Cholecalciferol (VITAMIN D) 2000 UNITS tablet, Take 2,000 Units by mouth daily., Disp: 100 tablet, Rfl: 3    fluticasone (FLONASE) 50 MCG/ACT nasal spray, Spray 1 spray into both nostrils daily, Disp: , Rfl:     fluvoxaMINE (LUVOX) 50 MG tablet, Take 1 tablet (50 mg) by mouth 2 times daily, Disp: 180 tablet, Rfl: 3    hydrOXYzine (VISTARIL) 25 MG capsule, Take 1-2 capsules (25-50 mg) by mouth 3 times daily as needed for itching, Disp: 60 capsule, Rfl: 3    LANsoprazole (PREVACID) 30 MG DR capsule, Take 1 capsule (30 mg) by mouth daily, Disp: 90 capsule, Rfl: 3    lidocaine (LIDODERM) 5 % patch, Place 1 patch onto the skin daily as needed for moderate pain Remove after 12 hours., Disp: 30 patch, Rfl: 0    montelukast (SINGULAIR) 10 MG tablet, Take 1 tablet (10 mg) by mouth at bedtime for 360 days, Disp: 90 tablet, Rfl: 3    Multiple Vitamin (MULTIVITAMIN ADULT PO), 50+, Disp: , Rfl:     naproxen (NAPROSYN) 500 MG tablet, Take 1 tablet (500 mg) by mouth 2 times daily as needed for moderate pain, Disp: 30 tablet, Rfl: 1    ondansetron (ZOFRAN ODT) 4 MG ODT tab, Take 1 tablet (4 mg) by mouth every 8 hours as needed for nausea, Disp: 20 tablet, Rfl: 1    propranolol (INDERAL) 40 MG tablet, Take 40 mg by mouth as needed (anxiety), Disp: , Rfl:     rizatriptan (MAXALT-MLT) 5 MG ODT, Take 1-2 tablets (5-10 mg) by mouth at onset of headache for migraine (may repeat in 2 hours as needed. Max 30 mg in 24 hours), Disp: 18 tablet, Rfl: 6    ustekinumab (STELARA) 45 MG/0.5ML SOSY, Inject 0.5 mLs (45 mg) Subcutaneous every 3 months, Disp: 0.5 mL, Rfl: 3    valACYclovir (VALTREX) 1000 mg tablet, take 2 tablets PO Q 12 hours x 2 doses at first signs of outbreak., Disp: 12 tablet, Rfl: 3    linaclotide (LINZESS) 72 MCG capsule, Take 145 mcg by mouth every morning (before breakfast), Disp: , Rfl:     Current Facility-Administered Medications:     Botulinum Toxin Type A (BOTOX) 200 units injection 155 Units, 155  "Units, Intramuscular, See Admin Instructions, Brittany Aguilar APRN CNP, 155 Units at 11/06/23 1201      Physical Exam:  /86 (BP Location: Left arm, Patient Position: Chair, Cuff Size: Adult Regular)   Pulse 83   SpO2 96%   GENERAL: Well developed, well nourished, alert, and in no apparent distress.  HEENT: Normocephalic, atraumatic. PERRL, EOMI. Oral mucosa is moist. No perioral cyanosis.  NECK: supple, no masses, no thyromegaly.  RESP:  Normal respiratory effort.  CTAB.  No rales, wheezes, rhonchi.  No cyanosis or clubbing.  CV: Normal S1, S2, regular rhythm, normal rate. No murmur.  No LE edema.   ABDOMEN:  Soft, non-tender, non-distended.   SKIN: warm and dry. No rash.  NEURO: AAOx3.  Normal gait.  Fluent speech.  PSYCH: mentation appears normal.   Results:  PFTs: Normal spirometric study, normal lung volumes, no evidence of airflow obstruction, normal flow volume loops as well as normal diffusion capacity  Most Recent [facility] Pulmonary Function Testing    FVC-Pred   Date Value Ref Range Status   01/12/2022 3.42 L      FVC-Pre   Date Value Ref Range Status   01/12/2022 4.16 L      FVC-%Pred-Pre   Date Value Ref Range Status   01/12/2022 121 %      FEV1-Pre   Date Value Ref Range Status   01/12/2022 3.54 L      FEV1-%Pred-Pre   Date Value Ref Range Status   01/12/2022 128 %      FEV1FVC-Pred   Date Value Ref Range Status   01/12/2022 81 %      FEV1FVC-Pre   Date Value Ref Range Status   01/12/2022 85 %      No results found for: \"86513\"  FEFMax-Pred   Date Value Ref Range Status   01/12/2022 6.69 L/sec      FEFMax-Pre   Date Value Ref Range Status   01/12/2022 10.00 L/sec      FEFMax-%Pred-Pre   Date Value Ref Range Status   01/12/2022 149 %      ExpTime-Pre   Date Value Ref Range Status   01/12/2022 6.41 sec      FIFMax-Pre   Date Value Ref Range Status   01/12/2022 8.90 L/sec      FEV1FEV6-Pred   Date Value Ref Range Status   01/12/2022 82 %      FEV1FEV6-Pre   Date Value Ref Range Status " "  01/12/2022 85 %      No results found for: \"20055\"  Imaging (personally reviewed in clinic today): CXR 01/12/2022  Minimal perihilar bronchial wall thickening.      Assessment and Plan:   Moderate persistent asthma  ACT score is improved today on a regimen of Symbicort 160 mcg 2 puffs twice daily and albuterol as needed. There has been no asthma flares since the last clinic visit. We will continue her current regimen.  Flonase has been helpful with seasonal allergies which are also triggers  Long conversation about trigger avoidance and we formulated an asthma action plan in clinic today.  Allergic rhinitis  Started on Flonase and has noticed improved symptoms.    Loud snoring  Concerning for ROZINA and referral to sleep medicine was made today  Questions and concerns were answered to the patient's satisfaction.  she was provided with my contact information should new questions or concerns arise in the interim.  She should return to clinic in 12 months.  I spent a total of 40 minutes face to face with Manju Berg during today's office visit. Over 50% of this time was spent counseling the patient and/or coordinating care regarding their pulmonary disease.    Up to date on Pneumovax (2010)  Liane Perez MD  Pulmonary, Critical Care and Sleep Medicine  [facility]  Pager: [phone]        The above note was dictated using voice recognition software and may include typographical errors. Please contact the author for any clarifications.                                  "

## 2023-12-20 NOTE — NURSING NOTE
Manju Berg's goals for this visit include:   Chief Complaint   Patient presents with    Follow Up    Asthma     Just finished ABX yesterday for an upper respiratory infection        She requests these members of her care team be copied on today's visit information: yes     PCP: Yarely Wise    Referring Provider:  No referring provider defined for this encounter.    /86 (BP Location: Left arm, Patient Position: Chair, Cuff Size: Adult Regular)   Pulse 83   SpO2 96%     Do you need any medication refills at today's visit? Yes     SARIAH Morin   Neph/Pulm [facility]

## 2023-12-21 NOTE — TELEPHONE ENCOUNTER
Prior Authorization Approval    Medication: STELARA 45 MG/0.5ML SC SOSY  Authorization Effective Date: 12/19/2023  Authorization Expiration Date: 12/18/2024  Approved Dose/Quantity: 1 pen per 84 days  Reference #: [id number]   Insurance Company: [employer] - Phone [phone] Fax [phone]  Expected CoPay: $    CoPay Card Available:      Financial Assistance Needed: na  Which Pharmacy is filling the prescription:    Pharmacy Notified: yes  Patient Notified: yes        Hallie Rodriguez [facility] Liaison  [email or web address]  Phone: [phone]  Fax: [phone]

## 2024-01-05 ENCOUNTER — TELEPHONE (OUTPATIENT)
Dept: DERMATOLOGY | Facility: CLINIC | Age: 54
End: 2024-01-05

## 2024-01-05 ENCOUNTER — OFFICE VISIT (OUTPATIENT)
Dept: DERMATOLOGY | Facility: CLINIC | Age: 54
End: 2024-01-05
Payer: COMMERCIAL

## 2024-01-05 DIAGNOSIS — L40.0 PLAQUE PSORIASIS: Primary | ICD-10-CM

## 2024-01-05 DIAGNOSIS — D84.9 IMMUNOSUPPRESSION (H): ICD-10-CM

## 2024-01-05 PROCEDURE — 99214 OFFICE O/P EST MOD 30 MIN: CPT | Performed by: DERMATOLOGY

## 2024-01-05 ASSESSMENT — PAIN SCALES - GENERAL: PAINLEVEL: NO PAIN (0)

## 2024-01-05 NOTE — TELEPHONE ENCOUNTER
PA Initiation    Medication: SKYRIZI  MG/ML SC SOAJ  Insurance Company: [email or web address] - Phone [phone] Fax [phone]  Pharmacy Filling the Rx: [facility] - [place], [place] - [facility] [address]  Filling Pharmacy Phone:    Filling Pharmacy Fax:    Start Date: 1/5/2024      Key: [id number]

## 2024-01-05 NOTE — NURSING NOTE
Chief Complaint   Patient presents with    Follow Up     Patient is here today for a psoriasis follow up.      Queenie KUMAR CMA

## 2024-01-05 NOTE — PROGRESS NOTES
[facility] Dermatology Note    Encounter Date: Jan 5, 2024    Dermatology Problem List:  1. Plaque psoriasis: scalp, trunk, with some ear involvement  - current tx: risankizumab, augmented betamethasone lotion  - in reserve: apremilast (exorbitant copay)  - prior: augmented betamethasone gel, coal tar/salcylic acid shampoo, ketoconazole shampoo, zinc pyrithione shampoo, compounded betamethasone/calcipotriene solution, Excimer, methotrexate 15 mg weekly, adalimumab (severe injection site reaction), ustekinumab    ______________________________________    Impression/Plan:  1. Plaque psoriasis of scalp and trunk  - no longer controlled on ustekinumab  - discussed risks/benefits of increasing ustekinumab dosage vs starting risankizumab vs changing topical therapy   - patient will begin risankizumab and continue topical regimen  - check Quantiferon    Follow-up in 6 months.       Staff Involved:  Staff and Scribe    I, Hortencia Plasencia, am serving as a scribe; to document services personally performed by Pierce Velasquez MD -based on data collection and the provider's statements to me.    Provider Disclosure:   The documentation recorded by the scribe accurately reflects the services I personally performed and the decisions made by me.    Pierce Velasquez MD   of Dermatology  Department of Dermatology  [facility] [employer]      CC:   Chief Complaint   Patient presents with    Follow Up     Patient is here today for a psoriasis follow up.        History of Present Illness:  Ms. Manju Berg is a 53 year old female who presents as a return patient. She is here for a psoriasis follow up. She reports that the psoriasis on the scalp has been coming back for a couple months now. She reports that her back is very itchy. She has been using the topicals, thinks that it helps calms down the itching. Does not believe that Stelara has helped much.     Patient is  otherwise feeling well, with no additional skin concerns.     Labs:  12/2022 Quantiferon negative    Physical exam:  Vitals: There were no vitals taken for this visit.  GEN: This is a well developed, well-nourished female in no acute distress, in a pleasant mood.    SKIN: Lowe phototype II  - Focused examination of the scalp and face was performed.  - Sharply demarcated erythematous scaly plaques on the occipital, biparietal, and bitemporal scalp.  - No other lesions of concern on areas examined.     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     5/27/2011,Proximal left tibia    Dyspepsia and other specified disorders of function of stomach     No Comments Provided    Gastroesophageal reflux disease     Headache     No Comments Provided    Herpes simplex     No Comments Provided    Lateral epicondylitis of elbow     No Comments Provided    Other depressive disorder     No Comments Provided    Other unspecified back disorder     No Comments Provided    Pain in joint, upper arm     No Comments Provided     Past Surgical History:   Procedure Laterality Date    ------------OTHER------------- Right 12/07/2018    5th metatarsal fracture    BUNIONECTOMY Bilateral 1999 1999    C/SECTION, LOW TRANSVERSE  2013    ENDOSCOPIC ULTRASOUND UPPER GASTROINTESTINAL TRACT (GI) N/A 9/27/2022    Procedure: ENDOSCOPIC ULTRASOUND, ESOPHAGOSCOPY / UPPER GASTROINTESTINAL TRACT (GI) WITH BIOPSY;  Surgeon: Alissa Lubin MD;  Location:  OR    FOOT SURGERY Left 30 august 2018    fusion of the large toe 30 august 2018    LAPAROSCOPIC CHOLECYSTECTOMY N/A 9/28/2022    Procedure: Laparoscopic cholecystectomy;  Surgeon: Bertha Potts MD;  Location:  OR    OTHER SURGICAL HISTORY      EXCIS BARTHOLIN GLAND/CYST    OTHER SURGICAL HISTORY Left  03/16/2012     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 She has never used smokeless tobacco. She reports current alcohol use. She reports that she does not use drugs.    Family History:  Family History   Problem Relation Age of Onset    Depression Mother     Osteoporosis Mother     Mental Illness Mother     Hypertension Father         Hypertension    Hyperlipidemia Father         Hyperlipidemia    Colon Cancer Father 56        Cancer-colon    Bladder Cancer Father 62        Cancer,bladder    Obesity Father     Family History Negative Sister         Good Health    Family History Negative Son     Mental Illness Maternal Aunt     Mental Illness Maternal Uncle        Medications:  Current Outpatient Medications   Medication Sig Dispense Refill    acetaminophen (TYLENOL) 500 MG tablet Take 1,000 mg by mouth every 6 hours as needed for pain      albuterol (PROAIR HFA/PROVENTIL HFA/VENTOLIN HFA) 108 (90 Base) MCG/ACT inhaler Inhale 2 puffs into the lungs every 4 hours as needed for shortness of breath or wheezing 25.5 g 3    albuterol (PROVENTIL) (2.5 MG/3ML) 0.083% neb solution Take 1 vial (2.5 mg) by nebulization every 6 hours as needed for shortness of breath or wheezing 90 mL 3    augmented betamethasone dipropionate (DIPROLENE) 0.05 % external lotion Apply topically daily 180 mL 1    budesonide-formoterol (SYMBICORT) 160-4.5 MCG/ACT Inhaler Inhale 2 puffs into the lungs two times daily for 360 days 30.6 g 3    buPROPion (WELLBUTRIN XL) 300 MG 24 hr tablet Take 1 tablet (300 mg) by mouth every morning 90 tablet 2    CALCIUM 600 + D 600-200 MG-UNIT OR TABS Take 1 tablet by mouth every evening      cetirizine (ZYRTEC) 10 MG tablet Take 10 mg by mouth daily      chlorzoxazone (PARAFON FORTE) 500 MG tablet TAKE 1/2-1&1/2 TABLETS BY MOUTH 3-4 TIMES DAILY  AS NEEDED TO RELAX MUSCLES 90 tablet 1    Cholecalciferol (VITAMIN D) 2000 UNITS tablet Take 2,000 Units by mouth daily. 100 tablet 3    fluticasone (FLONASE) 50 MCG/ACT nasal spray Spray 1 spray into both nostrils daily      fluvoxaMINE (LUVOX) 50 MG tablet Take 1 tablet (50 mg) by mouth 2 times daily 180 tablet 3    hydrOXYzine (VISTARIL) 25 MG capsule Take 1-2 capsules (25-50 mg) by mouth 3 times daily as needed for itching 60 capsule 3    LANsoprazole (PREVACID) 30 MG DR capsule Take 1 capsule (30 mg) by mouth daily 90 capsule 3    lidocaine (LIDODERM) 5 % patch Place 1 patch onto the skin daily as needed for moderate pain Remove after 12 hours. 30 patch 0    montelukast (SINGULAIR) 10 MG tablet Take 1 tablet (10 mg) by mouth at bedtime for 360 days 90 tablet 3    Multiple Vitamin (MULTIVITAMIN ADULT PO) 50+      naproxen (NAPROSYN) 500 MG tablet Take 1 tablet (500 mg) by mouth 2 times daily as needed for moderate pain 30 tablet 1    ondansetron (ZOFRAN ODT) 4 MG ODT tab Take 1 tablet (4 mg) by mouth every 8 hours as needed for nausea 20 tablet 1    propranolol (INDERAL) 40 MG tablet Take 40 mg by mouth as needed (anxiety)      rizatriptan (MAXALT-MLT) 5 MG ODT Take 1-2 tablets (5-10 mg) by mouth at onset of headache for migraine (may repeat in 2 hours as needed. Max 30 mg in 24 hours) 18 tablet 6    ustekinumab (STELARA) 45 MG/0.5ML SOSY Inject 0.5 mLs (45 mg) Subcutaneous every 3 months 0.5 mL 3    valACYclovir (VALTREX) 1000 mg tablet take 2 tablets PO Q 12 hours x 2 doses at first signs of outbreak. 12 tablet 3    linaclotide (LINZESS) 72 MCG capsule Take 145 mcg by mouth every morning (before breakfast)       No Known Allergies

## 2024-01-05 NOTE — LETTER
1/5/2024       RE: Manju Breg  [address]  [facility] [address]     Dear Colleague,    Thank you for referring your patient, Manju Berg, to the [facility] [place] at [facility]. Please see a copy of my visit note below.    [facility] Dermatology Note    Encounter Date: Jan 5, 2024    Dermatology Problem List:  1. Plaque psoriasis: scalp, trunk, with some ear involvement  - current tx: risankizumab, augmented betamethasone lotion  - in reserve: apremilast (exorbitant copay)  - prior: augmented betamethasone gel, coal tar/salcylic acid shampoo, ketoconazole shampoo, zinc pyrithione shampoo, compounded betamethasone/calcipotriene solution, Excimer, methotrexate 15 mg weekly, adalimumab (severe injection site reaction), ustekinumab    ______________________________________    Impression/Plan:  1. Plaque psoriasis of scalp and trunk  - no longer controlled on ustekinumab  - discussed risks/benefits of increasing ustekinumab dosage vs starting risankizumab vs changing topical therapy   - patient will begin risankizumab and continue topical regimen  - check Quantiferon    Follow-up in 6 months.       Staff Involved:  Staff and Scribe    I, Hortencia Plasencia, am serving as a scribe; to document services personally performed by Pierce Velasquez MD -based on data collection and the provider's statements to me.    Provider Disclosure:   The documentation recorded by the scribe accurately reflects the services I personally performed and the decisions made by me.    Pierce Velasquez MD   of Dermatology  Department of Dermatology  [facility]      CC:   Chief Complaint   Patient presents with    Follow Up     Patient is here today for a psoriasis follow up.        History of Present Illness:  Ms. Manju Berg is a 53 year old female who presents as a return  patient. She is here for a psoriasis follow up. She reports that the psoriasis on the scalp has been coming back for a couple months now. She reports that her back is very itchy. She has been using the topicals, thinks that it helps calms down the itching. Does not believe that Stelara has helped much.     Patient is otherwise feeling well, with no additional skin concerns.     Labs:  12/2022 Quantiferon negative    Physical exam:  Vitals: There were no vitals taken for this visit.  GEN: This is a well developed, well-nourished female in no acute distress, in a pleasant mood.    SKIN: Lowe phototype II  - Focused examination of the scalp and face was performed.  - Sharply demarcated erythematous scaly plaques on the occipital, biparietal, and bitemporal scalp.  - No other lesions of concern on areas examined.     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     5/27/2011,Proximal left tibia    Dyspepsia and other specified disorders of function of stomach     No Comments Provided    Gastroesophageal reflux disease     Headache     No Comments Provided    Herpes simplex     No Comments Provided    Lateral epicondylitis of elbow     No Comments Provided    Other depressive disorder     No Comments Provided    Other unspecified back disorder     No Comments Provided    Pain in joint, upper arm     No Comments Provided     Past Surgical History:   Procedure Laterality Date    ------------OTHER------------- Right 12/07/2018    5th metatarsal fracture    BUNIONECTOMY Bilateral 1999 1999    C/SECTION, LOW TRANSVERSE  2013    ENDOSCOPIC ULTRASOUND UPPER GASTROINTESTINAL TRACT (GI) N/A 9/27/2022    Procedure: ENDOSCOPIC ULTRASOUND, ESOPHAGOSCOPY / UPPER GASTROINTESTINAL TRACT (GI) WITH BIOPSY;  Surgeon: Alissa Lubin MD;   Location: [facility] OR    FOOT SURGERY Left 30 august 2018    fusion of the large toe 30 august 2018    LAPAROSCOPIC CHOLECYSTECTOMY N/A 9/28/2022    Procedure: Laparoscopic cholecystectomy;  Surgeon: Bertha Potts MD;  Location: [facility] OR    OTHER SURGICAL HISTORY      EXCIS BARTHOLIN GLAND/CYST    OTHER SURGICAL HISTORY Left 03/16/2012     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 She has never used smokeless tobacco. She reports current alcohol use. She reports that she does not use drugs.    Family History:  Family History   Problem Relation Age of Onset    Depression Mother     Osteoporosis Mother     Mental Illness Mother     Hypertension Father         Hypertension    Hyperlipidemia Father         Hyperlipidemia    Colon Cancer Father 56        Cancer-colon    Bladder Cancer Father 62        Cancer,bladder    Obesity Father     Family History Negative Sister         Good Health    Family History Negative Son     Mental Illness Maternal Aunt     Mental Illness Maternal Uncle        Medications:  Current Outpatient Medications   Medication Sig Dispense Refill    acetaminophen (TYLENOL) 500 MG tablet Take 1,000 mg by mouth every 6 hours as needed for pain      albuterol (PROAIR HFA/PROVENTIL HFA/VENTOLIN HFA) 108 (90 Base) MCG/ACT inhaler Inhale 2 puffs into the lungs every 4 hours as needed for shortness of breath or wheezing 25.5 g 3    albuterol (PROVENTIL) (2.5 MG/3ML) 0.083% neb solution Take 1 vial (2.5 mg) by nebulization every 6 hours as needed for shortness of breath or wheezing 90 mL 3    augmented betamethasone dipropionate (DIPROLENE) 0.05 % external lotion Apply topically daily 180 mL 1    budesonide-formoterol (SYMBICORT) 160-4.5 MCG/ACT Inhaler Inhale 2 puffs into the lungs two times daily for 360 days 30.6 g 3    buPROPion (WELLBUTRIN  XL) 300 MG 24 hr tablet Take 1 tablet (300 mg) by mouth every morning 90 tablet 2    CALCIUM 600 + D 600-200 MG-UNIT OR TABS Take 1 tablet by mouth every evening      cetirizine (ZYRTEC) 10 MG tablet Take 10 mg by mouth daily      chlorzoxazone (PARAFON FORTE) 500 MG tablet TAKE 1/2-1&1/2 TABLETS BY MOUTH 3-4 TIMES DAILY AS NEEDED TO RELAX MUSCLES 90 tablet 1    Cholecalciferol (VITAMIN D) 2000 UNITS tablet Take 2,000 Units by mouth daily. 100 tablet 3    fluticasone (FLONASE) 50 MCG/ACT nasal spray Spray 1 spray into both nostrils daily      fluvoxaMINE (LUVOX) 50 MG tablet Take 1 tablet (50 mg) by mouth 2 times daily 180 tablet 3    hydrOXYzine (VISTARIL) 25 MG capsule Take 1-2 capsules (25-50 mg) by mouth 3 times daily as needed for itching 60 capsule 3    LANsoprazole (PREVACID) 30 MG DR capsule Take 1 capsule (30 mg) by mouth daily 90 capsule 3    lidocaine (LIDODERM) 5 % patch Place 1 patch onto the skin daily as needed for moderate pain Remove after 12 hours. 30 patch 0    montelukast (SINGULAIR) 10 MG tablet Take 1 tablet (10 mg) by mouth at bedtime for 360 days 90 tablet 3    Multiple Vitamin (MULTIVITAMIN ADULT PO) 50+      naproxen (NAPROSYN) 500 MG tablet Take 1 tablet (500 mg) by mouth 2 times daily as needed for moderate pain 30 tablet 1    ondansetron (ZOFRAN ODT) 4 MG ODT tab Take 1 tablet (4 mg) by mouth every 8 hours as needed for nausea 20 tablet 1    propranolol (INDERAL) 40 MG tablet Take 40 mg by mouth as needed (anxiety)      rizatriptan (MAXALT-MLT) 5 MG ODT Take 1-2 tablets (5-10 mg) by mouth at onset of headache for migraine (may repeat in 2 hours as needed. Max 30 mg in 24 hours) 18 tablet 6    ustekinumab (STELARA) 45 MG/0.5ML SOSY Inject 0.5 mLs (45 mg) Subcutaneous every 3 months 0.5 mL 3    valACYclovir (VALTREX) 1000 mg tablet take 2 tablets PO Q 12 hours x 2 doses at first signs of outbreak. 12 tablet 3    linaclotide (LINZESS) 72 MCG capsule Take 145 mcg by mouth every morning  (before breakfast)       No Known Allergies

## 2024-01-08 ENCOUNTER — TELEPHONE (OUTPATIENT)
Dept: DERMATOLOGY | Facility: CLINIC | Age: 54
End: 2024-01-08
Payer: COMMERCIAL

## 2024-01-08 NOTE — TELEPHONE ENCOUNTER
MTM referral from: [facility] visit (referral by provider)    MTM referral outreach attempt #1 on January 8, 2024 at 9:37 AM      Outcome: Patient is not interested at this time, will route to MTM Pharmacist/Provider as an FYI. Thank you for the referral.     Use hbc for the carrier/Plan on the flowsheet      Hao [facility]

## 2024-01-08 NOTE — TELEPHONE ENCOUNTER
Prior Authorization Approval    Medication: SKYRIZI  MG/ML SC SOAJ  Authorization Effective Date: 1/7/2024  Authorization Expiration Date: 6/26/2024  Approved Dose/Quantity: 1 for 28 days  Reference #: Key: [id number]   Insurance Company: [employer] - Phone [phone] Fax [phone]  Expected CoPay: $ 6,040.03  CoPay Card Available: No    Financial Assistance Needed: yes  Which Pharmacy is filling the prescription: [facility] - [place], [place] - [address]  Pharmacy Notified: yes  Patient Notified: yes

## 2024-01-09 ENCOUNTER — ANCILLARY PROCEDURE (OUTPATIENT)
Dept: GENERAL RADIOLOGY | Facility: CLINIC | Age: 54
End: 2024-01-09
Attending: INTERNAL MEDICINE
Payer: COMMERCIAL

## 2024-01-09 ENCOUNTER — OFFICE VISIT (OUTPATIENT)
Dept: FAMILY MEDICINE | Facility: CLINIC | Age: 54
End: 2024-01-09
Payer: COMMERCIAL

## 2024-01-09 VITALS
SYSTOLIC BLOOD PRESSURE: 125 MMHG | RESPIRATION RATE: 16 BRPM | TEMPERATURE: 97.5 F | HEIGHT: 61 IN | HEART RATE: 78 BPM | WEIGHT: 194 LBS | BODY MASS INDEX: 36.63 KG/M2 | DIASTOLIC BLOOD PRESSURE: 83 MMHG | OXYGEN SATURATION: 97 %

## 2024-01-09 DIAGNOSIS — M25.551 ACUTE HIP PAIN, RIGHT: ICD-10-CM

## 2024-01-09 DIAGNOSIS — M70.61 TROCHANTERIC BURSITIS OF RIGHT HIP: Primary | ICD-10-CM

## 2024-01-09 PROCEDURE — 73502 X-RAY EXAM HIP UNI 2-3 VIEWS: CPT | Mod: TC | Performed by: RADIOLOGY

## 2024-01-09 PROCEDURE — 99213 OFFICE O/P EST LOW 20 MIN: CPT | Performed by: INTERNAL MEDICINE

## 2024-01-09 ASSESSMENT — PATIENT HEALTH QUESTIONNAIRE - PHQ9
SUM OF ALL RESPONSES TO PHQ QUESTIONS 1-9: 3
10. IF YOU CHECKED OFF ANY PROBLEMS, HOW DIFFICULT HAVE THESE PROBLEMS MADE IT FOR YOU TO DO YOUR WORK, TAKE CARE OF THINGS AT HOME, OR GET ALONG WITH OTHER PEOPLE: SOMEWHAT DIFFICULT
SUM OF ALL RESPONSES TO PHQ QUESTIONS 1-9: 3

## 2024-01-09 ASSESSMENT — ENCOUNTER SYMPTOMS: HIP PAIN: 1

## 2024-01-09 ASSESSMENT — PAIN SCALES - GENERAL: PAINLEVEL: NO PAIN (0)

## 2024-01-09 NOTE — PROGRESS NOTES
[facility] INTERNAL MEDICINE NOTE    Manju Berg is a 53 year old female who presents to clinic today for the following health issues:    Reason for visit:  Right hip pain  Symptom onset:  More than a month  Symptoms include:  Pain when walking  Symptom intensity:  Moderate  Symptom progression:  Worsening  Had these symptoms before:  Yes  Has tried/received treatment for these symptoms:  No  What makes it worse:  Walking  What makes it better:  Lidocaine patch isn't effective.    Patient Active Problem List   Diagnosis    Recurrent herpes labialis    GERD (gastroesophageal reflux disease)    Other specified counseling    Mild intermittent asthma    Depression, major, single episode    Family history of colon cancer    CARDIOVASCULAR SCREENING; LDL GOAL LESS THAN 160    Pain in joint, upper arm    Benign neoplasm of soft tissues    Constipation    Vitamin D deficiency    Anxiety state     delivery delivered    Leg pain, bilateral    Pain of finger of right hand    Prediabetes    Shoulder (girdle) dystocia during labor and deliver, delivered    Toe pain, left    Varicose veins of lower extremity    Backache    Headache, chronic daily    IUD (intrauterine device) in place    Psoriasis of scalp    Morbid obesity (H)    H/O cold sores    Acute cholecystitis    Acute pancreatitis, unspecified complication status, unspecified pancreatitis type    Anxiety     Past Surgical History:   Procedure Laterality Date    ------------OTHER------------- Right 2018    5th metatarsal fracture    BUNIONECTOMY Bilateral 1999    C/SECTION, LOW TRANSVERSE      ENDOSCOPIC ULTRASOUND UPPER GASTROINTESTINAL TRACT (GI) N/A 2022    Procedure: ENDOSCOPIC ULTRASOUND, ESOPHAGOSCOPY / UPPER GASTROINTESTINAL TRACT (GI) WITH BIOPSY;  Surgeon: Alissa Lubin MD;  Location: [facility]    FOOT SURGERY Left 2018    fusion of the large  toe 30 august 2018    LAPAROSCOPIC CHOLECYSTECTOMY N/A 9/28/2022    Procedure: Laparoscopic cholecystectomy;  Surgeon: Bertha Potts MD;  Location: [facility] OR    OTHER SURGICAL HISTORY      EXCIS BARTHOLIN GLAND/CYST    OTHER SURGICAL HISTORY Left 03/16/2012     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    RELEASE CARPAL TUNNEL Bilateral     No Comments Provided    STRIP VEIN  2014    vein removal for varicose veins       Social History     Tobacco Use    Smoking status: Never     Passive exposure: Never    Smokeless tobacco: Never   Substance Use Topics    Alcohol use: Yes     Comment: Alcoholic Drinks/day: Drinks socially only, 3 drinks per week.     Family History   Problem Relation Age of Onset    Depression Mother     Osteoporosis Mother     Mental Illness Mother     Hypertension Father         Hypertension    Hyperlipidemia Father         Hyperlipidemia    Colon Cancer Father 56        Cancer-colon    Bladder Cancer Father 62        Cancer,bladder    Obesity Father     Family History Negative Sister         Good Health    Family History Negative Son     Mental Illness Maternal Aunt     Mental Illness Maternal Uncle          No Known Allergies  Recent Labs   Lab Test 09/14/23  0753 05/18/23  1134 01/23/23  1127 12/20/22  1707 10/27/22  1354 10/06/22  1335 09/25/22  1948 04/14/22  0908 09/15/18  0952   A1C  --   --   --   --   --  5.1  --  5.2  --    LDL  --  124*  --   --   --   --   --  129*  --    HDL  --  85  --   --   --   --   --  70  --    TRIG  --  121  --   --   --   --   --  106  --    ALT 32 38* 45 33   < > 84*   < >  --   --    CR  --  0.82 0.78  --    < > 0.74   < >  --  0.80   GFRESTIMATED  --  87 >90  --    < > >90   < >  --  77   GFRESTBLACK  --   --   --   --   --   --   --   --  >90   POTASSIUM  --  3.9 3.9  --    < > 3.9   < >  --  3.8   TSH  --   --   --  1.06  --   --   --   --   --     < > = values in this interval not displayed.      BP Readings from Last 3 Encounters:   01/09/24 125/83  "  12/20/23 121/86   08/03/23 121/86    Wt Readings from Last 3 Encounters:   01/09/24 88 kg (194 lb)   11/01/23 85.3 kg (188 lb)   08/03/23 83.5 kg (184 lb)                    ROS:  C: NEGATIVE for fever, chills, change in weight  I: NEGATIVE for worrisome rashes, moles or lesions  E: NEGATIVE for vision changes or irritation  E/M: NEGATIVE for ear, mouth and throat problems  R: NEGATIVE for significant cough or SOB  B: NEGATIVE for masses, tenderness or discharge  CV: NEGATIVE for chest pain, palpitations or peripheral edema  GI: NEGATIVE for nausea, abdominal pain, heartburn, or change in bowel habits  : NEGATIVE for frequency, dysuria, or hematuria  M: As above.  N: NEGATIVE for weakness, dizziness or paresthesias  E: NEGATIVE for temperature intolerance, skin/hair changes  H: NEGATIVE for bleeding problems  P: NEGATIVE for changes in mood or affect    OBJECTIVE:                                                    /83 (BP Location: Left arm, Patient Position: Chair, Cuff Size: Adult Regular)   Pulse 78   Temp 97.5  F (36.4  C) (Tympanic)   Resp 16   Ht 1.549 m (5' 1\")   Wt 88 kg (194 lb)   SpO2 97%   BMI 36.66 kg/m    Body mass index is 36.66 kg/m .  GENERAL: healthy, alert and no distress  EYES: Eyes grossly normal to inspection and conjunctivae and sclerae normal  HENT: normal cephalic/atraumatic and oral mucous membranes moist  RESP: No audible wheezes.  MS: Tenderness on palpation of both trochanters, worse on the right side. Tenderness on palpation of the right IT band.  NEURO: Normal strength and tone, mentation intact and speech normal  PSYCH: mentation appears normal, affect normal/bright    Diagnostic Test Results:  Results for orders placed or performed in visit on 01/09/24   XR Hip Right 2-3 Views     Status: None    Narrative    EXAM: XR HIP RIGHT 2-3 VIEWS  LOCATION: [facility]  DATE: 01/09/2024    INDICATION: Right hip pain.  COMPARISON: None.      " Impression    IMPRESSION: Normal joint spaces and alignment. No fracture. IUD.          ASSESSMENT/PLAN:                                                      Trochanteric bursitis of right hip  - Orthopedic  Referral; Future  - MR Hip Right w/o Contrast    Acute hip pain, right  - XR Hip Right 2-3 Views     Disposition: follow-up in 2 weeks or as needed.    Giovani Pagan MD  [facility]      Answers submitted by the patient for this visit:  Provider Visit on 1/9/2024  4:00 PM with Giovani Pagan  Patient Health Questionnaire (Submitted on 1/9/2024)  If you checked off any problems, how difficult have these problems made it for you to do your work, take care of things at home, or get along with other people?: Somewhat difficult  PHQ9 TOTAL SCORE: 3

## 2024-01-17 DIAGNOSIS — Z86.19 H/O COLD SORES: ICD-10-CM

## 2024-01-17 RX ORDER — VALACYCLOVIR HYDROCHLORIDE 1 G/1
TABLET, FILM COATED ORAL
Qty: 12 TABLET | Refills: 1 | Status: SHIPPED | OUTPATIENT
Start: 2024-01-17 | End: 2024-03-26

## 2024-01-22 ENCOUNTER — ANCILLARY PROCEDURE (OUTPATIENT)
Dept: MRI IMAGING | Facility: CLINIC | Age: 54
End: 2024-01-22
Attending: INTERNAL MEDICINE
Payer: COMMERCIAL

## 2024-01-22 ENCOUNTER — TRANSFERRED RECORDS (OUTPATIENT)
Dept: HEALTH INFORMATION MANAGEMENT | Facility: CLINIC | Age: 54
End: 2024-01-22

## 2024-01-22 DIAGNOSIS — M54.2 CERVICALGIA: ICD-10-CM

## 2024-01-22 PROCEDURE — 73721 MRI JNT OF LWR EXTRE W/O DYE: CPT | Mod: RT | Performed by: RADIOLOGY

## 2024-01-22 RX ORDER — CHLORZOXAZONE 500 MG/1
TABLET ORAL
Qty: 90 TABLET | Refills: 0 | Status: SHIPPED | OUTPATIENT
Start: 2024-01-22 | End: 2024-04-16

## 2024-02-07 ENCOUNTER — VIRTUAL VISIT (OUTPATIENT)
Dept: NEUROLOGY | Facility: CLINIC | Age: 54
End: 2024-02-07
Payer: COMMERCIAL

## 2024-02-07 VITALS — BODY MASS INDEX: 36.63 KG/M2 | WEIGHT: 194 LBS | HEIGHT: 61 IN

## 2024-02-07 DIAGNOSIS — G43.709 CHRONIC MIGRAINE WITHOUT AURA WITHOUT STATUS MIGRAINOSUS, NOT INTRACTABLE: Primary | ICD-10-CM

## 2024-02-07 PROCEDURE — 99213 OFFICE O/P EST LOW 20 MIN: CPT | Mod: 95 | Performed by: NURSE PRACTITIONER

## 2024-02-07 RX ORDER — IBUPROFEN 200 MG
400 TABLET ORAL EVERY 6 HOURS PRN
COMMUNITY

## 2024-02-07 ASSESSMENT — MIGRAINE DISABILITY ASSESSMENT (MIDAS)
HOW OFTEN WERE SOCIAL ACTIVITIES MISSED DUE TO HEADACHES: 0
HOW MANY DAYS IN THE PAST 3 MONTHS HAVE YOU HAD A HEADACHE: 45
HOW MANY DAYS WAS HOUSEWORK PRODUCTIVITY CUT IN HALF DUE TO HEADACHES: 10
TOTAL SCORE: 30
HOW MANY DAYS DID YOU NOT DO HOUSEWORK BECAUSE OF HEADACHES: 5
HOW MANY DAYS WAS YOUR PRODUCTIVITY CUT IN HALF BECAUSE OF HEADACHES: 15
ON A SCALE FROM 0-10 ON AVERAGE HOW PAINFUL WERE HEADACHES: 6
HOW MANY DAYS DID YOU MISS WORK OR SCHOOL BECAUSE OF HEADACHES: 0

## 2024-02-07 ASSESSMENT — HEADACHE IMPACT TEST (HIT 6)
HIT6 TOTAL SCORE: 56
WHEN YOU HAVE A HEADACHE HOW OFTEN DO YOU WISH YOU COULD LIE DOWN: RARELY
WHEN YOU HAVE HEADACHES HOW OFTEN IS THE PAIN SEVERE: SOMETIMES
HOW OFTEN DID HEADACHS LIMIT CONCENTRATION ON WORK OR DAILY ACTIVITY: SOMETIMES
HOW OFTEN HAVE YOU FELT FED UP OR IRRITATED BECAUSE OF YOUR HEADACHES: SOMETIMES
HOW OFTEN HAVE YOU FELT TOO TIRED TO WORK BECAUSE OF YOUR HEADACHES: RARELY
HOW OFTEN DO HEADACHES LIMIT YOUR DAILY ACTIVITIES: SOMETIMES

## 2024-02-07 ASSESSMENT — PAIN SCALES - GENERAL: PAINLEVEL: MILD PAIN (3)

## 2024-02-07 NOTE — NURSING NOTE
Is the patient currently in the state of [place]? YES    Visit mode:VIDEO    If the visit is dropped, the patient can be reconnected by: VIDEO VISIT: Text to cell phone:   Telephone Information:   Mobile [phone]       Will anyone else be joining the visit? NO  (If patient encounters technical issues they should call [phone]    How would you like to obtain your AVS? MyChart    Are changes needed to the allergy or medication list? No    Reason for visit: Follow Up    Elizabet GUZMAN

## 2024-02-07 NOTE — LETTER
2/7/2024       RE: Manju Berg  [address] [facility] [address]     Dear Colleague,    Thank you for referring your patient, Manju Berg, to the [facility] NEUROLOGY [facility] [place] at [facility]. Please see a copy of my visit note below.    Manju is a 53 year old who is being evaluated via a billable video visit.      Subjective  Manju is a 53 year old, presenting for the following health issues:  Follow Up   Plaque psoriasis, asthma, s/p cholecysectomy, elevated LFTs   Verapmil stopped because side effects   Last Botox on 11/6/2023 and missed 2/5/2024. Continue dull headaches in the last couple weeks. Patient would would like to continue with Botox. Had Botox twice now.  Severe headache at least 4 days in the last 90 days. Rizatriptan helps but incomplete response with 5 mg and recommended to try 10 mg.   Before Botox-more headaches and tylenol use. Less tylenol since starting Botox.     Headache Tx  Tylenol as needed and much less since starting Botox   Occasional ibuprofen, naproxen -may interact with fluvoxamine -GI bleed reviewed with the patient    Gabapentin -caused fatigue   Nortriptyline -helped working and caused fatigue  Propranolol -retrial caused fatigue but was working and now takes as needed for anxiety  Verapamil-fatigue  Botox -2 round so far and seems effective   Rizatriptan-incomplete response at 5 mg   Ondansetron as needed for nausea    Impression:  Chronic migraine     Plan:  Continue Botox and need to schedule ASAP to stay on the schedule   Rescue treatment -try rizatriptan at 10 mg at severe migraine onset and if ineffective -than we can try sumatriptan   No new medications at this time   Follow up -schedule for Botox     Objective   Vitals - Patient Reported  Pain Score: Mild Pain (3)  Pain Loc: Head  Last Patient-Answered HIT-6 Questionnaire      2/7/2024     9:01 AM   HIT-6   When you have headaches, how  often is the pain severe 10   How often do headaches limit your ability to do usual daily activities including household work, work, school, or social activities? 10   When you have a headache, how often do you wish you could lie down? 8   In the past 4 weeks, how often have you felt too tired to do work or daily activities because of your headaches 8   In the past 4 weeks, how often have you felt fed up or irritated because of your headaches 10   In the past 4 weeks, how often did headaches limit your ability to concentrate on work or daily activities 10   HIT-6 Total Score 56       Physical Exam   Patient is alert and no in apparent acute distress,  mentation appears normal, judgement and insight intact, normal speech, no weakness observed     I discussed all my recommendations with Manju Berg who verbalizes understanding and comfortable with the plan.     21 minutes spent on the date of the encounter doing video access, chart  review, exam, meds review, treatment plan, documentation and further activities as noted above      Again, thank you for allowing me to participate in the care of your patient.      Sincerely,    BELINDA Montalvo CNP

## 2024-02-07 NOTE — PROGRESS NOTES
Manju is a 53 year old who is being evaluated via a billable video visit.      Subjective   Manju is a 53 year old, presenting for the following health issues:  Follow Up   Plaque psoriasis, asthma, s/p cholecysectomy, elevated LFTs   Verapmil stopped because side effects   Last Botox on 11/6/2023 and missed 2/5/2024. Continue dull headaches in the last couple weeks. Patient would would like to continue with Botox. Had Botox twice now.  Severe headache at least 4 days in the last 90 days. Rizatriptan helps but incomplete response with 5 mg and recommended to try 10 mg.   Before Botox-more headaches and tylenol use. Less tylenol since starting Botox.     Headache Tx  Tylenol as needed and much less since starting Botox   Occasional ibuprofen, naproxen -may interact with fluvoxamine -GI bleed reviewed with the patient    Gabapentin -caused fatigue   Nortriptyline -helped working and caused fatigue  Propranolol -retrial caused fatigue but was working and now takes as needed for anxiety  Verapamil-fatigue  Botox -2 round so far and seems effective   Rizatriptan-incomplete response at 5 mg   Ondansetron as needed for nausea    Impression:  Chronic migraine     Plan:  Continue Botox and need to schedule ASAP to stay on the schedule   Rescue treatment -try rizatriptan at 10 mg at severe migraine onset and if ineffective -than we can try sumatriptan   No new medications at this time   Follow up -schedule for Botox     Objective    Vitals - Patient Reported  Pain Score: Mild Pain (3)  Pain Loc: Head  Last Patient-Answered HIT-6 Questionnaire      2/7/2024     9:01 AM   HIT-6   When you have headaches, how often is the pain severe 10   How often do headaches limit your ability to do usual daily activities including household work, work, school, or social activities? 10   When you have a headache, how often do you wish you could lie down? 8   In the past 4 weeks, how often have you felt too tired to do work or daily  activities because of your headaches 8   In the past 4 weeks, how often have you felt fed up or irritated because of your headaches 10   In the past 4 weeks, how often did headaches limit your ability to concentrate on work or daily activities 10   HIT-6 Total Score 56       Physical Exam   Patient is alert and no in apparent acute distress,  mentation appears normal, judgement and insight intact, normal speech, no weakness observed     I discussed all my recommendations with Manju Berg who verbalizes understanding and comfortable with the plan.     21 minutes spent on the date of the encounter doing video access, chart  review, exam, meds review, treatment plan, documentation and further activities as noted above    BELINDA Wagner, CNP [facility]  Headache certified  [facility]      Video-Visit Details  Type of service:  Video Visit   Originating Location (pt. Location): Home  Distant Location (provider location):  Off-site  Platform used for Video Visit: Tarah

## 2024-02-15 ENCOUNTER — TELEPHONE (OUTPATIENT)
Dept: NEUROLOGY | Facility: CLINIC | Age: 54
End: 2024-02-15
Payer: COMMERCIAL

## 2024-02-15 NOTE — TELEPHONE ENCOUNTER
Patient Contacted to schedule the following:    Appointment type: P Botox - Standard Migraine  Provider: BELINDA Wagner CNP  Return date: 3/4/2024 at 1:00pm (slot held for patient)  Specialty phone number: [phone]  Additional appointment(s) needed: N/A  Additional Notes: N/A    Spoke with patient, she accepted appt. Scheduled accordingly.    Jacky Odom on 2/15/2024 at 4:46 PM

## 2024-03-04 ENCOUNTER — OFFICE VISIT (OUTPATIENT)
Dept: NEUROLOGY | Facility: CLINIC | Age: 54
End: 2024-03-04
Payer: COMMERCIAL

## 2024-03-04 DIAGNOSIS — G43.709 CHRONIC MIGRAINE WITHOUT AURA WITHOUT STATUS MIGRAINOSUS, NOT INTRACTABLE: Primary | ICD-10-CM

## 2024-03-04 PROCEDURE — 64615 CHEMODENERV MUSC MIGRAINE: CPT | Performed by: NURSE PRACTITIONER

## 2024-03-04 NOTE — PROGRESS NOTES
PROCEDURE NOTE:     BOTULINUM NEUROTOXIN INJECTION PROCEDURE:  DATA:03/04/24  INDICATIONS FOR PROCEDURES:   chronic migraine headaches.   Last Botox 11/06/2024 -first round and no difference or may be tiny. May be 1-2 days without a headaches. Has to take pain medications naproxen and tylenol and muscle relaxant 5 out 7 days.   Headache today 2/10   Has been taking propranolol as needed and was helpful with headaches causing mood changes -feeling depressed and stopped. Takes propranolol as needed   Did not try rizatriptan -because was scarred of side effects.    Treatment tried propranolol, nortriptyline, ibuprofen, naproxen, gabapentin  On wellbutrin and fluvoxamine-controlled depression/anxiety   Patient is here today for an initial injection with Botox.     GOAL OF PROCEDURE:  The goal of this procedure is to decrease pain and enhance functional independence.     TOTAL DOSE ADMINISTERED:  Dose Administered:  155 units  Botox (Botulinum Toxin Type A)       2:1 Dilution    Wasted 45 units  Medication guide was given to patient     CONSENT:  The risks, benefits, and treatment options were discussed with the patient who agreed to proceed.     Written consent was obtained      EQUIPMENT USED:  Needles-30 gauge, 0.5 inches for injections  Four 1-ml tuberculin syringes for injections  One sodium chloride 10 ml vial preservative free  Alcohol swabs     SKIN PREPARATION:  Skin preparation was performed using an alcohol wipe.        AREA/MUSCLE INJECTED:  155 units of Botox  Right upper Trapezius (upper cervical) - 5 units of Botox at 3 site/s.   Left upper Trapezius (upper cervical) - 5 units of Botox at 3 site/s.      Right cervical paraspinals - 5 units of Botox at 2 site/s.   Left cervical paraspinals - 5 units of Botox at 2 site/s.      Left occipitalis - 5 units of Botox at 3 site/s.  Right occipitalis -5 units of Botox at 3 site/s        Right Frontalis - 5 units of Botox at 2 site/s.  Left Frontalis - 5 units of  Botox at 2 site/s.     Right Temporalis - 5 units of Botox at 4 site/s.  Left Temporalis - 5 units of Botox at 4 site/s.     Right  - 5 units of Botox at 1 site/s.              Left  - 5 units of Botox at 1 site/s.     Procerus - 5 units of Botox at 1 site/s.     RESPONSE TO PROCEDURE:  tolerated the procedure well and there were no immediate complications.  Patient was allowed to recover for an appropriate period of time and was discharged home in stable condition.     FOLLOW UP:     Repeat Botox injections in 12 weeks        This procedure was performed under a hospital privileging agreement with BELINDA Cast, [facility]

## 2024-03-04 NOTE — LETTER
3/4/2024       RE: Manju Berg  [address]  [place] [address]     Dear Colleague,    Thank you for referring your patient, Manju Berg, to the [facility] [place] at [facility]. Please see a copy of my visit note below.    PROCEDURE NOTE:     BOTULINUM NEUROTOXIN INJECTION PROCEDURE:  DATA:03/04/24  INDICATIONS FOR PROCEDURES:   chronic migraine headaches.   Last Botox 11/06/2024 -first round and no difference or may be tiny. May be 1-2 days without a headaches. Has to take pain medications naproxen and tylenol and muscle relaxant 5 out 7 days.   Headache today 2/10   Has been taking propranolol as needed and was helpful with headaches causing mood changes -feeling depressed and stopped. Takes propranolol as needed   Did not try rizatriptan -because was scarred of side effects.    Treatment tried propranolol, nortriptyline, ibuprofen, naproxen, gabapentin  On wellbutrin and fluvoxamine-controlled depression/anxiety   Patient is here today for an initial injection with Botox.     GOAL OF PROCEDURE:  The goal of this procedure is to decrease pain and enhance functional independence.     TOTAL DOSE ADMINISTERED:  Dose Administered:  155 units  Botox (Botulinum Toxin Type A)       2:1 Dilution    Wasted 45 units  Medication guide was given to patient     CONSENT:  The risks, benefits, and treatment options were discussed with the patient who agreed to proceed.     Written consent was obtained      EQUIPMENT USED:  Needles-30 gauge, 0.5 inches for injections  Four 1-ml tuberculin syringes for injections  One sodium chloride 10 ml vial preservative free  Alcohol swabs     SKIN PREPARATION:  Skin preparation was performed using an alcohol wipe.        AREA/MUSCLE INJECTED:  155 units of Botox  Right upper Trapezius (upper cervical) - 5 units of Botox at 3 site/s.   Left upper Trapezius (upper cervical) - 5 units of Botox at 3  site/s.      Right cervical paraspinals - 5 units of Botox at 2 site/s.   Left cervical paraspinals - 5 units of Botox at 2 site/s.      Left occipitalis - 5 units of Botox at 3 site/s.  Right occipitalis -5 units of Botox at 3 site/s        Right Frontalis - 5 units of Botox at 2 site/s.  Left Frontalis - 5 units of Botox at 2 site/s.     Right Temporalis - 5 units of Botox at 4 site/s.  Left Temporalis - 5 units of Botox at 4 site/s.     Right  - 5 units of Botox at 1 site/s.              Left  - 5 units of Botox at 1 site/s.     Procerus - 5 units of Botox at 1 site/s.     RESPONSE TO PROCEDURE:  tolerated the procedure well and there were no immediate complications.  Patient was allowed to recover for an appropriate period of time and was discharged home in stable condition.     FOLLOW UP:     Repeat Botox injections in 12 weeks        This procedure was performed under a hospital privileging agreement with BELINDA Cast, CNP  [facility]            Again, thank you for allowing me to participate in the care of your patient.      Sincerely,    BELINDA Montalvo CNP

## 2024-03-05 ENCOUNTER — MYC REFILL (OUTPATIENT)
Dept: PULMONOLOGY | Facility: CLINIC | Age: 54
End: 2024-03-05
Payer: COMMERCIAL

## 2024-03-05 DIAGNOSIS — J45.40 MODERATE PERSISTENT ASTHMA, UNSPECIFIED WHETHER COMPLICATED: Primary | ICD-10-CM

## 2024-03-05 RX ORDER — BUDESONIDE AND FORMOTEROL FUMARATE DIHYDRATE 160; 4.5 UG/1; UG/1
2 AEROSOL RESPIRATORY (INHALATION)
Qty: 30.6 G | Refills: 3 | Status: SHIPPED | OUTPATIENT
Start: 2024-03-05 | End: 2024-08-20

## 2024-03-05 NOTE — TELEPHONE ENCOUNTER
budesonide-formoterol (SYMBICORT) 160-4.5 MCG/ACT Inhaler Inhale 2 puffs into the lungs two times daily for 360 days 30.6 g 3 ordered 12/20/2023     Last Office Visit: 12/20/2023  Future Office visit:  12/16/2024  Next 5 appointments (look out 90 days)      May 29, 2024  5:00 PM  (Arrive by 4:40 PM)  Adult Preventative Visit with Yarely Wise MD  [facility] ([facility] ) [address] [place] [id number] [phone]             Jamie Weinstein LPN  Pulmonary Medicine:  [facility]  Phone: [phone] Fax: [phone]

## 2024-03-20 ENCOUNTER — MYC REFILL (OUTPATIENT)
Dept: FAMILY MEDICINE | Facility: CLINIC | Age: 54
End: 2024-03-20
Payer: COMMERCIAL

## 2024-03-20 DIAGNOSIS — G89.29 CHRONIC BILATERAL LOW BACK PAIN WITH RIGHT-SIDED SCIATICA: ICD-10-CM

## 2024-03-20 DIAGNOSIS — M54.41 CHRONIC BILATERAL LOW BACK PAIN WITH RIGHT-SIDED SCIATICA: ICD-10-CM

## 2024-03-20 RX ORDER — LIDOCAINE 50 MG/G
1 PATCH TOPICAL DAILY PRN
Qty: 30 PATCH | Refills: 5 | Status: SHIPPED | OUTPATIENT
Start: 2024-03-20

## 2024-03-26 DIAGNOSIS — Z86.19 H/O COLD SORES: ICD-10-CM

## 2024-03-26 RX ORDER — VALACYCLOVIR HYDROCHLORIDE 1 G/1
TABLET, FILM COATED ORAL
Qty: 12 TABLET | Refills: 1 | Status: SHIPPED | OUTPATIENT
Start: 2024-03-26

## 2024-04-16 DIAGNOSIS — M54.2 CERVICALGIA: ICD-10-CM

## 2024-04-16 RX ORDER — CHLORZOXAZONE 500 MG/1
TABLET ORAL
Qty: 90 TABLET | Refills: 0 | Status: SHIPPED | OUTPATIENT
Start: 2024-04-16 | End: 2024-07-11

## 2024-05-29 ENCOUNTER — OFFICE VISIT (OUTPATIENT)
Dept: FAMILY MEDICINE | Facility: CLINIC | Age: 54
End: 2024-05-29
Payer: COMMERCIAL

## 2024-05-29 VITALS
BODY MASS INDEX: 33.75 KG/M2 | WEIGHT: 197.7 LBS | TEMPERATURE: 97.5 F | DIASTOLIC BLOOD PRESSURE: 86 MMHG | OXYGEN SATURATION: 98 % | HEIGHT: 64 IN | HEART RATE: 83 BPM | RESPIRATION RATE: 14 BRPM | SYSTOLIC BLOOD PRESSURE: 127 MMHG

## 2024-05-29 DIAGNOSIS — E66.01 MORBID OBESITY (H): ICD-10-CM

## 2024-05-29 DIAGNOSIS — R22.2 SUBCUTANEOUS MASS OF BACK: ICD-10-CM

## 2024-05-29 DIAGNOSIS — F32.0 CURRENT MILD EPISODE OF MAJOR DEPRESSIVE DISORDER WITHOUT PRIOR EPISODE (H): ICD-10-CM

## 2024-05-29 DIAGNOSIS — Z12.31 ENCOUNTER FOR SCREENING MAMMOGRAM FOR BREAST CANCER: ICD-10-CM

## 2024-05-29 DIAGNOSIS — J45.31 MILD PERSISTENT ASTHMA WITH ACUTE EXACERBATION: ICD-10-CM

## 2024-05-29 DIAGNOSIS — Z86.19 H/O COLD SORES: ICD-10-CM

## 2024-05-29 DIAGNOSIS — K59.01 SLOW TRANSIT CONSTIPATION: ICD-10-CM

## 2024-05-29 DIAGNOSIS — L40.9 PSORIASIS OF SCALP: ICD-10-CM

## 2024-05-29 DIAGNOSIS — Z87.19 HISTORY OF ACUTE CHOLECYSTITIS: ICD-10-CM

## 2024-05-29 DIAGNOSIS — R68.82 REDUCED LIBIDO: ICD-10-CM

## 2024-05-29 DIAGNOSIS — Z00.00 ROUTINE GENERAL MEDICAL EXAMINATION AT A HEALTH CARE FACILITY: Primary | ICD-10-CM

## 2024-05-29 DIAGNOSIS — K21.9 GASTROESOPHAGEAL REFLUX DISEASE, UNSPECIFIED WHETHER ESOPHAGITIS PRESENT: ICD-10-CM

## 2024-05-29 DIAGNOSIS — F41.9 ANXIETY: ICD-10-CM

## 2024-05-29 PROCEDURE — 99396 PREV VISIT EST AGE 40-64: CPT | Performed by: FAMILY MEDICINE

## 2024-05-29 PROCEDURE — 99214 OFFICE O/P EST MOD 30 MIN: CPT | Mod: 25 | Performed by: FAMILY MEDICINE

## 2024-05-29 PROCEDURE — 90750 HZV VACC RECOMBINANT IM: CPT | Performed by: FAMILY MEDICINE

## 2024-05-29 PROCEDURE — 90471 IMMUNIZATION ADMIN: CPT | Performed by: FAMILY MEDICINE

## 2024-05-29 RX ORDER — BUDESONIDE AND FORMOTEROL FUMARATE DIHYDRATE 160; 4.5 UG/1; UG/1
AEROSOL RESPIRATORY (INHALATION)
Qty: 20.4 G | Refills: 11 | Status: SHIPPED | OUTPATIENT
Start: 2024-05-29 | End: 2024-08-20

## 2024-05-29 SDOH — HEALTH STABILITY: PHYSICAL HEALTH: ON AVERAGE, HOW MANY DAYS PER WEEK DO YOU ENGAGE IN MODERATE TO STRENUOUS EXERCISE (LIKE A BRISK WALK)?: 0 DAYS

## 2024-05-29 ASSESSMENT — PAIN SCALES - GENERAL: PAINLEVEL: MILD PAIN (3)

## 2024-05-29 ASSESSMENT — ASTHMA QUESTIONNAIRES
QUESTION_2 LAST FOUR WEEKS HOW OFTEN HAVE YOU HAD SHORTNESS OF BREATH: ONCE A DAY
QUESTION_3 LAST FOUR WEEKS HOW OFTEN DID YOUR ASTHMA SYMPTOMS (WHEEZING, COUGHING, SHORTNESS OF BREATH, CHEST TIGHTNESS OR PAIN) WAKE YOU UP AT NIGHT OR EARLIER THAN USUAL IN THE MORNING: ONCE OR TWICE
QUESTION_4 LAST FOUR WEEKS HOW OFTEN HAVE YOU USED YOUR RESCUE INHALER OR NEBULIZER MEDICATION (SUCH AS ALBUTEROL): ONE OR TWO TIMES PER DAY
ACT_TOTALSCORE: 16
QUESTION_5 LAST FOUR WEEKS HOW WOULD YOU RATE YOUR ASTHMA CONTROL: SOMEWHAT CONTROLLED
ACT_TOTALSCORE: 16
QUESTION_1 LAST FOUR WEEKS HOW MUCH OF THE TIME DID YOUR ASTHMA KEEP YOU FROM GETTING AS MUCH DONE AT WORK, SCHOOL OR AT HOME: NONE OF THE TIME

## 2024-05-29 ASSESSMENT — SOCIAL DETERMINANTS OF HEALTH (SDOH): HOW OFTEN DO YOU GET TOGETHER WITH FRIENDS OR RELATIVES?: ONCE A WEEK

## 2024-05-29 NOTE — LETTER
My Asthma Action Plan    Name: Manju Berg   YOB: 1970  Date: 5/30/2024   My doctor: Yarely Wise MD   My clinic: [facility]        My Control Medicine: Budesonide + formoterol (Symbicort HFA) -  160/4.5 mcg 2 puffs once daily please 1-2 puffs as needed for asthma symptoms (max 12 puffs per day)  My Rescue Medicine: Albuterol (Proair/Ventolin/Proventil HFA) 2-4 puffs EVERY 4 HOURS as needed. Use a spacer if recommended by your provider.  My Oral Steroid Medicine: prednisone My Asthma Severity:   Mild Persistent  Know your asthma triggers:  .               GREEN ZONE   Good Control  I feel good  No cough or wheeze  Can work, sleep and play without asthma symptoms       Take your asthma control medicine every day.     If exercise triggers your asthma, take your rescue medication  15 minutes before exercise or sports, and  During exercise if you have asthma symptoms  Spacer to use with inhaler: If you have a spacer, make sure to use it with your inhaler             YELLOW ZONE Getting Worse  I have ANY of these:  I do not feel good  Cough or wheeze  Chest feels tight  Wake up at night   Keep taking your Green Zone medications  Start taking your rescue medicine:  every 20 minutes for up to 1 hour. Then every 4 hours for 24-48 hours.  If you stay in the Yellow Zone for more than 12-24 hours, contact your doctor.  If you do not return to the Green Zone in 12-24 hours or you get worse, start taking your oral steroid medicine if prescribed by your provider.           RED ZONE Medical Alert - Get Help  I have ANY of these:  I feel awful  Medicine is not helping  Breathing getting harder  Trouble walking or talking  Nose opens wide to breathe       Take your rescue medicine NOW  If your provider has prescribed an oral steroid medicine, start taking it NOW  Call your doctor NOW  If you are still in the Red Zone after 20 minutes and you have not reached your  doctor:  Take your rescue medicine again and  Call 911 or go to the emergency room right away    See your regular doctor within 2 weeks of an Emergency Room or Urgent Care visit for follow-up treatment.          Annual Reminders:  Meet with Asthma Educator,  Flu Shot in the Fall, consider Pneumonia Vaccination for patients with asthma (aged 19 and older).    Pharmacy:    [facility] -- [place], [place] - [address]  [facility] - [place], [place] - [address]    Electronically signed by Yarely Wise MD   Date: 05/30/24                      Asthma Triggers  How To Control Things That Make Your Asthma Worse    Triggers are things that make your asthma worse.  Look at the list below to help you find your triggers and what you can do about them.  You can help prevent asthma flare-ups by staying away from your triggers.      Trigger                                                          What you can do   Cigarette Smoke  Tobacco smoke can make asthma worse. Do not allow smoking in your home, car or around you.  Be sure no one smokes at a child s day care or school.  If you smoke, ask your health care provider for ways to help you quit.  Ask family members to quit too.  Ask your health care provider for a referral to Quit Plan to help you quit smoking, or call 6-736-869-PLAN.     Colds, Flu, Bronchitis  These are common triggers of asthma. Wash your hands often.  Don t touch your eyes, nose or mouth.  Get a flu shot every year.     Dust Mites  These are tiny bugs that live in cloth or carpet. They are too small to see. Wash sheets and blankets in hot water every week.   Encase pillows and mattress in dust mite proof covers.  Avoid having carpet if you can. If you have carpet, vacuum weekly.   Use a dust mask and HEPA vacuum.   Pollen and Outdoor Mold  Some people are allergic to trees, grass, or weed pollen, or molds. Try to keep your windows  closed.  Limit time out doors when pollen count is high.   Ask you health care provider about taking medicine during allergy season.     Animal Dander  Some people are allergic to skin flakes, urine or saliva from pets with fur or feathers. Keep pets with fur or feathers out of your home.    If you can t keep the pet outdoors, then keep the pet out of your bedroom.  Keep the bedroom door closed.  Keep pets off cloth furniture and away from stuffed toys.     Mice, Rats, and Cockroaches   Some people are allergic to the waste from these pests.   Cover food and garbage.  Clean up spills and food crumbs.  Store grease in the refrigerator.   Keep food out of the bedroom.   Indoor Mold  This can be a trigger if your home has high moisture. Fix leaking faucets, pipes, or other sources of water.   Clean moldy surfaces.  Dehumidify basement if it is damp and smelly.   Smoke, Strong Odors, and Sprays  These can reduce air quality. Stay away from strong odors and sprays, such as perfume, powder, hair spray, paints, smoke incense, paint, cleaning products, candles and new carpet.   Exercise or Sports  Some people with asthma have this trigger. Be active!  Ask your doctor about taking medicine before sports or exercise to prevent symptoms.    Warm up for 5-10 minutes before and after sports or exercise.     Other Triggers of Asthma  Cold air:  Cover your nose and mouth with a scarf.  Sometimes laughing or crying can be a trigger.  Some medicines and food can trigger asthma.

## 2024-05-29 NOTE — PATIENT INSTRUCTIONS
"Peroxyl mouth rinse after inhaler      Preventive Care Advice   This is general advice we often give to help people stay healthy. Your care team may have specific advice just for you. Please talk to your care team about your own preventive care needs.  Lifestyle  Exercise at least 150 minutes each week (30 minutes a day, 5 days a week).  Do muscle strengthening activities 2 days a week. These help control your weight and prevent disease.  No smoking.  Wear sunscreen to prevent skin cancer.  Have your home tested for radon every 2 to 5 years. Radon is a colorless, odorless gas that can harm your lungs. To learn more, go to www.health.[facility].[place].us and search for \"Radon in Homes.\"  Keep guns unloaded and locked up in a safe place like a safe or gun vault, or, use a gun lock and hide the keys. Always lock away bullets separately. To learn more, visit [employer].[place].gov and search for \"safe gun storage.\"  Nutrition  Eat 5 or more servings of fruits and vegetables each day.  Try wheat bread, brown rice and whole grain pasta (instead of white bread, rice, and pasta).  Get enough calcium and vitamin D. Check the label on foods and aim for 100% of the RDA (recommended daily allowance).  Regular exams  Have a dental exam and cleaning every 6 months.  See your health care team every year to talk about:  Any changes in your health.  Any medicines your care team has prescribed.  Preventive care, family planning, and ways to prevent chronic diseases.  Shots (vaccines)   HPV shots (up to age 26), if you've never had them before.  Hepatitis B shots (up to age 59), if you've never had them before.  COVID-19 shot: Get this shot when it's due.  Flu shot: Get a flu shot every year.  Tetanus shot: Get a tetanus shot every 10 years.  Pneumococcal, hepatitis A, and RSV shots: Ask your care team if you need these based on your risk.  Shingles shot (for age 50 and up).  General health tests  Diabetes screening:  Starting at age 35, Get screened for " diabetes at least every 3 years.  If you are younger than age 35, ask your care team if you should be screened for diabetes.  Cholesterol test: At age 39, start having a cholesterol test every 5 years, or more often if advised.  Bone density scan (DEXA): At age 50, ask your care team if you should have this scan for osteoporosis (brittle bones).  Hepatitis C: Get tested at least once in your life.  Abdominal aortic aneurysm screening: Talk to your doctor about having this screening if you:  Have ever smoked; and  Are biologically male; and  Are between the ages of 65 and 75.  STIs (sexually transmitted infections)  Before age 24: Ask your care team if you should be screened for STIs.  After age 24: Get screened for STIs if you're at risk. You are at risk for STIs (including HIV) if:  You are sexually active with more than one person.  You don't use condoms every time.  You or a partner was diagnosed with a sexually transmitted infection.  If you are at risk for HIV, ask about PrEP medicine to prevent HIV.  Get tested for HIV at least once in your life, whether you are at risk for HIV or not.  Cancer screening tests  Cervical cancer screening: If you have a cervix, begin getting regular cervical cancer screening tests at age 21. Most people who have regular screenings with normal results can stop after age 65. Talk about this with your provider.  Breast cancer scan (mammogram): If you've ever had breasts, begin having regular mammograms starting at age 40. This is a scan to check for breast cancer.  Colon cancer screening: It is important to start screening for colon cancer at age 45.  Have a colonoscopy test every 10 years (or more often if you're at risk) Or, ask your provider about stool tests like a FIT test every year or Cologuard test every 3 years.  To learn more about your testing options, visit: www.Track/731189.pdf.  For help making a decision, visit: erika/ke52733.  Prostate cancer screening test:  If you have a prostate and are age 55 to 69, ask your provider if you would benefit from a yearly prostate cancer screening test.  Lung cancer screening: If you are a current or former smoker age 50 to 80, ask your care team if ongoing lung cancer screenings are right for you.  For informational purposes only. Not to replace the advice of your health care provider. Copyright   2023 [facility]. All rights reserved. Clinically reviewed by the [facility] Transitions Program. Celles [id number] - REV 04/24.    Learning About Stress  What is stress?     Stress is your body's response to a hard situation. Your body can have a physical, emotional, or mental response. Stress is a fact of life for most people, and it affects everyone differently. What causes stress for you may not be stressful for someone else.  A lot of things can cause stress. You may feel stress when you go on a job interview, take a test, or run a race. This kind of short-term stress is normal and even useful. It can help you if you need to work hard or react quickly. For example, stress can help you finish an important job on time.  Long-term stress is caused by ongoing stressful situations or events. Examples of long-term stress include long-term health problems, ongoing problems at work, or conflicts in your family. Long-term stress can harm your health.  How does stress affect your health?  When you are stressed, your body responds as though you are in danger. It makes hormones that speed up your heart, make you breathe faster, and give you a burst of energy. This is called the fight-or-flight stress response. If the stress is over quickly, your body goes back to normal and no harm is done.  But if stress happens too often or lasts too long, it can have bad effects. Long-term stress can make you more likely to get sick, and it can make symptoms of some diseases worse. If you tense up when you are stressed, you may develop neck,  shoulder, or low back pain. Stress is linked to high blood pressure and heart disease.  Stress also harms your emotional health. It can make you choe, tense, or depressed. Your relationships may suffer, and you may not do well at work or school.  What can you do to manage stress?  You can try these things to help manage stress:   Do something active. Exercise or activity can help reduce stress. Walking is a great way to get started. Even everyday activities such as housecleaning or yard work can help.  Try yoga or noe chi. These techniques combine exercise and meditation. You may need some training at first to learn them.  Do something you enjoy. For example, listen to music or go to a movie. Practice your hobby or do volunteer work.  Meditate. This can help you relax, because you are not worrying about what happened before or what may happen in the future.  Do guided imagery. Imagine yourself in any setting that helps you feel calm. You can use online videos, books, or a teacher to guide you.  Do breathing exercises. For example:  From a standing position, bend forward from the waist with your knees slightly bent. Let your arms dangle close to the floor.  Breathe in slowly and deeply as you return to a standing position. Roll up slowly and lift your head last.  Hold your breath for just a few seconds in the standing position.  Breathe out slowly and bend forward from the waist.  Let your feelings out. Talk, laugh, cry, and express anger when you need to. Talking with supportive friends or family, a counselor, or a kezia leader about your feelings is a healthy way to relieve stress. Avoid discussing your feelings with people who make you feel worse.  Write. It may help to write about things that are bothering you. This helps you find out how much stress you feel and what is causing it. When you know this, you can find better ways to cope.  What can you do to prevent stress?  You might try some of these things to  "help prevent stress:  Manage your time. This helps you find time to do the things you want and need to do.  Get enough sleep. Your body recovers from the stresses of the day while you are sleeping.  Get support. Your family, friends, and community can make a difference in how you experience stress.  Limit your news feed. Avoid or limit time on social media or news that may make you feel stressed.  Do something active. Exercise or activity can help reduce stress. Walking is a great way to get started.  Where can you learn more?  Go to https://www.[employer].net/patiented  Enter N032 in the search box to learn more about \"Learning About Stress.\"  Current as of: October 24, 2023               Content Version: 14.0    8129-4533 [employer].   Care instructions adapted under license by your healthcare professional. If you have questions about a medical condition or this instruction, always ask your healthcare professional. Healthwise, [employer] disclaims any warranty or liability for your use of this information.      "

## 2024-05-29 NOTE — PROGRESS NOTES
Prior to immunization administration, verified patients identity using patient s name and date of birth. Please see Immunization Activity for additional information.     Screening Questionnaire for Adult Immunization    Are you sick today?   No   Do you have allergies to medications, food, a vaccine component or latex?   No   Have you ever had a serious reaction after receiving a vaccination?   No   Do you have a long-term health problem with heart, lung, kidney, or metabolic disease (e.g., diabetes), asthma, a blood disorder, no spleen, complement component deficiency, a cochlear implant, or a spinal fluid leak?  Are you on long-term aspirin therapy?   Yes   Do you have cancer, leukemia, HIV/AIDS, or any other immune system problem?   No   Do you have a parent, brother, or sister with an immune system problem?   No   In the past 3 months, have you taken medications that affect  your immune system, such as prednisone, other steroids, or anticancer drugs; drugs for the treatment of rheumatoid arthritis, Crohn s disease, or psoriasis; or have you had radiation treatments?   No   Have you had a seizure, or a brain or other nervous system problem?   No   During the past year, have you received a transfusion of blood or blood    products, or been given immune (gamma) globulin or antiviral drug?   No   For women: Are you pregnant or is there a chance you could become       pregnant during the next month?   No   Have you received any vaccinations in the past 4 weeks?   No     Immunization questionnaire was positive for at least one answer.  Reviewed by Dr. Wise.      Patient instructed to remain in clinic for 15 minutes afterwards, and to report any adverse reactions.     Screening performed by Naila Lopez MA on 5/29/2024 at 6:19 PM.

## 2024-05-29 NOTE — PROGRESS NOTES
Preventive Care Visit  [facility]  Yarely Idania Wise MD, Family Medicine  May 29, 2024      Assessment & Plan     Routine general medical examination at a health care facility  Second shingles vaccine completed today.  Declines COVID booster but will plan to get 1 in the fall when new version comes out.    Encounter for screening mammogram for breast cancer  - MA Screen Bilateral w/Joaquín; Future    Reduced libido  We discussed how decreased libido may be multifactorial including medications, stress, perimenopause/menopause, etc.  It is unclear if she is in menopause given her IUD.  Did discuss vaginal estrogen could be an option for her along with sexual health counseling.  She is interested in the latter to begin with.  - [facility]  Referral; Future    Mild persistent asthma with acute exacerbation  Flare of symptoms this spring after she stopped her Symbicort for 1 week due to history of thrush.  Discussed using mouth rinse to prevent thrush and starting Smart therapy with the Symbicort.  Certainly, we could consider oral prednisone if flare is not improving with this treatment.  Follow-up as needed  - budesonide-formoterol (SYMBICORT) 160-4.5 MCG/ACT Inhaler; Inhale 2 puffs once daily plus 1-2 puffs as needed. May use up to 12 puffs per day.    Subcutaneous mass of back  Growing and slightly tender mass on her upper back.  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  - US Soft Tissue Chest Wall or Upper Back; Future    Morbid obesity (H)  She is motivated to make changes in diet and exercises going forward. Discussed some practical options to help    Current mild episode of major depressive disorder without prior episode (H24)  Anxiety  Definitely situational stress is quite high but feels managing okay. Will continue on wellbutrin and Luvox    H/O cold sores  Prn valtrex recommended.     Gastroesophageal reflux disease,  "unspecified whether esophagitis present  Presenting as abdominal pain in the past.  No symptoms for quite a while.  We discussed slow weaning of PPI to see if medication is still warranted.    Slow transit constipation  Has been following with gastroenterology for management.  Feels overall symptoms are stable and GI felt fine to transition back to primary care.  I will take over prescribing her Linzess.      [facility] orders placed for future labs: kidney, liver and electrolyte panel, tsh, lipids, a1c      BMI  Estimated body mass index is 34.47 kg/m  as calculated from the following:    Height as of this encounter: 1.613 m (5' 3.5\").    Weight as of this encounter: 89.7 kg (197 lb 11.2 oz).       Counseling  Appropriate preventive services were discussed with this patient, including applicable screening as appropriate for fall prevention, nutrition, physical activity, Tobacco-use cessation, weight loss and cognition.  Checklist reviewing preventive services available has been given to the patient.  Reviewed patient's diet, addressing concerns and/or questions.       See Patient Instructions    Evelyn Nunes is a 54 year old, presenting for the following:  Physical        5/29/2024     4:57 PM   Additional Questions   Roomed by Deisy   Accompanied by self        Health Care Directive  Patient does not have a Health Care Directive or Living Will: Discussed advance care planning with patient; however, patient declined at this time.    HPI    Stress is high right now due to financial concerns, Family, work, kids  Followed with counseling but stopped few years ago.   Saw psych once in past to confirm meds  Feels no med adjustment is needed, just more situational issues and doing overall okay    Botox injections with neuro and headaches are improved  When get headache will use maxalt for migraines (zofran rarely) or otc + muscle relaxer for more tension related.     Few cold sores a year    Thrush few months ago- " stopped inhaler for a week but then triggered her cough and now using alb 1-2 x's a day.    Partially torn muscle right hip. Lidocaine patch- [facility]. Currently in PT    Gerd- on prevacid. No recent sx's. Has not tried to wean ppi     Constipation- miralax, colace, linzess. Rx'd by GI but wanting to transfer med to our office    IUD- no menses. No hot flashes or vaginal dryness  Noting decreased libido for many years.  Admits this is a problem in her relationship    Feeling motivated to make changes in diet/exercises. Reports Has been eating her feelings and think school being out for her boys will help ease some of her stress.     Psoriasis- skyrizi working but not fully. Follows with derm for psoriasis    The 10-year ASCVD risk score (Margaret UP, et al., 2019) is: 1.1%    Values used to calculate the score:      Age: 54 years      Sex: Female      Is Non- : No      Diabetic: No      Tobacco smoker: No      Systolic Blood Pressure: 127 mmHg      Is BP treated: No      HDL Cholesterol: 85 mg/dL      Total Cholesterol: 233 mg/dL       Planning to get covid vaccine the fall      5/29/2024   General Health   How would you rate your overall physical health? (!) FAIR   Feel stress (tense, anxious, or unable to sleep) Very much   (!) STRESS CONCERN      5/29/2024   Nutrition   Three or more servings of calcium each day? Yes   Diet: Regular (no restrictions)   How many servings of fruit and vegetables per day? (!) 2-3   How many sweetened beverages each day? 0-1         5/29/2024   Exercise   Days per week of moderate/strenous exercise 0 days   (!) EXERCISE CONCERN      5/29/2024   Social Factors   Frequency of gathering with friends or relatives Once a week   Worry food won't last until get money to buy more Patient declined   Food not last or not have enough money for food? No   Do you have housing?  Yes   Are you worried about losing your housing? No   Lack of transportation? No   Unable to get  utilities (heat,electricity)? No         5/29/2024   Fall Risk   Fallen 2 or more times in the past year? No   Trouble with walking or balance? No          5/29/2024   Dental   Dentist two times every year? Yes            Today's PHQ-9 Score:       1/9/2024     3:44 PM   PHQ-9 SCORE   PHQ-9 Total Score MyChart 3 (Minimal depression)   PHQ-9 Total Score 3         11/28/2022    10:03 AM 5/10/2023    12:33 PM 1/9/2024     3:44 PM   PHQ-9 SCORE   PHQ-9 Total Score MyChart 6 (Mild depression) 8 (Mild depression) 3 (Minimal depression)   PHQ-9 Total Score 6 8 3         5/14/2014    11:41 AM 6/6/2014     2:13 PM 1/9/2015    11:52 AM   ESPERANZA-7 SCORE   Total Score 15 5 4             5/29/2024   Substance Use   Alcohol more than 3/day or more than 7/wk No   Do you use any other substances recreationally? No     Social History     Tobacco Use    Smoking status: Never     Passive exposure: Never    Smokeless tobacco: Never   Vaping Use    Vaping status: Never Used   Substance Use Topics    Alcohol use: Yes     Comment: Alcoholic Drinks/day: Drinks socially only, 3 drinks per week.    Drug use: Never             5/29/2024   Breast Cancer Screening   Family history of breast, colon, or ovarian cancer? Yes         5/29/2024   LAST FHS-7 RESULTS   1st degree relative breast or ovarian cancer No   Any relative bilateral breast cancer No   Any male have breast cancer No   Any ONE woman have BOTH breast AND ovarian cancer No   Any woman with breast cancer before 50yrs No   2 or more relatives with breast AND/OR ovarian cancer No   2 or more relatives with breast AND/OR bowel cancer Unknown        Mammogram Screening - Patient under 40 years of age: Routine Mammogram Screening not recommended.         5/29/2024   STI Screening   New sexual partner(s) since last STI/HIV test? No     History of abnormal Pap smear: No - age 30- 64 PAP with HPV every 5 years recommended        Latest Ref Rng & Units 3/23/2022     3:41 PM 10/26/2012     1:14  "PM   PAP / HPV   PAP  Negative for Intraepithelial Lesion or Malignancy (NILM)     PAP (Historical)   NIL    HPV 16 DNA Negative Negative     HPV 18 DNA Negative Negative     Other HR HPV Negative Negative       ASCVD Risk   The 10-year ASCVD risk score (Margaret UP, et al., 2019) is: 1.1%    Values used to calculate the score:      Age: 54 years      Sex: Female      Is Non- : No      Diabetic: No      Tobacco smoker: No      Systolic Blood Pressure: 125 mmHg      Is BP treated: No      HDL Cholesterol: 85 mg/dL      Total Cholesterol: 233 mg/dL           Reviewed and updated as needed this visit by Provider                          Review of Systems  Constitutional, neuro, ENT, endocrine, pulmonary, cardiac, gastrointestinal, genitourinary, musculoskeletal, integument and psychiatric systems are negative, except as otherwise noted.     Objective    Exam  Pulse 83   Temp 97.5  F (36.4  C) (Temporal)   Ht 1.613 m (5' 3.5\")   Wt 89.7 kg (197 lb 11.2 oz)   SpO2 98%   BMI 34.47 kg/m     Estimated body mass index is 34.47 kg/m  as calculated from the following:    Height as of this encounter: 1.613 m (5' 3.5\").    Weight as of this encounter: 89.7 kg (197 lb 11.2 oz).    Physical Exam  GENERAL: alert and no distress  EYES: Eyes grossly normal to inspection, PERRL and conjunctivae and sclerae normal  HENT: ear canals and TM's normal, nose and mouth without ulcers or lesions  NECK: no adenopathy, no asymmetry, masses, or scars  RESP: lungs clear to auscultation - no rales, rhonchi or wheezes  BREAST: normal without masses, tenderness or nipple discharge and no palpable axillary masses or adenopathy  CV: regular rate and rhythm, normal S1 S2, no S3 or S4, no murmur, click or rub, no peripheral edema  ABDOMEN: soft, nontender, no hepatosplenomegaly, no masses and bowel sounds normal   (female) w/bimanual: normal female external genitalia, normal urethral meatus, normal vaginal mucosa, " and normal. IUD string seen in cervical os. cervix/adnexa/uterus without masses or discharge  MS: no gross musculoskeletal defects noted, no edema  SKIN: no suspicious lesions or rashes. Left upper back with a few centimeter subcutaneous soft, smooth and slightly mobile mass.  Mild tenderness to palpation  NEURO: Normal strength and tone, mentation intact and speech normal  PSYCH: mentation appears normal, affect normal/bright        Signed Electronically by: Yarely Wise MD

## 2024-05-30 PROBLEM — Z86.19 H/O COLD SORES: Status: RESOLVED | Noted: 2022-07-01 | Resolved: 2024-05-30

## 2024-05-30 PROBLEM — K85.90 ACUTE PANCREATITIS, UNSPECIFIED COMPLICATION STATUS, UNSPECIFIED PANCREATITIS TYPE: Status: RESOLVED | Noted: 2022-09-25 | Resolved: 2024-05-30

## 2024-05-30 PROBLEM — Z87.19 HISTORY OF ACUTE CHOLECYSTITIS: Status: ACTIVE | Noted: 2022-09-25

## 2024-06-01 ENCOUNTER — HEALTH MAINTENANCE LETTER (OUTPATIENT)
Age: 54
End: 2024-06-01

## 2024-06-04 DIAGNOSIS — L40.0 PLAQUE PSORIASIS: ICD-10-CM

## 2024-06-04 RX ORDER — BETAMETHASONE DIPROPIONATE 0.5 MG/ML
LOTION, AUGMENTED TOPICAL DAILY
Qty: 180 ML | Refills: 0 | Status: SHIPPED | OUTPATIENT
Start: 2024-06-04

## 2024-06-04 NOTE — TELEPHONE ENCOUNTER
Medication refilled per Rachel last note:    - current tx: risankizumab, augmented betamethasone lotion

## 2024-06-04 NOTE — TELEPHONE ENCOUNTER
Refill request for Betamethasone DP Aug 0.05% from [facility] on [address] in [place], [place]. PT is almost out.  Rx: [id number]  Tel: [phone]    Adonis Reed on 6/4/2024 at 2:30 PM

## 2024-06-10 ENCOUNTER — MYC MEDICAL ADVICE (OUTPATIENT)
Dept: PULMONOLOGY | Facility: CLINIC | Age: 54
End: 2024-06-10

## 2024-06-10 ENCOUNTER — OFFICE VISIT (OUTPATIENT)
Dept: NEUROLOGY | Facility: CLINIC | Age: 54
End: 2024-06-10
Payer: COMMERCIAL

## 2024-06-10 DIAGNOSIS — G43.709 CHRONIC MIGRAINE WITHOUT AURA WITHOUT STATUS MIGRAINOSUS, NOT INTRACTABLE: Primary | ICD-10-CM

## 2024-06-10 DIAGNOSIS — J45.40 MODERATE PERSISTENT ASTHMA, UNSPECIFIED WHETHER COMPLICATED: Primary | ICD-10-CM

## 2024-06-10 PROCEDURE — 64615 CHEMODENERV MUSC MIGRAINE: CPT | Performed by: NURSE PRACTITIONER

## 2024-06-10 NOTE — LETTER
6/10/2024       RE: Manju Berg  [address] [place]  [place] [place] [address]       Dear Colleague,    Thank you for referring your patient, Manju Berg, to the [facility] [place] [facility]. Please see a copy of my visit note below.    PROCEDURE NOTE:     BOTULINUM NEUROTOXIN INJECTION PROCEDURE:  DATA:6/10/2024  INDICATIONS FOR PROCEDURES:   chronic migraine headaches.   Last Botox  -03/04/24.  About 15 mild headaches in the last 90 days. Severe migraines none  No wearing off effect  Headache today 0/10   Has been taking propranolol as needed and was helpful with headaches causing mood changes -feeling depressed and stopped. Takes propranolol as needed   Did not try rizatriptan -because was scarred of side effects.    Treatment tried propranolol, nortriptyline, ibuprofen, naproxen, gabapentin  On wellbutrin and fluvoxamine-controlled depression/anxiety   Patient is here today for an initial injection with Botox.     GOAL OF PROCEDURE:  The goal of this procedure is to decrease pain and enhance functional independence.     TOTAL DOSE ADMINISTERED:  Dose Administered:  155 units  Botox (Botulinum Toxin Type A)       2:1 Dilution    Wasted 45 units  Medication guide was given to patient     CONSENT:  The risks, benefits, and treatment options were discussed with the patient who agreed to proceed.     Written consent was obtained      EQUIPMENT USED:  Needles-30 gauge, 0.5 inches for injections  Four 1-ml tuberculin syringes for injections  One sodium chloride 10 ml vial preservative free  Alcohol swabs     SKIN PREPARATION:  Skin preparation was performed using an alcohol wipe.        AREA/MUSCLE INJECTED:  155 units of Botox  Right upper Trapezius (upper cervical) - 5 units of Botox at 3 site/s.   Left upper Trapezius (upper cervical) - 5 units of Botox at 3 site/s.      Right cervical paraspinals - 5 units of Botox at 2 site/s.    Left cervical paraspinals - 5 units of Botox at 2 site/s.      Left occipitalis - 5 units of Botox at 3 site/s.  Right occipitalis -5 units of Botox at 3 site/s        Right Frontalis - 5 units of Botox at 2 site/s.  Left Frontalis - 5 units of Botox at 2 site/s.     Right Temporalis - 5 units of Botox at 4 site/s.  Left Temporalis - 5 units of Botox at 4 site/s.     Right  - 5 units of Botox at 1 site/s.              Left  - 5 units of Botox at 1 site/s.     Procerus - 5 units of Botox at 1 site/s.     RESPONSE TO PROCEDURE:  tolerated the procedure well and there were no immediate complications.  Patient was allowed to recover for an appropriate period of time and was discharged home in stable condition.     FOLLOW UP:     Repeat Botox injections in 12 weeks  This procedure was performed under a hospital privileging agreement with Dr. Fernandes     Again, thank you for allowing me to participate in the care of your patient.      Sincerely,    BELINDA Montalvo CNP

## 2024-06-10 NOTE — PROGRESS NOTES
PROCEDURE NOTE:     BOTULINUM NEUROTOXIN INJECTION PROCEDURE:  DATA:6/10/2024  INDICATIONS FOR PROCEDURES:   chronic migraine headaches.   Last Botox  -03/04/24.  About 15 mild headaches in the last 90 days. Severe migraines none  No wearing off effect  Headache today 0/10   Has been taking propranolol as needed and was helpful with headaches causing mood changes -feeling depressed and stopped. Takes propranolol as needed   Did not try rizatriptan -because was scarred of side effects.    Treatment tried propranolol, nortriptyline, ibuprofen, naproxen, gabapentin  On wellbutrin and fluvoxamine-controlled depression/anxiety   Patient is here today for an initial injection with Botox.     GOAL OF PROCEDURE:  The goal of this procedure is to decrease pain and enhance functional independence.     TOTAL DOSE ADMINISTERED:  Dose Administered:  155 units  Botox (Botulinum Toxin Type A)       2:1 Dilution    Wasted 45 units  Medication guide was given to patient     CONSENT:  The risks, benefits, and treatment options were discussed with the patient who agreed to proceed.     Written consent was obtained      EQUIPMENT USED:  Needles-30 gauge, 0.5 inches for injections  Four 1-ml tuberculin syringes for injections  One sodium chloride 10 ml vial preservative free  Alcohol swabs     SKIN PREPARATION:  Skin preparation was performed using an alcohol wipe.        AREA/MUSCLE INJECTED:  155 units of Botox  Right upper Trapezius (upper cervical) - 5 units of Botox at 3 site/s.   Left upper Trapezius (upper cervical) - 5 units of Botox at 3 site/s.      Right cervical paraspinals - 5 units of Botox at 2 site/s.   Left cervical paraspinals - 5 units of Botox at 2 site/s.      Left occipitalis - 5 units of Botox at 3 site/s.  Right occipitalis -5 units of Botox at 3 site/s        Right Frontalis - 5 units of Botox at 2 site/s.  Left Frontalis - 5 units of Botox at 2 site/s.     Right Temporalis - 5 units of Botox at 4 site/s.  Left  Temporalis - 5 units of Botox at 4 site/s.     Right  - 5 units of Botox at 1 site/s.              Left  - 5 units of Botox at 1 site/s.     Procerus - 5 units of Botox at 1 site/s.     RESPONSE TO PROCEDURE:  tolerated the procedure well and there were no immediate complications.  Patient was allowed to recover for an appropriate period of time and was discharged home in stable condition.     FOLLOW UP:     Repeat Botox injections in 12 weeks        This procedure was performed under a hospital privileging agreement with BELINDA Cast, [facility]

## 2024-06-11 RX ORDER — PREDNISONE 20 MG/1
40 TABLET ORAL DAILY
Qty: 10 TABLET | Refills: 0 | Status: SHIPPED | OUTPATIENT
Start: 2024-06-11 | End: 2024-06-16

## 2024-06-11 RX ORDER — AZITHROMYCIN 250 MG/1
TABLET, FILM COATED ORAL
Qty: 6 TABLET | Refills: 0 | Status: SHIPPED | OUTPATIENT
Start: 2024-06-11 | End: 2024-06-16

## 2024-06-12 ENCOUNTER — ANCILLARY PROCEDURE (OUTPATIENT)
Dept: MAMMOGRAPHY | Facility: CLINIC | Age: 54
End: 2024-06-12
Attending: FAMILY MEDICINE
Payer: COMMERCIAL

## 2024-06-12 ENCOUNTER — ANCILLARY PROCEDURE (OUTPATIENT)
Dept: ULTRASOUND IMAGING | Facility: CLINIC | Age: 54
End: 2024-06-12
Attending: FAMILY MEDICINE
Payer: COMMERCIAL

## 2024-06-12 DIAGNOSIS — R22.2 SUBCUTANEOUS MASS OF BACK: ICD-10-CM

## 2024-06-12 DIAGNOSIS — Z12.31 ENCOUNTER FOR SCREENING MAMMOGRAM FOR BREAST CANCER: ICD-10-CM

## 2024-06-12 PROCEDURE — 76604 US EXAM CHEST: CPT | Mod: 52 | Performed by: RADIOLOGY

## 2024-06-12 PROCEDURE — 77067 SCR MAMMO BI INCL CAD: CPT | Mod: GC

## 2024-06-12 PROCEDURE — 77063 BREAST TOMOSYNTHESIS BI: CPT | Mod: GC

## 2024-06-13 NOTE — RESULT ENCOUNTER NOTE
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 if this is bothersome to you or increasing in size I would definitely recommend having it removed.  If you would like to pursue this, please let me know and I will place a referral through for you to see a surgeon for this.  Otherwise, if it is not bothering you it is okay to monitor for now.  Kind regards,  Yarely Wise MD

## 2024-06-17 ENCOUNTER — MYC MEDICAL ADVICE (OUTPATIENT)
Dept: PULMONOLOGY | Facility: CLINIC | Age: 54
End: 2024-06-17
Payer: COMMERCIAL

## 2024-06-17 DIAGNOSIS — J45.40 MODERATE PERSISTENT ASTHMA, UNSPECIFIED WHETHER COMPLICATED: Primary | ICD-10-CM

## 2024-06-19 RX ORDER — PREDNISONE 20 MG/1
TABLET ORAL
Qty: 20 TABLET | Refills: 0 | Status: SHIPPED | OUTPATIENT
Start: 2024-06-19 | End: 2024-08-20

## 2024-07-02 DIAGNOSIS — F32.0 CURRENT MILD EPISODE OF MAJOR DEPRESSIVE DISORDER WITHOUT PRIOR EPISODE (H): ICD-10-CM

## 2024-07-02 DIAGNOSIS — F41.9 ANXIETY: ICD-10-CM

## 2024-07-02 RX ORDER — BUPROPION HYDROCHLORIDE 300 MG/1
300 TABLET ORAL EVERY MORNING
Qty: 90 TABLET | Refills: 2 | Status: SHIPPED | OUTPATIENT
Start: 2024-07-02

## 2024-07-02 NOTE — TELEPHONE ENCOUNTER
RN called patient to follow up on gap in time for refills.     She states she has been taking bupropion daily without any breaks. She feels it is working well. She has a few pills left but does not have any refills on file.     Routing to provider to review and advise.    CLAUDE CalabreseN, RN  [facility]

## 2024-07-08 ENCOUNTER — VIRTUAL VISIT (OUTPATIENT)
Dept: DERMATOLOGY | Facility: CLINIC | Age: 54
End: 2024-07-08
Attending: DERMATOLOGY
Payer: COMMERCIAL

## 2024-07-08 DIAGNOSIS — D84.9 IMMUNOSUPPRESSION (H): ICD-10-CM

## 2024-07-08 DIAGNOSIS — L40.0 PLAQUE PSORIASIS: Primary | ICD-10-CM

## 2024-07-08 PROCEDURE — 99214 OFFICE O/P EST MOD 30 MIN: CPT | Mod: 95 | Performed by: DERMATOLOGY

## 2024-07-08 NOTE — LETTER
7/8/2024      Manju Berg  [address]  [facility] [address]      Dear Colleague,    Thank you for referring your patient, Manju Berg, to the [facility]. Please see a copy of my visit note below.    [facility] Dermatology Note  Encounter Date: Jul 8, 2024  Store-and-Forward and Video (invitation sent by [employer]). Location of teledermatologist: [facility]. Date of images: 07/08/24. Start time: 9:27. End time: 9:35.    Dermatology Problem List:  1. Plaque psoriasis: scalp, trunk, with some ear involvement  - current tx: risankizumab, augmented betamethasone lotion  - in reserve: apremilast (exorbitant copay)  - prior: augmented betamethasone gel, coal tar/salcylic acid shampoo, ketoconazole shampoo, zinc pyrithione shampoo, compounded betamethasone/calcipotriene solution, Excimer, methotrexate 15 mg weekly, adalimumab (severe injection site reaction), ustekinumab    ____________________________________________    Assessment & Plan:     Plaque psoriasis: overall improved on risankizumab but still has some active erythema and itching on the scalp, though no scaling. Had diffuse pruritus after last injection; will monitor and if recurrent, may pretreat with antihistamines. If progressive, consider alternative treatment.  - risankizumab  - augmented betamethasone lotion PRN  - check QFN    Procedures Performed:    None    Follow-up: 6 months    Staff:     Pierce Velasquez MD, FAAD   of Dermatology  Department of Dermatology  [facility]    ____________________________________________    CC: Derm Problem    HPI:  Ms. Manju Berg is a(n) 54 year old female who presents today as a return patient for psoriasis    Psoriasis - on risankizumab  - took second maintenance dose a few days ago  - very itchy for first few days  - taking diphenhydramine for a few days  - using OTC  creams for itching      Not working quite as well as adalimumab - still some itching and redness on the scalp; not having flaking  - using augmented betamethasone lotion occasionally  - stopped medicated shampoo - not helpful  - no injection site reactions    Patient is otherwise feeling well, without additional skin concerns.    Labs Reviewed:  12/2022 QFN negative    Physical Exam:  Vitals: There were no vitals taken for this visit.  SKIN: video images were reviewed; image quality and interpretability: acceptable. Image date: n/a.  - no active rash on the face  - No other lesions of concern on areas examined.     Medications:  Current Outpatient Medications   Medication Sig Dispense Refill     acetaminophen (TYLENOL) 500 MG tablet Take 1,000 mg by mouth every 6 hours as needed for pain       albuterol (PROAIR HFA/PROVENTIL HFA/VENTOLIN HFA) 108 (90 Base) MCG/ACT inhaler Inhale 2 puffs into the lungs every 4 hours as needed for shortness of breath or wheezing 25.5 g 3     albuterol (PROVENTIL) (2.5 MG/3ML) 0.083% neb solution Take 1 vial (2.5 mg) by nebulization every 6 hours as needed for shortness of breath or wheezing 90 mL 3     augmented betamethasone dipropionate (DIPROLENE) 0.05 % external lotion Apply topically daily 180 mL 0     budesonide-formoterol (SYMBICORT) 160-4.5 MCG/ACT Inhaler Inhale 2 puffs once daily plus 1-2 puffs as needed. May use up to 12 puffs per day. 20.4 g 11     budesonide-formoterol (SYMBICORT) 160-4.5 MCG/ACT Inhaler Inhale 2 puffs into the lungs two times daily 30.6 g 3     buPROPion (WELLBUTRIN XL) 300 MG 24 hr tablet Take 1 tablet (300 mg) by mouth every morning 90 tablet 2     CALCIUM 600 + D 600-200 MG-UNIT OR TABS Take 1 tablet by mouth every evening       cetirizine (ZYRTEC) 10 MG tablet Take 10 mg by mouth daily       chlorzoxazone (PARAFON FORTE) 500 MG tablet TAKE 1/2 to 1.5 TABLETS BY MOUTH 3-4 TIMES DAILY AS NEEDED TO RELAX MUSCLES 90 tablet 0     Cholecalciferol (VITAMIN  D) 2000 UNITS tablet Take 2,000 Units by mouth daily. 100 tablet 3     fluticasone (FLONASE) 50 MCG/ACT nasal spray Spray 1 spray into both nostrils daily       fluvoxaMINE (LUVOX) 50 MG tablet Take 1 tablet (50 mg) by mouth 2 times daily 180 tablet 3     hydrOXYzine (VISTARIL) 25 MG capsule Take 1-2 capsules (25-50 mg) by mouth 3 times daily as needed for itching 60 capsule 3     ibuprofen (ADVIL/MOTRIN) 200 MG tablet Take 400 mg by mouth every 6 hours as needed for moderate pain or mild pain       LANsoprazole (PREVACID) 30 MG DR capsule Take 1 capsule (30 mg) by mouth daily 90 capsule 3     lidocaine (LIDODERM) 5 % patch Place 1 patch onto the skin daily as needed for moderate pain Remove after 12 hours. 30 patch 5     linaclotide (LINZESS) 145 MCG capsule Take 145 mcg by mouth every morning (before breakfast)       montelukast (SINGULAIR) 10 MG tablet Take 1 tablet (10 mg) by mouth at bedtime for 360 days 90 tablet 3     naproxen (NAPROSYN) 500 MG tablet Take 1 tablet (500 mg) by mouth 2 times daily as needed for moderate pain 30 tablet 1     predniSONE (DELTASONE) 20 MG tablet Take 3 tabs by mouth daily x 3 days, then 2 tabs daily x 3 days, then 1 tab daily x 3 days, then 1/2 tab daily x 3 days. 20 tablet 0     propranolol (INDERAL) 40 MG tablet Take 40 mg by mouth as needed (anxiety)       Risankizumab-rzaa (SKYRIZI) 150 MG/ML subcutaneous Inject 1 mL (150 mg) Subcutaneous See Admin Instructions - 1 injection on day 0, 1 injection on day 28, then 1 injection every 12 weeks 1 mL 1     Risankizumab-rzaa (SKYRIZI) 150 MG/ML subcutaneous Inject 1 mL (150 mg) Subcutaneous every 3 months 1 mL 3     rizatriptan (MAXALT-MLT) 5 MG ODT Take 1-2 tablets (5-10 mg) by mouth at onset of headache for migraine (may repeat in 2 hours as needed. Max 30 mg in 24 hours) 18 tablet 6     valACYclovir (VALTREX) 1000 mg tablet TAKE 2 TABLETS BY MOUTH EVERY 12 HOURS FOR 2 doses AT first signs OF outbreak 12 tablet 1     Current  Facility-Administered Medications   Medication Dose Route Frequency Provider Last Rate Last Admin     Botulinum Toxin Type A (BOTOX) 200 units injection 155 Units  155 Units Intramuscular See Admin Instructions Brittany Aguilar, BELINDA CNP   155 Units at 06/10/24 1012      Past Medical/Surgical History:   Patient Active Problem List   Diagnosis     Recurrent herpes labialis     GERD (gastroesophageal reflux disease)     Mild persistent asthma without complication     Current mild episode of major depressive disorder without prior episode (H24)     Family history of colon cancer     CARDIOVASCULAR SCREENING; LDL GOAL LESS THAN 160     Benign neoplasm of soft tissues     Constipation     Vitamin D deficiency     Prediabetes     History of shoulder dystocia in prior pregnancy     Varicose veins of lower extremity     Backache     Headache, chronic daily     IUD (intrauterine device) in place     Psoriasis of scalp     Morbid obesity (H)     History of acute cholecystitis     Anxiety     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     2011,Proximal left tibia      delivery delivered 2013     Dyspepsia and other specified disorders of function of stomach     No Comments Provided     Gastroesophageal reflux disease      Headache     No Comments Provided     Herpes simplex     No Comments Provided     Lateral epicondylitis of elbow     No Comments Provided     Other depressive disorder     No Comments Provided     Other unspecified back disorder     No Comments Provided     Pain in joint, upper arm     No Comments Provided       CC Pierce Velasquez MD  [address] [place] [phone] on close of this encounter.      Again, thank you for allowing me to participate in the care of your patient.        Sincerely,        Pierce DAVILA  MD Ron

## 2024-07-08 NOTE — PROGRESS NOTES
[facility] Dermatology Note    Encounter Date: Jul 8, 2024    Dermatology Problem List:  1. Plaque psoriasis: scalp, trunk, with some ear involvement  - current tx: risankizumab, augmented betamethasone lotion  - in reserve: apremilast (exorbitant copay)  - prior: augmented betamethasone gel, coal tar/salcylic acid shampoo, ketoconazole shampoo, zinc pyrithione shampoo, compounded betamethasone/calcipotriene solution, Excimer, methotrexate 15 mg weekly, adalimumab (severe injection site reaction), ustekinumab    ______________________________________    Impression/Plan:  1. Plaque psoriasis of scalp and trunk  - no longer controlled on ustekinumab  - discussed risks/benefits of increasing ustekinumab dosage vs starting risankizumab vs changing topical therapy   - patient will begin risankizumab and continue topical regimen  - check Quantiferon    Follow-up in 6 months.       Staff Involved:  Staff and Scribe    I, Hortencia Plasencia, am serving as a scribe; to document services personally performed by Pierce Velasquez MD -based on data collection and the provider's statements to me.    Provider Disclosure:   The documentation recorded by the scribe accurately reflects the services I personally performed and the decisions made by me.    Pierce Velasquez MD   of Dermatology  Department of Dermatology  [facility]      CC:   No chief complaint on file.      History of Present Illness:  Ms. Manju Berg is a 54 year old female who presents as a return patient. She is here for a psoriasis follow up. She reports that the psoriasis on the scalp has been coming back for a couple months now. She reports that her back is very itchy. She has been using the topicals, thinks that it helps calms down the itching. Does not believe that Stelara has helped much.     Patient is otherwise feeling well, with no additional skin concerns.     Labs:  12/2022  Quantiferon negative    Physical exam:  Vitals: There were no vitals taken for this visit.  GEN: This is a well developed, well-nourished female in no acute distress, in a pleasant mood.    SKIN: Lowe phototype II  - Focused examination of the scalp and face was performed.  - Sharply demarcated erythematous scaly plaques on the occipital, biparietal, and bitemporal scalp.  - No other lesions of concern on areas examined.     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     2011,Proximal left tibia     delivery delivered 2013    Dyspepsia and other specified disorders of function of stomach     No Comments Provided    Gastroesophageal reflux disease     Headache     No Comments Provided    Herpes simplex     No Comments Provided    Lateral epicondylitis of elbow     No Comments Provided    Other depressive disorder     No Comments Provided    Other unspecified back disorder     No Comments Provided    Pain in joint, upper arm     No Comments Provided     Past Surgical History:   Procedure Laterality Date    ------------OTHER------------- Right 2018    5th metatarsal fracture    BUNIONECTOMY Bilateral 1999    C/SECTION, LOW TRANSVERSE      ENDOSCOPIC ULTRASOUND UPPER GASTROINTESTINAL TRACT (GI) N/A 2022    Procedure: ENDOSCOPIC ULTRASOUND, ESOPHAGOSCOPY / UPPER GASTROINTESTINAL TRACT (GI) WITH BIOPSY;  Surgeon: Alissa Lubin MD;  Location:  OR    FOOT SURGERY Left 2018    fusion of the large toe 2018    LAPAROSCOPIC CHOLECYSTECTOMY N/A 2022    Procedure: Laparoscopic cholecystectomy;  Surgeon: Bertha Potts MD;  Location:  OR    OTHER SURGICAL HISTORY      EXCIS BARTHOLIN GLAND/CYST    OTHER SURGICAL HISTORY Left 2012     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 She has never been exposed to tobacco smoke. She has never used smokeless tobacco. She reports current alcohol use. She reports that she does not use drugs.    Family History:  Family History   Problem Relation Age of Onset    Depression Mother     Osteoporosis Mother     Mental Illness Mother     Hypertension Father         Hypertension    Hyperlipidemia Father         Hyperlipidemia    Colon Cancer Father 56        Cancer-colon    Bladder Cancer Father 62        Cancer,bladder    Obesity Father     Family History Negative Sister         Good Health    Family History Negative Son     Mental Illness Maternal Aunt     Mental Illness Maternal Uncle        Medications:  Current Outpatient Medications   Medication Sig Dispense Refill    acetaminophen (TYLENOL) 500 MG tablet Take 1,000 mg by mouth every 6 hours as needed for pain      albuterol (PROAIR HFA/PROVENTIL HFA/VENTOLIN HFA) 108 (90 Base) MCG/ACT inhaler Inhale 2 puffs into the lungs every 4 hours as needed for shortness of breath or wheezing 25.5 g 3    albuterol (PROVENTIL) (2.5 MG/3ML) 0.083% neb solution Take 1 vial (2.5 mg) by nebulization every 6 hours as needed for shortness of breath or wheezing 90 mL 3    augmented betamethasone dipropionate (DIPROLENE) 0.05 % external lotion Apply topically daily 180 mL 0    budesonide-formoterol (SYMBICORT) 160-4.5 MCG/ACT Inhaler Inhale 2 puffs once daily plus 1-2 puffs as needed. May use up to 12 puffs per day. 20.4 g 11    budesonide-formoterol (SYMBICORT) 160-4.5 MCG/ACT Inhaler Inhale 2 puffs into the lungs two times daily 30.6 g 3    buPROPion (WELLBUTRIN XL) 300 MG 24 hr tablet Take 1 tablet (300 mg) by mouth every morning 90 tablet 2    CALCIUM 600 + D 600-200 MG-UNIT OR TABS Take 1 tablet by mouth every evening       cetirizine (ZYRTEC) 10 MG tablet Take 10 mg by mouth daily      chlorzoxazone (PARAFON FORTE) 500 MG tablet TAKE 1/2 to 1.5 TABLETS BY MOUTH 3-4 TIMES DAILY AS NEEDED TO RELAX MUSCLES 90 tablet 0    Cholecalciferol (VITAMIN D) 2000 UNITS tablet Take 2,000 Units by mouth daily. 100 tablet 3    fluticasone (FLONASE) 50 MCG/ACT nasal spray Spray 1 spray into both nostrils daily      fluvoxaMINE (LUVOX) 50 MG tablet Take 1 tablet (50 mg) by mouth 2 times daily 180 tablet 3    hydrOXYzine (VISTARIL) 25 MG capsule Take 1-2 capsules (25-50 mg) by mouth 3 times daily as needed for itching 60 capsule 3    ibuprofen (ADVIL/MOTRIN) 200 MG tablet Take 400 mg by mouth every 6 hours as needed for moderate pain or mild pain      LANsoprazole (PREVACID) 30 MG DR capsule Take 1 capsule (30 mg) by mouth daily 90 capsule 3    lidocaine (LIDODERM) 5 % patch Place 1 patch onto the skin daily as needed for moderate pain Remove after 12 hours. 30 patch 5    linaclotide (LINZESS) 145 MCG capsule Take 145 mcg by mouth every morning (before breakfast)      montelukast (SINGULAIR) 10 MG tablet Take 1 tablet (10 mg) by mouth at bedtime for 360 days 90 tablet 3    naproxen (NAPROSYN) 500 MG tablet Take 1 tablet (500 mg) by mouth 2 times daily as needed for moderate pain 30 tablet 1    predniSONE (DELTASONE) 20 MG tablet Take 3 tabs by mouth daily x 3 days, then 2 tabs daily x 3 days, then 1 tab daily x 3 days, then 1/2 tab daily x 3 days. 20 tablet 0    propranolol (INDERAL) 40 MG tablet Take 40 mg by mouth as needed (anxiety)      Risankizumab-rzaa (SKYRIZI) 150 MG/ML subcutaneous Inject 1 mL (150 mg) Subcutaneous See Admin Instructions - 1 injection on day 0, 1 injection on day 28, then 1 injection every 12 weeks 1 mL 1    Risankizumab-rzaa (SKYRIZI) 150 MG/ML subcutaneous Inject 1 mL (150 mg) Subcutaneous every 3 months 1 mL 3    rizatriptan (MAXALT-MLT) 5 MG ODT Take 1-2 tablets (5-10 mg) by mouth at onset of headache for migraine (may  repeat in 2 hours as needed. Max 30 mg in 24 hours) 18 tablet 6    valACYclovir (VALTREX) 1000 mg tablet TAKE 2 TABLETS BY MOUTH EVERY 12 HOURS FOR 2 doses AT first signs OF outbreak 12 tablet 1     No Known Allergies

## 2024-07-08 NOTE — PROGRESS NOTES
[facility] Dermatology Note  Encounter Date: Jul 8, 2024  Store-and-Forward and Video (invitation sent by [employer]). Location of teledermatologist: [facility]. Date of images: 07/08/24. Start time: 9:27. End time: 9:35.    Dermatology Problem List:  1. Plaque psoriasis: scalp, trunk, with some ear involvement  - current tx: risankizumab, augmented betamethasone lotion  - in reserve: apremilast (exorbitant copay)  - prior: augmented betamethasone gel, coal tar/salcylic acid shampoo, ketoconazole shampoo, zinc pyrithione shampoo, compounded betamethasone/calcipotriene solution, Excimer, methotrexate 15 mg weekly, adalimumab (severe injection site reaction), ustekinumab    ____________________________________________    Assessment & Plan:     Plaque psoriasis: overall improved on risankizumab but still has some active erythema and itching on the scalp, though no scaling. Had diffuse pruritus after last injection; will monitor and if recurrent, may pretreat with antihistamines. If progressive, consider alternative treatment.  - risankizumab  - augmented betamethasone lotion PRN  - check QFN    Procedures Performed:    None    Follow-up: 6 months    Staff:     Pierce Velasquez MD, FAAD   of Dermatology  Department of Dermatology  [facility]    ____________________________________________    CC: Derm Problem    HPI:  Ms. Manju Berg is a(n) 54 year old female who presents today as a return patient for psoriasis    Psoriasis - on risankizumab  - took second maintenance dose a few days ago  - very itchy for first few days  - taking diphenhydramine for a few days  - using OTC creams for itching      Not working quite as well as adalimumab - still some itching and redness on the scalp; not having flaking  - using augmented betamethasone lotion occasionally  - stopped medicated shampoo - not helpful  - no injection site  reactions    Patient is otherwise feeling well, without additional skin concerns.    Labs Reviewed:  12/2022 QFN negative    Physical Exam:  Vitals: There were no vitals taken for this visit.  SKIN: video images were reviewed; image quality and interpretability: acceptable. Image date: n/a.  - no active rash on the face  - No other lesions of concern on areas examined.     Medications:  Current Outpatient Medications   Medication Sig Dispense Refill    acetaminophen (TYLENOL) 500 MG tablet Take 1,000 mg by mouth every 6 hours as needed for pain      albuterol (PROAIR HFA/PROVENTIL HFA/VENTOLIN HFA) 108 (90 Base) MCG/ACT inhaler Inhale 2 puffs into the lungs every 4 hours as needed for shortness of breath or wheezing 25.5 g 3    albuterol (PROVENTIL) (2.5 MG/3ML) 0.083% neb solution Take 1 vial (2.5 mg) by nebulization every 6 hours as needed for shortness of breath or wheezing 90 mL 3    augmented betamethasone dipropionate (DIPROLENE) 0.05 % external lotion Apply topically daily 180 mL 0    budesonide-formoterol (SYMBICORT) 160-4.5 MCG/ACT Inhaler Inhale 2 puffs once daily plus 1-2 puffs as needed. May use up to 12 puffs per day. 20.4 g 11    budesonide-formoterol (SYMBICORT) 160-4.5 MCG/ACT Inhaler Inhale 2 puffs into the lungs two times daily 30.6 g 3    buPROPion (WELLBUTRIN XL) 300 MG 24 hr tablet Take 1 tablet (300 mg) by mouth every morning 90 tablet 2    CALCIUM 600 + D 600-200 MG-UNIT OR TABS Take 1 tablet by mouth every evening      cetirizine (ZYRTEC) 10 MG tablet Take 10 mg by mouth daily      chlorzoxazone (PARAFON FORTE) 500 MG tablet TAKE 1/2 to 1.5 TABLETS BY MOUTH 3-4 TIMES DAILY AS NEEDED TO RELAX MUSCLES 90 tablet 0    Cholecalciferol (VITAMIN D) 2000 UNITS tablet Take 2,000 Units by mouth daily. 100 tablet 3    fluticasone (FLONASE) 50 MCG/ACT nasal spray Spray 1 spray into both nostrils daily      fluvoxaMINE (LUVOX) 50 MG tablet Take 1 tablet (50 mg) by mouth 2 times daily 180 tablet 3     hydrOXYzine (VISTARIL) 25 MG capsule Take 1-2 capsules (25-50 mg) by mouth 3 times daily as needed for itching 60 capsule 3    ibuprofen (ADVIL/MOTRIN) 200 MG tablet Take 400 mg by mouth every 6 hours as needed for moderate pain or mild pain      LANsoprazole (PREVACID) 30 MG DR capsule Take 1 capsule (30 mg) by mouth daily 90 capsule 3    lidocaine (LIDODERM) 5 % patch Place 1 patch onto the skin daily as needed for moderate pain Remove after 12 hours. 30 patch 5    linaclotide (LINZESS) 145 MCG capsule Take 145 mcg by mouth every morning (before breakfast)      montelukast (SINGULAIR) 10 MG tablet Take 1 tablet (10 mg) by mouth at bedtime for 360 days 90 tablet 3    naproxen (NAPROSYN) 500 MG tablet Take 1 tablet (500 mg) by mouth 2 times daily as needed for moderate pain 30 tablet 1    predniSONE (DELTASONE) 20 MG tablet Take 3 tabs by mouth daily x 3 days, then 2 tabs daily x 3 days, then 1 tab daily x 3 days, then 1/2 tab daily x 3 days. 20 tablet 0    propranolol (INDERAL) 40 MG tablet Take 40 mg by mouth as needed (anxiety)      Risankizumab-rzaa (SKYRIZI) 150 MG/ML subcutaneous Inject 1 mL (150 mg) Subcutaneous See Admin Instructions - 1 injection on day 0, 1 injection on day 28, then 1 injection every 12 weeks 1 mL 1    Risankizumab-rzaa (SKYRIZI) 150 MG/ML subcutaneous Inject 1 mL (150 mg) Subcutaneous every 3 months 1 mL 3    rizatriptan (MAXALT-MLT) 5 MG ODT Take 1-2 tablets (5-10 mg) by mouth at onset of headache for migraine (may repeat in 2 hours as needed. Max 30 mg in 24 hours) 18 tablet 6    valACYclovir (VALTREX) 1000 mg tablet TAKE 2 TABLETS BY MOUTH EVERY 12 HOURS FOR 2 doses AT first signs OF outbreak 12 tablet 1     Current Facility-Administered Medications   Medication Dose Route Frequency Provider Last Rate Last Admin    Botulinum Toxin Type A (BOTOX) 200 units injection 155 Units  155 Units Intramuscular See Admin Instructions Brittany Aguilar APRN CNP   155 Units at 06/10/24  1012      Past Medical/Surgical History:   Patient Active Problem List   Diagnosis    Recurrent herpes labialis    GERD (gastroesophageal reflux disease)    Mild persistent asthma without complication    Current mild episode of major depressive disorder without prior episode (H24)    Family history of colon cancer    CARDIOVASCULAR SCREENING; LDL GOAL LESS THAN 160    Benign neoplasm of soft tissues    Constipation    Vitamin D deficiency    Prediabetes    History of shoulder dystocia in prior pregnancy    Varicose veins of lower extremity    Backache    Headache, chronic daily    IUD (intrauterine device) in place    Psoriasis of scalp    Morbid obesity (H)    History of acute cholecystitis    Anxiety     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     2011,Proximal left tibia     delivery delivered 2013    Dyspepsia and other specified disorders of function of stomach     No Comments Provided    Gastroesophageal reflux disease     Headache     No Comments Provided    Herpes simplex     No Comments Provided    Lateral epicondylitis of elbow     No Comments Provided    Other depressive disorder     No Comments Provided    Other unspecified back disorder     No Comments Provided    Pain in joint, upper arm     No Comments Provided       CC Pierce Velasquez MD  [address] [place] [address] on close of this encounter.

## 2024-07-09 ENCOUNTER — MYC MEDICAL ADVICE (OUTPATIENT)
Dept: PULMONOLOGY | Facility: CLINIC | Age: 54
End: 2024-07-09
Payer: COMMERCIAL

## 2024-07-11 DIAGNOSIS — M54.2 CERVICALGIA: ICD-10-CM

## 2024-07-11 RX ORDER — CHLORZOXAZONE 500 MG/1
TABLET ORAL
Qty: 90 TABLET | Refills: 0 | Status: SHIPPED | OUTPATIENT
Start: 2024-07-11

## 2024-07-13 ENCOUNTER — ANCILLARY PROCEDURE (OUTPATIENT)
Dept: GENERAL RADIOLOGY | Facility: CLINIC | Age: 54
End: 2024-07-13
Attending: INTERNAL MEDICINE
Payer: COMMERCIAL

## 2024-07-13 DIAGNOSIS — R06.02 SOB (SHORTNESS OF BREATH): ICD-10-CM

## 2024-07-13 DIAGNOSIS — R05.1 ACUTE COUGH: Primary | ICD-10-CM

## 2024-07-13 DIAGNOSIS — R05.1 ACUTE COUGH: ICD-10-CM

## 2024-07-13 PROCEDURE — 71046 X-RAY EXAM CHEST 2 VIEWS: CPT | Mod: GC | Performed by: RADIOLOGY

## 2024-07-13 RX ORDER — DOXYCYCLINE 100 MG/1
100 CAPSULE ORAL 2 TIMES DAILY
Qty: 20 CAPSULE | Refills: 0 | Status: SHIPPED | OUTPATIENT
Start: 2024-07-13 | End: 2024-08-20

## 2024-07-15 RX ORDER — FLUTICASONE FUROATE, UMECLIDINIUM BROMIDE AND VILANTEROL TRIFENATATE 200; 62.5; 25 UG/1; UG/1; UG/1
1 POWDER RESPIRATORY (INHALATION) DAILY
Qty: 3 EACH | Refills: 3 | Status: SHIPPED | OUTPATIENT
Start: 2024-07-15 | End: 2025-07-10

## 2024-07-15 NOTE — TELEPHONE ENCOUNTER
Daniel Ayala,    I spoke with patients and we agreed to escalate her asthma regimen to high dose Trelegy and prescriptions were sent.    Thank you.    Liane

## 2024-08-12 DIAGNOSIS — F32.0 CURRENT MILD EPISODE OF MAJOR DEPRESSIVE DISORDER WITHOUT PRIOR EPISODE (H): ICD-10-CM

## 2024-08-12 DIAGNOSIS — F41.9 ANXIETY: ICD-10-CM

## 2024-08-12 DIAGNOSIS — K21.9 GASTROESOPHAGEAL REFLUX DISEASE, UNSPECIFIED WHETHER ESOPHAGITIS PRESENT: ICD-10-CM

## 2024-08-12 RX ORDER — LANSOPRAZOLE 30 MG/1
30 CAPSULE, DELAYED RELEASE ORAL DAILY
Qty: 90 CAPSULE | Refills: 0 | Status: SHIPPED | OUTPATIENT
Start: 2024-08-12

## 2024-08-12 RX ORDER — FLUVOXAMINE MALEATE 50 MG
50 TABLET ORAL 2 TIMES DAILY
Qty: 180 TABLET | Refills: 2 | Status: SHIPPED | OUTPATIENT
Start: 2024-08-12 | End: 2024-09-25

## 2024-08-20 ENCOUNTER — OFFICE VISIT (OUTPATIENT)
Dept: SLEEP MEDICINE | Facility: CLINIC | Age: 54
End: 2024-08-20
Attending: INTERNAL MEDICINE
Payer: COMMERCIAL

## 2024-08-20 VITALS
BODY MASS INDEX: 33.36 KG/M2 | WEIGHT: 195.4 LBS | HEIGHT: 64 IN | DIASTOLIC BLOOD PRESSURE: 80 MMHG | RESPIRATION RATE: 12 BRPM | HEART RATE: 79 BPM | OXYGEN SATURATION: 96 % | SYSTOLIC BLOOD PRESSURE: 106 MMHG

## 2024-08-20 DIAGNOSIS — R06.81 WITNESSED APNEIC SPELLS: ICD-10-CM

## 2024-08-20 DIAGNOSIS — R40.0 DAYTIME SLEEPINESS: ICD-10-CM

## 2024-08-20 DIAGNOSIS — R06.83 SNORING: Primary | ICD-10-CM

## 2024-08-20 DIAGNOSIS — E66.811 OBESITY (BMI 30.0-34.9): ICD-10-CM

## 2024-08-20 DIAGNOSIS — R51.9 MORNING HEADACHE: ICD-10-CM

## 2024-08-20 PROCEDURE — 99203 OFFICE O/P NEW LOW 30 MIN: CPT

## 2024-08-20 RX ORDER — NYSTATIN 100000/ML
SUSPENSION, ORAL (FINAL DOSE FORM) ORAL
COMMUNITY
Start: 2024-03-22

## 2024-08-20 ASSESSMENT — SLEEP AND FATIGUE QUESTIONNAIRES
HOW LIKELY ARE YOU TO NOD OFF OR FALL ASLEEP WHILE SITTING AND TALKING TO SOMEONE: WOULD NEVER DOZE
HOW LIKELY ARE YOU TO NOD OFF OR FALL ASLEEP WHILE SITTING QUIETLY AFTER LUNCH WITHOUT ALCOHOL: MODERATE CHANCE OF DOZING
HOW LIKELY ARE YOU TO NOD OFF OR FALL ASLEEP IN A CAR, WHILE STOPPED FOR A FEW MINUTES IN TRAFFIC: WOULD NEVER DOZE
HOW LIKELY ARE YOU TO NOD OFF OR FALL ASLEEP WHILE WATCHING TV: SLIGHT CHANCE OF DOZING
HOW LIKELY ARE YOU TO NOD OFF OR FALL ASLEEP WHILE SITTING INACTIVE IN A PUBLIC PLACE: SLIGHT CHANCE OF DOZING
HOW LIKELY ARE YOU TO NOD OFF OR FALL ASLEEP WHILE LYING DOWN TO REST IN THE AFTERNOON WHEN CIRCUMSTANCES PERMIT: SLIGHT CHANCE OF DOZING
HOW LIKELY ARE YOU TO NOD OFF OR FALL ASLEEP WHEN YOU ARE A PASSENGER IN A CAR FOR AN HOUR WITHOUT A BREAK: SLIGHT CHANCE OF DOZING
HOW LIKELY ARE YOU TO NOD OFF OR FALL ASLEEP WHILE SITTING AND READING: HIGH CHANCE OF DOZING

## 2024-08-20 NOTE — NURSING NOTE
HST pick-up, HST drop-off, and follow-up with provider have all been scheduled. AVS printed and given to patient.  Dianne Mae, DORITA

## 2024-08-20 NOTE — PATIENT INSTRUCTIONS
"          MY TREATMENT INFORMATION FOR SLEEP APNEA-  Manju Berg    DOCTOR : BELINDA Dwyer CNP    Am I having a sleep study at a sleep center?  --->Due to normal delays, you will be contacted within 2-4 weeks to schedule    Am I having a home sleep study?  --->Watch the video for the device you are using:    -/drop off device- https://www.[employer].com/watch?v=yGGFBdELGhk    Frequently asked questions:  1. What is Obstructive Sleep Apnea (ROZINA)? ROZINA is the most common type of sleep apnea. Apnea means, \"without breath.\"  Apnea is most often caused by narrowing or collapse of the upper airway as muscles relax during sleep.   Almost everyone has occasional apneas. Most people with sleep apnea have had brief interruptions at night frequently for many years.  The severity of sleep apnea is related to how frequent and severe the events are.   2. What are the consequences of ROZINA? Symptoms include: feeling sleepy during the day, snoring loudly, gasping or stopping of breathing, trouble sleeping, and occasionally morning headaches or heartburn at night.  Sleepiness can be serious and even increase the risk of falling asleep while driving. Other health consequences may include development of high blood pressure and other cardiovascular disease in persons who are susceptible. Untreated ROZINA can contribute to heart disease, stroke and diabetes.   3. What are the treatment options? In most situations, sleep apnea is a lifelong disease that must be managed with daily therapy. Medications are not effective for sleep apnea and surgery is generally not considered until other therapies have been tried. Your treatment is your choice . Continuous Positive Airway (CPAP) works right away and is the therapy that is effective in nearly everyone. An oral device to hold your jaw forward is usually the next most reliable option. Other options include postioning devices (to keep you off your back), weight loss, and surgery " including a tongue pacing device. There is more detail about some of these options below.  4. Are my sleep studies covered by insurance? Although we will request verification of coverage, we advise you also check in advance of the study to ensure there is coverage.    Important tips for those choosing CPAP and similar devices  REMEMBER-IF YOU RECEIVE A CALL FROM [phone]-->IT IS TO SETUP A DEVICE  For new devices, sign up for device RUBÉN to monitor your device for your followup visits  We encourage you to utilize the [employer] rubén or website (https://[employer]/) to monitor your therapy progress and share the data with your healthcare team when you discuss your sleep apnea.                                                    Know your equipment:  CPAP is continuous positive airway pressure that prevents obstructive sleep apnea by keeping the throat from collapsing while you are sleeping. In most cases, the device is  smart  and can slowly self-adjusts if your throat collapses and keeps a record every day of how well you are treated-this information is available to you and your care team.  BPAP is bilevel positive airway pressure that keeps your throat open and also assists each breath with a pressure boost to maintain adequate breathing.  Special kinds of BPAP are used in patients who have inadequate breathing from lung or heart disease. In most cases, the device is  smart  and can slowly self-adjusts to assist breathing. Like CPAP, the device keeps a record of how well you are treated.  Your mask is your connection to the device. You get to choose what feels most comfortable and the staff will help to make sure if fits. Here: are some examples of the different masks that are available: Magnetic mask aids may assist with use but there are safety issues that should be addressed when considering with magnets* (see end of discussion).       Key points to remember on your journey with sleep apnea:  Sleep  study.  PAP devices often need to be adjusted during a sleep study to show that they are effective and adjusted right.  Good tips to remember: Try wearing just the mask during a quiet time during the day so your body adapts to wearing it. A humidifier is recommended for comfort in most cases to prevent drying of your nose and throat. Allergy medication from your provider may help you if you are having nasal congestion.  Getting settled-in. It takes more than one night for most of us to get used to wearing a mask. Try wearing just the mask during a quiet time during the day so your body adapts to wearing it. A humidifier is recommended for comfort in most cases. Our team will work with you carefully on the first day and will be in contact within 4 days and again at 2 and 4 weeks for advice and remote device adjustments. Your therapy is evaluated by the device each day.   Use it every night. The more you are able to sleep naturally for 7-8 hours, the more likely you will have good sleep and to prevent health risks or symptoms from sleep apnea. Even if you use it 4 hours it helps. Occasionally all of us are unable to use a medical therapy, in sleep apnea, it is not dangerous to miss one night.   Communicate. Call our skilled team on the number provided on the first day if your visit for problems that make it difficult to wear the device. Over 2 out of 3 patients can learn to wear the device long-term with help from our team. Remember to call our team or your sleep providers if you are unable to wear the device as we may have other solutions for those who cannot adapt to mask CPAP therapy. It is recommended that you sleep your sleep provider within the first 3 months and yearly after that if you are not having problems.   Use it for your health. We encourage use of CPAP masks during daytime quiet periods to allow your face and brain to adapt to the sensation of CPAP so that it will be a more natural sensation to awaken  to at night or during naps. This can be very useful during the first few weeks or months of adapting to CPAP though it does not help medically to wear CPAP during wakefulness and  should not be used as a strategy just to meet guidelines.  Take care of your equipment. Make sure you clean your mask and tubing using directions every day and that your filter and mask are replaced as recommended or if they are not working.     *Masks with magnets:  Updated Contraindications  Masks with magnetic components are contraindicated for use by patients where they, or anyone in close physical contact while using the mask, have the following:   Active medical implants that interact with magnets (i.e., pacemakers, implantable cardioverter defibrillators (ICD), neurostimulators, cerebrospinal fluid (CSF) shunts, insulin/infusion pumps)   Metallic implants/objects containing ferromagnetic material (i.e., aneurysm clips/flow disruption devices, embolic coils, stents, valves, electrodes, implants to restore hearing or balance with implanted magnets, ocular implants, metallic splinters in the eye)  Updated Warning  Keep the mask magnets at a safe distance of at least 6 inches (150 mm) away from implants or medical devices that may be adversely affected by magnetic interference. This warning applies to you or anyone in close physical contact with your mask. The magnets are in the frame and lower headgear clips, with a magnetic field strength of up to 400mT. When worn, they connect to secure the mask but may inadvertently detach while asleep.  Implants/medical devices, including those listed within contraindications, may be adversely affected if they change function under external magnetic fields or contain ferromagnetic materials that attract/repel to magnetic fields (some metallic implants, e.g., contact lenses with metal, dental implants, metallic cranial plates, screws, shalini hole covers, and bone substitute devices). Consult your  physician and  of your implant / other medical device for information on the potential adverse effects of magnetic fields.    BESIDES CPAP, WHAT OTHER THERAPIES ARE THERE?    Positioning Device  Positioning devices are generally used when sleep apnea is mild and only occurs on your back.This example shows a pillow that straps around the waist. It may be appropriate for those whose sleep study shows milder sleep apnea that occurs primarily when lying flat on one's back. Preliminary studies have shown benefit but effectiveness at home may need to be verified by a home sleep test. These devices are generally not covered by medical insurance.  Examples of devices that maintain sleeping on the back to prevent snoring and mild sleep apnea.    Belt type body positioner  http://[employer]/    Electronic reminder  http://nightshifttherapy.com/            Oral Appliance  What is oral appliance therapy?  An oral appliance device fits on your teeth at night like a retainer used after having braces. The device is made by a specialized dentist and requires several visits over 1-2 months before a manufactured device is made to fit your teeth and is adjusted to prevent your sleep apnea. Once an oral device is working properly, snoring should be improved. A home sleep test may be recommended at that time if to determine whether the sleep apnea is adequately treated.       Some things to remember:  -Oral devices are often, but not always, covered by your medical insurance. Be sure to check with your insurance provider.   -If you are referred for oral therapy, you will be given a list of specialized dentists to consider or you may choose to visit the Web site of the American Academy of Dental Sleep Medicine  -Oral devices are less likely to work if you have severe sleep apnea or are extremely overweight.     More detailed information  An oral appliance is a small acrylic device that fits over the upper and lower teeth   (similar to a retainer or a mouth guard). This device slightly moves jaw forward, which moves the base of the tongue forward, opens the airway, improves breathing for effective treat snoring and obstructive sleep apnea in perhaps 7 out of 10 people .  The best working devices are custom-made by a dental device  after a mold is made of the teeth 1, 2, 3.  When is an oral appliance indicated?  Oral appliance therapy is recommended as a first-line treatment for patients with primary snoring, mild sleep apnea, and for patients with moderate sleep apnea who prefer appliance therapy to use of CPAP4, 5. Severity of sleep apnea is determined by sleep testing and is based on the number of respiratory events per hour of sleep.   How successful is oral appliance therapy?  The success rate of oral appliance therapy in patients with mild sleep apnea is 75-80% while in patients with moderate sleep apnea it is 50-70%. The chance of success in patients with severe sleep apnea is 40-50%. The research also shows that oral appliances have a beneficial effect on the cardiovascular health of ROZINA patients at the same magnitude as CPAP therapy7.  Oral appliances should be a second-line treatment in cases of severe sleep apnea, but if not completely successful then a combination therapy utilizing CPAP plus oral appliance therapy may be effective. Oral appliances tend to be effective in a broad range of patients although studies show that the patients who have the highest success are females, younger patients, those with milder disease, and less severe obesity. 3, 6.   Finding a dentist that practices dental sleep medicine  Specific training is available through the American Academy of Dental Sleep Medicine for dentists interested in working in the field of sleep. To find a dentist who is educated in the field of sleep and the use of oral appliances, near you, visit the Web site of the American Academy of Dental Sleep  Medicine.    References  1. Dawson et al. Objectively measured vs self-reported compliance during oral appliance therapy for sleep-disordered breathing. Chest 2013; 144(5): 5715-1136.  2. Andrea et al. Objective measurement of compliance during oral appliance therapy for sleep-disordered breathing. Thorax 2013; 68(1): 91-96.  3. Singh et al. Mandibular advancement devices in 620 men and women with ROZINA and snoring: tolerability and predictors of treatment success. Chest 2004; 125: 3472-6963.  4. Jase et al. Oral appliances for snoring and ROZINA: a review. Sleep 2006; 29: 244-262.  5. Augustus et al. Oral appliance treatment for ROZINA: an update. J Clin Sleep Med 2014; 10(2): 215-227.  6. Mark et al. Predictors of OSAH treatment outcome. J Dent Res 2007; 86: 0541-7387.      Weight Loss: Your Body mass index is 33.54 kg/m .    Being overweight does not necessarily mean you will have health consequences.  Those who have BMI over 35 or over 27 with existing medical conditions carries greater risk.   Weight loss decreases severity of sleep apnea in most people with obesity. For those with mild obesity who have developed snoring with weight gain, even 15-30 pound weight loss can improve and occasionally milder eliminate sleep apnea.  Structured and life-long dietary and health habits are necessary to lose weight and keep healthier weight levels.     The Comprehensive Weight loss program offers all aspects of weight loss strategies including two Non-Surgical Weight Loss Programs: Medical Weight Management and our 24 Week Healthy Lifestyle Program:    Medical Weight Management: You will meet with a Medical Weight Management Provider, as well as a Registered Dietician. The program may include medication therapy, dietary education, recommended exercise and physical therapy programs, monthly support group meetings, and possible psychological counseling. Follow up visits with the provider or dietician are  scheduled based on your progress and needs.    24 Week Healthy Lifestyle Program: This unique program is designed to give you the support of weekly appointments and activities thru a 24-week period. It may include all of the components of the basic program (above), with the addition of 11 individual Health  Visits, 24-week access to the [employer] website for over 700 online classes, and monthly support group meetings. This program has an out-of-pocket expense of $499 to cover the items that can not be billed to insurance (health coaches and [employer] access), and is non-refundable/non-transferable (you may be able to use a Health Savings Account; ask your HSA provider). There may be an optional meal replacement plan prescribed as well.   Surgical management achieves meaningful long-term weight loss and improvement in health risks in most patients with more severe obesity.      Sleep Apnea Surgery:    Surgery for obstructive sleep apnea is considered generally only when other therapies fail to work. Surgery may be discussed with you if you are having a difficult time tolerating CPAP and or when there is an abnormal structure that requires surgical correction.  Nose and throat surgeries often enlarge the airway to prevent collapse.  Most of these surgeries create pain for 1-2 weeks and up to half of the most common surgeries are not effective throughout life.  You should carefully discuss the benefits and drawbacks to surgery with your sleep provider and surgeon to determine if it is the best solution for you.   More information  Surgery for ROZINA is directed at areas that are responsible for narrowing or complete obstruction of the airway during sleep.  There are a wide range of procedures available to enlarge and/or stabilize the airway to prevent blockage of breathing in the three major areas where it can occur: the palate, tongue, and nasal regions.  Successful surgical treatment depends on the accurate  identification of the factors responsible for obstructive sleep apnea in each person.  A personalized approach is required because there is no single treatment that works well for everyone.  Because of anatomic variation, consultation with an examination by a sleep surgeon is a critical first step in determining what surgical options are best for each patient.  In some cases, examination during sedation may be recommended in order to guide the selection of procedures.  Patients will be counseled about risks and benefits as well as the typical recovery course after surgery. Surgery is typically not a cure for a person s ROZINA.  However, surgery will often significantly improve one s ROZINA severity (termed  success rate ).  Even in the absence of a cure, surgery will decrease the cardiovascular risk associated with OSA7; improve overall quality of life8 (sleepiness, functionality, sleep quality, etc).    Palate Procedures:  Patients with ROZINA often have narrowing of their airway in the region of their tonsils and uvula.  The goals of palate procedures are to widen the airway in this region as well as to help the tissues resist collapse.  Modern palate procedure techniques focus on tissue conservation and soft tissue rearrangement, rather than tissue removal.  Often the uvula is preserved in this procedure. Residual sleep apnea is common in patient after pharyngoplasty with an average reduction in sleep apnea events of 33%2.      Tongue Procedures:  While patients are awake, the muscles that surround the throat are active and keep this region open for breathing. These muscles relax during sleep, allowing the tongue and other structures to collapse and block breathing.  There are several different tongue procedures available.  Selection of a tongue base procedure depends on characteristics seen on physical exam.  Generally, procedures are aimed at removing bulky tissues in this area or preventing the back of the tongue from  falling back during sleep.  Success rates for tongue surgery range from 50-62%3.    Hypoglossal Nerve Stimulation:  Hypoglossal nerve stimulation has recently received approval from the [place] Food and Drug Administration for the treatment of obstructive sleep apnea.  This is based on research showing that the system was safe and effective in treating sleep apnea6.  Results showed that the median AHI score decreased 68%, from 29.3 to 9.0. This therapy uses an implant system that senses breathing patterns and delivers mild stimulation to airway muscles, which keeps the airway open during sleep.  The system consists of three fully implanted components: a small generator (similar in size to a pacemaker), a breathing sensor, and a stimulation lead.  Using a small handheld remote, a patient turns the therapy on before bed and off upon awakening.    Candidates for this device must be greater than 18 years of age, have moderate to severe obstructive sleep apnea with less than 25% central events  (AHI between 15-65), BMI less than 35, have tried CPAP/oral appliance for at least 8 weeks without success, and have appropriate upper airway anatomy (determined by a sleep endoscopy performed by Dr. Antoine Magana or Dr. Andre Nichole).    Nasal Procedures:  Nasal obstruction can interfere with nasal breathing during the day and night.  Studies have shown that relief of nasal obstruction can improve the ability of some patients to tolerate positive airway pressure therapy for obstructive sleep apnea1.  Treatment options include medications such as nasal saline, topical corticosteroid and antihistamine sprays, and oral medications such as antihistamines or decongestants. Non-surgical treatments can include external nasal dilators for selected patients. If these are not successful by themselves, surgery can improve the nasal airway either alone or in combination with these other options.        Combination  Procedures:  Combination of surgical procedures and other treatments may be recommended, particularly if patients have more than one area of narrowing or persistent positional disease.  The success rate of combination surgery ranges from 66-80%2,3.    References  Gracia GONZALES. The Role of the Nose in Snoring and Obstructive Sleep Apnoea: An Update.  Eur Arch Otorhinolaryngol. 2011; 268: 1365-73.   Dimitri SM; Stanton JA; Fernando JR; Pallanch JF; Rj MB; Mary SG; Miller JOSHI. Surgical modifications of the upper airway for obstructive sleep apnea in adults: a systematic review and meta-analysis. SLEEP 2010;33(10):8251-6930. Blaine GARCIA. Hypopharyngeal surgery in obstructive sleep apnea: an evidence-based medicine review.  Arch Otolaryngol Head Neck Surg. 2006 Feb;132(2):206-13.  Russel YH1, Carola Y, Jose ANGELICA. The efficacy of anatomically based multilevel surgery for obstructive sleep apnea. Otolaryngol Head Neck Surg. 2003 Oct;129(4):327-35.  Kezirian E, Goldberg A. Hypopharyngeal Surgery in Obstructive Sleep Apnea: An Evidence-Based Medicine Review. Arch Otolaryngol Head Neck Surg. 2006 Feb;132(2):206-13.  Nitesh PJ et al. Upper-Airway Stimulation for Obstructive Sleep Apnea.  N Engl J Med. 2014 Jan 9;370(2):139-49.  Marlee Y et al. Increased Incidence of Cardiovascular Disease in Middle-aged Men with Obstructive Sleep Apnea. Am J Respir Crit Care Med; 2002 166: 159-165  Portillo EM et al. Studying Life Effects and Effectiveness of Palatopharyngoplasty (SLEEP) study: Subjective Outcomes of Isolated Uvulopalatopharyngoplasty. Otolaryngol Head Neck Surg. 2011; 144: 623-631.    WHAT IF I ONLY HAVE SNORING?    Mandibular advancement devices, lateral sleep positioning, long-term weight loss and treatment of nasal allergies have been shown to improve snoring.  Exercising tongue muscles with a game (https://apps.[employer]/us/rubén/soundly-reduce-snoring/tz1649878118) or stimulating the tongue during the day with a device  (https://doi.org/10.3390/ayb36777050) have improved snoring in some individuals.  https://www.[employer].com/  https://www.sleepfoundation.org/best-anti-snoring-mouthpieces-and-mouthguards    Remember to Drive Safe... Drive Alive     Sleep health profoundly affects your health, mood, and your safety.  Thirty three percent of the population (one in three of us) is not getting enough sleep and many have a sleep disorder. Not getting enough sleep or having an untreated / undertreated sleep condition may make us sleepy without even knowing it. In fact, our driving could be dramatically impaired due to our sleep health. As your provider, here are some things I would like you to know about driving:     Here are some warning signs for impairment and dangerous drowsy driving:              -Having been awake more than 16 hours               -Looking tired               -Eyelid drooping              -Head nodding (it could be too late at this point)              -Driving for more than 30 minutes     Some things you could do to make the driving safer if you are experiencing some drowsiness:              -Stop driving and rest              -Call for transportation              -Make sure your sleep disorder is adequately treated     Some things that have been shown NOT to work when experiencing drowsiness while driving:              -Turning on the radio              -Opening windows              -Eating any  distracting  /  entertaining  foods (e.g., sunflower seeds, candy, or any other)              -Talking on the phone      Your decision may not only impact your life, but also the life of others. Please, remember to drive safe for yourself and all of us.

## 2024-08-20 NOTE — PROGRESS NOTES
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   01/12/2022 82 %      [id number]-Pre   Date Value Ref Range Status   01/12/2022 85 %      No results found for: \"20055\"      Dawson Andres, APRN CNP 8/20/2024   "

## 2024-08-20 NOTE — NURSING NOTE
"Chief Complaint   Patient presents with    Sleep Problem     Patient presents to clinic for a consultation on possible sleep apnea as her  tells her she snore and has apnea.       Initial /80   Pulse 79   Resp 12   Ht 1.626 m (5' 4\")   Wt 88.6 kg (195 lb 6.4 oz)   SpO2 96%   BMI 33.54 kg/m   Estimated body mass index is 33.54 kg/m  as calculated from the following:    Height as of this encounter: 1.626 m (5' 4\").    Weight as of this encounter: 88.6 kg (195 lb 6.4 oz).    Medication Reconciliation: complete  ESS: 9  Neck circumference: 14.25 inches / 36 centimeters.  DME: N/A  Dianne Mae CMA      "

## 2024-08-26 DIAGNOSIS — G43.709 CHRONIC MIGRAINE WITHOUT AURA WITHOUT STATUS MIGRAINOSUS, NOT INTRACTABLE: Primary | ICD-10-CM

## 2024-09-03 ENCOUNTER — MYC MEDICAL ADVICE (OUTPATIENT)
Dept: DERMATOLOGY | Facility: CLINIC | Age: 54
End: 2024-09-03
Payer: COMMERCIAL

## 2024-09-03 DIAGNOSIS — L40.0 PLAQUE PSORIASIS: ICD-10-CM

## 2024-09-04 ENCOUNTER — TELEPHONE (OUTPATIENT)
Dept: DERMATOLOGY | Facility: CLINIC | Age: 54
End: 2024-09-04
Payer: COMMERCIAL

## 2024-09-06 NOTE — TELEPHONE ENCOUNTER
PA Initiation    Medication: SKYRIZI 150 MG/ML SC [facility]  Insurance Company: [employer] - Phone [phone] Fax [phone]  Pharmacy Filling the Rx: [facility] - [place], [place] - [facility] [address]  Filling Pharmacy Phone:    Filling Pharmacy Fax:    Start Date: 9/4/2024     pippa evans (Key: [id number])      
Prior Authorization Approval    Medication: SKYRIZI 150 MG/ML SC [facility]  Authorization Effective Date: 9/5/2024  Authorization Expiration Date: 9/4/2025  Approved Dose/Quantity: 1 per 84  Reference #: [id number]   Insurance Company: [employer] - Phone [phone] Fax [phone]  Expected CoPay: $ 0  CoPay Card Available: No    Financial Assistance Needed: copay card on file  Which Pharmacy is filling the prescription: [facility] - [place], [place] - [address]          
Sepsis Criteria were met:

## 2024-09-09 ENCOUNTER — OFFICE VISIT (OUTPATIENT)
Dept: NEUROLOGY | Facility: CLINIC | Age: 54
End: 2024-09-09
Payer: COMMERCIAL

## 2024-09-09 DIAGNOSIS — G43.709 CHRONIC MIGRAINE WITHOUT AURA WITHOUT STATUS MIGRAINOSUS, NOT INTRACTABLE: Primary | ICD-10-CM

## 2024-09-09 PROCEDURE — 64615 CHEMODENERV MUSC MIGRAINE: CPT | Performed by: NURSE PRACTITIONER

## 2024-09-09 ASSESSMENT — HEADACHE IMPACT TEST (HIT 6)
HOW OFTEN HAVE YOU FELT TOO TIRED TO WORK BECAUSE OF YOUR HEADACHES: RARELY
WHEN YOU HAVE HEADACHES HOW OFTEN IS THE PAIN SEVERE: SOMETIMES
HIT6 TOTAL SCORE: 62
HOW OFTEN DO HEADACHES LIMIT YOUR DAILY ACTIVITIES: VERY OFTEN
HOW OFTEN HAVE YOU FELT FED UP OR IRRITATED BECAUSE OF YOUR HEADACHES: VERY OFTEN
WHEN YOU HAVE A HEADACHE HOW OFTEN DO YOU WISH YOU COULD LIE DOWN: VERY OFTEN
HOW OFTEN DID HEADACHS LIMIT CONCENTRATION ON WORK OR DAILY ACTIVITY: VERY OFTEN

## 2024-09-09 NOTE — PROGRESS NOTES
PROCEDURE NOTE:     BOTULINUM NEUROTOXIN INJECTION PROCEDURE:  DATA:09/09/24    INDICATIONS FOR PROCEDURES:   chronic migraine headaches.   Last Botox  -6/10/2024  About 50 mild to moderate headaches in the last 90 days. Severe migraines 2 in 90 days  No wearing off effect  Headache today 0/10   Has been taking propranolol as needed and was helpful with headaches causing mood changes -feeling depressed and stopped. Takes propranolol as needed    rizatriptan -helps   Treatment tried propranolol, nortriptyline, ibuprofen, naproxen, gabapentin  On wellbutrin and fluvoxamine-controlled depression/anxiety   Verapmil stopped because side effects   Patient is here today for a repeat  injection with Botox.     GOAL OF PROCEDURE:  The goal of this procedure is to decrease pain and enhance functional independence.     TOTAL DOSE ADMINISTERED:  Dose Administered:  155 units  Botox (Botulinum Toxin Type A)       2:1 Dilution    Wasted 45 units  Medication guide was given to patient     CONSENT:  The risks, benefits, and treatment options were discussed with the patient who agreed to proceed.     Written consent was obtained      EQUIPMENT USED:  Needles-30 gauge, 0.5 inches for injections  Four 1-ml tuberculin syringes for injections  One sodium chloride 10 ml vial preservative free  Alcohol swabs     SKIN PREPARATION:  Skin preparation was performed using an alcohol wipe.        AREA/MUSCLE INJECTED:  155 units of Botox  Right upper Trapezius (upper cervical) - 5 units of Botox at 3 site/s.   Left upper Trapezius (upper cervical) - 5 units of Botox at 3 site/s.      Right cervical paraspinals - 5 units of Botox at 2 site/s.   Left cervical paraspinals - 5 units of Botox at 2 site/s.      Left occipitalis - 5 units of Botox at 3 site/s.  Right occipitalis -5 units of Botox at 3 site/s        Right Frontalis - 5 units of Botox at 2 site/s.  Left Frontalis - 5 units of Botox at 2 site/s.     Right Temporalis - 5 units of Botox at  4 site/s.  Left Temporalis - 5 units of Botox at 4 site/s.     Right  - 5 units of Botox at 1 site/s.              Left  - 5 units of Botox at 1 site/s.     Procerus - 5 units of Botox at 1 site/s.     RESPONSE TO PROCEDURE:  tolerated the procedure well and there were no immediate complications.  Patient was allowed to recover for an appropriate period of time and was discharged home in stable condition.     FOLLOW UP:  Nurtec every other day for migraine prevention. Side effects -nausea, drowsiness  Repeat Botox injections in 12 weeks        This procedure was performed under a hospital privileging agreement with Dr. Dom Aguilar, APRN, [facility]

## 2024-09-09 NOTE — LETTER
9/9/2024       RE: Manju Berg  [address] [facility] [address]     Dear Colleague,    Thank you for referring your patient, Manju Berg, to the [facility] [place] [facility]. Please see a copy of my visit note below.    PROCEDURE NOTE:     BOTULINUM NEUROTOXIN INJECTION PROCEDURE:  DATA:09/09/24    INDICATIONS FOR PROCEDURES:   chronic migraine headaches.   Last Botox  -6/10/2024  About 50 mild to moderate headaches in the last 90 days. Severe migraines 2 in 90 days  No wearing off effect  Headache today 0/10   Has been taking propranolol as needed and was helpful with headaches causing mood changes -feeling depressed and stopped. Takes propranolol as needed    rizatriptan -helps   Treatment tried propranolol, nortriptyline, ibuprofen, naproxen, gabapentin  On wellbutrin and fluvoxamine-controlled depression/anxiety   Verapmil stopped because side effects   Patient is here today for a repeat  injection with Botox.     GOAL OF PROCEDURE:  The goal of this procedure is to decrease pain and enhance functional independence.     TOTAL DOSE ADMINISTERED:  Dose Administered:  155 units  Botox (Botulinum Toxin Type A)       2:1 Dilution    Wasted 45 units  Medication guide was given to patient     CONSENT:  The risks, benefits, and treatment options were discussed with the patient who agreed to proceed.     Written consent was obtained      EQUIPMENT USED:  Needles-30 gauge, 0.5 inches for injections  Four 1-ml tuberculin syringes for injections  One sodium chloride 10 ml vial preservative free  Alcohol swabs     SKIN PREPARATION:  Skin preparation was performed using an alcohol wipe.        AREA/MUSCLE INJECTED:  155 units of Botox  Right upper Trapezius (upper cervical) - 5 units of Botox at 3 site/s.   Left upper Trapezius (upper cervical) - 5 units of Botox at 3 site/s.      Right cervical paraspinals - 5 units of Botox  at 2 site/s.   Left cervical paraspinals - 5 units of Botox at 2 site/s.      Left occipitalis - 5 units of Botox at 3 site/s.  Right occipitalis -5 units of Botox at 3 site/s        Right Frontalis - 5 units of Botox at 2 site/s.  Left Frontalis - 5 units of Botox at 2 site/s.     Right Temporalis - 5 units of Botox at 4 site/s.  Left Temporalis - 5 units of Botox at 4 site/s.     Right  - 5 units of Botox at 1 site/s.              Left  - 5 units of Botox at 1 site/s.     Procerus - 5 units of Botox at 1 site/s.     RESPONSE TO PROCEDURE:  tolerated the procedure well and there were no immediate complications.  Patient was allowed to recover for an appropriate period of time and was discharged home in stable condition.     FOLLOW UP:  Nurtec every other day for migraine prevention. Side effects -nausea, drowsiness  Repeat Botox injections in 12 weeks        This procedure was performed under a hospital privileging agreement with BELINDA Cast, CNP  [facility]         Again, thank you for allowing me to participate in the care of your patient.      Sincerely,    BELINDA Montalvo CNP

## 2024-09-25 ENCOUNTER — OFFICE VISIT (OUTPATIENT)
Dept: FAMILY MEDICINE | Facility: CLINIC | Age: 54
End: 2024-09-25
Payer: COMMERCIAL

## 2024-09-25 VITALS
BODY MASS INDEX: 33.34 KG/M2 | OXYGEN SATURATION: 96 % | WEIGHT: 195.3 LBS | HEART RATE: 77 BPM | RESPIRATION RATE: 15 BRPM | TEMPERATURE: 98.4 F | HEIGHT: 64 IN | SYSTOLIC BLOOD PRESSURE: 117 MMHG | DIASTOLIC BLOOD PRESSURE: 81 MMHG

## 2024-09-25 DIAGNOSIS — R29.898 ARM HEAVINESS: ICD-10-CM

## 2024-09-25 DIAGNOSIS — R73.03 PREDIABETES: ICD-10-CM

## 2024-09-25 DIAGNOSIS — F41.9 ANXIETY: Primary | ICD-10-CM

## 2024-09-25 DIAGNOSIS — B37.0 THRUSH: ICD-10-CM

## 2024-09-25 DIAGNOSIS — R68.2 DRY MOUTH: ICD-10-CM

## 2024-09-25 DIAGNOSIS — D84.9 IMMUNOSUPPRESSION (H): ICD-10-CM

## 2024-09-25 DIAGNOSIS — L40.9 PSORIASIS OF SCALP: ICD-10-CM

## 2024-09-25 DIAGNOSIS — F32.0 CURRENT MILD EPISODE OF MAJOR DEPRESSIVE DISORDER WITHOUT PRIOR EPISODE (H): ICD-10-CM

## 2024-09-25 LAB
ALBUMIN SERPL BCG-MCNC: 5 G/DL (ref 3.5–5.2)
ALP SERPL-CCNC: 61 U/L (ref 40–150)
ALT SERPL W P-5'-P-CCNC: 29 U/L (ref 0–50)
ANION GAP SERPL CALCULATED.3IONS-SCNC: 8 MMOL/L (ref 7–15)
AST SERPL W P-5'-P-CCNC: 30 U/L (ref 0–45)
BILIRUB SERPL-MCNC: 0.2 MG/DL
BUN SERPL-MCNC: 13.9 MG/DL (ref 6–20)
CALCIUM SERPL-MCNC: 9.7 MG/DL (ref 8.8–10.4)
CHLORIDE SERPL-SCNC: 105 MMOL/L (ref 98–107)
CK SERPL-CCNC: 134 U/L (ref 26–192)
CREAT SERPL-MCNC: 0.84 MG/DL (ref 0.51–0.95)
EGFRCR SERPLBLD CKD-EPI 2021: 83 ML/MIN/1.73M2
ERYTHROCYTE [SEDIMENTATION RATE] IN BLOOD BY WESTERGREN METHOD: 4 MM/HR (ref 0–30)
EST. AVERAGE GLUCOSE BLD GHB EST-MCNC: 108 MG/DL
GLUCOSE SERPL-MCNC: 97 MG/DL (ref 70–99)
HBA1C MFR BLD: 5.4 % (ref 0–5.6)
HCO3 SERPL-SCNC: 28 MMOL/L (ref 22–29)
POTASSIUM SERPL-SCNC: 3.9 MMOL/L (ref 3.4–5.3)
PROT SERPL-MCNC: 7.7 G/DL (ref 6.4–8.3)
SODIUM SERPL-SCNC: 141 MMOL/L (ref 135–145)
TSH SERPL DL<=0.005 MIU/L-ACNC: 1.02 UIU/ML (ref 0.3–4.2)

## 2024-09-25 PROCEDURE — G2211 COMPLEX E/M VISIT ADD ON: HCPCS | Performed by: FAMILY MEDICINE

## 2024-09-25 PROCEDURE — 84443 ASSAY THYROID STIM HORMONE: CPT | Performed by: FAMILY MEDICINE

## 2024-09-25 PROCEDURE — 86235 NUCLEAR ANTIGEN ANTIBODY: CPT | Performed by: FAMILY MEDICINE

## 2024-09-25 PROCEDURE — 83036 HEMOGLOBIN GLYCOSYLATED A1C: CPT | Performed by: FAMILY MEDICINE

## 2024-09-25 PROCEDURE — 36415 COLL VENOUS BLD VENIPUNCTURE: CPT | Performed by: FAMILY MEDICINE

## 2024-09-25 PROCEDURE — 82550 ASSAY OF CK (CPK): CPT | Performed by: FAMILY MEDICINE

## 2024-09-25 PROCEDURE — 99214 OFFICE O/P EST MOD 30 MIN: CPT | Performed by: FAMILY MEDICINE

## 2024-09-25 PROCEDURE — 86038 ANTINUCLEAR ANTIBODIES: CPT | Performed by: FAMILY MEDICINE

## 2024-09-25 PROCEDURE — 80053 COMPREHEN METABOLIC PANEL: CPT | Performed by: FAMILY MEDICINE

## 2024-09-25 PROCEDURE — 86481 TB AG RESPONSE T-CELL SUSP: CPT | Performed by: FAMILY MEDICINE

## 2024-09-25 PROCEDURE — 85652 RBC SED RATE AUTOMATED: CPT | Performed by: FAMILY MEDICINE

## 2024-09-25 RX ORDER — NYSTATIN 100000/ML
500000 SUSPENSION, ORAL (FINAL DOSE FORM) ORAL 4 TIMES DAILY
Qty: 473 ML | Refills: 0 | Status: SHIPPED | OUTPATIENT
Start: 2024-09-25

## 2024-09-25 RX ORDER — FLUVOXAMINE MALEATE 50 MG
TABLET ORAL
Qty: 360 TABLET | Refills: 2 | Status: SHIPPED | OUTPATIENT
Start: 2024-09-25

## 2024-09-25 RX ORDER — FLUVOXAMINE MALEATE 50 MG
TABLET ORAL
Qty: 180 TABLET | Refills: 2 | Status: SHIPPED | OUTPATIENT
Start: 2024-09-25 | End: 2024-09-25

## 2024-09-25 ASSESSMENT — ANXIETY QUESTIONNAIRES
GAD7 TOTAL SCORE: 14
8. IF YOU CHECKED OFF ANY PROBLEMS, HOW DIFFICULT HAVE THESE MADE IT FOR YOU TO DO YOUR WORK, TAKE CARE OF THINGS AT HOME, OR GET ALONG WITH OTHER PEOPLE?: SOMEWHAT DIFFICULT
7. FEELING AFRAID AS IF SOMETHING AWFUL MIGHT HAPPEN: NOT AT ALL
GAD7 TOTAL SCORE: 14
GAD7 TOTAL SCORE: 14

## 2024-09-25 ASSESSMENT — PATIENT HEALTH QUESTIONNAIRE - PHQ9
10. IF YOU CHECKED OFF ANY PROBLEMS, HOW DIFFICULT HAVE THESE PROBLEMS MADE IT FOR YOU TO DO YOUR WORK, TAKE CARE OF THINGS AT HOME, OR GET ALONG WITH OTHER PEOPLE: SOMEWHAT DIFFICULT
SUM OF ALL RESPONSES TO PHQ QUESTIONS 1-9: 11
SUM OF ALL RESPONSES TO PHQ QUESTIONS 1-9: 11

## 2024-09-25 ASSESSMENT — ASTHMA QUESTIONNAIRES
QUESTION_2 LAST FOUR WEEKS HOW OFTEN HAVE YOU HAD SHORTNESS OF BREATH: NOT AT ALL
QUESTION_5 LAST FOUR WEEKS HOW WOULD YOU RATE YOUR ASTHMA CONTROL: WELL CONTROLLED
QUESTION_3 LAST FOUR WEEKS HOW OFTEN DID YOUR ASTHMA SYMPTOMS (WHEEZING, COUGHING, SHORTNESS OF BREATH, CHEST TIGHTNESS OR PAIN) WAKE YOU UP AT NIGHT OR EARLIER THAN USUAL IN THE MORNING: NOT AT ALL
ACT_TOTALSCORE: 23
QUESTION_4 LAST FOUR WEEKS HOW OFTEN HAVE YOU USED YOUR RESCUE INHALER OR NEBULIZER MEDICATION (SUCH AS ALBUTEROL): ONCE A WEEK OR LESS
ACT_TOTALSCORE: 23
QUESTION_1 LAST FOUR WEEKS HOW MUCH OF THE TIME DID YOUR ASTHMA KEEP YOU FROM GETTING AS MUCH DONE AT WORK, SCHOOL OR AT HOME: NONE OF THE TIME

## 2024-09-25 ASSESSMENT — ENCOUNTER SYMPTOMS: NERVOUS/ANXIOUS: 1

## 2024-09-25 NOTE — PROGRESS NOTES
Assessment & Plan     Anxiety  Flared for the last several weeks.  She is not sleeping well due to the anxiety and has been overeating.  She is ready to adjust medication and we will trial increasing the fluvoxamine.  Certainly, if this is not improving symptoms I urged her to follow-up with us or if she has any new side effects from the medication. Reviewed onset of action of meds, common side effects and plan for close f/u. Encouraged call to clinic if symptoms worsening or adverse reactions.   - TSH WITH FREE T4 REFLEX; Future  - COMPREHENSIVE METABOLIC PANEL; Future  - fluvoxaMINE (LUVOX) 50 MG tablet; Take 1 tablet (50 mg) by mouth every morning AND 2 tablets (100 mg) at bedtime. After 2-3 weeks may increase to 100 mg bid.  - TSH WITH FREE T4 REFLEX  - COMPREHENSIVE METABOLIC PANEL    Current mild episode of major depressive disorder without prior episode (H24)  This has been stable on fluvoxamine and Wellbutrin  - fluvoxaMINE (LUVOX) 50 MG tablet; Take 1 tablet (50 mg) by mouth every morning AND 2 tablets (100 mg) at bedtime. After 2-3 weeks may increase to 100 mg bid.    Dry mouth  She is reporting increasing dry mouth.  Possibly this is correlated with her new inhaler and potentially some thrush but she does have psoriasis and certainly could be at risk for other autoimmune etiology.  Will check labs as listed.  Discussed mouthwash for dry mouth  - TSH WITH FREE T4 REFLEX; Future  - COMPREHENSIVE METABOLIC PANEL; Future  - SSA Ro KAYE Antibody IgG; Future  - SSB La KAYE Antibody IgG; Future  - Anti Nuclear Yudi IgG by IFA with Reflex; Future  - ESR: Erythrocyte sedimentation rate; Future  - TSH WITH FREE T4 REFLEX  - COMPREHENSIVE METABOLIC PANEL  - SSA Ro KAYE Antibody IgG  - SSB La KAYE Antibody IgG  - Anti Nuclear Yudi IgG by IFA with Reflex  - ESR: Erythrocyte sedimentation rate    Thrush  Suspect due to her new inhaler.  I encouraged her to update her pulmonologist.  Will treat with swish and spit nystatin  and follow-up as needed   - nystatin (MYCOSTATIN) 592453 UNIT/ML suspension; Take 5 mLs (500,000 Units) by mouth 4 times daily.    Arm heaviness  likely stress-induced although given her autoimmune disease will screen for other  - TSH WITH FREE T4 REFLEX; Future  - COMPREHENSIVE METABOLIC PANEL; Future  - SSA Ro KAYE Antibody IgG; Future  - SSB La KAYE Antibody IgG; Future  - Anti Nuclear Yudi IgG by IFA with Reflex; Future  - ESR: Erythrocyte sedimentation rate; Future  - CK total; Future  - TSH WITH FREE T4 REFLEX  - COMPREHENSIVE METABOLIC PANEL  - SSA Ro KAYE Antibody IgG  - SSB La KAYE Antibody IgG  - Anti Nuclear Yudi IgG by IFA with Reflex  - ESR: Erythrocyte sedimentation rate  - CK total    Psoriasis of scalp  - SSA Ro KAYE Antibody IgG; Future  - SSB La KAYE Antibody IgG; Future  - Anti Nuclear Yudi IgG by IFA with Reflex; Future  - ESR: Erythrocyte sedimentation rate; Future  - SSA Ro KAYE Antibody IgG  - SSB La KAYE Antibody IgG  - Anti Nuclear Yudi IgG by IFA with Reflex  - ESR: Erythrocyte sedimentation rate    Prediabetes  - HEMOGLOBIN A1C; Future  - HEMOGLOBIN A1C    Immunosuppression (H24)  Ordered by her dermatologist  - Quantiferon TB Gold Plus    Recommended tennis elbow band for elbow discomfort.  Follow-up with Ortho if needed if persistent pain present      See Patient Instructions    Evelyn Nunes is a 54 year old, presenting for the following health issues:  Anxiety and Depression        9/25/2024     3:00 PM   Additional Questions   Roomed by Britt     Anxiety    History of Present Illness       Mental Health Follow-up:  Patient presents to follow-up on Anxiety.    Patient's anxiety since last visit has been:  Worse  The patient is having other symptoms associated with anxiety.  Any significant life events: No  Patient is feeling anxious or having panic attacks.  Patient has no concerns about alcohol or drug use.    She eats 2-3 servings of fruits and vegetables daily.She consumes 1 sweetened  "beverage(s) daily.She exercises with enough effort to increase her heart rate 9 or less minutes per day.  She exercises with enough effort to increase her heart rate 3 or less days per week. She is missing 1 dose(s) of medications per week.     Sx's of anxiety flared in the last few weeks.   Not sleeping well.   Eating for her mood so eating more .  IUD- no menses.   No hot flashes.     No depression sx's. Feels overmotivated to keep busy. Not able to relax well. Easily irritable.   No recent stressors.     On celexa in past- peaked and then switched to fluvox.  This medication has been helpful until this point.  Wellbutrin was added due to mind racing   On current medds for many years.   Constipation and sexual side effects with meds but otherwise tolerates. Manages constipation with help of gi     Dry mouth for the last month.  Did start a new inhaler, Trelegy for her asthma with her pulmonologist recently.  Possibly from stress tension n her neck and upper back  Hip pain is much better now            Objective    /81 (BP Location: Right arm, Patient Position: Sitting, Cuff Size: Adult Regular)   Pulse 77   Temp 98.4  F (36.9  C) (Oral)   Resp 15   Ht 1.626 m (5' 4\")   Wt 88.6 kg (195 lb 4.8 oz)   SpO2 96%   BMI 33.52 kg/m    Body mass index is 33.52 kg/m .  Physical Exam   GENERAL: alert and no distress  HENT: oral mucous membranes moist and Small patches of erythema and white buccal membrane bilaterally  PSYCH: mentation appears normal, affect normal/bright  MSK: Right medial epicondyle tenderness. Nl rom. No effusion            Signed Electronically by: Yarely Wise MD    "

## 2024-09-25 NOTE — LETTER
October 7, 2024      Manju Berg  [address] [facility] [address]        Dear ,    We are writing to inform you of your test results.    PS,  The Sjogren's disease antibodies were also negative.    Resulted Orders   HEMOGLOBIN A1C   Result Value Ref Range    Estimated Average Glucose 108 <117 mg/dL    Hemoglobin A1C 5.4 0.0 - 5.6 %      Comment:      Normal <5.7%   Prediabetes 5.7-6.4%    Diabetes 6.5% or higher     Note: Adopted from ADA consensus guidelines.   TSH WITH FREE T4 REFLEX   Result Value Ref Range    TSH 1.02 0.30 - 4.20 uIU/mL   COMPREHENSIVE METABOLIC PANEL   Result Value Ref Range    Sodium 141 135 - 145 mmol/L    Potassium 3.9 3.4 - 5.3 mmol/L    Carbon Dioxide (CO2) 28 22 - 29 mmol/L    Anion Gap 8 7 - 15 mmol/L    Urea Nitrogen 13.9 6.0 - 20.0 mg/dL    Creatinine 0.84 0.51 - 0.95 mg/dL    GFR Estimate 83 >60 mL/min/1.73m2      Comment:      eGFR calculated using 2021 CKD-EPI equation.    Calcium 9.7 8.8 - 10.4 mg/dL      Comment:      Reference intervals for this test were updated on 7/16/2024 to reflect our healthy population more accurately. There may be differences in the flagging of prior results with similar values performed with this method. Those prior results can be interpreted in the context of the updated reference intervals.    Chloride 105 98 - 107 mmol/L    Glucose 97 70 - 99 mg/dL    Alkaline Phosphatase 61 40 - 150 U/L    AST 30 0 - 45 U/L    ALT 29 0 - 50 U/L    Protein Total 7.7 6.4 - 8.3 g/dL    Albumin 5.0 3.5 - 5.2 g/dL    Bilirubin Total 0.2 <=1.2 mg/dL   SSA Ro AKYE Antibody IgG   Result Value Ref Range    SSA Yudi IgG Instrument Value <0.5 <7.0 U/mL    SSA (Ro) Antibody IgG Negative Negative   SSB La KAYE Antibody IgG   Result Value Ref Range    SSB Yudi IgG Instrument Value <0.6 <7.0 U/mL    SSB (La) Antibody IgG Negative Negative   Anti Nuclear Yudi IgG by IFA with Reflex   Result Value Ref Range    NATHANIEL interpretation Negative Negative      Comment:         Negative:              <1:40  Borderline Positive:   1:40 - 1:80  Positive:              >1:80   ESR: Erythrocyte sedimentation rate   Result Value Ref Range    Erythrocyte Sedimentation Rate 4 0 - 30 mm/hr   CK total   Result Value Ref Range     26 - 192 U/L       If you have any questions or concerns, please call the clinic at the number listed above.       Sincerely,      Yarely Wise MD

## 2024-09-26 LAB
GAMMA INTERFERON BACKGROUND BLD IA-ACNC: 0.05 IU/ML
M TB IFN-G BLD-IMP: NEGATIVE
M TB IFN-G CD4+ BCKGRND COR BLD-ACNC: 9.95 IU/ML
MITOGEN IGNF BCKGRD COR BLD-ACNC: 0.06 IU/ML
MITOGEN IGNF BCKGRD COR BLD-ACNC: 0.07 IU/ML
QUANTIFERON MITOGEN: 10 IU/ML
QUANTIFERON NIL TUBE: 0.05 IU/ML
QUANTIFERON TB1 TUBE: 0.11 IU/ML
QUANTIFERON TB2 TUBE: 0.12

## 2024-09-27 LAB
ANA SER QL IF: NEGATIVE
ENA SS-A AB SER IA-ACNC: <0.5 U/ML
ENA SS-A AB SER IA-ACNC: NEGATIVE
ENA SS-B IGG SER IA-ACNC: <0.6 U/ML
ENA SS-B IGG SER IA-ACNC: NEGATIVE

## 2024-09-27 NOTE — RESULT ENCOUNTER NOTE
Manju,  It was a pleasure to see you in the office recently.   I am happy to report your lab results are coming back normal.  The sed rate inflammation test was normal.  The NATHANIEL autoimmune screening test was negative.  The thyroid screening test was normal.  Muscle enzyme test was normal.  Kidney, liver and electrolyte panel was normal.   Diabetes screening test was negative/normal  I am still waiting on one final autoimmune test and we will keep you posted.    Please MyChart or call if you have any concerns or questions.   Sincerely,  Yarely Wise MD

## 2024-10-11 ENCOUNTER — ANCILLARY PROCEDURE (OUTPATIENT)
Dept: GENERAL RADIOLOGY | Facility: CLINIC | Age: 54
End: 2024-10-11
Attending: PHYSICIAN ASSISTANT
Payer: COMMERCIAL

## 2024-10-11 DIAGNOSIS — M51.26 LUMBAR DISC HERNIATION: ICD-10-CM

## 2024-10-11 DIAGNOSIS — M54.10 RADICULAR PAIN OF RIGHT LOWER EXTREMITY: ICD-10-CM

## 2024-10-11 DIAGNOSIS — M54.50 CHRONIC LOW BACK PAIN: ICD-10-CM

## 2024-10-11 DIAGNOSIS — G89.29 CHRONIC LOW BACK PAIN: ICD-10-CM

## 2024-10-11 PROCEDURE — 62323 NJX INTERLAMINAR LMBR/SAC: CPT | Performed by: RADIOLOGY

## 2024-10-11 RX ORDER — BUPIVACAINE HYDROCHLORIDE 5 MG/ML
2 INJECTION, SOLUTION PERINEURAL ONCE
OUTPATIENT
Start: 2024-10-11 | End: 2024-10-11

## 2024-10-11 RX ORDER — IOPAMIDOL 408 MG/ML
2 INJECTION, SOLUTION INTRATHECAL ONCE
Status: COMPLETED | OUTPATIENT
Start: 2024-10-11 | End: 2024-10-11

## 2024-10-11 RX ORDER — LIDOCAINE HYDROCHLORIDE 10 MG/ML
5 INJECTION, SOLUTION INFILTRATION; PERINEURAL ONCE
OUTPATIENT
Start: 2024-10-11 | End: 2024-10-11

## 2024-10-11 RX ORDER — METHYLPREDNISOLONE ACETATE 80 MG/ML
80 INJECTION, SUSPENSION INTRA-ARTICULAR; INTRALESIONAL; INTRAMUSCULAR; SOFT TISSUE ONCE
OUTPATIENT
Start: 2024-10-11 | End: 2024-10-11

## 2024-10-11 RX ORDER — METHYLPREDNISOLONE ACETATE 80 MG/ML
80 INJECTION, SUSPENSION INTRA-ARTICULAR; INTRALESIONAL; INTRAMUSCULAR; SOFT TISSUE ONCE
Status: COMPLETED | OUTPATIENT
Start: 2024-10-11 | End: 2024-10-11

## 2024-10-11 RX ORDER — IOPAMIDOL 408 MG/ML
2 INJECTION, SOLUTION INTRATHECAL ONCE
OUTPATIENT
Start: 2024-10-11 | End: 2024-10-11

## 2024-10-11 RX ORDER — LIDOCAINE HYDROCHLORIDE 10 MG/ML
5 INJECTION, SOLUTION INFILTRATION; PERINEURAL ONCE
Status: COMPLETED | OUTPATIENT
Start: 2024-10-11 | End: 2024-10-11

## 2024-10-11 RX ORDER — BUPIVACAINE HYDROCHLORIDE 5 MG/ML
2 INJECTION, SOLUTION PERINEURAL ONCE
Status: COMPLETED | OUTPATIENT
Start: 2024-10-11 | End: 2024-10-11

## 2024-10-11 RX ADMIN — METHYLPREDNISOLONE ACETATE 80 MG: 80 INJECTION, SUSPENSION INTRA-ARTICULAR; INTRALESIONAL; INTRAMUSCULAR; SOFT TISSUE at 16:02

## 2024-10-11 RX ADMIN — IOPAMIDOL 2 ML: 408 INJECTION, SOLUTION INTRATHECAL at 16:02

## 2024-10-11 RX ADMIN — BUPIVACAINE HYDROCHLORIDE 10 MG: 5 INJECTION, SOLUTION PERINEURAL at 16:03

## 2024-10-11 RX ADMIN — LIDOCAINE HYDROCHLORIDE 5 ML: 10 INJECTION, SOLUTION INFILTRATION; PERINEURAL at 16:02

## 2024-10-11 NOTE — PROGRESS NOTES
Manju was seen in X-ray today for a epidural injection. Patient rated pain before procedure 2/10. After procedure patient rated pain 1/10.   This pain level is acceptable to patient. Patient discharged home with .

## 2024-10-11 NOTE — PROGRESS NOTES
AFTER YOU GO HOME    DO relax; minimize your activity for 24 hours  You may resume normal activity tomorrow  You may remove the bandage in the evening or next morning  You may resume bathing the next day  Drink at least 4 extra glasses of fluid today if not on fluid restrictions  DO NOT drive or operate machinery at home or at work for at least 24 hours      VISIT THE EMERGENCY ROOM OR URGENT CARE IF:    There is redness or swelling at the injection site  There is discharge from the injection site  You develop a temperature of 101  F or greater      ADDITIONAL INSTRUCTIONS:     You may resume your Coumadin or other blood thinner at your regular dose today.  Follow up with your physician to have your INR rechecked if indicated.  If you gain no relief from the injection after two (2) weeks, follow-up with your provider for your options.        Contacts:    During business hours from 8 to 5 pm, you may call [phone] to reach a nurse advisor at [facility].  After hours, call [facility]  [phone].  Ask for the Radiologist on-call.  Someone is on-call 24 hrs/day.  [facility] Toll Free Number   [phone]

## 2024-10-15 ENCOUNTER — OFFICE VISIT (OUTPATIENT)
Dept: SLEEP MEDICINE | Facility: CLINIC | Age: 54
End: 2024-10-15
Payer: COMMERCIAL

## 2024-10-15 DIAGNOSIS — R06.83 SNORING: ICD-10-CM

## 2024-10-15 DIAGNOSIS — R06.81 WITNESSED APNEIC SPELLS: ICD-10-CM

## 2024-10-15 DIAGNOSIS — R40.0 DAYTIME SLEEPINESS: ICD-10-CM

## 2024-10-15 DIAGNOSIS — R51.9 MORNING HEADACHE: ICD-10-CM

## 2024-10-15 DIAGNOSIS — E66.811 OBESITY (BMI 30.0-34.9): ICD-10-CM

## 2024-10-15 PROCEDURE — G0399 HOME SLEEP TEST/TYPE 3 PORTA: HCPCS | Performed by: INTERNAL MEDICINE

## 2024-10-15 NOTE — PROGRESS NOTES
Pt is completing a home sleep test. Pt was instructed on how to put on the Noxturnal T3 device and associated equipment before going to bed and given the opportunity to practice putting it on before leaving the sleep center. Pt was reminded to bring the home sleep test kit back to the center tomorrow, at the scheduled time for download and reporting. Patient was instructed to complete study using the following treatment?  None  Neck circumference: 36 CM / 14 1/4 inches.  Device number: 26  Poonam Sosa CMA on 10/15/2024 at 1:56 PM

## 2024-10-16 ENCOUNTER — DOCUMENTATION ONLY (OUTPATIENT)
Dept: SLEEP MEDICINE | Facility: CLINIC | Age: 54
End: 2024-10-16
Payer: COMMERCIAL

## 2024-10-16 NOTE — NURSING NOTE
Pt returned HST device. It was downloaded and forwarded data to the clinical specialist for scoring.   Poonam Sosa CMA on 10/16/2024 at 8:54 AM

## 2024-10-16 NOTE — PROGRESS NOTES
HST POST-STUDY QUESTIONNAIRE    What time did you go to bed?  10:35  How long do you think it took to fall asleep?  5 min  What time did you wake up to start the day?  7 am  Did you get up during the night at all?  No  If you woke up, do you remember approximately what time(s)?   Did you have any difficulty with the equipment?  No  Did you us any type of treatment with this study?  None  Was the head of the bed elevated? No  Did you sleep in a recliner?  No  Did you stop using CPAP at least 3 days before this test?  NA  Any other information you'd like us to know?

## 2024-10-21 NOTE — PROGRESS NOTES
This HSAT was performed using a Noxturnal T3 device which recorded snore, sound, movement activity, body position, nasal pressure, oronasal thermal airflow, pulse, oximetry and both chest and abdominal respiratory effort. HSAT data was restricted to the time patient states they were in bed.     HSAT was scored using 1B 4% hypopnea rule.     HST AHI (Non-PAT): 5.3  Snoring was reported as mild and intermittent.  Time with SpO2 below 89% was 2 minutes.   Overall signal quality was good     Pt will follow up with sleep provider to determine appropriate therapy.

## 2024-11-06 ENCOUNTER — MYC MEDICAL ADVICE (OUTPATIENT)
Dept: SLEEP MEDICINE | Facility: CLINIC | Age: 54
End: 2024-11-06
Payer: COMMERCIAL

## 2024-11-06 DIAGNOSIS — G47.33 OSA (OBSTRUCTIVE SLEEP APNEA): Primary | ICD-10-CM

## 2024-11-13 ENCOUNTER — OFFICE VISIT (OUTPATIENT)
Dept: PULMONOLOGY | Facility: CLINIC | Age: 54
End: 2024-11-13
Payer: COMMERCIAL

## 2024-11-13 VITALS
HEIGHT: 64 IN | SYSTOLIC BLOOD PRESSURE: 115 MMHG | WEIGHT: 195 LBS | DIASTOLIC BLOOD PRESSURE: 78 MMHG | BODY MASS INDEX: 33.29 KG/M2 | OXYGEN SATURATION: 97 % | HEART RATE: 91 BPM | RESPIRATION RATE: 14 BRPM

## 2024-11-13 DIAGNOSIS — R05.3 CHRONIC COUGH: ICD-10-CM

## 2024-11-13 DIAGNOSIS — J45.40 MODERATE PERSISTENT ASTHMA, UNSPECIFIED WHETHER COMPLICATED: ICD-10-CM

## 2024-11-13 PROCEDURE — G2211 COMPLEX E/M VISIT ADD ON: HCPCS | Performed by: INTERNAL MEDICINE

## 2024-11-13 PROCEDURE — 99215 OFFICE O/P EST HI 40 MIN: CPT | Performed by: INTERNAL MEDICINE

## 2024-11-13 RX ORDER — PREDNISONE 20 MG/1
40 TABLET ORAL DAILY
Qty: 10 TABLET | Refills: 0 | Status: SHIPPED | OUTPATIENT
Start: 2024-11-13

## 2024-11-13 RX ORDER — ALBUTEROL SULFATE 0.83 MG/ML
2.5 SOLUTION RESPIRATORY (INHALATION) EVERY 6 HOURS PRN
Qty: 90 ML | Refills: 3 | Status: SHIPPED | OUTPATIENT
Start: 2024-11-13

## 2024-11-13 RX ORDER — MONTELUKAST SODIUM 10 MG/1
10 TABLET ORAL AT BEDTIME
Qty: 90 TABLET | Refills: 3 | Status: SHIPPED | OUTPATIENT
Start: 2024-11-13

## 2024-11-13 RX ORDER — PREDNISONE 20 MG/1
40 TABLET ORAL DAILY
Qty: 10 TABLET | Refills: 0 | Status: SHIPPED | OUTPATIENT
Start: 2024-11-13 | End: 2024-11-13

## 2024-11-13 RX ORDER — ALBUTEROL SULFATE 90 UG/1
2 INHALANT RESPIRATORY (INHALATION) EVERY 4 HOURS PRN
Qty: 25.5 G | Refills: 3 | Status: SHIPPED | OUTPATIENT
Start: 2024-11-13

## 2024-11-13 ASSESSMENT — ASTHMA QUESTIONNAIRES
QUESTION_5 LAST FOUR WEEKS HOW WOULD YOU RATE YOUR ASTHMA CONTROL: WELL CONTROLLED
QUESTION_3 LAST FOUR WEEKS HOW OFTEN DID YOUR ASTHMA SYMPTOMS (WHEEZING, COUGHING, SHORTNESS OF BREATH, CHEST TIGHTNESS OR PAIN) WAKE YOU UP AT NIGHT OR EARLIER THAN USUAL IN THE MORNING: ONCE OR TWICE
ACUTE_EXACERBATION_TODAY: NO
QUESTION_1 LAST FOUR WEEKS HOW MUCH OF THE TIME DID YOUR ASTHMA KEEP YOU FROM GETTING AS MUCH DONE AT WORK, SCHOOL OR AT HOME: SOME OF THE TIME
ACT_TOTALSCORE: 19
QUESTION_2 LAST FOUR WEEKS HOW OFTEN HAVE YOU HAD SHORTNESS OF BREATH: ONCE OR TWICE A WEEK
QUESTION_4 LAST FOUR WEEKS HOW OFTEN HAVE YOU USED YOUR RESCUE INHALER OR NEBULIZER MEDICATION (SUCH AS ALBUTEROL): ONCE A WEEK OR LESS
ACT_TOTALSCORE: 19

## 2024-11-13 ASSESSMENT — PAIN SCALES - GENERAL: PAINLEVEL_OUTOF10: NO PAIN (0)

## 2024-11-13 NOTE — PROGRESS NOTES
"Manju Berg's goals for this visit include: Return  She requests these members of her care team be copied on today's visit information: PCP    PCP: Yarely Wise    Referring Provider:  Liane Perez MD  [address] [place] [address]    /78   Pulse 91   Resp 14   Ht 1.626 m (5' 4.02\")   Wt 88.5 kg (195 lb)   SpO2 97%   BMI 33.45 kg/m      Do you need any medication refills at today's visit? JONATHAN Weinstein LPN  Pulmonary Medicine:  [facility]  Phone: [phone] Fax: [phone]    Pulmonary Clinic Return Patient Visit  Reason for Visit: Asthma/Cough  History of Present Illness  Manju Berg is a 54-year-old female who presents to clinic for follow up of a chronic cough. I last saw her in 12/2023.  To briefly review, Manju was diagnosed with asthma as a teenager which was mostly associated with exercise (exercise-induced) and only requiring albuterol inhaler as needed. During the last clinic visit, her asthma had been poorly controlled with frequent asthma flares as well as requirements for antibiotics as well as steroids and so I escalated her regimen to high dose Trelegy with good results. She also has seasonal allergies which is controlled with Flonase.  She did have an eventful 2022 with repeated admissions in the fall for gallstone pancreatitis and required ERCP and cholecystectomy.  She also had a recent URI which was complicated with an asthma flare for which she required prednisone and azithromycin with some improvement.   Today, doing better but still has a lingering dry cough. She is close to her baseline. Prior to her recent URI, she was doing great. She has also noted significant improved control of her environmental triggers and associated allergic rhinitis with postnasal drip.   She denies any chest pain, no orthopnea, no PND, no leg swellings, no palpitations etc. she denies any dysphagia no dysphonia.  Has pets including a " cat and 2 dogs and she is not allergic to them    Review of Systems:  10 of 14 systems reviewed and are negative unless otherwise stated in HPI.    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     2011,Proximal left tibia     delivery delivered 2013    Dyspepsia and other specified disorders of function of stomach     No Comments Provided    Gastroesophageal reflux disease     Headache     No Comments Provided    Herpes simplex     No Comments Provided    Lateral epicondylitis of elbow     No Comments Provided    Other depressive disorder     No Comments Provided    Other unspecified back disorder     No Comments Provided    Pain in joint, upper arm     No Comments Provided       Past Surgical History:   Procedure Laterality Date    ------------OTHER------------- Right 2018    5th metatarsal fracture    BUNIONECTOMY Bilateral 1999    C/SECTION, LOW TRANSVERSE      ENDOSCOPIC ULTRASOUND UPPER GASTROINTESTINAL TRACT (GI) N/A 2022    Procedure: ENDOSCOPIC ULTRASOUND, ESOPHAGOSCOPY / UPPER GASTROINTESTINAL TRACT (GI) WITH BIOPSY;  Surgeon: Alissa Lubin MD;  Location: [facility] OR    FOOT SURGERY Left 2018    fusion of the large toe 2018    LAPAROSCOPIC CHOLECYSTECTOMY N/A 2022    Procedure: Laparoscopic cholecystectomy;  Surgeon: Bertha Potts MD;  Location:  OR    OTHER SURGICAL HISTORY      EXCIS BARTHOLIN GLAND/CYST    OTHER SURGICAL HISTORY Left 2012     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    RELEASE CARPAL TUNNEL Bilateral     No Comments Provided    STRIP VEIN      vein removal for varicose veins       Family History   Problem Relation Age of Onset    Depression Mother     Osteoporosis Mother     Mental Illness Mother     Hypertension Father         Hypertension     Hyperlipidemia Father         Hyperlipidemia    Colon Cancer Father 56        Cancer-colon    Bladder Cancer Father 62        Cancer,bladder    Obesity Father     Family History Negative Sister         Good Health    Family History Negative Son     Mental Illness Maternal Aunt     Mental Illness Maternal Uncle        Social History     Socioeconomic History    Marital status:      Spouse name: Not on file    Number of children: Not on file    Years of education: Not on file    Highest education level: Not on file   Occupational History    Occupation: RN     Employer: OTHER   Tobacco Use    Smoking status: Never Smoker    Smokeless tobacco: Never Used   Substance and Sexual Activity    Alcohol use: Yes     Comment: Alcoholic Drinks/day: Drinks socially only    Drug use: Unknown     Types: Other     Comment: Drug use: No    Sexual activity: Not on file   Other Topics Concern    Not on file   Social History Narrative    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        . lives in [facility]. [facility] spouse     Social Determinants of Health     Financial Resource Strain: Not on file   Food Insecurity: Not on file   Transportation Needs: Not on file   Physical Activity: Not on file   Stress: Not on file   Social Connections: Not on file   Intimate Partner Violence: Not on file   Housing Stability: Not on file         No Known Allergies      Current Outpatient Medications:     acetaminophen (TYLENOL) 500 MG tablet, Take 1,000 mg by mouth every 6 hours as needed for pain, Disp: , Rfl:     albuterol (PROAIR HFA/PROVENTIL HFA/VENTOLIN HFA) 108 (90 Base) MCG/ACT inhaler, Inhale 2 puffs into the lungs every 4 hours as needed for shortness of breath or wheezing, Disp: 25.5 g, Rfl: 3    albuterol (PROVENTIL) (2.5 MG/3ML) 0.083% neb solution, Take 1 vial (2.5 mg) by nebulization every 6 hours as needed for shortness of breath or wheezing, Disp: 90 mL, Rfl: 3    augmented betamethasone dipropionate  (DIPROLENE) 0.05 % external lotion, Apply topically daily, Disp: 180 mL, Rfl: 0    buPROPion (WELLBUTRIN XL) 300 MG 24 hr tablet, Take 1 tablet (300 mg) by mouth every morning, Disp: 90 tablet, Rfl: 2    CALCIUM 600 + D 600-200 MG-UNIT OR TABS, Take 1 tablet by mouth every evening, Disp: , Rfl:     cetirizine (ZYRTEC) 10 MG tablet, Take 10 mg by mouth daily, Disp: , Rfl:     chlorzoxazone (PARAFON FORTE) 500 MG tablet, TAKE 1/2 to 1.5 TABLETS BY MOUTH 3-4 TIMES DAILY AS NEEDED TO RELAX MUSCLES, Disp: 90 tablet, Rfl: 0    Cholecalciferol (VITAMIN D) 2000 UNITS tablet, Take 2,000 Units by mouth daily., Disp: 100 tablet, Rfl: 3    fluticasone (FLONASE) 50 MCG/ACT nasal spray, Spray 1 spray into both nostrils daily, Disp: , Rfl:     Fluticasone-Umeclidin-Vilanterol (TRELEGY ELLIPTA) 200-62.5-25 MCG/ACT oral inhaler, Inhale 1 puff into the lungs daily for 360 days, Disp: 3 each, Rfl: 3    fluvoxaMINE (LUVOX) 50 MG tablet, Take 1 tablet (50 mg) by mouth every morning AND 2 tablets (100 mg) at bedtime. After 2-3 weeks may increase to 100 mg bid. (Patient taking differently: Take 1 tablet (50 mg) by mouth twice daily.), Disp: 360 tablet, Rfl: 2    hydrOXYzine (VISTARIL) 25 MG capsule, Take 1-2 capsules (25-50 mg) by mouth 3 times daily as needed for itching, Disp: 60 capsule, Rfl: 3    ibuprofen (ADVIL/MOTRIN) 200 MG tablet, Take 400 mg by mouth every 6 hours as needed for moderate pain or mild pain, Disp: , Rfl:     LANsoprazole (PREVACID) 30 MG DR capsule, Take 1 capsule (30 mg) by mouth daily, Disp: 90 capsule, Rfl: 0    lidocaine (LIDODERM) 5 % patch, Place 1 patch onto the skin daily as needed for moderate pain Remove after 12 hours., Disp: 30 patch, Rfl: 5    linaclotide (LINZESS) 145 MCG capsule, Take 145 mcg by mouth every morning (before breakfast), Disp: , Rfl:     montelukast (SINGULAIR) 10 MG tablet, Take 1 tablet (10 mg) by mouth at bedtime for 360 days, Disp: 90 tablet, Rfl: 3    naproxen (NAPROSYN) 500 MG  "tablet, Take 1 tablet (500 mg) by mouth 2 times daily as needed for moderate pain, Disp: 30 tablet, Rfl: 1    nystatin (MYCOSTATIN) 012812 UNIT/ML suspension, Take 5 mLs (500,000 Units) by mouth 4 times daily., Disp: 473 mL, Rfl: 0    propranolol (INDERAL) 40 MG tablet, Take 40 mg by mouth as needed (anxiety), Disp: , Rfl:     rimegepant (NURTEC) 75 MG ODT tablet, Place 1 tablet (75 mg) under the tongue every 48 hours., Disp: 16 tablet, Rfl: 11    Risankizumab-rzaa (SKYRIZI) 150 MG/ML subcutaneous, Inject 1 mL (150 mg) subcutaneously once every twelve weeks. - 1 injection on day 0, 1 injection on day 28, then 1 injection every 12 weeks, Disp: 1 mL, Rfl: 3    rizatriptan (MAXALT-MLT) 5 MG ODT, Take 1-2 tablets (5-10 mg) by mouth at onset of headache for migraine (may repeat in 2 hours as needed. Max 30 mg in 24 hours), Disp: 18 tablet, Rfl: 6    valACYclovir (VALTREX) 1000 mg tablet, TAKE 2 TABLETS BY MOUTH EVERY 12 HOURS FOR 2 doses AT first signs OF outbreak, Disp: 12 tablet, Rfl: 1    nystatin (MYCOSTATIN) 432442 UNIT/ML suspension, SWISH & SWALLOW 5 ML BY MOUTH 4 TIMES DAILY PRN Thrush, Disp: , Rfl:     Current Facility-Administered Medications:     Botulinum Toxin Type A (BOTOX) 200 units injection 155 Units, 155 Units, Intramuscular, See Admin Instructions, , 155 Units at 09/09/24 1032      Physical Exam:  /78   Pulse 91   Resp 14   Ht 1.626 m (5' 4.02\")   Wt 88.5 kg (195 lb)   SpO2 97%   BMI 33.45 kg/m    GENERAL: Well developed, well nourished, alert, and in no apparent distress.  HEENT: Normocephalic, atraumatic. PERRL, EOMI. Oral mucosa is moist. No perioral cyanosis.  NECK: supple, no masses, no thyromegaly.  RESP:  Normal respiratory effort.  CTAB.  No rales, wheezes, rhonchi.  No cyanosis or clubbing.  CV: Normal S1, S2, regular rhythm, normal rate. No murmur.  No LE edema.   ABDOMEN:  Soft, non-tender, non-distended.   SKIN: warm and dry. No rash.  NEURO: AAOx3.  Normal gait.  Fluent " "speech.  PSYCH: mentation appears normal.   Results:  PFTs: Normal spirometric study, normal lung volumes, no evidence of airflow obstruction, normal flow volume loops as well as normal diffusion capacity  Most Recent [facility] Pulmonary Function Testing    FVC-Pred   Date Value Ref Range Status   01/12/2022 3.42 L      FVC-Pre   Date Value Ref Range Status   01/12/2022 4.16 L      FVC-%Pred-Pre   Date Value Ref Range Status   01/12/2022 121 %      FEV1-Pre   Date Value Ref Range Status   01/12/2022 3.54 L      FEV1-%Pred-Pre   Date Value Ref Range Status   01/12/2022 128 %      FEV1FVC-Pred   Date Value Ref Range Status   01/12/2022 81 %      FEV1FVC-Pre   Date Value Ref Range Status   01/12/2022 85 %      No results found for: \"20029\"  FEFMax-Pred   Date Value Ref Range Status   01/12/2022 6.69 L/sec      FEFMax-Pre   Date Value Ref Range Status   01/12/2022 10.00 L/sec      FEFMax-%Pred-Pre   Date Value Ref Range Status   01/12/2022 149 %      ExpTime-Pre   Date Value Ref Range Status   01/12/2022 6.41 sec      FIFMax-Pre   Date Value Ref Range Status   01/12/2022 8.90 L/sec      FEV1FEV6-Pred   Date Value Ref Range Status   01/12/2022 82 %      FEV1FEV6-Pre   Date Value Ref Range Status   01/12/2022 85 %      No results found for: \"20055\"  Imaging (personally reviewed in clinic today): CXR 01/12/2022  Minimal perihilar bronchial wall thickening.    Assessment and Plan:   Moderate persistent asthma/Chest congestion  ACT score is worse at 19 likely due to her recent URI today on a regimen of Trelegy and albuterol as needed. She will benefit from an extended course of prednisone and prescriptions were given for 40 mg for 5 days. I also advised that she gets some OTC Mucinex to take along with prednisone and her current inhaler regimen.  Flonase has been helpful with seasonal allergies which are also triggers  Long conversation about trigger avoidance and we formulated an asthma action plan in clinic today.  Allergic " rhinitis  Started on Flonase and has noticed improved symptoms.    Mild ROZINA  Follows with sleep medicine  Questions and concerns were answered to the patient's satisfaction.  she was provided with my contact information should new questions or concerns arise in the interim.  She should return to clinic in 6 months.  I spent a total of 40 minutes face to face with Manju Berg during today's office visit. Over 50% of this time was spent counseling the patient and/or coordinating care regarding their pulmonary disease.    Up to date on Pneumovax (2010)  Liane Perez MD  Pulmonary, Critical Care and Sleep Medicine  [email or web address]  Pager: [phone]        The above note was dictated using voice recognition software and may include typographical errors. Please contact the author for any clarifications.

## 2024-11-20 DIAGNOSIS — M54.2 CERVICALGIA: ICD-10-CM

## 2024-11-20 RX ORDER — CHLORZOXAZONE 500 MG/1
TABLET ORAL
Qty: 90 TABLET | Refills: 0 | Status: SHIPPED | OUTPATIENT
Start: 2024-11-20

## 2024-11-25 ENCOUNTER — TELEPHONE (OUTPATIENT)
Dept: DERMATOLOGY | Facility: CLINIC | Age: 54
End: 2024-11-25
Payer: COMMERCIAL

## 2024-11-25 NOTE — TELEPHONE ENCOUNTER
PA Initiation    Medication: OTEZLA 10 & 20 & 30 MG PO TBPK  Insurance Company: [employer] - Phone [phone] Fax [phone]  Pharmacy Filling the Rx: [facility] - [place], [place] - [facility] [address]  Filling Pharmacy Phone: [phone]  Filling Pharmacy Fax: [phone]  Start Date: 11/25/2024         Thank you,     Roman Jorge [facility]  Pharmacy Clinic [facility]  [email or web address]   Phone: [phone]  Fax: [phone]

## 2024-11-26 NOTE — TELEPHONE ENCOUNTER
Prior Authorization Approval    Medication: OTEZLA 10 & 20 & 30 MG PO TBPK  Authorization Effective Date: 11/25/2024  Authorization Expiration Date: 06/18/2025  Approved Dose/Quantity: 55 tablets per 28 days loading, 1 tablet per 30 days maintenance  Reference #: [id number]   Insurance Company: [employer] - Phone [phone] Fax [phone]  Expected CoPay: $ 0  CoPay Card Available:      Financial Assistance Needed: Not at this time  Which Pharmacy is filling the prescription: [facility] - [address]  Pharmacy Notified: Yes  Patient Notified: Yes **pharmacy will notify pt**            Thank you,     Roman Jorge CPhT  [facility]arm[facility] Clinic [facility] [email or web address]   Phone: [phone]  Fax: [phone]

## 2024-12-03 NOTE — PROGRESS NOTES
Medication Therapy Management (MTM) Encounter    ASSESSMENT:                            Medication Adherence/Access: No issues identified.    Plaque psoriasis: Stable, patient would benefit from Otezla (apremilast). She experienced increased infections while on a biologic and would like to switch to a medication that is less immunosuppressing. Discussed she can start Otezla (apremilast) once it arrives. Provided education on Otezla (apremilast) today including dosing, administration, side effects, precautions, monitoring, and time to efficacy. Educated on the importance of avoiding live vaccines. Encouraged receiving the following non-live vaccines: Covid-19 vaccine (2023-24). Encouraged patient to monitor for worsening or changing symptoms of depression, including suicidal ideation. Her mood currently is stable. Discussed pausing Linzess in the event she experiences diarrhea from starting Otezla (apremilast). Patient displayed good understanding of plan. Encouraged patient to contact the Dermatology clinic in the event they have questions.     Asthma/seasonal allergies: Continue current regimen. Encouraged patient to rinse out mouth after every use with Trelegy Ellipta to help reduce increased or worsening thrush.    Depression/Anxiety: Continue current regimen.    Muscle spasms: Continue current regimen.    Constipation: Continue current regimen.    Migraines without aura: Continue current regimen.    Cold sores: Continue current regimen.    Thrush: Continue current regimen.    Supplements: Continue current regimen.    PLAN:                            Start Otezla (apremilast), follow directions on the starter pack. This is an oral medication.  Day 1: take 10 mg in the morning  Day 2: take 10 mg twice daily  Day 3: take 10 mg in the morning and 20 mg in the evening  Day 4: take 20 mg twice daily  Day 5: take 20 mg in the morning and 30 mg in the evening  Day 6 and maintenance dosing going forward: 30 mg twice  daily  Consider receiving the following vaccination(s) now: COVID-19 booster.    Follow-up: as needed via MyChart and in 2-3 weeks via telephone to continue assessing safety and efficacy. Will check in after the new year to assess if copay card can be utilized.    SUBJECTIVE/OBJECTIVE:                          Manju Berg is a 54 year old female called for an initial visit. She was referred to me from Dr. Pierce Velasquez MD.      Reason for visit: switching to Otezla for psoriasis.    Allergies/ADRs: Reviewed in chart.  Past Medical History: Reviewed in chart.  Tobacco: She reports that she has never smoked. She has never been exposed to tobacco smoke. She has never used smokeless tobacco.  Alcohol: Reviewed in chart.    Medication Adherence/Access: Otezla (apremilast) approved for starter pack and maintenance dosing through 6/18/2025, [facility], no copay (re-evaluate copay after the new year)    Plaque psoriasis:   Current treatment:  - Skyrizi (risankizumab) 150 mg every 12 weeks  Last dose mid-September    - augmented betamethasone 0.05% lotion once daily as needed     Patient electing to switch to Otezla (apremilast) for less immunosuppressing effects. She had success with this medication in the past but was unable to continue due to cost.    Symptoms: chronic itching, denies plaques, and is not currently flaring.    Side effects: respiratory infections    Specialist: Dr. Pierce Velasquez MD, Dermatology. Last visit on 7/8/2024. The following was recommended:   Plaque psoriasis: overall improved on risankizumab but still has some active erythema and itching on the scalp, though no scaling. Had diffuse pruritus after last injection; will monitor and if recurrent, may pretreat with antihistamines. If progressive, consider alternative treatment.  - risankizumab  - augmented betamethasone lotion PRN  - check QFN    Previous treatment:   - Stelara (ustekinumab) - ineffective  - Skyrizi  (risankizumab) - increased infections    Pre-Biologic Screenings      Hep B Surface Antibody Reactive, indicates immunity. (12/8/2022)    Hep B Core Antibody  Non-reactive (12/8/2022)    Hep B Surface Antigen Non-reactive (12/8/2022)    Hep C Antibody  Non-reactive (12/8/2022)    Quantiferon TB Gold Negative (9/25/2024)    CBC 6/25/2024 within normal limits    CMP 9/25/2024 (eGFR 83 mL/min), LFTs within normal limits      Immunization History   Covid-19 vaccine  Consider vaccine in 2024-25 season   Influenza (annual) Up-to-date   Tetanus/Tdap Up-to-date   Pneumococcal Complete   Shingrix Complete   All patients on biologics should avoid live vaccines (varicella/VZV, intranasal influenza, MMR, or yellow fever vaccine (if traveling))      Asthma/seasonal allergies  - Albuterol inhaler 2 puffs every 4 hours as needed   - Trelegy Ellipta 200-62.5 mg 1 puff once daily   - ipratropium-albuterol nebulizer every 6 hours as needed   - montelukast 10 mg once daily   - fluticasone nasal spray 1 spray both nostrils daily  - cetirizine 10 mg once daily  - hydroxyzine 25-50 mg three times daily as needed     Denies side effects or concerns.    Depression/Anxiety  - bupropion  mg once daily  - fluvoxamine 50 mg twice daily   - propranolol 40 mg daily as needed     Denies side effects or concerns. She reports that her mood is stable with her current medications, denies suicidal thoughts.    Muscle spasms  - chlorzoxazone 500 mg - 250 mg to 750 mg every 6-8 hours as needed   - lidocaine 5% patch daily as needed   - acetaminophen 1000 mg every 6 hours as needed   - ibuprofen 400 mg every 6 hours as needed   - naproxen 500 mg twice daily as needed     Denies side effects or concerns.    Constipation  - Linzess 145 mcg once daily      Denies side effects or concerns.    Migraines without aura  - rizatriptan 5-10 mg at onset of migraine  - Botox injections every 12 weeks    Denies side effects or concerns.    Cold sores  -  valacyclovir 1000 mg - take 2 tablets every 12 hours for 2 hours at first sign of outbreak     Denies side effects or concerns.    Thrush  - nystatin 223310 oral suspension swish and swallow 5 mL by mouth four times daily     Denies side effects or concerns.    Supplements  - calcium with vitamin D once daily  - probiotic once daily     Denies side effects or concerns.    Today's Vitals: There were no vitals taken for this visit.  ----------------    I spent 17 minutes with this patient today. All changes were made via collaborative practice agreement with Dr. Pierce Velasquez MD. A copy of the visit note was provided to the patient's provider(s).    A summary of these recommendations was sent via [employer].    Hallie Patel, MichaelD, MPH  Medication Therapy Management Pharmacist  [facility]    Telemedicine Visit Details  The patient's medications can be safely assessed via a telemedicine encounter.  Type of service:  Telephone visit  Originating Location (pt. Location): Home    Distant Location (provider location):  Off-site  Start Time:  1:32 PM  End Time: 1:49 PM     Medication Therapy Recommendations  No medication therapy recommendations to display

## 2024-12-05 ENCOUNTER — VIRTUAL VISIT (OUTPATIENT)
Dept: PHARMACY | Facility: CLINIC | Age: 54
End: 2024-12-05
Attending: DERMATOLOGY
Payer: COMMERCIAL

## 2024-12-05 DIAGNOSIS — K59.01 SLOW TRANSIT CONSTIPATION: ICD-10-CM

## 2024-12-05 DIAGNOSIS — G89.29 CHRONIC LOW BACK PAIN, UNSPECIFIED BACK PAIN LATERALITY, UNSPECIFIED WHETHER SCIATICA PRESENT: ICD-10-CM

## 2024-12-05 DIAGNOSIS — M54.50 CHRONIC LOW BACK PAIN, UNSPECIFIED BACK PAIN LATERALITY, UNSPECIFIED WHETHER SCIATICA PRESENT: ICD-10-CM

## 2024-12-05 DIAGNOSIS — B00.1 RECURRENT HERPES LABIALIS: ICD-10-CM

## 2024-12-05 DIAGNOSIS — F41.9 ANXIETY: ICD-10-CM

## 2024-12-05 DIAGNOSIS — Z78.9 TAKES DIETARY SUPPLEMENTS: ICD-10-CM

## 2024-12-05 DIAGNOSIS — J45.30 MILD PERSISTENT ASTHMA WITHOUT COMPLICATION: ICD-10-CM

## 2024-12-05 DIAGNOSIS — L40.9 PSORIASIS: Primary | ICD-10-CM

## 2024-12-05 DIAGNOSIS — R51.9 HEADACHE, CHRONIC DAILY: ICD-10-CM

## 2024-12-05 DIAGNOSIS — B37.0 THRUSH: ICD-10-CM

## 2024-12-09 ENCOUNTER — OFFICE VISIT (OUTPATIENT)
Dept: NEUROLOGY | Facility: CLINIC | Age: 54
End: 2024-12-09
Payer: COMMERCIAL

## 2024-12-09 DIAGNOSIS — G43.009 MIGRAINE WITHOUT AURA AND WITHOUT STATUS MIGRAINOSUS, NOT INTRACTABLE: ICD-10-CM

## 2024-12-09 DIAGNOSIS — G43.709 CHRONIC MIGRAINE WITHOUT AURA WITHOUT STATUS MIGRAINOSUS, NOT INTRACTABLE: Primary | ICD-10-CM

## 2024-12-09 RX ORDER — RIZATRIPTAN BENZOATE 5 MG/1
5-10 TABLET, ORALLY DISINTEGRATING ORAL
Qty: 18 TABLET | Refills: 6 | Status: SHIPPED | OUTPATIENT
Start: 2024-12-09

## 2024-12-09 NOTE — PROGRESS NOTES
PROCEDURE NOTE:     BOTULINUM NEUROTOXIN INJECTION PROCEDURE:  DATA: 12/09/24    INDICATIONS FOR PROCEDURES:   chronic migraine headaches.   Last Botox  -09/09/24  About 50 mild to moderate headaches in the last 90 days. Severe migraines 2 in 90 days  No wearing off effect  Headache today 1/10   Has been taking propranolol as needed and was helpful with headaches causing mood changes -feeling depressed and stopped. Takes propranolol as needed    rizatriptan -helps   Treatment tried propranolol-mood changes, nortriptyline, ibuprofen, naproxen, gabapentin  On wellbutrin and fluvoxamine-controlled depression/anxiety   Verapmil stopped because side effects   Patient is here today for a repeat  injection with Botox.     GOAL OF PROCEDURE:  The goal of this procedure is to decrease pain and enhance functional independence.     TOTAL DOSE ADMINISTERED:  Dose Administered:  155 units  Botox (Botulinum Toxin Type A)       2:1 Dilution    Wasted 45 units  Medication guide was given to patient     CONSENT:  The risks, benefits, and treatment options were discussed with the patient who agreed to proceed.     Written consent was obtained      EQUIPMENT USED:  Needles-30 gauge, 0.5 inches for injections  Four 1-ml tuberculin syringes for injections  One sodium chloride 10 ml vial preservative free  Alcohol swabs     SKIN PREPARATION:  Skin preparation was performed using an alcohol wipe.        AREA/MUSCLE INJECTED:  155 units of Botox  Right upper Trapezius (upper cervical) - 5 units of Botox at 3 site/s.   Left upper Trapezius (upper cervical) - 5 units of Botox at 3 site/s.      Right cervical paraspinals - 5 units of Botox at 2 site/s.   Left cervical paraspinals - 5 units of Botox at 2 site/s.      Left occipitalis - 5 units of Botox at 3 site/s.  Right occipitalis -5 units of Botox at 3 site/s        Right Frontalis - 5 units of Botox at 2 site/s.  Left Frontalis - 5 units of Botox at 2 site/s.     Right Temporalis - 5  units of Botox at 4 site/s.  Left Temporalis - 5 units of Botox at 4 site/s.     Right  - 5 units of Botox at 1 site/s.              Left  - 5 units of Botox at 1 site/s.     Procerus - 5 units of Botox at 1 site/s.     RESPONSE TO PROCEDURE:  tolerated the procedure well and there were no immediate complications.  Patient was allowed to recover for an appropriate period of time and was discharged home in stable condition.     FOLLOW UP:  Repeat Botox injections in 12 weeks        This procedure was performed under a hospital privileging agreement with [facility]IN[facility] Cast, [facility]

## 2024-12-09 NOTE — LETTER
12/9/2024       RE: Manju Berg  [address]  [facility] [address]     Dear Colleague,    Thank you for referring your patient, Manju Berg, to the [facility] [place] at [facility]. Please see a copy of my visit note below.    PROCEDURE NOTE:     BOTULINUM NEUROTOXIN INJECTION PROCEDURE:  DATA: 12/09/24    INDICATIONS FOR PROCEDURES:   chronic migraine headaches.   Last Botox  -09/09/24  About 50 mild to moderate headaches in the last 90 days. Severe migraines 2 in 90 days  No wearing off effect  Headache today 1/10   Has been taking propranolol as needed and was helpful with headaches causing mood changes -feeling depressed and stopped. Takes propranolol as needed    rizatriptan -helps   Treatment tried propranolol-mood changes, nortriptyline, ibuprofen, naproxen, gabapentin  On wellbutrin and fluvoxamine-controlled depression/anxiety   Verapmil stopped because side effects   Patient is here today for a repeat  injection with Botox.     GOAL OF PROCEDURE:  The goal of this procedure is to decrease pain and enhance functional independence.     TOTAL DOSE ADMINISTERED:  Dose Administered:  155 units  Botox (Botulinum Toxin Type A)       2:1 Dilution    Wasted 45 units  Medication guide was given to patient     CONSENT:  The risks, benefits, and treatment options were discussed with the patient who agreed to proceed.     Written consent was obtained      EQUIPMENT USED:  Needles-30 gauge, 0.5 inches for injections  Four 1-ml tuberculin syringes for injections  One sodium chloride 10 ml vial preservative free  Alcohol swabs     SKIN PREPARATION:  Skin preparation was performed using an alcohol wipe.        AREA/MUSCLE INJECTED:  155 units of Botox  Right upper Trapezius (upper cervical) - 5 units of Botox at 3 site/s.   Left upper Trapezius (upper cervical) - 5 units of Botox at 3 site/s.      Right cervical paraspinals -  5 units of Botox at 2 site/s.   Left cervical paraspinals - 5 units of Botox at 2 site/s.      Left occipitalis - 5 units of Botox at 3 site/s.  Right occipitalis -5 units of Botox at 3 site/s        Right Frontalis - 5 units of Botox at 2 site/s.  Left Frontalis - 5 units of Botox at 2 site/s.     Right Temporalis - 5 units of Botox at 4 site/s.  Left Temporalis - 5 units of Botox at 4 site/s.     Right  - 5 units of Botox at 1 site/s.              Left  - 5 units of Botox at 1 site/s.     Procerus - 5 units of Botox at 1 site/s.     RESPONSE TO PROCEDURE:  tolerated the procedure well and there were no immediate complications.  Patient was allowed to recover for an appropriate period of time and was discharged home in stable condition.     FOLLOW UP:  Repeat Botox injections in 12 weeks        This procedure was performed under a hospital privileging agreement with BELINDA Cast, CNP  [facility]            Again, thank you for allowing me to participate in the care of your patient.      Sincerely,    BELINDA Montalvo CNP

## 2024-12-22 DIAGNOSIS — R05.3 CHRONIC COUGH: ICD-10-CM

## 2024-12-22 DIAGNOSIS — J45.40 MODERATE PERSISTENT ASTHMA, UNSPECIFIED WHETHER COMPLICATED: ICD-10-CM

## 2024-12-23 RX ORDER — MONTELUKAST SODIUM 10 MG/1
1 TABLET ORAL AT BEDTIME
Qty: 90 TABLET | Refills: 3 | OUTPATIENT
Start: 2024-12-23

## 2024-12-23 NOTE — TELEPHONE ENCOUNTER
montelukast (SINGULAIR) 10 MG tablet Take 1 tablet (10 mg) by mouth at bedtime. 90 tablet 3 ordered 11/13/2024       Rx re order refused . Pt had Rx filled 11/13/2024 for 90 tablet with 3 refills.   Toby Croft RN on 12/23/2024 at 8:34 AM

## 2025-01-28 DIAGNOSIS — M54.2 CERVICALGIA: ICD-10-CM

## 2025-01-28 RX ORDER — CHLORZOXAZONE 500 MG/1
TABLET ORAL
Qty: 90 TABLET | Refills: 3 | Status: SHIPPED | OUTPATIENT
Start: 2025-01-28

## 2025-02-11 DIAGNOSIS — Z86.19 H/O COLD SORES: ICD-10-CM

## 2025-02-12 RX ORDER — VALACYCLOVIR HYDROCHLORIDE 1 G/1
TABLET, FILM COATED ORAL
Qty: 12 TABLET | Refills: 1 | Status: SHIPPED | OUTPATIENT
Start: 2025-02-12

## 2025-02-17 ENCOUNTER — OFFICE VISIT (OUTPATIENT)
Dept: DERMATOLOGY | Facility: CLINIC | Age: 55
End: 2025-02-17
Payer: COMMERCIAL

## 2025-02-17 DIAGNOSIS — L40.0 PLAQUE PSORIASIS: Primary | ICD-10-CM

## 2025-02-17 ASSESSMENT — PAIN SCALES - GENERAL: PAINLEVEL_OUTOF10: MILD PAIN (2)

## 2025-02-17 NOTE — LETTER
2/17/2025      Manju Berg  [address]  [facility] [address]      Dear Colleague,    Thank you for referring your patient, Manju Berg, to the [facility]. Please see a copy of my visit note below.    [facility] Dermatology Note    Encounter Date: Feb 17, 2025    Dermatology Problem List:  1. Plaque psoriasis: scalp, trunk, with some ear involvement  - current tx: apremilast, augmented betamethasone lotion  - in reserve: apremilast (exorbitant copay)  - prior: augmented betamethasone gel, coal tar/salcylic acid shampoo, ketoconazole shampoo, zinc pyrithione shampoo, compounded betamethasone/calcipotriene solution, Excimer, methotrexate 15 mg weekly, adalimumab (severe injection site reaction), ustekinumab,  risankizumab (repeat infections/viruses due to immunosuppression)     ______________________________________    Impression/Plan:      Plaque psoriasis: overall improved on apremilast but still has some active itching on the scalp, though no scaling. Switched to apremilast given immunocompromising effects of risankizumab.   - Continue apremilast  - augmented betamethasone lotion PRN      Follow-up in 10 months.       Staff Involved:  Staff and Scribe  Scribe Disclosure:   I, Leelee Saez, am serving as a scribe to document services personally performed by Pierce Velasquez MD based on data collection and the provider's statements to me.     Provider Disclosure:   The documentation recorded by the scribe accurately reflects the services I personally performed and the decisions made by me.    Pierce Velasquez MD   of Dermatology  Department of Dermatology  [facility] [employer]        CC:   Chief Complaint   Patient presents with     Psoriasis     Pt here for psoriasis follow up. Recently started Otezla and things have been good! Pt is noticing some itching every so often.       History of Present  Illness:  Ms. Manju Berg is a 54 year old female who presents as a return patient.    Today, patient reports psoriasis is going well with Otezla. Has not noticed any scaling but does report some itching. She is not having to use the topicals much.     Labs:  2024 CBC and Quantiferon     Physical exam:  Vitals: There were no vitals taken for this visit.  GEN: This is a well developed, well-nourished female in no acute distress, in a pleasant mood.    SKIN: Lowe phototype II  - Focused examination of the scalp and face was performed.  - No active rash noted on exam.   - No other lesions of concern on areas examined.     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     2011,Proximal left tibia      delivery delivered 2013     Dyspepsia and other specified disorders of function of stomach     No Comments Provided     Gastroesophageal reflux disease      Headache     No Comments Provided     Herpes simplex     No Comments Provided     Lateral epicondylitis of elbow     No Comments Provided     Other depressive disorder     No Comments Provided     Other unspecified back disorder     No Comments Provided     Pain in joint, upper arm     No Comments Provided     Past Surgical History:   Procedure Laterality Date     ------------OTHER------------- Right 2018    5th metatarsal fracture     BUNIONECTOMY Bilateral 1999     C/SECTION, LOW TRANSVERSE       ENDOSCOPIC ULTRASOUND UPPER GASTROINTESTINAL TRACT (GI) N/A 2022    Procedure: ENDOSCOPIC ULTRASOUND, ESOPHAGOSCOPY / UPPER GASTROINTESTINAL TRACT (GI) WITH BIOPSY;  Surgeon: Alissa Lubin MD;  Location: [facility] OR     FOOT SURGERY Left 2018    fusion of the large toe 2018     LAPAROSCOPIC CHOLECYSTECTOMY N/A  09/28/2022    Procedure: Laparoscopic cholecystectomy;  Surgeon: Bertha Potts MD;  Location: [facility] OR     OTHER SURGICAL HISTORY      EXCIS BARTHOLIN GLAND/CYST     OTHER SURGICAL HISTORY Left 03/16/2012     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 She has never been exposed to tobacco smoke. She has never used smokeless tobacco. She reports current alcohol use. She reports that she does not use drugs.    Family History:  Family History   Problem Relation Age of Onset     Depression Mother      Osteoporosis Mother      Mental Illness Mother      Hypertension Father         Hypertension     Hyperlipidemia Father         Hyperlipidemia     Colon Cancer Father 56        Cancer-colon     Bladder Cancer Father 62        Cancer,bladder     Obesity Father      Family History Negative Sister         Good Health     Family History Negative Son      Mental Illness Maternal Aunt      Mental Illness Maternal Uncle        Medications:  Current Outpatient Medications   Medication Sig Dispense Refill     acetaminophen (TYLENOL) 500 MG tablet Take 1,000 mg by mouth every 6 hours as needed for pain       albuterol (PROAIR HFA/PROVENTIL HFA/VENTOLIN HFA) 108 (90 Base) MCG/ACT inhaler Inhale 2 puffs into the lungs every 4 hours as needed for shortness of breath or wheezing. 25.5 g 3     albuterol (PROVENTIL) (2.5 MG/3ML) 0.083% neb solution Take 1 vial (2.5 mg) by nebulization every 6 hours as needed for shortness of breath or wheezing. 90 mL 3     apremilast (OTEZLA) 30 MG tablet Take 1 tablet (30 mg) by mouth 2 times daily. 60 tablet 11     augmented betamethasone dipropionate (DIPROLENE) 0.05 % external lotion Apply topically daily (Patient taking differently: Apply topically as needed.) 180 mL 0     budesonide-formoterol (SYMBICORT) 160-4.5 MCG/ACT Inhaler  Inhale 2 puffs once daily plus 1-2 puffs as needed. May use up to 12 puffs per day. 20.4 g 11     buPROPion (WELLBUTRIN XL) 300 MG 24 hr tablet Take 1 tablet (300 mg) by mouth every morning 90 tablet 2     CALCIUM 600 + D 600-200 MG-UNIT OR TABS Take 1 tablet by mouth every evening       cetirizine (ZYRTEC) 10 MG tablet Take 10 mg by mouth daily       chlorzoxazone (PARAFON FORTE) 500 MG tablet Take 1/2 to 1.5 tablets by mouth 3-4 times daily as needed to relax muscles 90 tablet 3     fluticasone (FLONASE) 50 MCG/ACT nasal spray Spray 1 spray into both nostrils daily       fluvoxaMINE (LUVOX) 50 MG tablet Take 1 tablet (50 mg) by mouth every morning AND 2 tablets (100 mg) at bedtime. After 2-3 weeks may increase to 100 mg bid. (Patient taking differently: Take 1 tablet (50 mg) by mouth twice daily.) 360 tablet 2     ibuprofen (ADVIL/MOTRIN) 200 MG tablet Take 400 mg by mouth every 6 hours as needed for moderate pain or mild pain       LANsoprazole (PREVACID) 30 MG DR capsule Take 1 capsule (30 mg) by mouth daily 90 capsule 0     lidocaine (LIDODERM) 5 % patch Place 1 patch onto the skin daily as needed for moderate pain Remove after 12 hours. 30 patch 5     linaclotide (LINZESS) 145 MCG capsule Take 145 mcg by mouth every morning (before breakfast)       montelukast (SINGULAIR) 10 MG tablet Take 1 tablet (10 mg) by mouth at bedtime. 90 tablet 3     naproxen (NAPROSYN) 500 MG tablet Take 1 tablet (500 mg) by mouth 2 times daily as needed for moderate pain 30 tablet 1     nystatin (MYCOSTATIN) 588133 UNIT/ML suspension Take 5 mLs (500,000 Units) by mouth 4 times daily. 473 mL 0     propranolol (INDERAL) 40 MG tablet Take 40 mg by mouth as needed (anxiety)       rizatriptan (MAXALT-MLT) 5 MG ODT Take 1-2 tablets (5-10 mg) by mouth at onset of headache for migraine (may repeat in 2 hours as needed. Max 30 mg in 24 hours). 18 tablet 6     valACYclovir (VALTREX) 1000 mg tablet TAKE 2 TABLETS BY MOUTH EVERY 12 HOURS FOR 2  doses AT first signs OF outbreak 12 tablet 1     Apremilast (OTEZLA) 10 & 20 & 30 MG TBPK Take 1 tablet in the morning on day 1, then take 1 tablet every 12 hours thereafter, according to the packet instructions. 55 each 0     Cholecalciferol (VITAMIN D) 2000 UNITS tablet Take 2,000 Units by mouth daily. 100 tablet 3     hydrOXYzine (VISTARIL) 25 MG capsule Take 1-2 capsules (25-50 mg) by mouth 3 times daily as needed for itching (Patient not taking: Reported on 2/17/2025) 60 capsule 3     predniSONE (DELTASONE) 20 MG tablet Take 2 tablets (40 mg) by mouth daily. 10 tablet 0     Risankizumab-rzaa (SKYRIZI) 150 MG/ML subcutaneous Inject 1 mL (150 mg) subcutaneously once every twelve weeks. - 1 injection on day 0, 1 injection on day 28, then 1 injection every 12 weeks 1 mL 3     No Known Allergies            Again, thank you for allowing me to participate in the care of your patient.        Sincerely,    Pierce Velasquez MD    Electronically signed

## 2025-02-17 NOTE — PROGRESS NOTES
[facility] Dermatology Note    Encounter Date: Feb 17, 2025    Dermatology Problem List:  1. Plaque psoriasis: scalp, trunk, with some ear involvement  - current tx: apremilast, augmented betamethasone lotion  - in reserve: apremilast (exorbitant copay)  - prior: augmented betamethasone gel, coal tar/salcylic acid shampoo, ketoconazole shampoo, zinc pyrithione shampoo, compounded betamethasone/calcipotriene solution, Excimer, methotrexate 15 mg weekly, adalimumab (severe injection site reaction), ustekinumab,  risankizumab (repeat infections/viruses due to immunosuppression)     ______________________________________    Impression/Plan:      Plaque psoriasis: overall improved on apremilast but still has some active itching on the scalp, though no scaling. Switched to apremilast given immunocompromising effects of risankizumab.   - Continue apremilast  - augmented betamethasone lotion PRN      Follow-up in 10 months.       Staff Involved:  Staff and Scribe  Scribe Disclosure:   I, Leelee Saez, am serving as a scribe to document services personally performed by Pierce Velasquez MD based on data collection and the provider's statements to me.     Provider Disclosure:   The documentation recorded by the scribe accurately reflects the services I personally performed and the decisions made by me.    Pierce Velasquez MD   of Dermatology  Department of Dermatology  [facility] [employer]        CC:   Chief Complaint   Patient presents with    Psoriasis     Pt here for psoriasis follow up. Recently started Otezla and things have been good! Pt is noticing some itching every so often.       History of Present Illness:  Ms. Manju Berg is a 54 year old female who presents as a return patient.    Today, patient reports psoriasis is going well with Otezla. Has not noticed any scaling but does report some itching. She is not having to use the topicals much.      Labs:  2024 CBC and Quantiferon     Physical exam:  Vitals: There were no vitals taken for this visit.  GEN: This is a well developed, well-nourished female in no acute distress, in a pleasant mood.    SKIN: Lowe phototype II  - Focused examination of the scalp and face was performed.  - No active rash noted on exam.   - No other lesions of concern on areas examined.     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     2011,Proximal left tibia     delivery delivered 2013    Dyspepsia and other specified disorders of function of stomach     No Comments Provided    Gastroesophageal reflux disease     Headache     No Comments Provided    Herpes simplex     No Comments Provided    Lateral epicondylitis of elbow     No Comments Provided    Other depressive disorder     No Comments Provided    Other unspecified back disorder     No Comments Provided    Pain in joint, upper arm     No Comments Provided     Past Surgical History:   Procedure Laterality Date    ------------OTHER------------- Right 2018    5th metatarsal fracture    BUNIONECTOMY Bilateral 1999    C/SECTION, LOW TRANSVERSE      ENDOSCOPIC ULTRASOUND UPPER GASTROINTESTINAL TRACT (GI) N/A 2022    Procedure: ENDOSCOPIC ULTRASOUND, ESOPHAGOSCOPY / UPPER GASTROINTESTINAL TRACT (GI) WITH BIOPSY;  Surgeon: Alissa Lubin MD;  Location: [facility] OR    FOOT SURGERY Left 2018    fusion of the large toe 2018    LAPAROSCOPIC CHOLECYSTECTOMY N/A 2022    Procedure: Laparoscopic cholecystectomy;  Surgeon: Bertha Potts MD;  Location:  OR    OTHER SURGICAL HISTORY      EXCIS BARTHOLIN GLAND/CYST    OTHER SURGICAL HISTORY Left 2012     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    RELEASE CARPAL TUNNEL  Bilateral     No Comments Provided    STRIP VEIN  2014    vein removal for varicose veins       Social History:   reports that she has never smoked. She has never been exposed to tobacco smoke. She has never used smokeless tobacco. She reports current alcohol use. She reports that she does not use drugs.    Family History:  Family History   Problem Relation Age of Onset    Depression Mother     Osteoporosis Mother     Mental Illness Mother     Hypertension Father         Hypertension    Hyperlipidemia Father         Hyperlipidemia    Colon Cancer Father 56        Cancer-colon    Bladder Cancer Father 62        Cancer,bladder    Obesity Father     Family History Negative Sister         Good Health    Family History Negative Son     Mental Illness Maternal Aunt     Mental Illness Maternal Uncle        Medications:  Current Outpatient Medications   Medication Sig Dispense Refill    acetaminophen (TYLENOL) 500 MG tablet Take 1,000 mg by mouth every 6 hours as needed for pain      albuterol (PROAIR HFA/PROVENTIL HFA/VENTOLIN HFA) 108 (90 Base) MCG/ACT inhaler Inhale 2 puffs into the lungs every 4 hours as needed for shortness of breath or wheezing. 25.5 g 3    albuterol (PROVENTIL) (2.5 MG/3ML) 0.083% neb solution Take 1 vial (2.5 mg) by nebulization every 6 hours as needed for shortness of breath or wheezing. 90 mL 3    apremilast (OTEZLA) 30 MG tablet Take 1 tablet (30 mg) by mouth 2 times daily. 60 tablet 11    augmented betamethasone dipropionate (DIPROLENE) 0.05 % external lotion Apply topically daily (Patient taking differently: Apply topically as needed.) 180 mL 0    budesonide-formoterol (SYMBICORT) 160-4.5 MCG/ACT Inhaler Inhale 2 puffs once daily plus 1-2 puffs as needed. May use up to 12 puffs per day. 20.4 g 11    buPROPion (WELLBUTRIN XL) 300 MG 24 hr tablet Take 1 tablet (300 mg) by mouth every morning 90 tablet 2    CALCIUM 600 + D 600-200 MG-UNIT OR TABS Take 1 tablet by mouth every evening       cetirizine (ZYRTEC) 10 MG tablet Take 10 mg by mouth daily      chlorzoxazone (PARAFON FORTE) 500 MG tablet Take 1/2 to 1.5 tablets by mouth 3-4 times daily as needed to relax muscles 90 tablet 3    fluticasone (FLONASE) 50 MCG/ACT nasal spray Spray 1 spray into both nostrils daily      fluvoxaMINE (LUVOX) 50 MG tablet Take 1 tablet (50 mg) by mouth every morning AND 2 tablets (100 mg) at bedtime. After 2-3 weeks may increase to 100 mg bid. (Patient taking differently: Take 1 tablet (50 mg) by mouth twice daily.) 360 tablet 2    ibuprofen (ADVIL/MOTRIN) 200 MG tablet Take 400 mg by mouth every 6 hours as needed for moderate pain or mild pain      LANsoprazole (PREVACID) 30 MG DR capsule Take 1 capsule (30 mg) by mouth daily 90 capsule 0    lidocaine (LIDODERM) 5 % patch Place 1 patch onto the skin daily as needed for moderate pain Remove after 12 hours. 30 patch 5    linaclotide (LINZESS) 145 MCG capsule Take 145 mcg by mouth every morning (before breakfast)      montelukast (SINGULAIR) 10 MG tablet Take 1 tablet (10 mg) by mouth at bedtime. 90 tablet 3    naproxen (NAPROSYN) 500 MG tablet Take 1 tablet (500 mg) by mouth 2 times daily as needed for moderate pain 30 tablet 1    nystatin (MYCOSTATIN) 919399 UNIT/ML suspension Take 5 mLs (500,000 Units) by mouth 4 times daily. 473 mL 0    propranolol (INDERAL) 40 MG tablet Take 40 mg by mouth as needed (anxiety)      rizatriptan (MAXALT-MLT) 5 MG ODT Take 1-2 tablets (5-10 mg) by mouth at onset of headache for migraine (may repeat in 2 hours as needed. Max 30 mg in 24 hours). 18 tablet 6    valACYclovir (VALTREX) 1000 mg tablet TAKE 2 TABLETS BY MOUTH EVERY 12 HOURS FOR 2 doses AT first signs OF outbreak 12 tablet 1    Apremilast (OTEZLA) 10 & 20 & 30 MG TBPK Take 1 tablet in the morning on day 1, then take 1 tablet every 12 hours thereafter, according to the packet instructions. 55 each 0    Cholecalciferol (VITAMIN D) 2000 UNITS tablet Take 2,000 Units by mouth  daily. 100 tablet 3    hydrOXYzine (VISTARIL) 25 MG capsule Take 1-2 capsules (25-50 mg) by mouth 3 times daily as needed for itching (Patient not taking: Reported on 2/17/2025) 60 capsule 3    predniSONE (DELTASONE) 20 MG tablet Take 2 tablets (40 mg) by mouth daily. 10 tablet 0    Risankizumab-rzaa (SKYRIZI) 150 MG/ML subcutaneous Inject 1 mL (150 mg) subcutaneously once every twelve weeks. - 1 injection on day 0, 1 injection on day 28, then 1 injection every 12 weeks 1 mL 3     No Known Allergies

## 2025-02-17 NOTE — NURSING NOTE
Manju Berg's chief complaint for this visit includes:  Chief Complaint   Patient presents with    Psoriasis     Pt here for psoriasis follow up. Recently started Otezla and things have been good! Pt is noticing some itching every so often.     PCP: Yarely Wise    Referring Provider:  Yarely Wise MD  [address]  [place]  [place] [address]    There were no vitals taken for this visit.  Mild Pain (2)      No Known Allergies      Do you need any medication refills at today's visit?    Jade Blankenship on 2/17/2025 at 2:12 PM

## 2025-02-20 ENCOUNTER — MYC MEDICAL ADVICE (OUTPATIENT)
Dept: DERMATOLOGY | Facility: CLINIC | Age: 55
End: 2025-02-20
Payer: COMMERCIAL

## 2025-02-20 NOTE — TELEPHONE ENCOUNTER
Yes, I've had a couple of patients develop anxiety-like symptoms with Otezla. Not exactly this, but it would fit in the spectrum. I recommend holding for 1 week and seeing how the symptoms do. If they go away, we can try 1 pill daily before further dose escalation. Thanks!

## 2025-02-25 ENCOUNTER — OFFICE VISIT (OUTPATIENT)
Dept: NEUROLOGY | Facility: CLINIC | Age: 55
End: 2025-02-25
Payer: COMMERCIAL

## 2025-02-25 VITALS
BODY MASS INDEX: 33.97 KG/M2 | WEIGHT: 198 LBS | DIASTOLIC BLOOD PRESSURE: 85 MMHG | HEART RATE: 104 BPM | OXYGEN SATURATION: 97 % | SYSTOLIC BLOOD PRESSURE: 127 MMHG

## 2025-02-25 DIAGNOSIS — G43.709 CHRONIC MIGRAINE WITHOUT AURA WITHOUT STATUS MIGRAINOSUS, NOT INTRACTABLE: ICD-10-CM

## 2025-02-25 DIAGNOSIS — G43.009 MIGRAINE WITHOUT AURA AND WITHOUT STATUS MIGRAINOSUS, NOT INTRACTABLE: Primary | ICD-10-CM

## 2025-02-25 RX ORDER — SUMATRIPTAN 50 MG/1
50-100 TABLET, FILM COATED ORAL
Qty: 12 TABLET | Refills: 3 | Status: SHIPPED | OUTPATIENT
Start: 2025-02-25

## 2025-02-25 NOTE — PATIENT INSTRUCTIONS
Acute Treatment:  -For acute treatment of mild headache, take acetaminophen and/or ibuprofen or naproxen as needed. Do not exceed more than 14 days per month to avoid medication overuse.  -For acute treatment of moderate to severe headache, take sumatriptan at the onset of headache, with a repeat dose in two hours if needed. Do not exceed more than 9 days per month to avoid medication overuse.  Stop rizatriptan  -For headache related nausea, or as a rescue medicine for headache, take ondansetron as needed.     Preventive Treatment:  -For headache prevention,   Continue Botox     Follow up for Botox as scheduled

## 2025-02-25 NOTE — PROGRESS NOTES
[facility]    Headache Neurology Progress Note  February 25, 2025      Assessment/Plan:   Manju Berg is a 54 year old   Chronic migraines managed with Botox and as needed treatment.  Acute Treatment:  -For acute treatment of mild headache, take acetaminophen and/or ibuprofen or naproxen as needed. Do not exceed more than 14 days per month to avoid medication overuse.  -For acute treatment of moderate to severe headache, take sumatriptan at the onset of headache, with a repeat dose in two hours if needed. Do not exceed more than 9 days per month to avoid medication overuse.  Stop rizatriptan  -For headache related nausea, or as a rescue medicine for headache, take ondansetron as needed.     Preventive Treatment:  -For headache prevention,   Continue Botox     Follow up for Botox as scheduled     The longitudinal plan of care for Manju was addressed during this visit. Due to the added complexity in care, I will continue to support Manju in the subsequent management of this condition(s) and with the ongoing continuity of care of this condition(s).    16 minutes spent on the date of the encounter doing video  access, chart  review,  results review,  meds review, treatment plan, documentation and further activities as noted above    BELINDA Wagner, CNP [facility]    Subjective:    Manju Berg returns for follow up of headaches  Plaque psoriasis, asthma, s/p cholecysectomy, elevated LFTs   Last known Botox headache clinic visit on 2/7/2024, see note for details  Last Botox 12/09/2024.   Patient reports today that she stopped apremilast (Otezla) one week ago and headaches better-no headaches. Has been on 2 months. Headaches would come in cluster and not similar to migraines. Pain wise-moderate and holocephalic. No other symptoms and would have for 5 days than gone and recurrent again. Headaches started after starting Otezla.   Has been doing Botox  for migraine prevention and it helps at least 50-75% in sever migraine. Rescue treatment -rizatriptan uses may be once per month but it does not help. Uses naproxen or ibuprofen and acetaminophen.     Headache Tx  Nurtec caused headache   Tylenol as needed and much less since starting Botox   Occasional ibuprofen, naproxen -may interact with fluvoxamine -GI bleed reviewed with the patient    Gabapentin -caused fatigue   Nortriptyline -helped working and caused fatigue  Propranolol -retrial caused fatigue but was working and now takes as needed for anxiety  Verapamil-fatigue  Botox -2 round so far and seems effective   Rizatriptan-incomplete response at 5 mg   Ondansetron as needed for nausea    Objective:   Vitals: /85 (BP Location: Right arm, Patient Position: Sitting, Cuff Size: Adult Regular)   Pulse 104   Wt 89.8 kg (198 lb)   SpO2 97%   BMI 33.97 kg/m    General: Cooperative, NAD  Neurologic:  Mental Status: Fully alert, attentive and oriented. Speech clear and fluent.   Cranial Nerves: Facial movements symmetric.   Motor: No abnormal movements.      Pertinent Investigations:            11/1/2023     9:11 AM 2/7/2024     9:01 AM 9/9/2024     9:32 AM   HIT-6   When you have headaches, how often is the pain severe 11  10 10   How often do headaches limit your ability to do usual daily activities including household work, work, school, or social activities? 8  10 11   When you have a headache, how often do you wish you could lie down? 8  8 11   In the past 4 weeks, how often have you felt too tired to do work or daily activities because of your headaches 8  8 8   In the past 4 weeks, how often have you felt fed up or irritated because of your headaches 11  10 11   In the past 4 weeks, how often did headaches limit your ability to concentrate on work or daily activities 11  10 11   HIT-6 Total Score 57 56 62       Proxy-reported           11/1/2023     9:13 AM 2/7/2024     9:02 AM   MIDAS - in the past  three months:   On how many days did you miss work or school because of your headaches? 0 0   How many days was your productivity at work or school reduced by half or more because of your headaches? 2 15   On how many days did you not do household work because of your headaches? 3 5   How many days was your productivity in household work reduced by half or more because of your headaches? 4 10   On how many days did you miss family, social, or leisure activities because of your headaches? 1 0   On how many days did you have a headache? 75 45   On a scale of 0-10, on average how painful were these headaches? 5 6   MIDAS Score 10 (II - Mild Disability) 30 (IV - Severe Disability)

## 2025-02-25 NOTE — LETTER
2/25/2025       RE: Manju Berg  [address]  [place] [address]     Dear Colleague,    Thank you for referring your patient, Manju Berg, to the [facility]UROLOGY [facility] [place] at [facility]. Please see a copy of my visit note below.    [facility]    Headache Neurology Progress Note  February 25, 2025      Assessment/Plan:   Manju Berg is a 54 year old   Chronic migraines managed with Botox and as needed treatment.  Acute Treatment:  -For acute treatment of mild headache, take acetaminophen and/or ibuprofen or naproxen as needed. Do not exceed more than 14 days per month to avoid medication overuse.  -For acute treatment of moderate to severe headache, take sumatriptan at the onset of headache, with a repeat dose in two hours if needed. Do not exceed more than 9 days per month to avoid medication overuse.  Stop rizatriptan  -For headache related nausea, or as a rescue medicine for headache, take ondansetron as needed.     Preventive Treatment:  -For headache prevention,   Continue Botox     Follow up for Botox as scheduled     The longitudinal plan of care for Manju was addressed during this visit. Due to the added complexity in care, I will continue to support Manju in the subsequent management of this condition(s) and with the ongoing continuity of care of this condition(s).    16 minutes spent on the date of the encounter doing video  access, chart  review,  results review,  meds review, treatment plan, documentation and further activities as noted above    BELINDA Wagner, CNP [facility]    Subjective:    Manju Berg returns for follow up of headaches  Plaque psoriasis, asthma, s/p cholecysectomy, elevated LFTs   Last known Botox headache clinic visit on 2/7/2024, see note for details  Last Botox 12/09/2024.   Patient reports today that she stopped  apremilast (Otezla) one week ago and headaches better-no headaches. Has been on 2 months. Headaches would come in cluster and not similar to migraines. Pain wise-moderate and holocephalic. No other symptoms and would have for 5 days than gone and recurrent again. Headaches started after starting Otezla.   Has been doing Botox for migraine prevention and it helps at least 50-75% in sever migraine. Rescue treatment -rizatriptan uses may be once per month but it does not help. Uses naproxen or ibuprofen and acetaminophen.     Headache Tx  Nurtec caused headache   Tylenol as needed and much less since starting Botox   Occasional ibuprofen, naproxen -may interact with fluvoxamine -GI bleed reviewed with the patient    Gabapentin -caused fatigue   Nortriptyline -helped working and caused fatigue  Propranolol -retrial caused fatigue but was working and now takes as needed for anxiety  Verapamil-fatigue  Botox -2 round so far and seems effective   Rizatriptan-incomplete response at 5 mg   Ondansetron as needed for nausea    Objective:   Vitals: /85 (BP Location: Right arm, Patient Position: Sitting, Cuff Size: Adult Regular)   Pulse 104   Wt 89.8 kg (198 lb)   SpO2 97%   BMI 33.97 kg/m    General: Cooperative, NAD  Neurologic:  Mental Status: Fully alert, attentive and oriented. Speech clear and fluent.   Cranial Nerves: Facial movements symmetric.   Motor: No abnormal movements.      Pertinent Investigations:            11/1/2023     9:11 AM 2/7/2024     9:01 AM 9/9/2024     9:32 AM   HIT-6   When you have headaches, how often is the pain severe 11  10 10   How often do headaches limit your ability to do usual daily activities including household work, work, school, or social activities? 8  10 11   When you have a headache, how often do you wish you could lie down? 8  8 11   In the past 4 weeks, how often have you felt too tired to do work or daily activities because of your headaches 8  8 8   In the past 4  weeks, how often have you felt fed up or irritated because of your headaches 11  10 11   In the past 4 weeks, how often did headaches limit your ability to concentrate on work or daily activities 11  10 11   HIT-6 Total Score 57 56 62       Proxy-reported           11/1/2023     9:13 AM 2/7/2024     9:02 AM   MIDAS - in the past three months:   On how many days did you miss work or school because of your headaches? 0 0   How many days was your productivity at work or school reduced by half or more because of your headaches? 2 15   On how many days did you not do household work because of your headaches? 3 5   How many days was your productivity in household work reduced by half or more because of your headaches? 4 10   On how many days did you miss family, social, or leisure activities because of your headaches? 1 0   On how many days did you have a headache? 75 45   On a scale of 0-10, on average how painful were these headaches? 5 6   MIDAS Score 10 (II - Mild Disability) 30 (IV - Severe Disability)          Again, thank you for allowing me to participate in the care of your patient.      Sincerely,    BELINDA Montalvo CNP

## 2025-03-13 ENCOUNTER — OFFICE VISIT (OUTPATIENT)
Dept: NEUROLOGY | Facility: CLINIC | Age: 55
End: 2025-03-13
Payer: COMMERCIAL

## 2025-03-13 DIAGNOSIS — G43.709 CHRONIC MIGRAINE WITHOUT AURA WITHOUT STATUS MIGRAINOSUS, NOT INTRACTABLE: Primary | ICD-10-CM

## 2025-03-13 NOTE — LETTER
3/13/2025       RE: Manju Berg  [address] [place] [address]     Dear Colleague,    Thank you for referring your patient, Manju Berg, to the [facility]UROLOGY [facility] [place] at [facility]. Please see a copy of my visit note below.    PROCEDURE NOTE:    [facility]  Botulinum Toxin Procedure      Headache Neurology    March 13, 2025    Procedure:  OnabotulinumtoxinA injections for chronic migraine  Indication:  Chronic migraine    Manju  suffers from severe intractable headaches.  She was referred by for onabotulinumtoxinA injections for headache.  Risks, benefits, and alternatives were discussed.  All questions were answered and consent given.  She decided to proceed with the injections.     Headache history, from initial consult note: see initial headache clinic visit for details    Prior to initiation of botulinum toxin injections, Ms. Berg reported 12-16 headache days per month, with  severe headache days per month. Her headaches were quite disabling and often interfere with her ability to function normally.      Date of last injections: 12/9/2024  Date of last office visit: 2/25/2025      Ms. Berg reports 45 in 90 days more headaches since starting otezla but no severe migraines.  She has noticed a wearing off phenomenon prior to this round of botulinum toxin injections, lasting  weeks.    Botulinum toxin injections have improved their functioning. Since the last set of injections, they report  they 0 missed work/were unable to care for their children/were unable to drive/cancelled appointments or social events 0 days per month.       She has attempted other migraine prophylactic treatments in the past, which have included:   Propranolol -mood changes, rizatriptan, nortriptyline, ibuprofen, naproxen, gabapentin, wellbutrin, fluvoxamine, verapamil-side effects, nurtec     Ms. Berg's pain was assessed  prior to the procedure.  She rated her pain today as 3 out of 10.    Procedural Pause: Procedural pause was conducted to verify correct patient identity, procedure to be performed, correct side and site, correct patient position, and special requirements. Appropriate hand hygiene was utilized, and each injection site was prepped with alcohol wipe or Chloraprep swab.     Procedure Details: 200 units of onabotulinumtoxinA was diluted in 4 mL 0.9% normal saline. A total of 155 units of onabotulinumtoxinA were injected using 30 gauge 0.5 in needles into the muscles listed below. 45 units of onabotulinumtoxinA were wasted.         AREA/MUSCLE INJECTED:  155 units of Botox     Right upper Trapezius (upper cervical) - 5 units of Botox at 3 site/s.   Left upper Trapezius (upper cervical) - 5 units of Botox at 3 site/s.     Right cervical paraspinals - 5 units of Botox at 2 site/s.   Left cervical paraspinals - 5 units of Botox at 2 site/s.     Left occipitalis - 5 units of Botox at 3 site/s.  Right occipitalis -5 units of Botox at 3 site/s    Right Frontalis - 5 units of Botox at 2 site/s.  Left Frontalis - 5 units of Botox at 2 site/s.    Right Temporalis - 5 units of Botox at 4 site/s.  Left Temporalis - 5 units of Botox at 4 site/s.    Right  - 5 units of Botox at 1 site/s.              Left  - 5 units of Botox at 1 site/s.    Procerus - 5 units of Botox at 1 site/s.    RESPONSE TO PROCEDURE:  tolerated the procedure well and there were no immediate complications.  Patient was allowed to recover for an appropriate period of time and was discharged home in stable condition.    FOLLOW UP:    Repeat Botox injections in 12 weeks      This procedure was performed under a hospital privileging agreement with Dr. Dom Aguilar, APRN, [facility]        Again, thank you for allowing me to participate in the care of your patient.      Sincerely,    Brittany Vigil  BELINDA Aguilar CNP

## 2025-03-13 NOTE — PROGRESS NOTES
PROCEDURE NOTE:    [facility]  Botulinum Toxin Procedure      Headache Neurology    March 13, 2025    Procedure:  OnabotulinumtoxinA injections for chronic migraine  Indication:  Chronic migraine    Manju  suffers from severe intractable headaches.  She was referred by for onabotulinumtoxinA injections for headache.  Risks, benefits, and alternatives were discussed.  All questions were answered and consent given.  She decided to proceed with the injections.     Headache history, from initial consult note: see initial headache clinic visit for details    Prior to initiation of botulinum toxin injections, Ms. Berg reported 12-16 headache days per month, with  severe headache days per month. Her headaches were quite disabling and often interfere with her ability to function normally.      Date of last injections: 12/9/2024  Date of last office visit: 2/25/2025      Ms. Berg reports 45 in 90 days more headaches since starting otezla but no severe migraines.  She has noticed a wearing off phenomenon prior to this round of botulinum toxin injections, lasting  weeks.    Botulinum toxin injections have improved their functioning. Since the last set of injections, they report  they 0 missed work/were unable to care for their children/were unable to drive/cancelled appointments or social events 0 days per month.       She has attempted other migraine prophylactic treatments in the past, which have included:   Propranolol -mood changes, rizatriptan, nortriptyline, ibuprofen, naproxen, gabapentin, wellbutrin, fluvoxamine, verapamil-side effects, nurtec     Ms. Anderson pain was assessed prior to the procedure.  She rated her pain today as 3 out of 10.    Procedural Pause: Procedural pause was conducted to verify correct patient identity, procedure to be performed, correct side and site, correct patient position, and special requirements. Appropriate hand hygiene was utilized, and each injection site was  prepped with alcohol wipe or Chloraprep swab.     Procedure Details: 200 units of onabotulinumtoxinA was diluted in 4 mL 0.9% normal saline. A total of 155 units of onabotulinumtoxinA were injected using 30 gauge 0.5 in needles into the muscles listed below. 45 units of onabotulinumtoxinA were wasted.         AREA/MUSCLE INJECTED:  155 units of Botox     Right upper Trapezius (upper cervical) - 5 units of Botox at 3 site/s.   Left upper Trapezius (upper cervical) - 5 units of Botox at 3 site/s.     Right cervical paraspinals - 5 units of Botox at 2 site/s.   Left cervical paraspinals - 5 units of Botox at 2 site/s.     Left occipitalis - 5 units of Botox at 3 site/s.  Right occipitalis -5 units of Botox at 3 site/s    Right Frontalis - 5 units of Botox at 2 site/s.  Left Frontalis - 5 units of Botox at 2 site/s.    Right Temporalis - 5 units of Botox at 4 site/s.  Left Temporalis - 5 units of Botox at 4 site/s.    Right  - 5 units of Botox at 1 site/s.              Left  - 5 units of Botox at 1 site/s.    Procerus - 5 units of Botox at 1 site/s.    RESPONSE TO PROCEDURE:  tolerated the procedure well and there were no immediate complications.  Patient was allowed to recover for an appropriate period of time and was discharged home in stable condition.    FOLLOW UP:    Repeat Botox injections in 12 weeks      This procedure was performed under a hospital privileging agreement with BELINDA Cast, [facility]

## 2025-03-18 ENCOUNTER — MYC REFILL (OUTPATIENT)
Dept: FAMILY MEDICINE | Facility: CLINIC | Age: 55
End: 2025-03-18
Payer: COMMERCIAL

## 2025-03-18 DIAGNOSIS — K59.00 CONSTIPATION, UNSPECIFIED CONSTIPATION TYPE: Primary | ICD-10-CM

## 2025-03-18 DIAGNOSIS — F41.9 ANXIETY: ICD-10-CM

## 2025-03-18 DIAGNOSIS — F32.0 CURRENT MILD EPISODE OF MAJOR DEPRESSIVE DISORDER WITHOUT PRIOR EPISODE: ICD-10-CM

## 2025-03-18 NOTE — TELEPHONE ENCOUNTER
Clinic RN: Please investigate patient's chart or contact patient if the information cannot be found because Linzess-  the medication is listed as historical or discontinued. Confirm patient is taking this medication. Document findings and route refill encounter to provider for approval or denial.

## 2025-03-18 NOTE — TELEPHONE ENCOUNTER
First attempt. Responded to patient refill request as requested by refill team:    Prescription requested: Linzess    Clinic RN: Please investigate patient's chart or contact patient if the information cannot be found because Linzess-  the medication is listed as historical or discontinued. Confirm patient is taking this medication. Document findings and route refill encounter to provider for approval or denial.     RN, if/when patient returns call, please review message as shown. Tenzin Puri RN, BSN

## 2025-03-19 RX ORDER — BUPROPION HYDROCHLORIDE 300 MG/1
300 TABLET ORAL EVERY MORNING
Qty: 90 TABLET | Refills: 1 | Status: SHIPPED | OUTPATIENT
Start: 2025-03-19

## 2025-03-19 NOTE — TELEPHONE ENCOUNTER
Spoke with patient as requested by refill team. Patient states that at her last physical, Yarely Wise MD had mentioned that she would refill the Linzess. Patient take 1 capsule per day. Routing to provider for review. Tenzin Puri RN, BSN

## 2025-03-26 DIAGNOSIS — K21.9 GASTROESOPHAGEAL REFLUX DISEASE, UNSPECIFIED WHETHER ESOPHAGITIS PRESENT: ICD-10-CM

## 2025-03-27 RX ORDER — LANSOPRAZOLE 30 MG/1
30 CAPSULE, DELAYED RELEASE ORAL DAILY
Qty: 90 CAPSULE | Refills: 1 | Status: SHIPPED | OUTPATIENT
Start: 2025-03-27

## 2025-03-27 NOTE — TELEPHONE ENCOUNTER
Patient returning call. Patient states that there is a gap in the prescription because she had tried weaning herself off of the medication but it didn't work well. Patient is requesting refill on prescription. Routing to provider, please review and advise. Tenzin Puri RN, BSN

## 2025-03-27 NOTE — TELEPHONE ENCOUNTER
Clinic RN: Please investigate patient's chart or contact patient if the information cannot be found because patient should have run out of this medication on 11/12/24. Confirm patient is taking this medication as prescribed. Document findings and route refill encounter to provider for approval or denial.    Lee Campbell, RN, BSN, MSN  RN Lead

## 2025-03-27 NOTE — TELEPHONE ENCOUNTER
First attempt. Left message for patient to return call to clinic as requested by refill team:    Prescription requested: Lansoprazole (Prevacid)    Clinic RN: Please investigate patient's chart or contact patient if the information cannot be found because patient should have run out of this medication on 11/12/24. Confirm patient is taking this medication as prescribed. Document findings and route refill encounter to provider for approval or denial.     RN, if/when patient returns call, please review message as shown. Tenzin Puri, RN, BSN

## 2025-04-09 ENCOUNTER — TELEPHONE (OUTPATIENT)
Dept: DERMATOLOGY | Facility: CLINIC | Age: 55
End: 2025-04-09
Payer: COMMERCIAL

## 2025-04-09 NOTE — TELEPHONE ENCOUNTER
"PT adherence notice for Otezla 30mg tabs from [facility] states \"unable to reach PT to place refill order, according to records, they are overdue. Any additional information about current status, or change in therapy, please notify us.\"  Tel: [phone] or [phone]  Fax: [phone]  Email: [email or web address]  Last filled: 02/05/2025    Adonis Reed MA on 4/9/2025 at 10:24 AM    "

## 2025-04-21 DIAGNOSIS — F32.0 CURRENT MILD EPISODE OF MAJOR DEPRESSIVE DISORDER WITHOUT PRIOR EPISODE: ICD-10-CM

## 2025-04-21 DIAGNOSIS — F41.9 ANXIETY: ICD-10-CM

## 2025-04-21 RX ORDER — BUPROPION HYDROCHLORIDE 300 MG/1
300 TABLET ORAL EVERY MORNING
Qty: 90 TABLET | Refills: 2 | OUTPATIENT
Start: 2025-04-21

## 2025-05-06 ENCOUNTER — PATIENT OUTREACH (OUTPATIENT)
Dept: CARE COORDINATION | Facility: CLINIC | Age: 55
End: 2025-05-06
Payer: COMMERCIAL

## 2025-06-02 ENCOUNTER — OFFICE VISIT (OUTPATIENT)
Dept: NEUROLOGY | Facility: CLINIC | Age: 55
End: 2025-06-02
Payer: COMMERCIAL

## 2025-06-02 DIAGNOSIS — G43.709 CHRONIC MIGRAINE WITHOUT AURA WITHOUT STATUS MIGRAINOSUS, NOT INTRACTABLE: Primary | ICD-10-CM

## 2025-06-02 PROCEDURE — 64615 CHEMODENERV MUSC MIGRAINE: CPT | Performed by: NURSE PRACTITIONER

## 2025-06-02 NOTE — PROGRESS NOTES
PROCEDURE NOTE:     [facility]  Botulinum Toxin Procedure        Headache Neurology     06/02/25    Procedure:  OnabotulinumtoxinA injections for chronic migraine  Indication:  Chronic migraine     Manju  suffers from severe intractable headaches.  She was referred by for onabotulinumtoxinA injections for headache.  Risks, benefits, and alternatives were discussed.  All questions were answered and consent given.  She decided to proceed with the injections.      Headache history, from initial consult note: see initial headache clinic visit for details     Prior to initiation of botulinum toxin injections, Ms. Berg reported 12-16 headache days per month, with  severe headache days per month. Her headaches were quite disabling and often interfere with her ability to function normally.        Date of last injections: March 13, 2025  Date of last office visit: 2/25/2025        Ms. Berg reports 0 in 90 days more headaches since starting otezla but no severe migraines.  She has noticed a wearing off phenomenon prior to this round of botulinum toxin injections, lasting  weeks.     Botulinum toxin injections have improved their functioning. Since the last set of injections, they report  they 0 missed work/were unable to care for their children/were unable to drive/cancelled appointments or social events 0 days per month.         She has attempted other migraine prophylactic treatments in the past, which have included:   Propranolol -mood changes, rizatriptan, nortriptyline, ibuprofen, naproxen, gabapentin, wellbutrin, fluvoxamine, verapamil-side effects, nurtec      Ms. Blancos pain was assessed prior to the procedure.  She rated her pain today as 0 out of 10.     Procedural Pause: Procedural pause was conducted to verify correct patient identity, procedure to be performed, correct side and site, correct patient position, and special requirements. Appropriate hand hygiene was utilized, and each injection  site was prepped with alcohol wipe or Chloraprep swab.      Procedure Details: 200 units of onabotulinumtoxinA was diluted in 4 mL 0.9% normal saline. A total of 155 units of onabotulinumtoxinA were injected using 30 gauge 0.5 in needles into the muscles listed below. 45 units of onabotulinumtoxinA were wasted.            AREA/MUSCLE INJECTED:  155 units of Botox     Right upper Trapezius (upper cervical) - 5 units of Botox at 3 site/s.   Left upper Trapezius (upper cervical) - 5 units of Botox at 3 site/s.      Right cervical paraspinals - 5 units of Botox at 2 site/s.   Left cervical paraspinals - 5 units of Botox at 2 site/s.      Left occipitalis - 5 units of Botox at 3 site/s.  Right occipitalis -5 units of Botox at 3 site/s     Right Frontalis - 5 units of Botox at 2 site/s.  Left Frontalis - 5 units of Botox at 2 site/s.     Right Temporalis - 5 units of Botox at 4 site/s.  Left Temporalis - 5 units of Botox at 4 site/s.     Right  - 5 units of Botox at 1 site/s.              Left  - 5 units of Botox at 1 site/s.     Procerus - 5 units of Botox at 1 site/s.     RESPONSE TO PROCEDURE:  tolerated the procedure well and there were no immediate complications.  Patient was allowed to recover for an appropriate period of time and was discharged home in stable condition.     FOLLOW UP:     Repeat Botox injections in 12 weeks        This procedure was performed under a hospital privileging agreement with BELINDA Cast, [facility]

## 2025-06-02 NOTE — LETTER
6/2/2025       RE: Manju Berg  [address] [facility] [id number]     Dear Colleague,    Thank you for referring your patient, Manju Berg, to the [facility] [place] at [facility]. Please see a copy of my visit note below.    PROCEDURE NOTE:     [facility]  Botulinum Toxin Procedure        Headache Neurology     06/02/25    Procedure:  OnabotulinumtoxinA injections for chronic migraine  Indication:  Chronic migraine     Manju  suffers from severe intractable headaches.  She was referred by for onabotulinumtoxinA injections for headache.  Risks, benefits, and alternatives were discussed.  All questions were answered and consent given.  She decided to proceed with the injections.      Headache history, from initial consult note: see initial headache clinic visit for details     Prior to initiation of botulinum toxin injections, Ms. Berg reported 12-16 headache days per month, with  severe headache days per month. Her headaches were quite disabling and often interfere with her ability to function normally.        Date of last injections: March 13, 2025  Date of last office visit: 2/25/2025        Ms. Berg reports 0 in 90 days more headaches since starting otezla but no severe migraines.  She has noticed a wearing off phenomenon prior to this round of botulinum toxin injections, lasting  weeks.     Botulinum toxin injections have improved their functioning. Since the last set of injections, they report  they 0 missed work/were unable to care for their children/were unable to drive/cancelled appointments or social events 0 days per month.         She has attempted other migraine prophylactic treatments in the past, which have included:   Propranolol -mood changes, rizatriptan, nortriptyline, ibuprofen, naproxen, gabapentin, wellbutrin, fluvoxamine, verapamil-side effects, nurtec      Ms. Berg's pain was  assessed prior to the procedure.  She rated her pain today as 0 out of 10.     Procedural Pause: Procedural pause was conducted to verify correct patient identity, procedure to be performed, correct side and site, correct patient position, and special requirements. Appropriate hand hygiene was utilized, and each injection site was prepped with alcohol wipe or Chloraprep swab.      Procedure Details: 200 units of onabotulinumtoxinA was diluted in 4 mL 0.9% normal saline. A total of 155 units of onabotulinumtoxinA were injected using 30 gauge 0.5 in needles into the muscles listed below. 45 units of onabotulinumtoxinA were wasted.            AREA/MUSCLE INJECTED:  155 units of Botox     Right upper Trapezius (upper cervical) - 5 units of Botox at 3 site/s.   Left upper Trapezius (upper cervical) - 5 units of Botox at 3 site/s.      Right cervical paraspinals - 5 units of Botox at 2 site/s.   Left cervical paraspinals - 5 units of Botox at 2 site/s.      Left occipitalis - 5 units of Botox at 3 site/s.  Right occipitalis -5 units of Botox at 3 site/s     Right Frontalis - 5 units of Botox at 2 site/s.  Left Frontalis - 5 units of Botox at 2 site/s.     Right Temporalis - 5 units of Botox at 4 site/s.  Left Temporalis - 5 units of Botox at 4 site/s.     Right  - 5 units of Botox at 1 site/s.              Left  - 5 units of Botox at 1 site/s.     Procerus - 5 units of Botox at 1 site/s.     RESPONSE TO PROCEDURE:  tolerated the procedure well and there were no immediate complications.  Patient was allowed to recover for an appropriate period of time and was discharged home in stable condition.     FOLLOW UP:     Repeat Botox injections in 12 weeks        This procedure was performed under a hospital privileging agreement with Dr. Dom Aguilar, APRN, [facility]       Again, thank you for allowing me to participate in the care of your patient.       Sincerely,    BELINDA Montalvo CNP

## 2025-06-04 ENCOUNTER — OFFICE VISIT (OUTPATIENT)
Dept: FAMILY MEDICINE | Facility: CLINIC | Age: 55
End: 2025-06-04
Attending: FAMILY MEDICINE
Payer: COMMERCIAL

## 2025-06-04 VITALS
BODY MASS INDEX: 35.66 KG/M2 | HEIGHT: 63 IN | RESPIRATION RATE: 14 BRPM | TEMPERATURE: 97.7 F | OXYGEN SATURATION: 97 % | WEIGHT: 201.25 LBS | SYSTOLIC BLOOD PRESSURE: 115 MMHG | DIASTOLIC BLOOD PRESSURE: 78 MMHG | HEART RATE: 83 BPM

## 2025-06-04 DIAGNOSIS — F32.0 CURRENT MILD EPISODE OF MAJOR DEPRESSIVE DISORDER WITHOUT PRIOR EPISODE: ICD-10-CM

## 2025-06-04 DIAGNOSIS — F41.9 ANXIETY: ICD-10-CM

## 2025-06-04 DIAGNOSIS — Z12.31 ENCOUNTER FOR SCREENING MAMMOGRAM FOR BREAST CANCER: ICD-10-CM

## 2025-06-04 DIAGNOSIS — R22.2 SUBCUTANEOUS MASS OF BACK: ICD-10-CM

## 2025-06-04 DIAGNOSIS — Z97.5 IUD (INTRAUTERINE DEVICE) IN PLACE: ICD-10-CM

## 2025-06-04 DIAGNOSIS — Z13.220 SCREENING CHOLESTEROL LEVEL: ICD-10-CM

## 2025-06-04 DIAGNOSIS — E66.01 MORBID OBESITY (H): ICD-10-CM

## 2025-06-04 DIAGNOSIS — J45.30 MILD PERSISTENT ASTHMA WITHOUT COMPLICATION: ICD-10-CM

## 2025-06-04 DIAGNOSIS — K21.9 GASTROESOPHAGEAL REFLUX DISEASE, UNSPECIFIED WHETHER ESOPHAGITIS PRESENT: ICD-10-CM

## 2025-06-04 DIAGNOSIS — R73.03 PREDIABETES: ICD-10-CM

## 2025-06-04 DIAGNOSIS — Z12.4 CERVICAL CANCER SCREENING: ICD-10-CM

## 2025-06-04 DIAGNOSIS — Z00.00 ROUTINE GENERAL MEDICAL EXAMINATION AT A HEALTH CARE FACILITY: Primary | ICD-10-CM

## 2025-06-04 DIAGNOSIS — L40.0 PLAQUE PSORIASIS: ICD-10-CM

## 2025-06-04 DIAGNOSIS — M72.2 PLANTAR FASCIITIS: ICD-10-CM

## 2025-06-04 PROCEDURE — 1125F AMNT PAIN NOTED PAIN PRSNT: CPT | Performed by: FAMILY MEDICINE

## 2025-06-04 PROCEDURE — G2211 COMPLEX E/M VISIT ADD ON: HCPCS | Performed by: FAMILY MEDICINE

## 2025-06-04 PROCEDURE — 3074F SYST BP LT 130 MM HG: CPT | Performed by: FAMILY MEDICINE

## 2025-06-04 PROCEDURE — 3078F DIAST BP <80 MM HG: CPT | Performed by: FAMILY MEDICINE

## 2025-06-04 PROCEDURE — 99396 PREV VISIT EST AGE 40-64: CPT | Performed by: FAMILY MEDICINE

## 2025-06-04 PROCEDURE — 99214 OFFICE O/P EST MOD 30 MIN: CPT | Mod: 25 | Performed by: FAMILY MEDICINE

## 2025-06-04 RX ORDER — FLUVOXAMINE MALEATE 50 MG
TABLET ORAL
COMMUNITY
Start: 2025-06-04

## 2025-06-04 SDOH — HEALTH STABILITY: PHYSICAL HEALTH: ON AVERAGE, HOW MANY DAYS PER WEEK DO YOU ENGAGE IN MODERATE TO STRENUOUS EXERCISE (LIKE A BRISK WALK)?: 1 DAY

## 2025-06-04 ASSESSMENT — ANXIETY QUESTIONNAIRES
4. TROUBLE RELAXING: SEVERAL DAYS
5. BEING SO RESTLESS THAT IT IS HARD TO SIT STILL: SEVERAL DAYS
3. WORRYING TOO MUCH ABOUT DIFFERENT THINGS: SEVERAL DAYS
7. FEELING AFRAID AS IF SOMETHING AWFUL MIGHT HAPPEN: NOT AT ALL
8. IF YOU CHECKED OFF ANY PROBLEMS, HOW DIFFICULT HAVE THESE MADE IT FOR YOU TO DO YOUR WORK, TAKE CARE OF THINGS AT HOME, OR GET ALONG WITH OTHER PEOPLE?: SOMEWHAT DIFFICULT
7. FEELING AFRAID AS IF SOMETHING AWFUL MIGHT HAPPEN: NOT AT ALL
6. BECOMING EASILY ANNOYED OR IRRITABLE: SEVERAL DAYS
GAD7 TOTAL SCORE: 5
2. NOT BEING ABLE TO STOP OR CONTROL WORRYING: NOT AT ALL
1. FEELING NERVOUS, ANXIOUS, OR ON EDGE: SEVERAL DAYS
GAD7 TOTAL SCORE: 5
IF YOU CHECKED OFF ANY PROBLEMS ON THIS QUESTIONNAIRE, HOW DIFFICULT HAVE THESE PROBLEMS MADE IT FOR YOU TO DO YOUR WORK, TAKE CARE OF THINGS AT HOME, OR GET ALONG WITH OTHER PEOPLE: SOMEWHAT DIFFICULT
GAD7 TOTAL SCORE: 5

## 2025-06-04 ASSESSMENT — SOCIAL DETERMINANTS OF HEALTH (SDOH): HOW OFTEN DO YOU GET TOGETHER WITH FRIENDS OR RELATIVES?: ONCE A WEEK

## 2025-06-04 ASSESSMENT — PAIN SCALES - GENERAL: PAINLEVEL_OUTOF10: MODERATE PAIN (6)

## 2025-06-04 ASSESSMENT — PATIENT HEALTH QUESTIONNAIRE - PHQ9
SUM OF ALL RESPONSES TO PHQ QUESTIONS 1-9: 6
SUM OF ALL RESPONSES TO PHQ QUESTIONS 1-9: 6
10. IF YOU CHECKED OFF ANY PROBLEMS, HOW DIFFICULT HAVE THESE PROBLEMS MADE IT FOR YOU TO DO YOUR WORK, TAKE CARE OF THINGS AT HOME, OR GET ALONG WITH OTHER PEOPLE: SOMEWHAT DIFFICULT

## 2025-06-04 ASSESSMENT — ASTHMA QUESTIONNAIRES
QUESTION_4 LAST FOUR WEEKS HOW OFTEN HAVE YOU USED YOUR RESCUE INHALER OR NEBULIZER MEDICATION (SUCH AS ALBUTEROL): ONCE A WEEK OR LESS
QUESTION_3 LAST FOUR WEEKS HOW OFTEN DID YOUR ASTHMA SYMPTOMS (WHEEZING, COUGHING, SHORTNESS OF BREATH, CHEST TIGHTNESS OR PAIN) WAKE YOU UP AT NIGHT OR EARLIER THAN USUAL IN THE MORNING: NOT AT ALL
ACT_TOTALSCORE: 24
QUESTION_5 LAST FOUR WEEKS HOW WOULD YOU RATE YOUR ASTHMA CONTROL: COMPLETELY CONTROLLED
QUESTION_2 LAST FOUR WEEKS HOW OFTEN HAVE YOU HAD SHORTNESS OF BREATH: NOT AT ALL
QUESTION_1 LAST FOUR WEEKS HOW MUCH OF THE TIME DID YOUR ASTHMA KEEP YOU FROM GETTING AS MUCH DONE AT WORK, SCHOOL OR AT HOME: NONE OF THE TIME

## 2025-06-04 NOTE — PATIENT INSTRUCTIONS
Schedule fasting labs.           Patient Education   Preventive Care Advice   This is general advice given by our system to help you stay healthy. However, your care team may have specific advice just for you. Please talk to your care team about your preventive care needs.  Nutrition  Eat 5 or more servings of fruits and vegetables each day.  Try wheat bread, brown rice and whole grain pasta (instead of white bread, rice, and pasta).  Get enough calcium and vitamin D. Check the label on foods and aim for 100% of the RDA (recommended daily allowance).  Lifestyle  Exercise at least 150 minutes each week  (30 minutes a day, 5 days a week).  Do muscle strengthening activities 2 days a week. These help control your weight and prevent disease.  No smoking.  Wear sunscreen to prevent skin cancer.  Have a dental exam and cleaning every 6 months.  Yearly exams  See your health care team every year to talk about:  Any changes in your health.  Any medicines your care team has prescribed.  Preventive care, family planning, and ways to prevent chronic diseases.  Shots (vaccines)   HPV shots (up to age 26), if you've never had them before.  Hepatitis B shots (up to age 59), if you've never had them before.  COVID-19 shot: Get this shot when it's due.  Flu shot: Get a flu shot every year.  Tetanus shot: Get a tetanus shot every 10 years.  Pneumococcal, hepatitis A, and RSV shots: Ask your care team if you need these based on your risk.  Shingles shot (for age 50 and up)  General health tests  Diabetes screening:  Starting at age 35, Get screened for diabetes at least every 3 years.  If you are younger than age 35, ask your care team if you should be screened for diabetes.  Cholesterol test: At age 39, start having a cholesterol test every 5 years, or more often if advised.  Bone density scan (DEXA): At age 50, ask your care team if you should have this scan for osteoporosis (brittle bones).  Hepatitis C: Get tested at least once  in your life.  STIs (sexually transmitted infections)  Before age 24: Ask your care team if you should be screened for STIs.  After age 24: Get screened for STIs if you're at risk. You are at risk for STIs (including HIV) if:  You are sexually active with more than one person.  You don't use condoms every time.  You or a partner was diagnosed with a sexually transmitted infection.  If you are at risk for HIV, ask about PrEP medicine to prevent HIV.  Get tested for HIV at least once in your life, whether you are at risk for HIV or not.  Cancer screening tests  Cervical cancer screening: If you have a cervix, begin getting regular cervical cancer screening tests starting at age 21.  Breast cancer scan (mammogram): If you've ever had breasts, begin having regular mammograms starting at age 40. This is a scan to check for breast cancer.  Colon cancer screening: It is important to start screening for colon cancer at age 45.  Have a colonoscopy test every 10 years (or more often if you're at risk) Or, ask your provider about stool tests like a FIT test every year or Cologuard test every 3 years.  To learn more about your testing options, visit:   .  For help making a decision, visit:   https://bit.ly/rq26213.  Prostate cancer screening test: If you have a prostate, ask your care team if a prostate cancer screening test (PSA) at age 55 is right for you.  Lung cancer screening: If you are a current or former smoker ages 50 to 80, ask your care team if ongoing lung cancer screenings are right for you.  For informational purposes only. Not to replace the advice of your health care provider. Copyright   2023 [facility]. All rights reserved. Clinically reviewed by the [facility]. RIWI 002544 - REV 01/24.  Learning About Stress  What is stress?     Stress is your body's response to a hard situation. Your body can have a physical, emotional, or mental response. Stress is a fact of  life for most people, and it affects everyone differently. What causes stress for you may not be stressful for someone else.  A lot of things can cause stress. You may feel stress when you go on a job interview, take a test, or run a race. This kind of short-term stress is normal and even useful. It can help you if you need to work hard or react quickly. For example, stress can help you finish an important job on time.  Long-term stress is caused by ongoing stressful situations or events. Examples of long-term stress include long-term health problems, ongoing problems at work, or conflicts in your family. Long-term stress can harm your health.  How does stress affect your health?  When you are stressed, your body responds as though you are in danger. It makes hormones that speed up your heart, make you breathe faster, and give you a burst of energy. This is called the fight-or-flight stress response. If the stress is over quickly, your body goes back to normal and no harm is done.  But if stress happens too often or lasts too long, it can have bad effects. Long-term stress can make you more likely to get sick, and it can make symptoms of some diseases worse. If you tense up when you are stressed, you may develop neck, shoulder, or low back pain. Stress is linked to high blood pressure and heart disease.  Stress also harms your emotional health. It can make you cohe, tense, or depressed. Your relationships may suffer, and you may not do well at work or school.  What can you do to manage stress?  You can try these things to help manage stress:   Do something active. Exercise or activity can help reduce stress. Walking is a great way to get started. Even everyday activities such as housecleaning or yard work can help.  Try yoga or noe chi. These techniques combine exercise and meditation. You may need some training at first to learn them.  Do something you enjoy. For example, listen to music or go to a movie. Practice  "your hobby or do volunteer work.  Meditate. This can help you relax, because you are not worrying about what happened before or what may happen in the future.  Do guided imagery. Imagine yourself in any setting that helps you feel calm. You can use online videos, books, or a teacher to guide you.  Do breathing exercises. For example:  From a standing position, bend forward from the waist with your knees slightly bent. Let your arms dangle close to the floor.  Breathe in slowly and deeply as you return to a standing position. Roll up slowly and lift your head last.  Hold your breath for just a few seconds in the standing position.  Breathe out slowly and bend forward from the waist.  Let your feelings out. Talk, laugh, cry, and express anger when you need to. Talking with supportive friends or family, a counselor, or a kezia leader about your feelings is a healthy way to relieve stress. Avoid discussing your feelings with people who make you feel worse.  Write. It may help to write about things that are bothering you. This helps you find out how much stress you feel and what is causing it. When you know this, you can find better ways to cope.  What can you do to prevent stress?  You might try some of these things to help prevent stress:  Manage your time. This helps you find time to do the things you want and need to do.  Get enough sleep. Your body recovers from the stresses of the day while you are sleeping.  Get support. Your family, friends, and community can make a difference in how you experience stress.  Limit your news feed. Avoid or limit time on social media or news that may make you feel stressed.  Do something active. Exercise or activity can help reduce stress. Walking is a great way to get started.  Where can you learn more?  Go to https://www.healthwise.net/patiented  Enter N032 in the search box to learn more about \"Learning About Stress.\"  Current as of: October 24, 2024  Content Version: 14.4    " 4200-7079 [employer].   Care instructions adapted under license by your healthcare professional. If you have questions about a medical condition or this instruction, always ask your healthcare professional. [employer] disclaims any warranty or liability for your use of this information.    Learning About Depression Screening  What is depression screening?  Depression screening is a way to see if you have depression symptoms. It may be done by a doctor or counselor. It's often part of a routine checkup. That's because your mental health is just as important as your physical health.  Depression is a mental health condition that affects how you feel, think, and act. You may:  Have less energy.  Lose interest in your daily activities.  Feel sad and grouchy for a long time.  Depression is very common. It affects people of all ages.  Many things can lead to depression. Some people become depressed after they have a stroke or find out they have a major illness like cancer or heart disease. The death of a loved one or a breakup may lead to depression. It can run in families. Most experts believe that a combination of inherited genes and stressful life events can cause it.  What happens during screening?  You may be asked to fill out a form about your depression symptoms. You and the doctor will discuss your answers. The doctor may ask you more questions to learn more about how you think, act, and feel.  What happens after screening?  If you have symptoms of depression, your doctor will talk to you about your options.  Doctors usually treat depression with medicines or counseling. Often, combining the two works best. Many people don't get help because they think that they'll get over the depression on their own. But people with depression may not get better unless they get treatment.  The cause of depression is not well understood. There may be many factors involved. But if you have depression, it's  "not your fault.  A serious symptom of depression is thinking about death or suicide. If you or someone you care about talks about this or about feeling hopeless, get help right away.  It's important to know that depression can be treated. Medicine, counseling, and self-care may help.  Where can you learn more?  Go to https://www.[employer].net/patiented  Enter T185 in the search box to learn more about \"Learning About Depression Screening.\"  Current as of: July 31, 2024  Content Version: 14.4    1395-9552 [employer].   Care instructions adapted under license by your healthcare professional. If you have questions about a medical condition or this instruction, always ask your healthcare professional. [employer] disclaims any warranty or liability for your use of this information.       "

## 2025-06-04 NOTE — PROGRESS NOTES
Preventive Care Visit  [facility]  Yarely Idania Wise MD, Family Medicine  Jun 4, 2025      Assessment & Plan     Routine general medical examination at a health care facility    Cervical cancer screening  - HPV and Gynecologic Cytology Panel - Recommended Age 30 - 65 Years    Plaque psoriasis  Working with dermatology.  Feels symptoms are currently well-controlled on low-dose Otezla  - apremilast (OTEZLA) 30 MG tablet; Take 1 tablet (30 mg) by mouth daily.    Anxiety  Current mild episode of major depressive disorder without prior episode  Is overall stable and doing well right now.  Did decrease her fluvoxamine dose on her own due to sexual side effects.  Will continue current dosing given stability  - fluvoxaMINE (LUVOX) 50 MG tablet; Take 1 tablet (50 mg) by mouth twice daily.  - Comprehensive metabolic panel (BMP + Alb, Alk Phos, ALT, AST, Total. Bili, TP); Future    IUD (intrauterine device) in place  Mirena in place.  She has amenorrhea.  Unclear if she is menopausal we did discuss the option of IUD removal given her age versus leaving in place.  She prefers the latter and we will remove at 10 years, 2017    Mild persistent asthma without complication  Is off her daily ICS/LABA and actually doing quite well.  She continues to follow with pulmonary    Gastroesophageal reflux disease, unspecified whether esophagitis present  Well-controlled on PPI.  Was able to wean off for period of time but now back on the medication regularly for control.  - Vitamin B12; Future    Prediabetes  Has been very busy caring for her children and her job and finds it hard to complete self-care.  Has been working on trying to eat less sugar.  We also discussed increasing activity level  - Comprehensive metabolic panel (BMP + Alb, Alk Phos, ALT, AST, Total. Bili, TP); Future  - Hemoglobin A1c; Future    Plantar fasciitis  This is really been limiting her activities recently.  Will have her see Ortho.  -  "Orthopedic  Referral; Future    Morbid obesity (H)  We reviewed healthy diet and exercise.  She is not interested in weight loss medications at this point but did offer visit to address if desired in the future.    Subcutaneous mass of back  Suspect lipoma based on exam but given her history of a desmoid tumor we will get an ultrasound.  This is really been asymptomatic for her and is not desiring removal unless there is concerns  - US Soft Tissue Chest Wall or Upper Back; Future    Screening cholesterol level  - Lipid panel reflex to direct LDL Non-fasting; Future    Encounter for screening mammogram for breast cancer  - MA Screen Bilateral w/Joaquín; Future            BMI  Estimated body mass index is 35.65 kg/m  as calculated from the following:    Height as of this encounter: 1.6 m (5' 3\").    Weight as of this encounter: 91.3 kg (201 lb 4 oz).   Weight management plan: Discussed healthy diet and exercise guidelines    Counseling  Appropriate preventive services were addressed with this patient via screening, questionnaire, or discussion as appropriate for fall prevention, nutrition, physical activity, Tobacco-use cessation, social engagement, weight loss and cognition.  Checklist reviewing preventive services available has been given to the patient.  Reviewed patient's diet, addressing concerns and/or questions.   She is at risk for lack of exercise and has been provided with information to increase physical activity for the benefit of her well-being.   She is at risk for psychosocial distress and has been provided with information to reduce risk.   The patient's PHQ-9 score is consistent with mild depression. She was provided with information regarding depression.       Follow-up    Follow-up Visit   Expected date:  Jun 04, 2026 (Approximate)      Follow Up Appointment Details:     Follow-up with whom?: PCP    Follow-Up for what?: Adult Preventive    How?: In Person                 The longitudinal plan " of care for the diagnosis(es)/condition(s) as documented were addressed during this visit. Due to the added complexity in care, I will continue to support Manju in the subsequent management and with ongoing continuity of care.    Evelyn Nunes is a 55 year old, presenting for the following:  Physical        6/4/2025     5:02 PM   Additional Questions   Roomed by Naila BLOUNT   Accompanied by self          Healthy Habits:     Getting at least 3 servings of Calcium per day:  Yes    Bi-annual eye exam:  NO    Dental care twice a year:  Yes    Sleep apnea or symptoms of sleep apnea:  None    Diet:  Regular (no restrictions)    Frequency of exercise:  1 day/week    Duration of exercise:  Other    Taking medications regularly:  Yes    Barriers to taking medications:  Cost of medication    Medication side effects:  None    Additional concerns today:  Yes (Weight Management)    Started otezla since last year with derm.     Migraines- controlled with botox. Imitrex every few mo    Resp: follow with pulmonary. Thrush from Symbicort. Stopped and haven't restarted it yet.  However, her asthma has remained well-controlled.  Has only needed Alb few times in last few months.     Mood stable ith current dosing.   Decreased fluvaxamine down as had AE at higher dose.  She thinks the sexual side effects are slightly improved on the lower dose.  [facility] too [facility].  Continues to have low libido    GI: weaned off prevacid but 5-6 months later sx's returned. Restarted med and now symptoms are controlled.  Hasn't been able to afford linzess but planning to restart when can    Declines covid 19 vaccine.    IUD in place. No cycles.     Working on eating less sugar. Eat a lot of fruits and veggies.     P;lantar fasciitis flared right now. Injection in the past. Wearing good shoes, taping.       Advance Care Planning    Discussed advance care planning with patient;  informed AVS has link to [facility].        6/4/2025   General Health   How would you rate your overall physical health? (!) FAIR   Feel stress (tense, anxious, or unable to sleep) Very much   (!) STRESS CONCERN      6/4/2025   Nutrition   Three or more servings of calcium each day? Yes   Diet: Regular (no restrictions)   How many servings of fruit and vegetables per day? (!) 2-3   How many sweetened beverages each day? 0-1         6/4/2025   Exercise   Days per week of moderate/strenous exercise 1 day   (!) EXERCISE CONCERN      6/4/2025   Social Factors   Frequency of gathering with friends or relatives Once a week   Worry food won't last until get money to buy more No   Food not last or not have enough money for food? No   Do you have housing? (Housing is defined as stable permanent housing and does not include staying outside in a car, in a tent, in an abandoned building, in an overnight shelter, or couch-surfing.) Yes   Are you worried about losing your housing? No   Lack of transportation? No   Unable to get utilities (heat,electricity)? No         6/4/2025   Fall Risk   Fallen 2 or more times in the past year? No   Trouble with walking or balance? No          6/4/2025   Dental   Dentist two times every year? Yes       Today's PHQ-9 Score:       6/4/2025     4:57 PM   PHQ-9 SCORE   PHQ-9 Total Score MyChart 6 (Mild depression)   PHQ-9 Total Score 6        Patient-reported         6/4/2025   Substance Use   Alcohol more than 3/day or more than 7/wk No   Do you use any other substances recreationally? No     Social History     Tobacco Use    Smoking status: Never     Passive exposure: Never    Smokeless tobacco: Never   Vaping Use    Vaping status: Never Used   Substance Use Topics    Alcohol use: Yes     Comment: Alcoholic Drinks/day: Drinks socially.    Drug use: Never           6/12/2024   LAST FHS-7 RESULTS   1st degree relative breast or ovarian cancer No   Any relative bilateral breast cancer No  "  Any male have breast cancer No   Any ONE woman have BOTH breast AND ovarian cancer No   Any woman with breast cancer before 50yrs No   2 or more relatives with breast AND/OR ovarian cancer No   2 or more relatives with breast AND/OR bowel cancer No        Mammogram Screening - Mammogram every 1-2 years updated in Health Maintenance based on mutual decision making        6/4/2025   STI Screening   New sexual partner(s) since last STI/HIV test? No     History of abnormal Pap smear: No - age 21 - 65 - Patient with other risk factor requiring more frequent screening - see link Cervical Cytology Screening Guidelines        Latest Ref Rng & Units 3/23/2022     3:41 PM 10/26/2012     1:14 PM   PAP / HPV   PAP  Negative for Intraepithelial Lesion or Malignancy (NILM)     PAP (Historical)   NIL    HPV 16 DNA Negative Negative     HPV 18 DNA Negative Negative     Other HR HPV Negative Negative       ASCVD Risk   The 10-year ASCVD risk score (Margaret UP, et al., 2019) is: 1%    Values used to calculate the score:      Age: 55 years      Sex: Female      Is Non- : No      Diabetic: No      Tobacco smoker: No      Systolic Blood Pressure: 115 mmHg      Is BP treated: No      HDL Cholesterol: 85 mg/dL      Total Cholesterol: 233 mg/dL           Reviewed and updated as needed this visit by Provider     Meds                      Review of Systems  Constitutional, neuro, ENT, endocrine, pulmonary, cardiac, gastrointestinal, genitourinary, musculoskeletal, integument and psychiatric systems are negative, except as otherwise noted.     Objective    Exam  /78 (BP Location: Right arm, Patient Position: Sitting, Cuff Size: Adult Large)   Pulse 83   Temp 97.7  F (36.5  C) (Temporal)   Resp 14   Ht 1.6 m (5' 3\")   Wt 91.3 kg (201 lb 4 oz)   SpO2 97%   BMI 35.65 kg/m     Estimated body mass index is 35.65 kg/m  as calculated from the following:    Height as of this encounter: 1.6 m (5' 3\").    " Weight as of this encounter: 91.3 kg (201 lb 4 oz).    Physical Exam  GENERAL: alert and no distress  EYES: Eyes grossly normal to inspection, PERRL and conjunctivae and sclerae normal  HENT: ear canals and TM's normal, nose and mouth without ulcers or lesions  NECK: no adenopathy, no asymmetry, masses, or scars  RESP: lungs clear to auscultation - no rales, rhonchi or wheezes  BREAST: normal without masses, tenderness or nipple discharge and no palpable axillary masses or adenopathy  CV: regular rate and rhythm, normal S1 S2, no S3 or S4, no murmur, click or rub, no peripheral edema  ABDOMEN: soft, nontender, no hepatosplenomegaly, no masses and bowel sounds normal   (female) w/bimanual: normal female external genitalia, normal urethral meatus, normal vaginal mucosa, and normal cervix/adnexa/uterus without masses or discharge.  IUD string seen in cervical os.  MS: no gross musculoskeletal defects noted, no edema  SKIN: no suspicious lesions or rashes.  Approximately 2 to 3 cm sized subcutaneous smooth mobile mass left upper back.  NEURO: Normal strength and tone, mentation intact and speech normal  PSYCH: mentation appears normal, affect normal/bright        Signed Electronically by: Yarely Wise MD    Answers submitted by the patient for this visit:  Patient Health Questionnaire (Submitted on 6/4/2025)  If you checked off any problems, how difficult have these problems made it for you to do your work, take care of things at home, or get along with other people?: Somewhat difficult  PHQ9 TOTAL SCORE: 6  Patient Health Questionnaire (G7) (Submitted on 6/4/2025)  ESPERANZA 7 TOTAL SCORE: 5

## 2025-06-05 ENCOUNTER — PATIENT OUTREACH (OUTPATIENT)
Dept: CARE COORDINATION | Facility: CLINIC | Age: 55
End: 2025-06-05
Payer: COMMERCIAL

## 2025-06-09 ENCOUNTER — RESULTS FOLLOW-UP (OUTPATIENT)
Dept: OBGYN | Facility: CLINIC | Age: 55
End: 2025-06-09

## 2025-06-09 ENCOUNTER — PATIENT OUTREACH (OUTPATIENT)
Dept: CARE COORDINATION | Facility: CLINIC | Age: 55
End: 2025-06-09
Payer: COMMERCIAL

## 2025-06-10 DIAGNOSIS — L40.0 PLAQUE PSORIASIS: ICD-10-CM

## 2025-06-10 LAB
BKR AP ASSOCIATED HPV REPORT: NORMAL
BKR LAB AP GYN ADEQUACY: NORMAL
BKR LAB AP GYN INTERPRETATION: NORMAL
BKR LAB AP PREVIOUS ABNORMAL: NORMAL
PATH REPORT.COMMENTS IMP SPEC: NORMAL
PATH REPORT.COMMENTS IMP SPEC: NORMAL
PATH REPORT.RELEVANT HX SPEC: NORMAL

## 2025-06-10 NOTE — TELEPHONE ENCOUNTER
Per chart review, patient has reduced Otezla to one tablet daily. She has a follow up with Dr. Velasquez in December. MT to reach out to schedule a follow up with me, last visit 3/21. Refills sent per LULU Patel, MichaelD, MPH  Medication Therapy Management Pharmacist  [facility]

## 2025-06-18 ENCOUNTER — TELEPHONE (OUTPATIENT)
Dept: DERMATOLOGY | Facility: CLINIC | Age: 55
End: 2025-06-18
Payer: COMMERCIAL

## 2025-07-10 ENCOUNTER — ANCILLARY PROCEDURE (OUTPATIENT)
Dept: ULTRASOUND IMAGING | Facility: CLINIC | Age: 55
End: 2025-07-10
Attending: FAMILY MEDICINE
Payer: COMMERCIAL

## 2025-07-10 ENCOUNTER — ANCILLARY PROCEDURE (OUTPATIENT)
Dept: MAMMOGRAPHY | Facility: CLINIC | Age: 55
End: 2025-07-10
Attending: FAMILY MEDICINE
Payer: COMMERCIAL

## 2025-07-10 DIAGNOSIS — Z12.31 ENCOUNTER FOR SCREENING MAMMOGRAM FOR BREAST CANCER: ICD-10-CM

## 2025-07-10 DIAGNOSIS — R22.2 SUBCUTANEOUS MASS OF BACK: ICD-10-CM

## 2025-07-10 PROCEDURE — 77067 SCR MAMMO BI INCL CAD: CPT | Mod: GC

## 2025-07-10 PROCEDURE — 76604 US EXAM CHEST: CPT | Mod: 52 | Performed by: STUDENT IN AN ORGANIZED HEALTH CARE EDUCATION/TRAINING PROGRAM

## 2025-07-10 PROCEDURE — 77063 BREAST TOMOSYNTHESIS BI: CPT | Mod: GC

## 2025-08-18 DIAGNOSIS — M54.2 CERVICALGIA: ICD-10-CM

## 2025-08-18 RX ORDER — CHLORZOXAZONE 500 MG/1
TABLET ORAL
Qty: 90 TABLET | Refills: 1 | Status: SHIPPED | OUTPATIENT
Start: 2025-08-18

## 2025-08-19 ENCOUNTER — TELEPHONE (OUTPATIENT)
Dept: FAMILY MEDICINE | Facility: CLINIC | Age: 55
End: 2025-08-19
Payer: COMMERCIAL

## 2025-08-20 ENCOUNTER — PRE VISIT (OUTPATIENT)
Dept: ORTHOPEDICS | Facility: CLINIC | Age: 55
End: 2025-08-20

## 2025-08-21 ENCOUNTER — VIRTUAL VISIT (OUTPATIENT)
Dept: PHARMACY | Facility: CLINIC | Age: 55
End: 2025-08-21
Attending: DERMATOLOGY
Payer: COMMERCIAL

## 2025-08-21 DIAGNOSIS — L40.0 PLAQUE PSORIASIS: Primary | ICD-10-CM

## 2025-08-25 ENCOUNTER — OFFICE VISIT (OUTPATIENT)
Dept: NEUROLOGY | Facility: CLINIC | Age: 55
End: 2025-08-25
Payer: COMMERCIAL

## 2025-08-25 DIAGNOSIS — G43.709 CHRONIC MIGRAINE WITHOUT AURA WITHOUT STATUS MIGRAINOSUS, NOT INTRACTABLE: Primary | ICD-10-CM

## 2025-08-25 PROCEDURE — 64615 CHEMODENERV MUSC MIGRAINE: CPT | Performed by: NURSE PRACTITIONER

## 2025-09-01 ENCOUNTER — PATIENT OUTREACH (OUTPATIENT)
Dept: CARE COORDINATION | Facility: CLINIC | Age: 55
End: 2025-09-01
Payer: COMMERCIAL

## 2025-09-03 ENCOUNTER — PATIENT OUTREACH (OUTPATIENT)
Dept: CARE COORDINATION | Facility: CLINIC | Age: 55
End: 2025-09-03

## (undated) DEVICE — SU PDS II 0 ENDOLOOP EZ10G

## (undated) DEVICE — ENDO TROCAR SLEEVE KII Z-THREADED 05X100MM CTS02

## (undated) DEVICE — GLOVE PROTEXIS MICRO 6.0  2D73PM60

## (undated) DEVICE — CLIP APPLIER ENDO 5MM M/L LIGAMAX EL5ML

## (undated) DEVICE — ESU GROUND PAD ADULT W/CORD E7507

## (undated) DEVICE — SU VICRYL 0 UR-6 27" J603H

## (undated) DEVICE — ESU GROUND PAD UNIVERSAL W/O CORD

## (undated) DEVICE — ESU HOLDER LAP INST DISP PURPLE LONG 330MM H-PRO-330

## (undated) DEVICE — ENDO BITE BLOCK 60 MAXI LF 00712804

## (undated) DEVICE — SU VICRYL 3-0 SH 27" J316H

## (undated) DEVICE — MANIFOLD NEPTUNE 4 PORT 700-20

## (undated) DEVICE — ENDO SCOPE WARMER LF TM500

## (undated) DEVICE — SOL WATER IRRIG 1000ML BOTTLE 2F7114

## (undated) DEVICE — ENDO TROCAR FIRST ENTRY KII FIOS Z-THRD 12X100MM CTF73

## (undated) DEVICE — EVAC SYSTEM CLEAR FLOW SC082500

## (undated) DEVICE — PREP CHLORAPREP 26ML TINTED HI-LITE ORANGE 930815

## (undated) DEVICE — SU MONOCRYL 4-0 PS-2 18" UND Y496G

## (undated) DEVICE — ENDO POUCH UNIV RETRIEVAL SYSTEM INZII 10MM CD001

## (undated) DEVICE — SOL NACL 0.9% INJ 1000ML BAG 2B1324X

## (undated) DEVICE — DEVICE SUTURE GRASPER TROCAR CLOSURE 14GA PMITCSG

## (undated) DEVICE — RAD RX ISOVUE 300 (50ML) 61% IOPAMIDOL CHARGE PER ML

## (undated) DEVICE — GLOVE PROTEXIS BLUE W/NEU-THERA 6.5  2D73EB65

## (undated) DEVICE — SUCTION IRR STRYKERFLOW II W/TIP 250-070-520

## (undated) DEVICE — ENDO TROCAR FIRST ENTRY KII FIOS Z-THRD 05X100MM CTF03

## (undated) DEVICE — LINEN TOWEL PACK X5 5464

## (undated) DEVICE — PACK LAP CHOLE SLC15LCFSD

## (undated) RX ORDER — HYDROMORPHONE HYDROCHLORIDE 1 MG/ML
INJECTION, SOLUTION INTRAMUSCULAR; INTRAVENOUS; SUBCUTANEOUS
Status: DISPENSED
Start: 2022-09-28

## (undated) RX ORDER — LIDOCAINE HYDROCHLORIDE 20 MG/ML
INJECTION, SOLUTION EPIDURAL; INFILTRATION; INTRACAUDAL; PERINEURAL
Status: DISPENSED
Start: 2022-09-28

## (undated) RX ORDER — HYDROMORPHONE HCL IN WATER/PF 6 MG/30 ML
PATIENT CONTROLLED ANALGESIA SYRINGE INTRAVENOUS
Status: DISPENSED
Start: 2022-09-28

## (undated) RX ORDER — PIPERACILLIN SODIUM, TAZOBACTAM SODIUM 3; .375 G/15ML; G/15ML
INJECTION, POWDER, LYOPHILIZED, FOR SOLUTION INTRAVENOUS
Status: DISPENSED
Start: 2022-09-28

## (undated) RX ORDER — BUPIVACAINE HYDROCHLORIDE AND EPINEPHRINE 5; 5 MG/ML; UG/ML
INJECTION, SOLUTION EPIDURAL; INTRACAUDAL; PERINEURAL
Status: DISPENSED
Start: 2022-09-28

## (undated) RX ORDER — KETOROLAC TROMETHAMINE 30 MG/ML
INJECTION, SOLUTION INTRAMUSCULAR; INTRAVENOUS
Status: DISPENSED
Start: 2022-09-28

## (undated) RX ORDER — LIDOCAINE HYDROCHLORIDE 10 MG/ML
INJECTION, SOLUTION EPIDURAL; INFILTRATION; INTRACAUDAL; PERINEURAL
Status: DISPENSED
Start: 2022-09-28

## (undated) RX ORDER — FENTANYL CITRATE 0.05 MG/ML
INJECTION, SOLUTION INTRAMUSCULAR; INTRAVENOUS
Status: DISPENSED
Start: 2022-09-28

## (undated) RX ORDER — ONDANSETRON 2 MG/ML
INJECTION INTRAMUSCULAR; INTRAVENOUS
Status: DISPENSED
Start: 2022-09-28

## (undated) RX ORDER — FENTANYL CITRATE 50 UG/ML
INJECTION, SOLUTION INTRAMUSCULAR; INTRAVENOUS
Status: DISPENSED
Start: 2022-09-27

## (undated) RX ORDER — ONDANSETRON 2 MG/ML
INJECTION INTRAMUSCULAR; INTRAVENOUS
Status: DISPENSED
Start: 2022-09-27

## (undated) RX ORDER — DIPHENHYDRAMINE HYDROCHLORIDE 50 MG/ML
INJECTION INTRAMUSCULAR; INTRAVENOUS
Status: DISPENSED
Start: 2022-09-28

## (undated) RX ORDER — FENTANYL CITRATE 50 UG/ML
INJECTION, SOLUTION INTRAMUSCULAR; INTRAVENOUS
Status: DISPENSED
Start: 2022-09-28

## (undated) RX ORDER — PROPOFOL 10 MG/ML
INJECTION, EMULSION INTRAVENOUS
Status: DISPENSED
Start: 2022-09-28